# Patient Record
Sex: FEMALE | Race: BLACK OR AFRICAN AMERICAN | Employment: OTHER | URBAN - METROPOLITAN AREA
[De-identification: names, ages, dates, MRNs, and addresses within clinical notes are randomized per-mention and may not be internally consistent; named-entity substitution may affect disease eponyms.]

---

## 2017-07-08 ENCOUNTER — ED HISTORICAL/CONVERTED ENCOUNTER (OUTPATIENT)
Dept: OTHER | Age: 57
End: 2017-07-08

## 2018-05-09 ENCOUNTER — IP HISTORICAL/CONVERTED ENCOUNTER (OUTPATIENT)
Dept: OTHER | Age: 58
End: 2018-05-09

## 2018-09-18 ENCOUNTER — OP HISTORICAL/CONVERTED ENCOUNTER (OUTPATIENT)
Dept: OTHER | Age: 58
End: 2018-09-18

## 2019-01-11 ENCOUNTER — OP HISTORICAL/CONVERTED ENCOUNTER (OUTPATIENT)
Dept: OTHER | Age: 59
End: 2019-01-11

## 2019-02-25 ENCOUNTER — ED HISTORICAL/CONVERTED ENCOUNTER (OUTPATIENT)
Dept: OTHER | Age: 59
End: 2019-02-25

## 2019-04-08 ENCOUNTER — ED HISTORICAL/CONVERTED ENCOUNTER (OUTPATIENT)
Dept: OTHER | Age: 59
End: 2019-04-08

## 2019-10-31 ENCOUNTER — IP HISTORICAL/CONVERTED ENCOUNTER (OUTPATIENT)
Dept: OTHER | Age: 59
End: 2019-10-31

## 2020-01-24 ENCOUNTER — ED HISTORICAL/CONVERTED ENCOUNTER (OUTPATIENT)
Dept: OTHER | Age: 60
End: 2020-01-24

## 2020-02-20 ENCOUNTER — ED HISTORICAL/CONVERTED ENCOUNTER (OUTPATIENT)
Dept: OTHER | Age: 60
End: 2020-02-20

## 2020-12-13 ENCOUNTER — APPOINTMENT (OUTPATIENT)
Dept: GENERAL RADIOLOGY | Age: 60
DRG: 140 | End: 2020-12-13
Attending: EMERGENCY MEDICINE
Payer: MEDICAID

## 2020-12-13 ENCOUNTER — HOSPITAL ENCOUNTER (INPATIENT)
Age: 60
LOS: 5 days | Discharge: HOME | DRG: 140 | End: 2020-12-18
Attending: EMERGENCY MEDICINE | Admitting: INTERNAL MEDICINE
Payer: MEDICAID

## 2020-12-13 DIAGNOSIS — J18.9 COMMUNITY ACQUIRED PNEUMONIA, UNSPECIFIED LATERALITY: Primary | ICD-10-CM

## 2020-12-13 DIAGNOSIS — J44.9 CHRONIC OBSTRUCTIVE PULMONARY DISEASE, UNSPECIFIED COPD TYPE (HCC): ICD-10-CM

## 2020-12-13 PROBLEM — R09.02 HYPOXIA: Status: ACTIVE | Noted: 2020-12-13

## 2020-12-13 LAB
ANION GAP SERPL CALC-SCNC: 4 MMOL/L (ref 5–15)
ATRIAL RATE: 98 BPM
BASOPHILS # BLD: 0 K/UL (ref 0–0.1)
BASOPHILS NFR BLD: 0 % (ref 0–1)
BNP SERPL-MCNC: 67 PG/ML
BUN SERPL-MCNC: 21 MG/DL (ref 6–20)
BUN/CREAT SERPL: 18 (ref 12–20)
CA-I BLD-MCNC: 9.3 MG/DL (ref 8.5–10.1)
CALCULATED P AXIS, ECG09: 59 DEGREES
CALCULATED R AXIS, ECG10: -11 DEGREES
CALCULATED T AXIS, ECG11: 66 DEGREES
CHLORIDE SERPL-SCNC: 106 MMOL/L (ref 97–108)
CO2 SERPL-SCNC: 30 MMOL/L (ref 21–32)
COVID-19 RAPID TEST, COVR: NOT DETECTED
CREAT SERPL-MCNC: 1.2 MG/DL (ref 0.55–1.02)
DIAGNOSIS, 93000: NORMAL
DIFFERENTIAL METHOD BLD: ABNORMAL
EOSINOPHIL # BLD: 0 K/UL (ref 0–0.4)
EOSINOPHIL NFR BLD: 0 % (ref 0–7)
ERYTHROCYTE [DISTWIDTH] IN BLOOD BY AUTOMATED COUNT: 14.8 % (ref 11.5–14.5)
FLUAV AG NPH QL IA: NEGATIVE
FLUBV AG NOSE QL IA: NEGATIVE
GLUCOSE SERPL-MCNC: 99 MG/DL (ref 65–100)
HCT VFR BLD AUTO: 41.3 % (ref 35–47)
HGB BLD-MCNC: 13 G/DL (ref 11.5–16)
IMM GRANULOCYTES # BLD AUTO: 0 K/UL (ref 0–0.04)
IMM GRANULOCYTES NFR BLD AUTO: 0 % (ref 0–0.5)
LYMPHOCYTES # BLD: 1.4 K/UL (ref 0.8–3.5)
LYMPHOCYTES NFR BLD: 20 % (ref 12–49)
MCH RBC QN AUTO: 30.2 PG (ref 26–34)
MCHC RBC AUTO-ENTMCNC: 31.5 G/DL (ref 30–36.5)
MCV RBC AUTO: 95.8 FL (ref 80–99)
MONOCYTES # BLD: 0.3 K/UL (ref 0–1)
MONOCYTES NFR BLD: 5 % (ref 5–13)
NEUTS SEG # BLD: 5.3 K/UL (ref 1.8–8)
NEUTS SEG NFR BLD: 75 % (ref 32–75)
P-R INTERVAL, ECG05: 156 MS
PLATELET # BLD AUTO: 329 K/UL (ref 150–400)
PMV BLD AUTO: 10.6 FL (ref 8.9–12.9)
POTASSIUM SERPL-SCNC: 4.1 MMOL/L (ref 3.5–5.1)
Q-T INTERVAL, ECG07: 348 MS
QRS DURATION, ECG06: 70 MS
QTC CALCULATION (BEZET), ECG08: 444 MS
RBC # BLD AUTO: 4.31 M/UL (ref 3.8–5.2)
SARS-COV-2, COV2: NORMAL
SODIUM SERPL-SCNC: 140 MMOL/L (ref 136–145)
SPECIMEN SOURCE: NORMAL
TROPONIN I SERPL-MCNC: <0.05 NG/ML
VENTRICULAR RATE, ECG03: 98 BPM
WBC # BLD AUTO: 7.1 K/UL (ref 3.6–11)

## 2020-12-13 PROCEDURE — 74011000250 HC RX REV CODE- 250

## 2020-12-13 PROCEDURE — 36415 COLL VENOUS BLD VENIPUNCTURE: CPT

## 2020-12-13 PROCEDURE — 80048 BASIC METABOLIC PNL TOTAL CA: CPT

## 2020-12-13 PROCEDURE — 84484 ASSAY OF TROPONIN QUANT: CPT

## 2020-12-13 PROCEDURE — 71045 X-RAY EXAM CHEST 1 VIEW: CPT

## 2020-12-13 PROCEDURE — 94640 AIRWAY INHALATION TREATMENT: CPT

## 2020-12-13 PROCEDURE — 96365 THER/PROPH/DIAG IV INF INIT: CPT

## 2020-12-13 PROCEDURE — 74011250637 HC RX REV CODE- 250/637: Performed by: INTERNAL MEDICINE

## 2020-12-13 PROCEDURE — 99218 HC RM OBSERVATION: CPT

## 2020-12-13 PROCEDURE — 74011000250 HC RX REV CODE- 250: Performed by: INTERNAL MEDICINE

## 2020-12-13 PROCEDURE — 74011250636 HC RX REV CODE- 250/636: Performed by: INTERNAL MEDICINE

## 2020-12-13 PROCEDURE — 87635 SARS-COV-2 COVID-19 AMP PRB: CPT

## 2020-12-13 PROCEDURE — 93005 ELECTROCARDIOGRAM TRACING: CPT

## 2020-12-13 PROCEDURE — 65270000029 HC RM PRIVATE

## 2020-12-13 PROCEDURE — 74011250637 HC RX REV CODE- 250/637: Performed by: EMERGENCY MEDICINE

## 2020-12-13 PROCEDURE — 87804 INFLUENZA ASSAY W/OPTIC: CPT

## 2020-12-13 PROCEDURE — 85025 COMPLETE CBC W/AUTO DIFF WBC: CPT

## 2020-12-13 PROCEDURE — 74011250636 HC RX REV CODE- 250/636: Performed by: EMERGENCY MEDICINE

## 2020-12-13 PROCEDURE — 99285 EMERGENCY DEPT VISIT HI MDM: CPT

## 2020-12-13 PROCEDURE — 96375 TX/PRO/DX INJ NEW DRUG ADDON: CPT

## 2020-12-13 PROCEDURE — 83880 ASSAY OF NATRIURETIC PEPTIDE: CPT

## 2020-12-13 PROCEDURE — 77010033678 HC OXYGEN DAILY

## 2020-12-13 PROCEDURE — 74011000250 HC RX REV CODE- 250: Performed by: EMERGENCY MEDICINE

## 2020-12-13 PROCEDURE — 94760 N-INVAS EAR/PLS OXIMETRY 1: CPT

## 2020-12-13 RX ORDER — IPRATROPIUM BROMIDE AND ALBUTEROL SULFATE 2.5; .5 MG/3ML; MG/3ML
3 SOLUTION RESPIRATORY (INHALATION)
Status: COMPLETED | OUTPATIENT
Start: 2020-12-13 | End: 2020-12-13

## 2020-12-13 RX ORDER — ONDANSETRON 2 MG/ML
4 INJECTION INTRAMUSCULAR; INTRAVENOUS
Status: DISCONTINUED | OUTPATIENT
Start: 2020-12-13 | End: 2020-12-18 | Stop reason: HOSPADM

## 2020-12-13 RX ORDER — DIPHENHYDRAMINE HYDROCHLORIDE 50 MG/ML
25 INJECTION, SOLUTION INTRAMUSCULAR; INTRAVENOUS
Status: COMPLETED | OUTPATIENT
Start: 2020-12-13 | End: 2020-12-13

## 2020-12-13 RX ORDER — MORPHINE SULFATE 2 MG/ML
4 INJECTION, SOLUTION INTRAMUSCULAR; INTRAVENOUS
Status: DISCONTINUED | OUTPATIENT
Start: 2020-12-13 | End: 2020-12-13

## 2020-12-13 RX ORDER — ERGOCALCIFEROL 1.25 MG/1
50000 CAPSULE ORAL
COMMUNITY

## 2020-12-13 RX ORDER — BENZONATATE 100 MG/1
100 CAPSULE ORAL
Status: DISCONTINUED | OUTPATIENT
Start: 2020-12-13 | End: 2020-12-18 | Stop reason: HOSPADM

## 2020-12-13 RX ORDER — MAGNESIUM SULFATE 1 G/100ML
1 INJECTION INTRAVENOUS
Status: COMPLETED | OUTPATIENT
Start: 2020-12-13 | End: 2020-12-13

## 2020-12-13 RX ORDER — ALBUTEROL SULFATE 0.63 MG/3ML
0.63 SOLUTION RESPIRATORY (INHALATION)
COMMUNITY
End: 2020-12-18

## 2020-12-13 RX ORDER — AZITHROMYCIN 500 MG/1
500 TABLET, FILM COATED ORAL
Status: COMPLETED | OUTPATIENT
Start: 2020-12-13 | End: 2020-12-13

## 2020-12-13 RX ORDER — ALBUTEROL SULFATE 2.5 MG/.5ML
SOLUTION RESPIRATORY (INHALATION)
Status: COMPLETED
Start: 2020-12-13 | End: 2020-12-13

## 2020-12-13 RX ORDER — ENOXAPARIN SODIUM 100 MG/ML
40 INJECTION SUBCUTANEOUS EVERY 24 HOURS
Status: DISCONTINUED | OUTPATIENT
Start: 2020-12-13 | End: 2020-12-18 | Stop reason: HOSPADM

## 2020-12-13 RX ORDER — BENZONATATE 100 MG/1
100 CAPSULE ORAL ONCE
COMMUNITY

## 2020-12-13 RX ORDER — FUROSEMIDE 10 MG/ML
40 INJECTION INTRAMUSCULAR; INTRAVENOUS ONCE
Status: COMPLETED | OUTPATIENT
Start: 2020-12-13 | End: 2020-12-13

## 2020-12-13 RX ORDER — MONTELUKAST SODIUM 10 MG/1
10 TABLET ORAL DAILY
COMMUNITY

## 2020-12-13 RX ORDER — AMLODIPINE BESYLATE 10 MG/1
10 TABLET ORAL DAILY
COMMUNITY

## 2020-12-13 RX ORDER — PREDNISONE 20 MG/1
40 TABLET ORAL
Status: DISCONTINUED | OUTPATIENT
Start: 2020-12-14 | End: 2020-12-13

## 2020-12-13 RX ORDER — SODIUM CHLORIDE 0.9 % (FLUSH) 0.9 %
5-40 SYRINGE (ML) INJECTION EVERY 8 HOURS
Status: DISCONTINUED | OUTPATIENT
Start: 2020-12-13 | End: 2020-12-18

## 2020-12-13 RX ORDER — SODIUM CHLORIDE 0.9 % (FLUSH) 0.9 %
5-40 SYRINGE (ML) INJECTION AS NEEDED
Status: DISCONTINUED | OUTPATIENT
Start: 2020-12-13 | End: 2020-12-18 | Stop reason: HOSPADM

## 2020-12-13 RX ORDER — BUDESONIDE AND FORMOTEROL FUMARATE DIHYDRATE 80; 4.5 UG/1; UG/1
2 AEROSOL RESPIRATORY (INHALATION)
Status: DISCONTINUED | OUTPATIENT
Start: 2020-12-13 | End: 2020-12-18 | Stop reason: HOSPADM

## 2020-12-13 RX ORDER — ALBUTEROL SULFATE 90 UG/1
2 AEROSOL, METERED RESPIRATORY (INHALATION)
COMMUNITY
End: 2021-08-08 | Stop reason: ALTCHOICE

## 2020-12-13 RX ORDER — ADHESIVE BANDAGE
30 BANDAGE TOPICAL DAILY PRN
Status: DISCONTINUED | OUTPATIENT
Start: 2020-12-13 | End: 2020-12-18 | Stop reason: HOSPADM

## 2020-12-13 RX ORDER — ALBUTEROL SULFATE 2.5 MG/.5ML
2.5 SOLUTION RESPIRATORY (INHALATION)
Status: DISCONTINUED | OUTPATIENT
Start: 2020-12-13 | End: 2020-12-14

## 2020-12-13 RX ORDER — FLUTICASONE PROPIONATE 50 MCG
2 SPRAY, SUSPENSION (ML) NASAL DAILY
COMMUNITY

## 2020-12-13 RX ORDER — CALCIUM CARBONATE/VITAMIN D3 500 MG-10
TABLET ORAL DAILY
COMMUNITY

## 2020-12-13 RX ORDER — THERA TABS 400 MCG
1 TAB ORAL DAILY
COMMUNITY
End: 2021-08-08 | Stop reason: ALTCHOICE

## 2020-12-13 RX ORDER — IPRATROPIUM BROMIDE 0.5 MG/2.5ML
0.5 SOLUTION RESPIRATORY (INHALATION)
Status: DISCONTINUED | OUTPATIENT
Start: 2020-12-13 | End: 2020-12-14

## 2020-12-13 RX ORDER — LORAZEPAM 0.5 MG/1
0.5 TABLET ORAL
Status: DISCONTINUED | OUTPATIENT
Start: 2020-12-13 | End: 2020-12-18 | Stop reason: HOSPADM

## 2020-12-13 RX ORDER — FUROSEMIDE 40 MG/1
40 TABLET ORAL DAILY
COMMUNITY
End: 2020-12-18

## 2020-12-13 RX ORDER — ACETAMINOPHEN 325 MG/1
650 TABLET ORAL
Status: DISCONTINUED | OUTPATIENT
Start: 2020-12-13 | End: 2020-12-18 | Stop reason: HOSPADM

## 2020-12-13 RX ADMIN — CEFTRIAXONE SODIUM 1 G: 1 INJECTION, POWDER, FOR SOLUTION INTRAMUSCULAR; INTRAVENOUS at 11:28

## 2020-12-13 RX ADMIN — IPRATROPIUM BROMIDE 0.5 MG: 0.5 SOLUTION RESPIRATORY (INHALATION) at 13:33

## 2020-12-13 RX ADMIN — AZITHROMYCIN 500 MG: 500 INJECTION, POWDER, LYOPHILIZED, FOR SOLUTION INTRAVENOUS at 11:32

## 2020-12-13 RX ADMIN — BUDESONIDE AND FORMOTEROL FUMARATE DIHYDRATE 2 PUFF: 80; 4.5 AEROSOL RESPIRATORY (INHALATION) at 19:20

## 2020-12-13 RX ADMIN — ALBUTEROL SULFATE 2.5 MG: 2.5 SOLUTION RESPIRATORY (INHALATION) at 19:21

## 2020-12-13 RX ADMIN — IPRATROPIUM BROMIDE AND ALBUTEROL SULFATE 3 ML: .5; 3 SOLUTION RESPIRATORY (INHALATION) at 04:43

## 2020-12-13 RX ADMIN — METHYLPREDNISOLONE SODIUM SUCCINATE 125 MG: 125 INJECTION, POWDER, FOR SOLUTION INTRAMUSCULAR; INTRAVENOUS at 04:44

## 2020-12-13 RX ADMIN — ENOXAPARIN SODIUM 40 MG: 40 INJECTION SUBCUTANEOUS at 11:29

## 2020-12-13 RX ADMIN — ALBUTEROL SULFATE 2.5 MG: 2.5 SOLUTION RESPIRATORY (INHALATION) at 13:33

## 2020-12-13 RX ADMIN — MAGNESIUM SULFATE HEPTAHYDRATE 1 G: 1 INJECTION, SOLUTION INTRAVENOUS at 04:44

## 2020-12-13 RX ADMIN — METHYLPREDNISOLONE SODIUM SUCCINATE 40 MG: 40 INJECTION, POWDER, FOR SOLUTION INTRAMUSCULAR; INTRAVENOUS at 23:14

## 2020-12-13 RX ADMIN — DIPHENHYDRAMINE HYDROCHLORIDE 25 MG: 50 INJECTION, SOLUTION INTRAMUSCULAR; INTRAVENOUS at 06:05

## 2020-12-13 RX ADMIN — Medication 10 ML: at 11:31

## 2020-12-13 RX ADMIN — BENZONATATE 100 MG: 100 CAPSULE ORAL at 23:24

## 2020-12-13 RX ADMIN — METHYLPREDNISOLONE SODIUM SUCCINATE 40 MG: 40 INJECTION, POWDER, FOR SOLUTION INTRAMUSCULAR; INTRAVENOUS at 18:14

## 2020-12-13 RX ADMIN — Medication 10 ML: at 16:19

## 2020-12-13 RX ADMIN — AZITHROMYCIN MONOHYDRATE 500 MG: 500 TABLET ORAL at 06:05

## 2020-12-13 RX ADMIN — IPRATROPIUM BROMIDE AND ALBUTEROL SULFATE 3 ML: .5; 3 SOLUTION RESPIRATORY (INHALATION) at 04:39

## 2020-12-13 RX ADMIN — Medication 10 ML: at 23:14

## 2020-12-13 RX ADMIN — IPRATROPIUM BROMIDE 0.5 MG: 0.5 SOLUTION RESPIRATORY (INHALATION) at 19:21

## 2020-12-13 RX ADMIN — FUROSEMIDE 40 MG: 10 INJECTION, SOLUTION INTRAMUSCULAR; INTRAVENOUS at 18:14

## 2020-12-13 NOTE — H&P
History and Physical    Patient: Walter Sanders MRN: [id number]  SSN: [id number]    YOB: 1960  Age: 64 y.o. Sex: female      Subjective:      Walter Sanders is a 64 y.o. female who presents with a complaint of cough. Shortness of breath 2 weeks duration. Patient came to the emergency room with hypoxia 85% on room air. She was not getting any relief with home inhalers. Past Medical History:   Diagnosis Date    Chronic obstructive pulmonary disease ([address]     Hypertension      No past surgical history on file. No family history on file. Social History     Tobacco Use    Smoking status: Current Some Day Smoker    Smokeless tobacco: Never Used   Substance Use Topics    Alcohol use: Yes      Prior to Admission medications    Not on File        Allergies   Allergen Reactions    Lisinopril Angioedema       Review of Systems:  A comprehensive review of systems was negative except for that written in the History of Present Illness. Objective:     Vitals:    12/13/20 0436 12/13/20 0500 12/13/20 0530 12/13/20 0630   BP:  124/79 132/89 131/78   Pulse:  93 94 92   Resp:  18 20 18   Temp:       SpO2: (!) 85% 98% 92% 93%   Weight:       Height:            Physical Exam:  General:  Alert, , crying   Eyes:  Conjunctivae/corneas clear. PERRL, EOMs intact. Fundi benign   Ears:  Normal TMs and external ear canals both ears. Nose: Nares normal. Septum midline. Mucosa normal. No drainage or sinus tenderness. Mouth/Throat: Lips, mucosa, and tongue normal. Teeth and gums normal.   Neck: Supple, symmetrical, trachea midline, no adenopathy, thyroid: no enlargment/tenderness/nodules, no carotid bruit and no JVD. Back:   Symmetric, no curvature. ROM normal. No CVA tenderness. Lungs:   ronchi  bilaterally. Heart:  Regular rate and rhythm, S1, S2 normal, no murmur, click, rub or gallop. Abdomen:   Soft, non-tender. Bowel sounds normal. No masses,  No organomegaly.    Extremities: Extremities normal, atraumatic, no cyanosis or edema. Pulses: 2+ and symmetric all extremities. Skin: Skin color, texture, turgor normal. No rashes or lesions   Lymph nodes: Cervical, supraclavicular, and axillary nodes normal.   Neurologic: CNII-XII intact. Normal strength, sensation and reflexes throughout. Assessment:     Hospital Problems  Never Reviewed          Codes Class Noted POA    COPD (chronic obstructive pulmonary disease) ([facility] ICD-10-CM: J44.9  ICD-9-CM: 696  12/13/2020 Unknown        Hypoxia ICD-10-CM: R09.02  ICD-9-CM: 799.02  12/13/2020 Unknown          Obesity  Acute hypoxic respiratory failure  Possible pneumonia  COVID-19 test negative by rapid acting  Anxiety with depression and possible reflux esophagitis    Plan:     The antibiotics nebulizers and steroids consult pulmonary.   DVT prophylaxis    Signed By: Ciera Alford MD     December 13, 2020

## 2020-12-13 NOTE — CONSULTS
Pulmonary and Critical Care Consult    Subjective:   Consult Note: 12/13/2020 @no control      Chief Complaint:   Chief Complaint   Patient presents with    Respiratory Distress        This patient has been seen and evaluated at the request of Dr. Colleen Brooks. 57-year-old obese -American lady  I am asked to see for acute respiratory failure with hypoxia and shortness of breath    Active smoker of a few cigarettes a day  Apparently has an extensive history of smoking for several decades in the past  Has a history of COPD/asthma diagnosed for the last several years  Has been on inhalers through my partner Dr. Thelma Vogel on recent PFTs 10/20 was 2.96 L, 47%    Also has a history of moderate to severe sleep apnea  Was prescribed CPAP at 9 cm of water with 3 L oxygen bleed in  But patient has been relatively noncompliant with this    Recent episode of exacerbation/bronchitis approximately 2 weeks back for which she was treated with antibiotics  Patient states symptoms improved somewhat but then have worsened  Worsening shortness of breath  Also admits to pedal edema increasing  Cough with yellowish phlegm production for the last few days    Chest x-ray shows cardiomegaly with bilateral pulmonary vascular congestion with no clear infiltrate    Review of Systems:  A comprehensive review of systems was negative except for that written in the HPI. Past Medical history:    1. COPD  2. Asthma  3. Obstructive sleep apnea  4. Hypertension  5. Morbid obesity    No past surgical history on file. No family history on file.   Social History     Tobacco Use    Smoking status: Current Some Day Smoker    Smokeless tobacco: Never Used   Substance Use Topics    Alcohol use: Yes      Current Facility-Administered Medications   Medication Dose Route Frequency Provider Last Rate Last Dose    benzonatate (TESSALON) capsule 100 mg  100 mg Oral TID PRN Jolly Solorzano MD        acetaminophen (TYLENOL) tablet 650 mg 650 mg Oral Q6H PRN Sudhir Strange MD        [facility]dansetron [facility]) injection 4 mg  4 mg IntraVENous Q4H PRN Kailash NICHOLS MD        sodium chloride (NS) flush 5-40 mL  5-40 mL IntraVENous Q8H Kailash NICHOLS MD   10 mL at 20 1619    sodium chloride (NS) flush 5-40 mL  5-40 mL IntraVENous PRN Kailash NICHOLS MD   10 mL at 20 1131    albuterol (PROVENTIL VENTOLIN) nebulizer solution 2.5 mg  2.5 mg Nebulization Q6H RT Sudhir Strange MD   Stopped at 20 1400    ipratropium (ATROVENT) 0.02 % nebulizer solution 0.5 mg  0.5 mg Nebulization Q6H RT Kailash NICHOLS MD   0.5 mg at 20 1333    budesonide-formoterol (SYMBICORT) 80-4.5 mcg inhaler  2 Puff Inhalation BID RT Sudhir Strange MD       [address] [place] [address] ON 2020] predniSONE (DELTASONE) tablet 40 mg  40 mg Oral DAILY WITH BREAKFAST Sudhir Strange MD        cefTRIAXone (ROCEPHIN) 1 g in sterile water (preservative free) 10 mL IV syringe  1 g IntraVENous Q24H Kailash NICHOLS MD   1 g at 20 1128    azithromycin (ZITHROMAX) 500 mg in 0.9% sodium chloride 250 mL (VIAL-MATE)  500 mg IntraVENous Q24H Kailash NICHOLS MD   500 mg at 20 1132    magnesium hydroxide (MILK OF MAGNESIA) 400 mg/5 mL oral suspension 30 mL  30 mL Oral DAILY PRN Sudhir Strange MD        LORazepam (ATIVAN) tablet 0.5 mg  0.5 mg Oral BID PRN Kailash NICHOLS MD        enoxaparin (LOVENOX) injection 40 mg  40 mg SubCUTAneous Q24H Kailash NICHOLS MD   40 mg at 20 1129          Allergies   Allergen Reactions    Lisinopril Angioedema           Objective:     Blood pressure 112/67, pulse 100, temperature 98.7 °F (37.1 °C), resp. rate 22, height 5' 4\" (1.626 m), weight 93 kg (205 lb), SpO2 91 %. Temp (24hrs), Av.8 °F (37.1 °C), Min:98.5 °F (36.9 °C), Max:99.1 °F (37.3 °C)      Intake and Output:  Current Shift: No intake/output data recorded.   Last 3 Shifts:  1901 -  0700  In: 100 [I.V.:100]  Out: -     Physical Exam: General:  Obese lady, lying in bed comfortably, no acute distress, on nasal cannula oxygen at 2 L  Eye: Reactive, symmetric  Throat and Neck: Supple  Lung:  Use air entry bilaterally with prolonged exhalation, wheezing, occasional crackles  Heart: S1+S2. No murmurs  Abdomen: soft, non-tender. Bowel sounds normal. No masses; obese  Extremities:  2+ edema left lower extremity  : Not done  Skin: No cyanosis  Neurologic: A & O x3. Grossly nonfocal  Psychiatric: Appropriate affect; coherent      Lab/Data Review:    Recent Results (from the past 24 hour(s))   CBC WITH AUTOMATED DIFF    Collection Time: 12/13/20  4:30 AM   Result Value Ref Range    WBC 7.1 3.6 - 11.0 K/uL    RBC 4.31 3.80 - 5.20 M/uL    HGB 13.0 11.5 - 16.0 g/dL    HCT 41.3 35.0 - 47.0 %    MCV 95.8 80.0 - 99.0 FL    MCH 30.2 26.0 - 34.0 PG    MCHC 31.5 30.0 - 36.5 g/dL    RDW 14.8 (H) 11.5 - 14.5 %    PLATELET 990 173 - 502 K/uL    MPV 10.6 8.9 - 12.9 FL    NEUTROPHILS 75 32 - 75 %    LYMPHOCYTES 20 12 - 49 %    MONOCYTES 5 5 - 13 %    EOSINOPHILS 0 0 - 7 %    BASOPHILS 0 0 - 1 %    IMMATURE GRANULOCYTES 0 0.0 - 0.5 %    ABS. NEUTROPHILS 5.3 1.8 - 8.0 K/UL    ABS. LYMPHOCYTES 1.4 0.8 - 3.5 K/UL    ABS. MONOCYTES 0.3 0.0 - 1.0 K/UL    ABS. EOSINOPHILS 0.0 0.0 - 0.4 K/UL    ABS. BASOPHILS 0.0 0.0 - 0.1 K/UL    ABS. IMM.  GRANS. 0.0 0.00 - 0.04 K/UL    DF AUTOMATED     METABOLIC PANEL, BASIC    Collection Time: 12/13/20  4:30 AM   Result Value Ref Range    Sodium 140 136 - 145 mmol/L    Potassium 4.1 3.5 - 5.1 mmol/L    Chloride 106 97 - 108 mmol/L    CO2 30 21 - 32 mmol/L    Anion gap 4 (L) 5 - 15 mmol/L    Glucose 99 65 - 100 mg/dL    BUN 21 (H) 6 - 20 mg/dL    Creatinine 1.20 (H) 0.55 - 1.02 mg/dL    BUN/Creatinine ratio 18 12 - 20      GFR est AA 56 (L) >60 ml/min/1.73m2    GFR est non-AA 46 (L) >60 ml/min/1.73m2    Calcium 9.3 8.5 - 10.1 mg/dL   TROPONIN I    Collection Time: 12/13/20  4:30 AM   Result Value Ref Range    Troponin-I, Qt. <0.05 <0.05 ng/mL   BNP    Collection Time: 12/13/20  4:30 AM   Result Value Ref Range    NT pro-BNP 67 <125 pg/mL   SARS-COV-2    Collection Time: 12/13/20  5:30 AM   Result Value Ref Range    SARS-CoV-2 Please find results under separate order     INFLUENZA A & B AG (RAPID TEST)    Collection Time: 12/13/20  5:30 AM   Result Value Ref Range    Influenza A Antigen Negative Negative      Influenza B Antigen Negative Negative     COVID-19 RAPID TEST    Collection Time: 12/13/20  5:30 AM   Result Value Ref Range    Specimen source Nasopharyngeal      COVID-19 rapid test Not Detected Not Detected         XR CHEST SNGL V   Final Result   IMPRESSION: Hazy density bilaterally which may be artifact related to overlying   soft tissue. Early peripheral infiltrates could give this appearance as well. Are there signs or symptoms of pneumonia? .                    CT Results  (Last 48 hours)    None            Assessment:     1. Acute respiratory failure with hypoxia  2. Acute exacerbation of COPD  3. Asthma  4. Wheezing  5. Cardiomegaly  6. Pedal edema  7. Obstructive sleep apnea on CPAP  8. Obesity  9.   Acute kidney injury    Plan:     Patient admitted to the hospital  Will be watched here closely    Nasal cannula oxygen as needed to keep saturation above 92%  Currently on 2 L nasal cannula oxygen    Continue azithromycin and Rocephin  Further changes in antibiotics based on clinical response and culture results  We will start Solu-Medrol 40 mg every 6 hours  Continue duo nebs every 6 hours  Symbicort twice daily    Patient has cardiomegaly and pedal edema but BNP is normal  Will give Lasix 40 mg IV x1  Intake and output charting  Check electrolytes and replace as needed  Follow renal function with fluid changes  Check echo in a.m. to evaluate left ventricular and right ventricular function  Will do Doppler ultrasound lower extremities to rule out DVT    Start CPAP 9 cm of water pressure with 3 L oxygen bleed in    DVT and GI prophylaxis    Questions of patient were answered at bedside in detail  Case discussed in detail with RN, RT, and care team  Thank you for involving me in the care of this patient  I will follow with you closely during hospitalization    Time spent more than 45 minutes in direct patient care with no overlap reviewing results and records, decision making, and answering questions.       Keiko Monreal MD  [facility]  12/13/2020  4:34 PM

## 2020-12-13 NOTE — PROGRESS NOTES
Primary Nurse Kayley Banks RN and Karen Ventura RN performed a dual skin assessment on this patient No impairment noted  Driss score is 22

## 2020-12-13 NOTE — ED TRIAGE NOTES
Presents with sob x 2 weeks. Reports URI sx for that long. Hx of copd, inhalers not working well. Pt tearful. P ox 85-86%.   +wheezing

## 2020-12-13 NOTE — ROUTINE PROCESS
TRANSFER - OUT REPORT:    Verbal report given to Naty RN(name) on Brennan Lala  being transferred to [facility](unit) for routine progression of care       Report consisted of patients Situation, Background, Assessment and   Recommendations(SBAR). Information from the following report(s) SBAR, ED Summary, Intake/Output, MAR, Recent Results and Cardiac Rhythm Sinus Tach was reviewed with the receiving nurse. Lines:   Peripheral IV 12/13/20 Anterior;Right Forearm (Active)        Opportunity for questions and clarification was provided.       Patient transported with:   [facility]

## 2020-12-13 NOTE — ED PROVIDER NOTES
EMERGENCY DEPARTMENT HISTORY AND PHYSICAL EXAM      Date: 12/13/2020  Patient Name: Bolivar Seals      History of Presenting Illness     Chief Complaint   Patient presents with    Respiratory Distress       History Provided By: Patient    HPI: Bolivar Seals, 64 y.o. female with a past medical history significant hypertension and COPD presents to the ED with cc of shortness of breath x2 weeks worsening. She reports URI symptoms of cough congestion the same time on arrival to the ED 85% on room air. In her home inhalers without relief. No fevers no chills. There are no other complaints, changes, or physical findings at this time. PCP: Fidencio De La Torre MD    Current Facility-Administered Medications   Medication Dose Route Frequency Provider Last Rate Last Dose    benzonatate (TESSALON) capsule 100 mg  100 mg Oral TID PRN Socorro Graf MD        acetaminophen (TYLENOL) tablet 650 mg  650 mg Oral Q6H PRN Socorro Graf MD        morphine injection 4 mg  4 mg IntraVENous Q4H PRN Socorro Graf MD        [facility]) injection 4 mg  4 mg IntraVENous Q4H PRN Socorro Graf MD           Past History     Past Medical History:  Past Medical History:   Diagnosis Date    Chronic obstructive pulmonary disease ([facility]     Hypertension        Past Surgical History:  No past surgical history on file. Family History:  No family history on file. Social History:  Social History     Tobacco Use    Smoking status: Current Some Day Smoker    Smokeless tobacco: Never Used   Substance Use Topics    Alcohol use: Yes    Drug use: Never       Allergies: Allergies   Allergen Reactions    Lisinopril Angioedema         Review of Systems     Review of Systems   Constitutional: Negative for appetite change, fatigue and fever. HENT: Negative for congestion, ear pain, sinus pressure, sinus pain and sore throat. Eyes: Negative for redness and visual disturbance. Respiratory: Positive for shortness of breath and wheezing. Negative for cough and chest tightness. Cardiovascular: Positive for chest pain. Negative for leg swelling. Gastrointestinal: Negative for abdominal pain, diarrhea, nausea and vomiting. Genitourinary: Negative for dysuria and flank pain. Musculoskeletal: Negative for arthralgias, back pain, gait problem and myalgias. Skin: Negative for rash. Neurological: Negative for dizziness, speech difficulty, light-headedness, numbness and headaches. Psychiatric/Behavioral: Negative for suicidal ideas. The patient is not nervous/anxious. Physical Exam     Physical Exam  Vitals signs and nursing note reviewed. Constitutional:       General: She is in acute distress. Appearance: Normal appearance. She is diaphoretic. She is not ill-appearing. Comments: Anxious   HENT:      Head: Normocephalic and atraumatic. Right Ear: External ear normal.      Left Ear: External ear normal.      Nose: Nose normal.      Mouth/Throat:      Mouth: Mucous membranes are moist.      Pharynx: Oropharynx is clear. Eyes:      Conjunctiva/sclera: Conjunctivae normal.      Pupils: Pupils are equal, round, and reactive to light. Neck:      Musculoskeletal: Normal range of motion and neck supple. Cardiovascular:      Rate and Rhythm: Regular rhythm. Tachycardia present. Pulses: Normal pulses. Heart sounds: Normal heart sounds. Pulmonary:      Effort: Respiratory distress present. Breath sounds: No stridor. Wheezing present. No rales. Abdominal:      Palpations: Abdomen is soft. Musculoskeletal: Normal range of motion. Skin:     General: Skin is warm. Capillary Refill: Capillary refill takes less than 2 seconds. Neurological:      General: No focal deficit present. Mental Status: She is alert and oriented to person, place, and time. Mental status is at baseline.    Psychiatric:         Mood and Affect: Mood normal. Thought Content: Thought content normal.         Judgment: Judgment normal.         Lab and Diagnostic Study Results     Labs -     Recent Results (from the past 12 hour(s))   CBC WITH AUTOMATED DIFF    Collection Time: 12/13/20  4:30 AM   Result Value Ref Range    WBC 7.1 3.6 - 11.0 K/uL    RBC 4.31 3.80 - 5.20 M/uL    HGB 13.0 11.5 - 16.0 g/dL    HCT 41.3 35.0 - 47.0 %    MCV 95.8 80.0 - 99.0 FL    MCH 30.2 26.0 - 34.0 PG    MCHC 31.5 30.0 - 36.5 g/dL    RDW 14.8 (H) 11.5 - 14.5 %    PLATELET 540 394 - 212 K/uL    MPV 10.6 8.9 - 12.9 FL    NEUTROPHILS 75 32 - 75 %    LYMPHOCYTES 20 12 - 49 %    MONOCYTES 5 5 - 13 %    EOSINOPHILS 0 0 - 7 %    BASOPHILS 0 0 - 1 %    IMMATURE GRANULOCYTES 0 0.0 - 0.5 %    ABS. NEUTROPHILS 5.3 1.8 - 8.0 K/UL    ABS. LYMPHOCYTES 1.4 0.8 - 3.5 K/UL    ABS. MONOCYTES 0.3 0.0 - 1.0 K/UL    ABS. EOSINOPHILS 0.0 0.0 - 0.4 K/UL    ABS. BASOPHILS 0.0 0.0 - 0.1 K/UL    ABS. IMM.  GRANS. 0.0 0.00 - 0.04 K/UL    DF AUTOMATED     METABOLIC PANEL, BASIC    Collection Time: 12/13/20  4:30 AM   Result Value Ref Range    Sodium 140 136 - 145 mmol/L    Potassium 4.1 3.5 - 5.1 mmol/L    Chloride 106 97 - 108 mmol/L    CO2 30 21 - 32 mmol/L    Anion gap 4 (L) 5 - 15 mmol/L    Glucose 99 65 - 100 mg/dL    BUN 21 (H) 6 - 20 mg/dL    Creatinine 1.20 (H) 0.55 - 1.02 mg/dL    BUN/Creatinine ratio 18 12 - 20      GFR est AA 56 (L) >60 ml/min/1.73m2    GFR est non-AA 46 (L) >60 ml/min/1.73m2    Calcium 9.3 8.5 - 10.1 mg/dL   TROPONIN I    Collection Time: 12/13/20  4:30 AM   Result Value Ref Range    Troponin-I, Qt. <0.05 <0.05 ng/mL   BNP    Collection Time: 12/13/20  4:30 AM   Result Value Ref Range    NT pro-BNP 67 <125 pg/mL   SARS-COV-2    Collection Time: 12/13/20  5:30 AM   Result Value Ref Range    SARS-CoV-2 Please find results under separate order     INFLUENZA A & B AG (RAPID TEST)    Collection Time: 12/13/20  5:30 AM   Result Value Ref Range    Influenza A Antigen Negative Negative Influenza B Antigen Negative Negative     COVID-19 RAPID TEST    Collection Time: 12/13/20  5:30 AM   Result Value Ref Range    Specimen source Nasopharyngeal      COVID-19 rapid test Not Detected Not Detected         Radiologic Studies -   [unfilled]  CT Results  (Last 48 hours)    None        CXR Results  (Last 48 hours)               12/13/20 0437  XR CHEST SNGL V Final result    Impression:  IMPRESSION: Hazy density bilaterally which may be artifact related to overlying   soft tissue. Early peripheral infiltrates could give this appearance as well. Are there signs or symptoms of pneumonia? .                    Narrative:  PROCEDURE: XR CHEST SNGL V       HISTORY:Short of breath       COMPARISON:Chest x-ray October 31, 2019           TECHNIQUE: AP portable chest       LIMITATIONS: None       TUBES/LINES: None       LUNG PARENCHYMA/PLEURA: There is poorly defined density in the right infrahilar   region and along the right lateral chest wall. Minimal density in the periapical   region on the left. A discrete pulmonary mass or infiltrate is not seen. TRACHEA/BRONCHI: Normal   PULMONARY VESSELS: Normal   HEART: Heart appears mildly enlarged   MEDIASTINUM: Normal   BONE/SOFT TISSUES: No acute process       OTHER: None                 Medical Decision Making and ED Course   - I am the first and primary provider for this patient AND AM THE PRIMARY PROVIDER OF RECORD. - I reviewed the vital signs, available nursing notes, past medical history, past surgical history, family history and social history. - Initial assessment performed. The patients presenting problems have been discussed, and the staff are in agreement with the care plan formulated and outlined with them. I have encouraged them to ask questions as they arise throughout their visit. Vital Signs-Reviewed the patient's vital signs.     Patient Vitals for the past 12 hrs:   Temp Pulse Resp BP SpO2   12/13/20 0630 -- 92 18 131/78 93 %   12/13/20 0530 -- 94 20 132/89 92 %   12/13/20 0500 -- 93 18 124/79 98 %   12/13/20 0436 -- -- -- -- (!) 85 %   12/13/20 0435 -- -- -- -- 99 %   12/13/20 0431 99.1 °F (37.3 °C) (!) 111 (!) 32 (!) 151/79 (!) 85 %       EKG interpretation: (Preliminary):   13 December 2020 0 446 read at 0 452 normal sinus rhythm normal EKG no STEMI no ectopy. Rate and 90 bpm QRS of 70 ms. Records Reviewed: Nursing Notes      ED Course:              Provider Notes (Medical Decision Making):   61-year-old female presenting to the emergency department in respiratory distress. Diffuse wheezing hypoxia on arrival.  Received duo nebs and steroids and magnesium she is improving however she still only satting about 90% on nasal cannula. She still tachypneic. There is some evidence of pneumonia on chest x-ray she states she is had recurrent pneumonia and presented similarly over the last couple of years. Will admit. MDM           Consultations:       Consultations: Hospitalist Consultant: Dr. Iman Rowan: We have asked for emergent assistance with regard to this patient. We have discussed the patients HPI, ROS, PE and results this far. They will come and evaluate the patient for admission. Procedures and Critical Care       Performed by: Becki Chang MD  PROCEDURES:   Procedures        CRITICAL CARE NOTE :  5:56 AM  Amount of Critical Care Time: 35(minutes)    IMPENDING DETERIORATION -Respiratory and Metabolic  ASSOCIATED RISK FACTORS - Hypoxia, Dysrhythmia and Dehydration  MANAGEMENT- Bedside Assessment  INTERPRETATION -  Xrays, ECG and Blood Pressure  INTERVENTIONS - hemodynamic mngmt and Metobolic interventions  CASE REVIEW - Hospitalist  TREATMENT RESPONSE -Improved  PERFORMED BY - Self    NOTES   :  I have spent critical care time involved in lab review, consultations with specialist, family decision- making, bedside attention and documentation. This time excludes time spent in any separate billed procedures.   During this entire length of time I was immediately available to the patient . Nicolle Saravia MD        Disposition     Disposition: Admitted to Floor Stepdown Unit the case was discussed with the admitting physician Dr. Colleen Brooks    Admitted       Diagnosis     Clinical Impression:   1. Community acquired pneumonia, unspecified laterality    2. Chronic obstructive pulmonary disease, unspecified COPD type ([facility]        Attestations:    Nicolle Saravia MD    Please note that this dictation was completed with [employer], the computer voice recognition software. Quite often unanticipated grammatical, syntax, homophones, and other interpretive errors are inadvertently transcribed by the computer software. Please disregard these errors. Please excuse any errors that have escaped final proofreading. Thank you.

## 2020-12-13 NOTE — CONSULTS
Call for pulmonary consult for acute hypoxic respiratory failure.   The patient is normally followed by Dr. Liliya Mcginnis.  Will defer to his group's care

## 2020-12-14 ENCOUNTER — APPOINTMENT (OUTPATIENT)
Dept: NON INVASIVE DIAGNOSTICS | Age: 60
DRG: 140 | End: 2020-12-14
Attending: INTERNAL MEDICINE
Payer: MEDICAID

## 2020-12-14 LAB
ECHO AV AREA PEAK VELOCITY: 2.3 CM2
ECHO AV AREA/BSA PEAK VELOCITY: 1.2 CM2/M2
ECHO AV PEAK GRADIENT: 15 MMHG
ECHO EST RA PRESSURE: 3 MMHG
ECHO LV EDV A2C: 138 CM3
ECHO LV EJECTION FRACTION A2C: 34 %
ECHO LV EJECTION FRACTION BIPLANE: 62.4 % (ref 55–100)
ECHO LV ESV A2C: 40 CM3
ECHO LV INTERNAL DIMENSION DIASTOLIC: 5.17 CM (ref 3.9–5.3)
ECHO LV INTERNAL DIMENSION SYSTOLIC: 3.42 CM
ECHO LV IVSD: 1.21 CM (ref 0.6–0.9)
ECHO LV MASS 2D: 249.4 G (ref 67–162)
ECHO LV MASS INDEX 2D: 126.2 G/M2 (ref 43–95)
ECHO LV POSTERIOR WALL DIASTOLIC: 1.21 CM (ref 0.6–0.9)
ECHO LVOT DIAM: 1.9 CM
ECHO LVOT PEAK GRADIENT: 10 MMHG
ECHO MV A VELOCITY: 139 CM/S
ECHO MV AREA PHT: 3.79 CM2
ECHO MV E DECELERATION TIME (DT): 282 MS
ECHO MV E VELOCITY: 105 CM/S
ECHO MV E/A RATIO: 0.76
ECHO MV PRESSURE HALF TIME (PHT): 58 MS
ECHO PV PEAK INSTANTANEOUS GRADIENT SYSTOLIC: 3 MMHG
ECHO PV REGURGITANT MAX VELOCITY: 156 CM/S
ECHO PV REGURGITANT MAX VELOCITY: 193 CM/S
ECHO PV REGURGITANT MAX VELOCITY: 90.5 CM/S
ECHO PVEIN A DURATION: 95 MS
ECHO PVEIN A VELOCITY: 32 CM/S
ECHO RIGHT VENTRICULAR SYSTOLIC PRESSURE (RVSP): 40 MMHG
ECHO RV INTERNAL DIMENSION: 3.63 CM
ECHO TV MAX VELOCITY: 306 CM/S
ECHO TV REGURGITANT PEAK GRADIENT: 37 MMHG
MV DEC SLOPE: 5800 MM/S2
MV DEC SLOPE: 5800 MM/S2

## 2020-12-14 PROCEDURE — 74011636637 HC RX REV CODE- 636/637: Performed by: INTERNAL MEDICINE

## 2020-12-14 PROCEDURE — 74011250636 HC RX REV CODE- 250/636: Performed by: INTERNAL MEDICINE

## 2020-12-14 PROCEDURE — 74011250637 HC RX REV CODE- 250/637: Performed by: INTERNAL MEDICINE

## 2020-12-14 PROCEDURE — 93306 TTE W/DOPPLER COMPLETE: CPT

## 2020-12-14 PROCEDURE — 93970 EXTREMITY STUDY: CPT

## 2020-12-14 PROCEDURE — 74011000250 HC RX REV CODE- 250: Performed by: INTERNAL MEDICINE

## 2020-12-14 PROCEDURE — 77010033678 HC OXYGEN DAILY

## 2020-12-14 PROCEDURE — 65270000029 HC RM PRIVATE

## 2020-12-14 PROCEDURE — 94640 AIRWAY INHALATION TREATMENT: CPT

## 2020-12-14 PROCEDURE — 94760 N-INVAS EAR/PLS OXIMETRY 1: CPT

## 2020-12-14 RX ORDER — BISMUTH SUBSALICYLATE 262 MG
1 TABLET,CHEWABLE ORAL DAILY
Status: DISCONTINUED | OUTPATIENT
Start: 2020-12-14 | End: 2020-12-18 | Stop reason: HOSPADM

## 2020-12-14 RX ORDER — MAGNESIUM SULFATE 100 %
4 CRYSTALS MISCELLANEOUS AS NEEDED
Status: DISCONTINUED | OUTPATIENT
Start: 2020-12-14 | End: 2020-12-18 | Stop reason: HOSPADM

## 2020-12-14 RX ORDER — INSULIN LISPRO 100 [IU]/ML
INJECTION, SOLUTION INTRAVENOUS; SUBCUTANEOUS
Status: DISCONTINUED | OUTPATIENT
Start: 2020-12-14 | End: 2020-12-18 | Stop reason: HOSPADM

## 2020-12-14 RX ORDER — POLYETHYLENE GLYCOL 3350 17 G/17G
17 POWDER, FOR SOLUTION ORAL DAILY
Status: DISCONTINUED | OUTPATIENT
Start: 2020-12-14 | End: 2020-12-18 | Stop reason: HOSPADM

## 2020-12-14 RX ORDER — CALCIUM CARBONATE/VITAMIN D3 600MG-5MCG
1 TABLET ORAL
Status: DISCONTINUED | OUTPATIENT
Start: 2020-12-14 | End: 2020-12-18 | Stop reason: HOSPADM

## 2020-12-14 RX ORDER — DEXTROSE 50 % IN WATER (D50W) INTRAVENOUS SYRINGE
25-50 AS NEEDED
Status: DISCONTINUED | OUTPATIENT
Start: 2020-12-14 | End: 2020-12-18 | Stop reason: HOSPADM

## 2020-12-14 RX ORDER — ERGOCALCIFEROL 1.25 MG/1
50000 CAPSULE ORAL
Status: DISCONTINUED | OUTPATIENT
Start: 2020-12-14 | End: 2020-12-18 | Stop reason: HOSPADM

## 2020-12-14 RX ORDER — AMLODIPINE BESYLATE 5 MG/1
10 TABLET ORAL DAILY
Status: DISCONTINUED | OUTPATIENT
Start: 2020-12-14 | End: 2020-12-18 | Stop reason: HOSPADM

## 2020-12-14 RX ORDER — MONTELUKAST SODIUM 10 MG/1
10 TABLET ORAL
Status: DISCONTINUED | OUTPATIENT
Start: 2020-12-14 | End: 2020-12-18 | Stop reason: HOSPADM

## 2020-12-14 RX ORDER — IPRATROPIUM BROMIDE AND ALBUTEROL SULFATE 2.5; .5 MG/3ML; MG/3ML
3 SOLUTION RESPIRATORY (INHALATION)
Status: DISCONTINUED | OUTPATIENT
Start: 2020-12-14 | End: 2020-12-18 | Stop reason: HOSPADM

## 2020-12-14 RX ORDER — FUROSEMIDE 40 MG/1
40 TABLET ORAL DAILY
Status: DISCONTINUED | OUTPATIENT
Start: 2020-12-14 | End: 2020-12-18 | Stop reason: HOSPADM

## 2020-12-14 RX ADMIN — ENOXAPARIN SODIUM 40 MG: 40 INJECTION SUBCUTANEOUS at 11:01

## 2020-12-14 RX ADMIN — ALBUTEROL SULFATE 2.5 MG: 2.5 SOLUTION RESPIRATORY (INHALATION) at 07:09

## 2020-12-14 RX ADMIN — Medication 1 TABLET: at 11:01

## 2020-12-14 RX ADMIN — METHYLPREDNISOLONE SODIUM SUCCINATE 40 MG: 40 INJECTION, POWDER, FOR SOLUTION INTRAMUSCULAR; INTRAVENOUS at 16:53

## 2020-12-14 RX ADMIN — MONTELUKAST 10 MG: 10 TABLET, FILM COATED ORAL at 20:53

## 2020-12-14 RX ADMIN — CEFTRIAXONE SODIUM 1 G: 1 INJECTION, POWDER, FOR SOLUTION INTRAMUSCULAR; INTRAVENOUS at 13:23

## 2020-12-14 RX ADMIN — METHYLPREDNISOLONE SODIUM SUCCINATE 40 MG: 40 INJECTION, POWDER, FOR SOLUTION INTRAMUSCULAR; INTRAVENOUS at 11:01

## 2020-12-14 RX ADMIN — METHYLPREDNISOLONE SODIUM SUCCINATE 40 MG: 40 INJECTION, POWDER, FOR SOLUTION INTRAMUSCULAR; INTRAVENOUS at 18:00

## 2020-12-14 RX ADMIN — METHYLPREDNISOLONE SODIUM SUCCINATE 40 MG: 40 INJECTION, POWDER, FOR SOLUTION INTRAMUSCULAR; INTRAVENOUS at 05:26

## 2020-12-14 RX ADMIN — IPRATROPIUM BROMIDE AND ALBUTEROL SULFATE 3 ML: .5; 3 SOLUTION RESPIRATORY (INHALATION) at 13:26

## 2020-12-14 RX ADMIN — IPRATROPIUM BROMIDE AND ALBUTEROL SULFATE 3 ML: .5; 3 SOLUTION RESPIRATORY (INHALATION) at 20:10

## 2020-12-14 RX ADMIN — METHYLPREDNISOLONE SODIUM SUCCINATE 40 MG: 40 INJECTION, POWDER, FOR SOLUTION INTRAMUSCULAR; INTRAVENOUS at 23:50

## 2020-12-14 RX ADMIN — BUDESONIDE AND FORMOTEROL FUMARATE DIHYDRATE 2 PUFF: 80; 4.5 AEROSOL RESPIRATORY (INHALATION) at 07:09

## 2020-12-14 RX ADMIN — AZITHROMYCIN 500 MG: 500 INJECTION, POWDER, LYOPHILIZED, FOR SOLUTION INTRAVENOUS at 11:01

## 2020-12-14 RX ADMIN — BENZONATATE 100 MG: 100 CAPSULE ORAL at 20:53

## 2020-12-14 RX ADMIN — MULTIVITAMIN TABLET 1 TABLET: TABLET at 11:01

## 2020-12-14 RX ADMIN — POLYETHYLENE GLYCOL 3350 17 G: 17 POWDER, FOR SOLUTION ORAL at 16:51

## 2020-12-14 RX ADMIN — IPRATROPIUM BROMIDE 0.5 MG: 0.5 SOLUTION RESPIRATORY (INHALATION) at 01:35

## 2020-12-14 RX ADMIN — AMLODIPINE BESYLATE 10 MG: 5 TABLET ORAL at 11:01

## 2020-12-14 RX ADMIN — ERGOCALCIFEROL 50000 UNITS: 1.25 CAPSULE ORAL at 13:23

## 2020-12-14 RX ADMIN — IPRATROPIUM BROMIDE 0.5 MG: 0.5 SOLUTION RESPIRATORY (INHALATION) at 07:09

## 2020-12-14 RX ADMIN — BUDESONIDE AND FORMOTEROL FUMARATE DIHYDRATE 2 PUFF: 80; 4.5 AEROSOL RESPIRATORY (INHALATION) at 20:10

## 2020-12-14 RX ADMIN — Medication 10 ML: at 05:26

## 2020-12-14 RX ADMIN — Medication 10 ML: at 16:53

## 2020-12-14 RX ADMIN — INSULIN LISPRO 8 UNITS: 100 INJECTION, SOLUTION INTRAVENOUS; SUBCUTANEOUS at 20:53

## 2020-12-14 RX ADMIN — ALBUTEROL SULFATE 2.5 MG: 2.5 SOLUTION RESPIRATORY (INHALATION) at 01:35

## 2020-12-14 RX ADMIN — Medication 10 ML: at 22:28

## 2020-12-14 RX ADMIN — FUROSEMIDE 40 MG: 40 TABLET ORAL at 11:01

## 2020-12-14 RX ADMIN — BENZONATATE 100 MG: 100 CAPSULE ORAL at 11:01

## 2020-12-14 NOTE — PROGRESS NOTES
Pulmonary and Critical Care Progress Note    Subjective:       Patient seen and examined in her room this afternoon, no acute events overnight. Patient states that she does not feel all that much different from yesterday. Still actively wheezing on exam, therefore will continue on current duo nebs and Solu-Medrol plans. States that she does have a CPAP at home but cannot tolerate her mask. She was not aware of the ability to wear a nasal mask possibly at home. Influenza and COVID-19 testing is negative, lower extremity duplex is negative for clots, echocardiogram has been completed but the read is currently pending. Review of Systems:  A comprehensive review of systems was negative except for that written in the HPI.           Current Facility-Administered Medications   Medication Dose Route Frequency Provider Last Rate Last Dose    amLODIPine (NORVASC) tablet 10 mg  10 mg Oral DAILY Fina NICHOLS MD   10 mg at 12/14/20 1101    calcium-vitamin D 600 mg(1,500mg) -200 unit per tablet 1 Tab  1 Tab Oral DAILY WITH BREAKFAST Maricarmen Myrick MD   1 Tab at 12/14/20 1101    furosemide (LASIX) tablet 40 mg  40 mg Oral DAILY Maricarmen Myrick MD   40 mg at 12/14/20 1101    montelukast (SINGULAIR) tablet 10 mg  10 mg Oral QHS Maricarmen Myrick MD        multivitamin (ONE A DAY) tablet 1 Tab  1 Tab Oral DAILY Maricarmen Myrick MD   1 Tab at 12/14/20 1101    ergocalciferol capsule 50,000 Units  50,000 Units Oral Q7D Maricarmen Myrick MD   50,000 Units at 12/14/20 1323    albuterol-ipratropium (DUO-NEB) 2.5 MG-0.5 MG/3 ML  3 mL Nebulization Q6H RT Parish Gomes MD   3 mL at 12/14/20 1326    polyethylene glycol (MIRALAX) packet 17 g  17 g Oral DAILY Destin Elmore DO        benzonatate (TESSALON) capsule 100 mg  100 mg Oral TID PRN Fina NICHOLS MD   100 mg at 12/14/20 1101    acetaminophen (TYLENOL) tablet 650 mg  650 mg Oral Q6H PRN Fina NICHOLS MD        ondansetron [facility]) injection 4 mg  4 mg IntraVENous Q4H PRN Cecilia NICHOLS MD        sodium chloride (NS) flush 5-40 mL  5-40 mL IntraVENous Q8H Cecilia NICHOLS MD   10 mL at 20 0526    sodium chloride (NS) flush 5-40 mL  5-40 mL IntraVENous PRN Cecilia NICHOLS MD   10 mL at 20 1131    budesonide-formoterol (SYMBICORT) 80-4.5 mcg inhaler  2 Puff Inhalation BID RT Adam Palacios MD   2 Puff at 20 0709    cefTRIAXone (ROCEPHIN) 1 g in sterile water (preservative free) 10 mL IV syringe  1 g IntraVENous Q24H Cecilia NICHOLS MD   1 g at 20 1323    azithromycin (ZITHROMAX) 500 mg in 0.9% sodium chloride 250 mL (VIAL-MATE)  500 mg IntraVENous Q24H Cecilia NICHOLS MD   500 mg at 20 1101    magnesium hydroxide (MILK OF MAGNESIA) 400 mg/5 mL oral suspension 30 mL  30 mL Oral DAILY PRN Adam Palacios MD        LORazepam (ATIVAN) tablet 0.5 mg  0.5 mg Oral BID PRN Cecilia NICHOLS MD        enoxaparin (LOVENOX) injection 40 mg  40 mg SubCUTAneous Q24H Cecilia NICHOLS MD   40 mg at 20 1101    methylPREDNISolone (PF) (SOLU-MEDROL) injection 40 mg  40 mg IntraVENous Q6H Benitez Willson MD   40 mg at 20 1101          Allergies   Allergen Reactions    Lisinopril Angioedema           Objective:     Blood pressure 126/76, pulse 97, temperature 98.5 °F (36.9 °C), resp. rate 19, height 5' 4.02\" (1.626 m), weight 93 kg (205 lb 0.4 oz), SpO2 96 %. Temp (24hrs), Av.5 °F (36.9 °C), Min:98 °F (36.7 °C), Max:98.9 °F (37.2 °C)      Intake and Output:  Current Shift: No intake/output data recorded. Last 3 Shifts:  1901 -  0700  In: 100 [I.V.:100]  Out: -     Physical Exam:     General:  Obese lady, lying in bed comfortably, no acute distress, on nasal cannula oxygen at 2 L  Eye: Reactive, symmetric  Throat and Neck: Supple  Lung:  Decreased air entry bilaterally with prolonged exhalation, wheezing, occasional crackles. Currently on 2 L nasal cannula. Heart: S1+S2.   No murmurs  Abdomen: soft, non-tender. Bowel sounds normal. No masses; obese  Extremities:  2+ edema left lower extremity  : Not done  Skin: No cyanosis  Neurologic: A & O x3. Grossly nonfocal  Psychiatric: Appropriate affect; coherent      Lab/Data Review:    Recent Results (from the past 24 hour(s))   ECHO ADULT COMPLETE    Collection Time: 12/14/20 10:15 AM   Result Value Ref Range    Pulmonic Regurgitant End Max Velocity 193.00 cm/s    AoV PG 15.00 mmHg    Aortic Valve Area by Continuity of Peak Velocity 2.30 cm2    IVSd 1.21 (A) 0.6 - 0.9 cm    LVIDd 5.17 3.9 - 5.3 cm    LVIDs 3.42 cm    LVOT d 1.90 cm    Pulmonic Regurgitant End Max Velocity 156.00 cm/s    LVOT Peak Gradient 10.00 mmHg    LVPWd 1.21 (A) 0.6 - 0.9 cm    LV ED Vol A2C 138.00 cm3    BP EF 62.4 55 - 100 %    LV ES Vol A2C 40.00 cm3    LV Ejection Fraction MOD 2C 34 %    Mitral Valve Deceleration [place] 5,800.00 mm/s2    Mitral Valve Deceleration [place] 5,800.00 mm/s2    Mitral Valve E Wave Deceleration Time 282.00 ms    Mitral Valve Pressure Half-time 58.00 ms    MV A Adi 139.00 cm/s    MV E Adi 105.00 cm/s    MVA (PHT) 3.79 cm2    MV E/A 0.76     Pulmonic Regurgitant End Max Velocity 90.50 cm/s    Pulmonic Valve Systolic Peak Instantaneous Gradient 3.00 mmHg    P Vein A Dur 95.00 ms    Pulmonary Vein \"A\" Wave Velocity 32.00 cm/s    Est. RA Pressure 3.00 mmHg    RVIDd 3.63 cm    RVSP 40.00 mmHg    Tricuspid Valve Max Velocity 306.00 cm/s    Triscuspid Valve Regurgitation Peak Gradient 37.00 mmHg    LV Mass .4 67 - 162 g    LV Mass AL Index 126.2 43 - 95 g/m2    ZAID/BSA Pk Adi 1.2 cm2/m2       XR CHEST SNGL V   Final Result   IMPRESSION: Hazy density bilaterally which may be artifact related to overlying   soft tissue. Early peripheral infiltrates could give this appearance as well. Are there signs or symptoms of pneumonia? .                    CT Results  (Last 48 hours)    None            Assessment:     1. Acute respiratory failure with hypoxia  2.   Acute exacerbation of COPD  3. Asthma  4. Wheezing  5. Cardiomegaly  6. Pedal edema  7. Obstructive sleep apnea on CPAP  8. Obesity  9. Acute kidney injury    Plan:     Patient is doing fairly well on the floor on 2 L nasal cannula. Nasal cannula oxygen as needed to keep saturation above 92%    Continue azithromycin and Rocephin for treatment of possible pneumonia on top of a COPD exacerbation. Further changes in antibiotics based on clinical response and culture results  We will start Solu-Medrol 40 mg every 6 hours  Continue duo nebs every 6 hours  Symbicort twice daily    COVID-19 and influenza testing is negative. Patient has cardiomegaly and pedal edema but BNP is normal  Currently on oral Lasix daily. Intake and output charting  Check electrolytes and replace as needed  Follow renal function with fluid changes  Echocardiogram has been completed but the read is currently pending  Venous duplex of the lower extremities is negative for DVT. Patient refusing to wear full facemask, will need discussion as an outpatient about a nasal mask. Clearly her untreated obstructive sleep apnea is definitely contributing to her chronic shortness of breath. DVT and GI prophylaxis    Questions of patient were answered at bedside in detail  Case discussed in detail with RN, RT, and care team  Thank you for involving me in the care of this patient  I will follow with you closely during hospitalization    Time spent more than 30 minutes in direct patient care with no overlap reviewing results and records, decision making, and answering questions.       Leti Allan DO  [facility]  12/14/2020  4:34 PM

## 2020-12-14 NOTE — PROGRESS NOTES
Reason for Admission:   COPD                   RUR Score:          12%           Plan for utilizing home health:      Patient states she would like home health for skilled nursing at discharge. No preference for agency. PCP: First and Last name:  Dr. Shalini Grossman   Name of Practice:    Are you a current patient: Yes/No:    Approximate date of last visit:    Can you participate in a virtual visit with your PCP:                     Current Advanced Directive/Advance Care Plan: Patient does not have a written POA. Patient states her brother is her surrogate decision make, Zuri Dumont [phone]. Patient also her a daughter, Paula Elias, however patient does not know her contact number. Transition of Care Plan:                    CM met with patient at the bedside for DCP assessment. Patient lives in an apartment building on the 2nd floor. Patient states she is having difficulty getting up and down steps to her apartment. Patient does not have home oxygen, but has a CPAP and nebulizer. Patient has been having difficulty performing ADLs and she states she gets short of breath easily. Patient does not drive and uses medical transport to get to appointments. Patient would like home health skilled nursing at discharge, no preference for agency. Patient will need RT eval for home oxygen. Anticipate home/home health.

## 2020-12-14 NOTE — PROGRESS NOTES
Progress Note    Patient: Collin Troncoso MRN: [id number]  SSN: [id number]    YOB: 1960  Age: 64 y.o. Sex: female      Admit Date: 12/13/2020    LOS: 1 day     Subjective:   51-year-old admitted for acute respiratory failure, possible COVID-19 pneumonitis      Objective:     Vitals:    12/14/20 0711 12/14/20 0800 12/14/20 1204 12/14/20 1445   BP:   127/84 126/76   Pulse:  84  97   Resp:    19   Temp:    98.5 °F (36.9 °C)   SpO2: 96%   96%   Weight:   93 kg (205 lb 0.4 oz)    Height:   5' 4.02\" (1.626 m)         Intake and Output:  Current Shift: No intake/output data recorded. Last three shifts: 12/12 1901 - 12/14 0700  In: 100 [I.V.:100]  Out: -     Physical Exam:   General:  Alert, cooperative, no distress, appears stated age. Eyes:  Conjunctivae/corneas clear. PERRL, EOMs intact. Fundi benign   Ears:  Normal TMs and external ear canals both ears. Nose: Nares normal. Septum midline. Mucosa normal. No drainage or sinus tenderness. Mouth/Throat: Lips, mucosa, and tongue normal. Teeth and gums normal.   Neck: Supple, symmetrical, trachea midline, no adenopathy, thyroid: no enlargment/tenderness/nodules, no carotid bruit and no JVD. Back:   Symmetric, no curvature. ROM normal. No CVA tenderness. Lungs:    Diminished air entry bilaterally   Heart:  Regular rate and rhythm, S1, S2 normal, no murmur, click, rub or gallop. Abdomen:   Soft, non-tender. Bowel sounds normal. No masses,  No organomegaly. Extremities: Extremities normal, atraumatic, no cyanosis or edema. Pulses: 2+ and symmetric all extremities. Skin: Skin color, texture, turgor normal. No rashes or lesions   Lymph nodes: Cervical, supraclavicular, and axillary nodes normal.   Neurologic: CNII-XII intact. Normal strength, sensation and reflexes throughout. Lab/Data Review: All lab results for the last 24 hours reviewed.      Recent Results (from the past 24 hour(s))   ECHO ADULT COMPLETE    Collection Time: 12/14/20 10:15 AM   Result Value Ref Range    Pulmonic Regurgitant End Max Velocity 193.00 cm/s    AoV PG 15.00 mmHg    Aortic Valve Area by Continuity of Peak Velocity 2.30 cm2    IVSd 1.21 (A) 0.6 - 0.9 cm    LVIDd 5.17 3.9 - 5.3 cm    LVIDs 3.42 cm    LVOT d 1.90 cm    Pulmonic Regurgitant End Max Velocity 156.00 cm/s    LVOT Peak Gradient 10.00 mmHg    LVPWd 1.21 (A) 0.6 - 0.9 cm    LV ED Vol A2C 138.00 cm3    BP EF 62.4 55 - 100 %    LV ES Vol A2C 40.00 cm3    LV Ejection Fraction MOD 2C 34 %    Mitral Valve Deceleration [place] 5,800.00 mm/s2    Mitral Valve Deceleration [place] 5,800.00 mm/s2    Mitral Valve E Wave Deceleration Time 282.00 ms    Mitral Valve Pressure Half-time 58.00 ms    MV A Adi 139.00 cm/s    MV E Adi 105.00 cm/s    MVA (PHT) 3.79 cm2    MV E/A 0.76     Pulmonic Regurgitant End Max Velocity 90.50 cm/s    Pulmonic Valve Systolic Peak Instantaneous Gradient 3.00 mmHg    P Vein A Dur 95.00 ms    Pulmonary Vein \"A\" Wave Velocity 32.00 cm/s    Est. RA Pressure 3.00 mmHg    RVIDd 3.63 cm    RVSP 40.00 mmHg    Tricuspid Valve Max Velocity 306.00 cm/s    Triscuspid Valve Regurgitation Peak Gradient 37.00 mmHg    LV Mass .4 67 - 162 g    LV Mass AL Index 126.2 43 - 95 g/m2    ZAID/BSA Pk Adi 1.2 cm2/m2         Assessment:     COPD with acute extubation continue with DuoNeb's and steroids  Obesity  Anxiety neurosis  Possible reflux esophagitis.   No evidence of COVID-19 pneumonia clinically  Plan:     Continue IV antibiotics    Signed By: Jorge Jaimes MD     December 14, 2020

## 2020-12-14 NOTE — PROGRESS NOTES
Patient is accepted with [facility]. Patient will need RT eval for home oxygen.

## 2020-12-15 ENCOUNTER — APPOINTMENT (OUTPATIENT)
Dept: ULTRASOUND IMAGING | Age: 60
DRG: 140 | End: 2020-12-15
Attending: FAMILY MEDICINE
Payer: MEDICAID

## 2020-12-15 PROCEDURE — 74011250636 HC RX REV CODE- 250/636: Performed by: INTERNAL MEDICINE

## 2020-12-15 PROCEDURE — 74011250637 HC RX REV CODE- 250/637: Performed by: INTERNAL MEDICINE

## 2020-12-15 PROCEDURE — 94760 N-INVAS EAR/PLS OXIMETRY 1: CPT

## 2020-12-15 PROCEDURE — 65270000029 HC RM PRIVATE

## 2020-12-15 PROCEDURE — 74011000250 HC RX REV CODE- 250: Performed by: INTERNAL MEDICINE

## 2020-12-15 PROCEDURE — 94640 AIRWAY INHALATION TREATMENT: CPT

## 2020-12-15 PROCEDURE — 74011636637 HC RX REV CODE- 636/637: Performed by: INTERNAL MEDICINE

## 2020-12-15 PROCEDURE — 74011250637 HC RX REV CODE- 250/637: Performed by: FAMILY MEDICINE

## 2020-12-15 PROCEDURE — 76536 US EXAM OF HEAD AND NECK: CPT

## 2020-12-15 RX ORDER — GUAIFENESIN 600 MG/1
600 TABLET, EXTENDED RELEASE ORAL 2 TIMES DAILY
Status: DISCONTINUED | OUTPATIENT
Start: 2020-12-15 | End: 2020-12-18 | Stop reason: HOSPADM

## 2020-12-15 RX ADMIN — IPRATROPIUM BROMIDE AND ALBUTEROL SULFATE 3 ML: .5; 3 SOLUTION RESPIRATORY (INHALATION) at 19:50

## 2020-12-15 RX ADMIN — MONTELUKAST 10 MG: 10 TABLET, FILM COATED ORAL at 19:33

## 2020-12-15 RX ADMIN — IPRATROPIUM BROMIDE AND ALBUTEROL SULFATE 3 ML: .5; 3 SOLUTION RESPIRATORY (INHALATION) at 07:26

## 2020-12-15 RX ADMIN — GUAIFENESIN 600 MG: 600 TABLET, EXTENDED RELEASE ORAL at 14:16

## 2020-12-15 RX ADMIN — METHYLPREDNISOLONE SODIUM SUCCINATE 40 MG: 40 INJECTION, POWDER, FOR SOLUTION INTRAMUSCULAR; INTRAVENOUS at 12:00

## 2020-12-15 RX ADMIN — Medication 10 ML: at 23:36

## 2020-12-15 RX ADMIN — MULTIVITAMIN TABLET 1 TABLET: TABLET at 08:04

## 2020-12-15 RX ADMIN — METHYLPREDNISOLONE SODIUM SUCCINATE 40 MG: 40 INJECTION, POWDER, FOR SOLUTION INTRAMUSCULAR; INTRAVENOUS at 17:25

## 2020-12-15 RX ADMIN — BUDESONIDE AND FORMOTEROL FUMARATE DIHYDRATE 2 PUFF: 80; 4.5 AEROSOL RESPIRATORY (INHALATION) at 19:50

## 2020-12-15 RX ADMIN — INSULIN LISPRO 6 UNITS: 100 INJECTION, SOLUTION INTRAVENOUS; SUBCUTANEOUS at 11:15

## 2020-12-15 RX ADMIN — AZITHROMYCIN 500 MG: 500 INJECTION, POWDER, LYOPHILIZED, FOR SOLUTION INTRAVENOUS at 11:15

## 2020-12-15 RX ADMIN — METHYLPREDNISOLONE SODIUM SUCCINATE 40 MG: 40 INJECTION, POWDER, FOR SOLUTION INTRAMUSCULAR; INTRAVENOUS at 05:53

## 2020-12-15 RX ADMIN — ENOXAPARIN SODIUM 40 MG: 40 INJECTION SUBCUTANEOUS at 11:15

## 2020-12-15 RX ADMIN — Medication 1 TABLET: at 08:04

## 2020-12-15 RX ADMIN — METHYLPREDNISOLONE SODIUM SUCCINATE 40 MG: 40 INJECTION, POWDER, FOR SOLUTION INTRAMUSCULAR; INTRAVENOUS at 23:28

## 2020-12-15 RX ADMIN — INSULIN LISPRO 6 UNITS: 100 INJECTION, SOLUTION INTRAVENOUS; SUBCUTANEOUS at 17:25

## 2020-12-15 RX ADMIN — FUROSEMIDE 40 MG: 40 TABLET ORAL at 08:04

## 2020-12-15 RX ADMIN — INSULIN LISPRO 4 UNITS: 100 INJECTION, SOLUTION INTRAVENOUS; SUBCUTANEOUS at 19:40

## 2020-12-15 RX ADMIN — BUDESONIDE AND FORMOTEROL FUMARATE DIHYDRATE 2 PUFF: 80; 4.5 AEROSOL RESPIRATORY (INHALATION) at 07:24

## 2020-12-15 RX ADMIN — BENZONATATE 100 MG: 100 CAPSULE ORAL at 19:33

## 2020-12-15 RX ADMIN — POLYETHYLENE GLYCOL 3350 17 G: 17 POWDER, FOR SOLUTION ORAL at 08:04

## 2020-12-15 RX ADMIN — Medication 10 ML: at 14:16

## 2020-12-15 RX ADMIN — CEFTRIAXONE SODIUM 1 G: 1 INJECTION, POWDER, FOR SOLUTION INTRAMUSCULAR; INTRAVENOUS at 11:24

## 2020-12-15 RX ADMIN — IPRATROPIUM BROMIDE AND ALBUTEROL SULFATE 3 ML: .5; 3 SOLUTION RESPIRATORY (INHALATION) at 13:48

## 2020-12-15 RX ADMIN — Medication 10 ML: at 05:53

## 2020-12-15 RX ADMIN — INSULIN LISPRO 4 UNITS: 100 INJECTION, SOLUTION INTRAVENOUS; SUBCUTANEOUS at 08:04

## 2020-12-15 RX ADMIN — GUAIFENESIN 600 MG: 600 TABLET, EXTENDED RELEASE ORAL at 19:33

## 2020-12-15 RX ADMIN — IPRATROPIUM BROMIDE AND ALBUTEROL SULFATE 3 ML: .5; 3 SOLUTION RESPIRATORY (INHALATION) at 01:39

## 2020-12-15 RX ADMIN — AMLODIPINE BESYLATE 10 MG: 5 TABLET ORAL at 08:04

## 2020-12-15 NOTE — PROGRESS NOTES
Discharge plan continue to be home with home health through [facility]. Patient remains on 2L NC. Patient may need RT eval for home oxygen prior to discharge. CM will continue to follow.

## 2020-12-15 NOTE — PROGRESS NOTES
Pulmonary and Critical Care Progress Note    Subjective:       Patient seen and examined in her room this afternoon, no acute events overnight. Patient states that she is feeling better from admission and overall may be a little bit better than yesterday. Her echocardiogram was completed showing EF of 65%, mild LVH, grade 1 diastolic dysfunction, mild tricuspid valve regurgitation, and a normal RV. All questions/concerns were addressed at the bedside this afternoon. Review of Systems:  A comprehensive review of systems was negative except for that written in the HPI.           Current Facility-Administered Medications   Medication Dose Route Frequency Provider Last Rate Last Dose    guaiFENesin ER (MUCINEX) tablet 600 mg  600 mg Oral BID Kami Funes MD   600 mg at 12/15/20 1416    amLODIPine (NORVASC) tablet 10 mg  10 mg Oral DAILY Kailash NICHOLS MD   10 mg at 12/15/20 0804    calcium-vitamin D 600 mg(1,500mg) -200 unit per tablet 1 Tab  1 Tab Oral DAILY WITH BREAKFAST Sudhir Strange MD   1 Tab at 12/15/20 0804    furosemide (LASIX) tablet 40 mg  40 mg Oral DAILY Kailash NICHOLS MD   40 mg at 12/15/20 0804    montelukast (SINGULAIR) tablet 10 mg  10 mg Oral QHS Kailash NICHOLS MD   10 mg at 12/14/20 2053    multivitamin (ONE A DAY) tablet 1 Tab  1 Tab Oral DAILY Sudhir Strange MD   1 Tab at 12/15/20 0804    ergocalciferol capsule 50,000 Units  50,000 Units Oral Q7D Sudhir Strange MD   50,000 Units at 12/14/20 1323    albuterol-ipratropium (DUO-NEB) 2.5 MG-0.5 MG/3 ML  3 mL Nebulization Q6H RT Rome Dunn MD   3 mL at 12/15/20 1348    polyethylene glycol (MIRALAX) packet 17 g  17 g Oral DAILY Destin Elmore DO   17 g at 12/15/20 0804    insulin lispro (HUMALOG) injection   SubCUTAneous AC&HS Sudhir Strange MD   6 Units at 12/15/20 1115    glucose chewable tablet 16 g  4 Tab Oral PRN Sudhir Strange MD        glucagon (GLUCAGEN) injection 1 mg  1 mg IntraMUSCular PRN Claudio Gooden MD        dextrose (D50W) injection syrg 12.5-25 g  25-50 mL IntraVENous PRN Claudio Gooden MD        benzonatate (TESSALON) capsule 100 mg  100 mg Oral TID PRN Donita NICHOLS MD   100 mg at 20    acetaminophen (TYLENOL) tablet 650 mg  650 mg Oral Q6H PRN Claudio Gooden MD        ondansetron [facility]) injection 4 mg  4 mg IntraVENous Q4H PRN Donita NICHOLS MD        sodium chloride (NS) flush 5-40 mL  5-40 mL IntraVENous Q8H Donita NICHOLS MD   10 mL at 12/15/20 1416    sodium chloride (NS) flush 5-40 mL  5-40 mL IntraVENous PRN Claudio Gooden MD   10 mL at 20 1131    budesonide-formoterol (SYMBICORT) 80-4.5 mcg inhaler  2 Puff Inhalation BID RT Claudio Gooden MD   2 Puff at 12/15/20 0724    cefTRIAXone (ROCEPHIN) 1 g in sterile water (preservative free) 10 mL IV syringe  1 g IntraVENous Q24H Donita NICHOLS MD   1 g at 12/15/20 1124    azithromycin (ZITHROMAX) 500 mg in 0.9% sodium chloride 250 mL (VIAL-MATE)  500 mg IntraVENous Q24H Donita NICHOLS MD   500 mg at 12/15/20 1115    magnesium hydroxide (MILK OF MAGNESIA) 400 mg/5 mL oral suspension 30 mL  30 mL Oral DAILY PRN Claudio Gooden MD        LORazepam (ATIVAN) tablet 0.5 mg  0.5 mg Oral BID PRN Donita NICHOLS MD        enoxaparin (LOVENOX) injection 40 mg  40 mg SubCUTAneous Q24H Donita NICHOLS MD   40 mg at 12/15/20 1115    methylPREDNISolone (PF) (SOLU-MEDROL) injection 40 mg  40 mg IntraVENous Q6H Benitez Willson MD   40 mg at 12/15/20 1200          Allergies   Allergen Reactions    Lisinopril Angioedema           Objective:     Blood pressure 139/80, pulse 99, temperature 98.9 °F (37.2 °C), resp. rate 21, height 5' 4.02\" (1.626 m), weight 93 kg (205 lb 0.4 oz), SpO2 96 %. Temp (24hrs), Av.5 °F (36.9 °C), Min:98 °F (36.7 °C), Max:98.9 °F (37.2 °C)      Intake and Output:  Current Shift: No intake/output data recorded.   Last 3 Shifts: No intake/output data recorded. Physical Exam:     General:  Obese lady, lying in bed comfortably, no acute distress, on nasal cannula oxygen at 2 L  Eye: Reactive, symmetric  Throat and Neck: Supple  Lung:  Decreased air entry bilaterally with prolonged exhalation, wheezing, occasional crackles. Currently on 2 L nasal cannula. Heart: S1+S2. No murmurs  Abdomen: soft, non-tender. Bowel sounds normal. No masses; obese  Extremities:  2+ edema left lower extremity  : Not done  Skin: No cyanosis  Neurologic: A & O x3. Grossly nonfocal  Psychiatric: Appropriate affect; coherent      Lab/Data Review:    No results found for this or any previous visit (from the past 24 hour(s)). US HEAD NECK SOFT TISSUE   Final Result   IMPRESSION: Right thyroid nodule is of intermediate suspicion. Suggest either   biopsy or follow-up      XR CHEST SNGL V   Final Result   IMPRESSION: Hazy density bilaterally which may be artifact related to overlying   soft tissue. Early peripheral infiltrates could give this appearance as well. Are there signs or symptoms of pneumonia? .                    CT Results  (Last 48 hours)    None            Assessment:     1. Acute respiratory failure with hypoxia  2. Acute exacerbation of COPD  3. Asthma  4. Wheezing  5. Cardiomegaly  6. Pedal edema  7. Obstructive sleep apnea on CPAP  8. Obesity  9. Acute kidney injury    Plan:     Patient is doing fairly well on the floor on 2 L nasal cannula. Nasal cannula oxygen as needed to keep saturation above 92%    Continue azithromycin and Rocephin for treatment of possible pneumonia on top of a COPD exacerbation. Further changes in antibiotics based on clinical response and culture results  We will continue Solu-Medrol 40 mg every 6 hours, suspect she will need IV steroids for the next 24 to 48 hours. Anticipate discharge home Thursday/Friday. Continue duo nebs every 6 hours  Symbicort twice daily    COVID-19 and influenza testing is negative.     Patient has cardiomegaly and pedal edema but BNP is normal  Currently on oral Lasix daily. Intake and output charting  Check electrolytes and replace as needed  Follow renal function with fluid changes  Echocardiogram has been completed but the read is currently pending  Venous duplex of the lower extremities is negative for DVT. Patient refusing to wear full facemask, will need discussion as an outpatient about a nasal mask. Clearly her untreated obstructive sleep apnea is definitely contributing to her chronic shortness of breath. DVT and GI prophylaxis    Questions of patient were answered at bedside in detail  Case discussed in detail with RN, RT, and care team  Thank you for involving me in the care of this patient  I will follow with you closely during hospitalization    Time spent more than 30 minutes in direct patient care with no overlap reviewing results and records, decision making, and answering questions.       Lissette Montano DO  [facility]  12/15/2020  4:34 PM

## 2020-12-15 NOTE — CONSULTS
Consult    Patient: Inga Alvarez MRN: [id number]  SSN: [id number]    YOB: 1960  Age: 64 y.o. Sex: female      Subjective:      Inga Alvarez is a 64 y.o. female who is being seen for Chest pain. Patient with history of obesity, nicotine dependence, bronchial asthma, COPD and obstructive sleep apnea who presented to the emergency room complaining of coughing spells and shortness of breath for about 2 weeks. She tested negative for COVID-19 and influenza. She was found to be hypoxemic in the emergency room in the 80s. She is currently continue to wheeze. She is receiving some nebulizer treatment. She complains of some nonspecific retrosternal short lasting chest pain does not related to exercise or activity. It is sharp in nature, nonradiating. She denied nausea, vomiting or excessive sweating. She denied recent change in her weight but complains of some lower extremity swelling. She denies previous cardiac work-up. She have some coughing spells and bringing up some yellow phlegm. Past Medical History:   Diagnosis Date    Chronic obstructive pulmonary disease ([address]     Hypertension      No past surgical history on file. No family history on file.   Social History     Tobacco Use    Smoking status: Current Some Day Smoker    Smokeless tobacco: Never Used   Substance Use Topics    Alcohol use: Yes      Current Facility-Administered Medications   Medication Dose Route Frequency Provider Last Rate Last Dose    guaiFENesin ER (MUCINEX) tablet 600 mg  600 mg Oral BID Kami Funes MD   600 mg at 12/15/20 1416    amLODIPine (NORVASC) tablet 10 mg  10 mg Oral DAILY Mack NICHOLS MD   10 mg at 12/15/20 0804    calcium-vitamin D 600 mg(1,500mg) -200 unit per tablet 1 Tab  1 Tab Oral DAILY WITH BREAKFAST Guadlupe Hodgkin, MD   1 Tab at 12/15/20 0804    furosemide (LASIX) tablet 40 mg  40 mg Oral DAILY Mack NICHOLS MD   40 mg at 12/15/20 0804    montelukast (SINGULAIR) tablet 10 mg  10 mg Oral QHS Robyn NICHOLS MD   10 mg at 12/14/20 2053    multivitamin (ONE A DAY) tablet 1 Tab  1 Tab Oral DAILY Parul Dorman MD   1 Tab at 12/15/20 0804    ergocalciferol capsule 50,000 Units  50,000 Units Oral Q7D Parul Dorman MD   50,000 Units at 12/14/20 1323    albuterol-ipratropium (DUO-NEB) 2.5 MG-0.5 MG/3 ML  3 mL Nebulization Q6H RT Maricarmen Maurer MD   3 mL at 12/15/20 1348    polyethylene glycol (MIRALAX) packet 17 g  17 g Oral DAILY Destin Elmore DO   17 g at 12/15/20 0804    insulin lispro (HUMALOG) injection   SubCUTAneous AC&HS Parul Dorman MD   6 Units at 12/15/20 1725    glucose chewable tablet 16 g  4 Tab Oral PRN Parul Dorman MD        glucagon (GLUCAGEN) injection 1 mg  1 mg IntraMUSCular PRN Robyn NICHOLS MD        dextrose (D50W) injection syrg 12.5-25 g  25-50 mL IntraVENous PRN Parul Dorman MD        benzonatate (TESSALON) capsule 100 mg  100 mg Oral TID PRN Robyn NICHOLS MD   100 mg at 12/14/20 2053    acetaminophen (TYLENOL) tablet 650 mg  650 mg Oral Q6H PRN Parul Dorman MD        [facility] injection 4 mg  4 mg IntraVENous Q4H PRN Robyn NICHOLS MD        sodium chloride (NS) flush 5-40 mL  5-40 mL IntraVENous Q8H North Hamm MD   10 mL at 12/15/20 1416    sodium chloride (NS) flush 5-40 mL  5-40 mL IntraVENous PRN Robyn NICHOLS MD   10 mL at 12/13/20 1131    budesonide-formoterol (SYMBICORT) 80-4.5 mcg inhaler  2 Puff Inhalation BID RT Parul Dorman MD   2 Puff at 12/15/20 0724    cefTRIAXone (ROCEPHIN) 1 g in sterile water (preservative free) 10 mL IV syringe  1 g IntraVENous Q24H Robyn NICHOLS MD   1 g at 12/15/20 1124    azithromycin (ZITHROMAX) 500 mg in 0.9% sodium chloride 250 mL (VIAL-MATE)  500 mg IntraVENous Q24H Robyn NICHOLS MD   500 mg at 12/15/20 1115    magnesium hydroxide (MILK OF MAGNESIA) 400 mg/5 mL oral suspension 30 mL  30 mL Oral DAILY PRN Robyn Ybarra I, MD        LORazepam (ATIVAN) tablet 0.5 mg  0.5 mg Oral BID PRN Belkys NICHOLS MD        enoxaparin (LOVENOX) injection 40 mg  40 mg SubCUTAneous Q24H Belkys NICHOLS MD   40 mg at 12/15/20 1115    methylPREDNISolone (PF) (SOLU-MEDROL) injection 40 mg  40 mg IntraVENous Q6H Grady Willson MD   40 mg at 12/15/20 1725        Allergies   Allergen Reactions    Lisinopril Angioedema       Review of Systems:  A comprehensive review of systems was negative except for that written in the History of Present Illness. Objective:     Vitals:    12/15/20 0727 12/15/20 0740 12/15/20 1350 12/15/20 1508   BP:  (!) 148/88  139/80   Pulse:  94  99   Resp:  20  21   Temp:  98 °F (36.7 °C)  98.9 °F (37.2 °C)   SpO2: 93% 94% 95% 96%   Weight:       Height:            Physical Exam:  General:  Alert, cooperative, no distress, appears stated age. Eyes:  Conjunctivae/corneas clear. PERRL, EOMs intact. Fundi benign   Ears:  Normal TMs and external ear canals both ears. Nose: Nares normal. Septum midline. Mucosa normal. No drainage or sinus tenderness. Mouth/Throat: Lips, mucosa, and tongue normal. Teeth and gums normal.   Neck: Supple, symmetrical, trachea midline, no adenopathy, thyroid: no enlargment/tenderness/nodules, no carotid bruit and no JVD. Back:   Symmetric, no curvature. ROM normal. No CVA tenderness. Lungs:   Clear to auscultation bilaterally. Heart:  Regular rate and rhythm, S1, S2 normal, no murmur, click, rub or gallop. Abdomen:   Soft, non-tender. Bowel sounds normal. No masses,  No organomegaly. Extremities: Extremities normal, atraumatic, no cyanosis or edema. Pulses: 2+ and symmetric all extremities. Skin: Skin color, texture, turgor normal. No rashes or lesions   Lymph nodes: Cervical, supraclavicular, and axillary nodes normal.   Neurologic: CNII-XII intact. Normal strength, sensation and reflexes throughout.          Assessment:     Hospital Problems  Never Reviewed          Codes Class Noted POA    COPD (chronic obstructive pulmonary disease) ([facility] [address] ICD-10-CM: J44.9  ICD-9-CM: 901  12/13/2020 Unknown        Hypoxia ICD-10-CM: R09.02  ICD-9-CM: 799.02  12/13/2020 Unknown          Patient is 24-year-old -American female with:  1. Chest pain  2. COPD exacerbation  3. Hypertensive heart disease  4. Nicotine dependence  5. Morbid obesity  6. ACE inhibitor allergy  7. Bronchial asthma  8. Acute kidney injury  9. Obstructive sleep apnea    Plan:     I personally reviewed echo that showed EF of 60 to 65% with grade 1 diastolic dysfunction, mild pulmonary hypertension, RVSP 40 mmHg. She tested negative for COVID-19. She is allergic to lisinopril. She tested negative for influenza. She is still have some wheezing. Pulmonary management as per consultant team and primary team.  Duplex ultrasound was negative. BMP showed a creatinine of 1.2 and BUN of 21. Troponin was negative. BNP was normal.  She was counseled regarding the importance of smoking cessation. She is currently on amlodipine that probably is responsible for some of the lower extremity swelling. She is currently on oral Lasix and will continue. Blood pressure is acceptable. Consider up titrating medications as an outpatient if necessary. Consider outpatient stress test    Thank you  For involving me in Mrs. Monaco's care. I will follow.   Signed By: Oriana Garrido MD     December 15, 2020

## 2020-12-15 NOTE — PROGRESS NOTES
General Daily Progress Note          Patient Name:   Lawyer Welsh       YOB: 1960       Age:  64 y.o. Admit Date: 12/13/2020      Subjective:       Patient alert awake complaining of cough congestion  Complaining of swelling in the neck area           Objective:     Visit Vitals  BP (!) 148/88 (BP 1 Location: Right arm)   Pulse 94   Temp 98 °F (36.7 °C)   Resp 20   Ht 5' 4.02\" (1.626 m)   Wt 93 kg (205 lb 0.4 oz)   SpO2 94%   BMI 35.18 kg/m²        No results found for this or any previous visit (from the past 24 hour(s)). [unfilled]      Review of Systems    Constitutional: Negative for chills and fever. HENT: Negative. Eyes: Negative. Respiratory: Cough congestion   cardiovascular: Negative. Gastrointestinal: Negative for abdominal pain and nausea. Skin: Negative. Neurological: Negative. On examination  Head atraumatic  Neck swelling likely thyroidomegaly  Heart normal sinus rhythm  Lung decreased air entry bilateral wheezing  Abdomen soft obese nontender  Extremities no cyanosis no clubbing no edema  Neuro alert awake oriented x3 no focal deficit      Physical Exam:  On examination  Head atraumatic  Neck swelling likely thyroidomegaly  Heart normal sinus rhythm  Lung decreased air entry bilateral wheezing  Abdomen soft obese nontender  Extremities no cyanosis no clubbing no edema  Neuro alert awake oriented x3 no focal deficit      Constitutional: pt is oriented to person, place, and time. Head: Normocephalic and atraumatic. Eyes: Pupils are equal, round, and reactive to light. EOM are normal.   Cardiovascular: Normal rate, regular rhythm and normal heart sounds. Exhibits no tenderness. Abdominal: Soft. Bowel sounds are normal. There is no abdominal tenderness. There is no rebound and no guarding. Musculoskeletal: Normal range of motion. Neurological: pt is alert and oriented to person, place, and time.      XR CHEST SNGL V   Final Result   IMPRESSION: Hazy density bilaterally which may be artifact related to overlying   soft tissue. Early peripheral infiltrates could give this appearance as well. Are there signs or symptoms of pneumonia? Mirella Smart No results found for this or any previous visit (from the past 24 hour(s)). Results     Procedure Component Value Units Date/Time    INFLUENZA A & B AG (RAPID TEST) [[id number]] Collected:  12/13/20 0530    Order Status:  Completed Specimen:  Nasopharyngeal from Nasal washing Updated:  12/13/20 0656     Influenza A Antigen Negative        Influenza B Antigen Negative       COVID-19 RAPID TEST [[id number]] Collected:  12/13/20 0530    Order Status:  Completed Specimen:  Nasopharyngeal Updated:  12/13/20 0657     Specimen source Nasopharyngeal        COVID-19 rapid test Not Detected        Comment: Rapid Abbott ID Now   Rapid NAAT:  The specimen is NEGATIVE for SARS-CoV-2, the novel coronavirus associated with COVID-19. Negative results should be treated as presumptive and, if inconsistent with clinical signs and symptoms or necessary for patient management, should be tested with an alternative molecular assay. Negative results do not preclude SARS-CoV-2 infection and should not be used as the sole basis for patient management decisions. This test has been authorized by the FDA under   an Emergency Use Authorization (EUA) for use by authorized laboratories. Fact sheet for Healthcare Providers: ConventionUpdate.co.nz Fact sheet for Patients: ConventionUpdate.co.nz   Methodology: Isothermal Nucleic Acid Amplification                Labs:     Recent Labs     12/13/20  0430   WBC 7.1   HGB 13.0   HCT 41.3        Recent Labs     12/13/20  0430      K 4.1      CO2 30   BUN 21*   CREA 1.20*   GLU 99   CA 9.3     No results for input(s): ALT, AP, TBIL, TBILI, TP, ALB, GLOB, GGT, AML, LPSE in the last 72 hours.     No lab exists for component: SGOT, GPT, AMYP, HLPSE  No results for input(s): INR, PTP, APTT, INREXT in the last 72 hours. No results for input(s): FE, TIBC, PSAT, FERR in the last 72 hours. No results found for: FOL, RBCF   No results for input(s): PH, PCO2, PO2 in the last 72 hours. Recent Labs     12/13/20  0430   TROIQ <0.05     No results found for: CHOL, CHOLX, CHLST, CHOLV, HDL, HDLP, LDL, LDLC, DLDLP, TGLX, TRIGL, TRIGP, CHHD, CHHDX  No results found for: GLUCPOC  No results found for: COLOR, APPRN, SPGRU, REFSG, MIKE, PROTU, GLUCU, KETU, BILU, UROU, SHARIF, LEUKU, GLUKE, EPSU, BACTU, WBCU, RBCU, CASTS, UCRY      Assessment:   1. Acute respiratory failure with hypoxia  2. Acute exacerbation of COPD  3. Asthma  4. Wheezing  5. Cardiomegaly  6. Pedal edema  7. Obstructive sleep apnea on CPAP  8. Obesity  9. Acute kidney injury  10 thyromegaly     ·   LV: Calculated LVEF is 65%. Normal cavity size and systolic function (ejection fraction normal). Mild concentric hypertrophy. Abnormal left ventricular septal motion consistent with right ventricular pacing. Mild (grade 1) left ventricular diastolic dysfunction.              Plan:     On Norvasc 10 mg daily  On Zithromax 500 mg daily IV Rocephin 1 g daily  On Symbicort 2 puffs twice daily  Lasix 40 mg daily  IV Solu-Medrol 40 mg IV every 6      We will order ultrasound of the thyroid  Mucinex 600 twice daily for cough  PT OT consult      Current Facility-Administered Medications:     amLODIPine (NORVASC) tablet 10 mg, 10 mg, Oral, DAILY, North Hamm MD, 10 mg at 12/15/20 0804    calcium-vitamin D 600 mg(1,500mg) -200 unit per tablet 1 Tab, 1 Tab, Oral, DAILY WITH BREAKFAST, Adam Palacios MD, 1 Tab at 12/15/20 0804    furosemide (LASIX) tablet 40 mg, 40 mg, Oral, DAILY, North Hamm MD, 40 mg at 12/15/20 0804    montelukast (SINGULAIR) tablet 10 mg, 10 mg, Oral, QHS, North Hamm MD, 10 mg at 12/14/20 2053    multivitamin (ONE A DAY) tablet 1 Tab, 1 Tab, Oral, DAILY, Thai Buck MD, 1 Tab at 12/15/20 2661    ergocalciferol capsule 50,000 Units, 50,000 Units, Oral, Q7D, North Hamm MD, 50,000 Units at 12/14/20 1323    albuterol-ipratropium (DUO-NEB) 2.5 MG-0.5 MG/3 ML, 3 mL, Nebulization, Q6H RT, Benitez Willson MD, 3 mL at 12/15/20 0726    polyethylene glycol (MIRALAX) packet 17 g, 17 g, Oral, DAILY, Destin Elmore DO, 17 g at 12/15/20 0804    insulin lispro (HUMALOG) injection, , SubCUTAneous, AC&HS, Thai Buck MD, 6 Units at 12/15/20 1115    glucose chewable tablet 16 g, 4 Tab, Oral, PRN, Cherie NICHOLS MD    glucagon (GLUCAGEN) injection 1 mg, 1 mg, IntraMUSCular, PRN, Cherie NICHOLS MD    dextrose (D50W) injection syrg 12.5-25 g, 25-50 mL, IntraVENous, PRN, North Simon MD    benzonatate (TESSALON) capsule 100 mg, 100 mg, Oral, TID PRN, Cherie NICHOLS MD, 100 mg at 12/14/20 2053    acetaminophen (TYLENOL) tablet 650 mg, 650 mg, Oral, Q6H PRN, Cherie NICHOLS MD    ondansetron (ZOFRAN) injection 4 mg, 4 mg, IntraVENous, Q4H PRN, North Simon MD    sodium chloride (NS) flush 5-40 mL, 5-40 mL, IntraVENous, Q8H, North Hamm MD, 10 mL at 12/15/20 0553    sodium chloride (NS) flush 5-40 mL, 5-40 mL, IntraVENous, PRN, North Simon MD, 10 mL at 12/13/20 1131    budesonide-formoterol (SYMBICORT) 80-4.5 mcg inhaler, 2 Puff, Inhalation, BID RT, Thai Buck MD, 2 Puff at 12/15/20 0724    cefTRIAXone (ROCEPHIN) 1 g in sterile water (preservative free) 10 mL IV syringe, 1 g, IntraVENous, Q24H, North Hamm MD, 1 g at 12/15/20 1124    azithromycin (ZITHROMAX) 500 mg in 0.9% sodium chloride 250 mL (VIAL-MATE), 500 mg, IntraVENous, Q24H, North Hamm MD, 500 mg at 12/15/20 1115    magnesium hydroxide (MILK OF MAGNESIA) 400 mg/5 mL oral suspension 30 mL, 30 mL, Oral, DAILY PRN, North Hamm MD    LORazepam (ATIVAN) tablet 0.5 mg, 0.5 mg, Oral, BID PRN, Cherie NICHOLS MD    enoxaparin (LOVENOX) injection 40 mg, 40 mg, SubCUTAneous, Q24H, North Hamm MD, 40 mg at 12/15/20 1115    methylPREDNISolone (PF) (SOLU-MEDROL) injection 40 mg, 40 mg, IntraVENous, Q6H, Benitez Willson MD, 40 mg at 12/15/20 1200

## 2020-12-16 LAB
ALBUMIN SERPL-MCNC: 3.3 G/DL (ref 3.5–5)
ALBUMIN/GLOB SERPL: 0.8 {RATIO} (ref 1.1–2.2)
ALP SERPL-CCNC: 83 U/L (ref 45–117)
ALT SERPL-CCNC: 24 U/L (ref 12–78)
ANION GAP SERPL CALC-SCNC: 5 MMOL/L (ref 5–15)
AST SERPL W P-5'-P-CCNC: 7 U/L (ref 15–37)
BASOPHILS # BLD: 0 K/UL (ref 0–0.1)
BASOPHILS NFR BLD: 0 % (ref 0–1)
BILIRUB SERPL-MCNC: 0.3 MG/DL (ref 0.2–1)
BUN SERPL-MCNC: 29 MG/DL (ref 6–20)
BUN/CREAT SERPL: 27 (ref 12–20)
CA-I BLD-MCNC: 10 MG/DL (ref 8.5–10.1)
CHLORIDE SERPL-SCNC: 102 MMOL/L (ref 97–108)
CO2 SERPL-SCNC: 31 MMOL/L (ref 21–32)
CREAT SERPL-MCNC: 1.08 MG/DL (ref 0.55–1.02)
DIFFERENTIAL METHOD BLD: ABNORMAL
EOSINOPHIL # BLD: 0 K/UL (ref 0–0.4)
EOSINOPHIL NFR BLD: 0 % (ref 0–7)
ERYTHROCYTE [DISTWIDTH] IN BLOOD BY AUTOMATED COUNT: 14.7 % (ref 11.5–14.5)
GLOBULIN SER CALC-MCNC: 3.9 G/DL (ref 2–4)
GLUCOSE SERPL-MCNC: 180 MG/DL (ref 65–100)
HCT VFR BLD AUTO: 41.2 % (ref 35–47)
HGB BLD-MCNC: 13 G/DL (ref 11.5–16)
IMM GRANULOCYTES # BLD AUTO: 0 K/UL (ref 0–0.04)
IMM GRANULOCYTES NFR BLD AUTO: 0 % (ref 0–0.5)
LYMPHOCYTES # BLD: 0.6 K/UL (ref 0.8–3.5)
LYMPHOCYTES NFR BLD: 5 % (ref 12–49)
MCH RBC QN AUTO: 30.2 PG (ref 26–34)
MCHC RBC AUTO-ENTMCNC: 31.6 G/DL (ref 30–36.5)
MCV RBC AUTO: 95.6 FL (ref 80–99)
MONOCYTES # BLD: 0.3 K/UL (ref 0–1)
MONOCYTES NFR BLD: 3 % (ref 5–13)
NEUTS SEG # BLD: 11.1 K/UL (ref 1.8–8)
NEUTS SEG NFR BLD: 92 % (ref 32–75)
PLATELET # BLD AUTO: 353 K/UL (ref 150–400)
PMV BLD AUTO: 11.6 FL (ref 8.9–12.9)
POTASSIUM SERPL-SCNC: 4.5 MMOL/L (ref 3.5–5.1)
PROT SERPL-MCNC: 7.2 G/DL (ref 6.4–8.2)
RBC # BLD AUTO: 4.31 M/UL (ref 3.8–5.2)
SODIUM SERPL-SCNC: 138 MMOL/L (ref 136–145)
WBC # BLD AUTO: 12 K/UL (ref 3.6–11)

## 2020-12-16 PROCEDURE — 65270000029 HC RM PRIVATE

## 2020-12-16 PROCEDURE — 74011250637 HC RX REV CODE- 250/637: Performed by: INTERNAL MEDICINE

## 2020-12-16 PROCEDURE — 74011636637 HC RX REV CODE- 636/637: Performed by: INTERNAL MEDICINE

## 2020-12-16 PROCEDURE — 80053 COMPREHEN METABOLIC PANEL: CPT

## 2020-12-16 PROCEDURE — 74011250636 HC RX REV CODE- 250/636: Performed by: INTERNAL MEDICINE

## 2020-12-16 PROCEDURE — 97530 THERAPEUTIC ACTIVITIES: CPT

## 2020-12-16 PROCEDURE — 97161 PT EVAL LOW COMPLEX 20 MIN: CPT

## 2020-12-16 PROCEDURE — 74011000250 HC RX REV CODE- 250: Performed by: INTERNAL MEDICINE

## 2020-12-16 PROCEDURE — 94640 AIRWAY INHALATION TREATMENT: CPT

## 2020-12-16 PROCEDURE — 94760 N-INVAS EAR/PLS OXIMETRY 1: CPT

## 2020-12-16 PROCEDURE — 77010033678 HC OXYGEN DAILY

## 2020-12-16 PROCEDURE — 74011250637 HC RX REV CODE- 250/637: Performed by: FAMILY MEDICINE

## 2020-12-16 PROCEDURE — 36415 COLL VENOUS BLD VENIPUNCTURE: CPT

## 2020-12-16 PROCEDURE — 85025 COMPLETE CBC W/AUTO DIFF WBC: CPT

## 2020-12-16 RX ORDER — PREDNISONE 20 MG/1
40 TABLET ORAL
Status: DISCONTINUED | OUTPATIENT
Start: 2020-12-17 | End: 2020-12-18 | Stop reason: HOSPADM

## 2020-12-16 RX ORDER — HYDROXYZINE 25 MG/1
25 TABLET, FILM COATED ORAL
Status: DISCONTINUED | OUTPATIENT
Start: 2020-12-16 | End: 2020-12-18 | Stop reason: HOSPADM

## 2020-12-16 RX ORDER — GUAIFENESIN 100 MG/5ML
81 LIQUID (ML) ORAL DAILY
Status: DISCONTINUED | OUTPATIENT
Start: 2020-12-17 | End: 2020-12-18 | Stop reason: HOSPADM

## 2020-12-16 RX ADMIN — IPRATROPIUM BROMIDE AND ALBUTEROL SULFATE 3 ML: .5; 3 SOLUTION RESPIRATORY (INHALATION) at 13:44

## 2020-12-16 RX ADMIN — HYDROXYZINE HYDROCHLORIDE 25 MG: 25 TABLET, FILM COATED ORAL at 20:32

## 2020-12-16 RX ADMIN — BUDESONIDE AND FORMOTEROL FUMARATE DIHYDRATE 2 PUFF: 80; 4.5 AEROSOL RESPIRATORY (INHALATION) at 07:37

## 2020-12-16 RX ADMIN — INSULIN LISPRO 2 UNITS: 100 INJECTION, SOLUTION INTRAVENOUS; SUBCUTANEOUS at 09:14

## 2020-12-16 RX ADMIN — METHYLPREDNISOLONE SODIUM SUCCINATE 40 MG: 40 INJECTION, POWDER, FOR SOLUTION INTRAMUSCULAR; INTRAVENOUS at 09:15

## 2020-12-16 RX ADMIN — BENZONATATE 100 MG: 100 CAPSULE ORAL at 09:22

## 2020-12-16 RX ADMIN — GUAIFENESIN 600 MG: 600 TABLET, EXTENDED RELEASE ORAL at 20:32

## 2020-12-16 RX ADMIN — Medication 10 ML: at 21:40

## 2020-12-16 RX ADMIN — INSULIN LISPRO 6 UNITS: 100 INJECTION, SOLUTION INTRAVENOUS; SUBCUTANEOUS at 16:30

## 2020-12-16 RX ADMIN — ENOXAPARIN SODIUM 40 MG: 40 INJECTION SUBCUTANEOUS at 11:04

## 2020-12-16 RX ADMIN — GUAIFENESIN 600 MG: 600 TABLET, EXTENDED RELEASE ORAL at 09:15

## 2020-12-16 RX ADMIN — CEFTRIAXONE SODIUM 1 G: 1 INJECTION, POWDER, FOR SOLUTION INTRAMUSCULAR; INTRAVENOUS at 11:14

## 2020-12-16 RX ADMIN — METHYLPREDNISOLONE SODIUM SUCCINATE 40 MG: 40 INJECTION, POWDER, FOR SOLUTION INTRAMUSCULAR; INTRAVENOUS at 11:05

## 2020-12-16 RX ADMIN — FUROSEMIDE 40 MG: 40 TABLET ORAL at 09:14

## 2020-12-16 RX ADMIN — INSULIN LISPRO 6 UNITS: 100 INJECTION, SOLUTION INTRAVENOUS; SUBCUTANEOUS at 21:39

## 2020-12-16 RX ADMIN — MONTELUKAST 10 MG: 10 TABLET, FILM COATED ORAL at 21:40

## 2020-12-16 RX ADMIN — AMLODIPINE BESYLATE 10 MG: 5 TABLET ORAL at 09:14

## 2020-12-16 RX ADMIN — MULTIVITAMIN TABLET 1 TABLET: TABLET at 09:14

## 2020-12-16 RX ADMIN — IPRATROPIUM BROMIDE AND ALBUTEROL SULFATE 3 ML: .5; 3 SOLUTION RESPIRATORY (INHALATION) at 07:37

## 2020-12-16 RX ADMIN — INSULIN LISPRO 4 UNITS: 100 INJECTION, SOLUTION INTRAVENOUS; SUBCUTANEOUS at 11:05

## 2020-12-16 RX ADMIN — AZITHROMYCIN 500 MG: 500 INJECTION, POWDER, LYOPHILIZED, FOR SOLUTION INTRAVENOUS at 11:04

## 2020-12-16 RX ADMIN — Medication 1 TABLET: at 09:14

## 2020-12-16 RX ADMIN — BUDESONIDE AND FORMOTEROL FUMARATE DIHYDRATE 2 PUFF: 80; 4.5 AEROSOL RESPIRATORY (INHALATION) at 20:22

## 2020-12-16 RX ADMIN — Medication 10 ML: at 06:00

## 2020-12-16 RX ADMIN — IPRATROPIUM BROMIDE AND ALBUTEROL SULFATE 3 ML: .5; 3 SOLUTION RESPIRATORY (INHALATION) at 20:22

## 2020-12-16 RX ADMIN — IPRATROPIUM BROMIDE AND ALBUTEROL SULFATE 3 ML: .5; 3 SOLUTION RESPIRATORY (INHALATION) at 02:00

## 2020-12-16 NOTE — PROGRESS NOTES
PHYSICAL THERAPY EVALUATION  Patient: Walter Sanders (85 y.o. female)  Date: 12/16/2020  Primary Diagnosis: Hypoxia [R09.02]  COPD (chronic obstructive pulmonary disease) ([facility]) [J44.9]  COPD (chronic obstructive pulmonary disease) ([facility] [J44.9]        Precautions: Low SPO2 levels     ASSESSMENT  This is a 63 y/o female came to  [facility]  with c/o cough , shortness of breath 2 weeks duration ,  found to be hypoxemic in the emergency room in the 80s ,admitted  on 12/13/20 for hypoxia and COPD  . Pt tested negative for COVID-19 and influenza . Pt received sitting up on the chair  , A& O x 4 , as per pt. Report , she lives  alone in a  apartment on second floor with 10 steps to enter , HR on bilateral sides , IND with ADL's and IND for functional transfers/mobility without AD prior to admission . Based on the objective data described below, currently pt on 1 L O2  , SPO2 levels at rest 94% , is IND with all functional transfers and is able to ambulate 350' independently without AD  , took 1 standing rest break after ambulating 200' sec c/o SOB  , O2 sats - 90% . Pt educated about importance of taking rest breaks as needed and energy conservation techniques. Pt is at her functional baseline with no acute PT needs at this time . Recommend d/c to home independently  when medically appropriate . Current Level of Function Impacting Discharge (mobility/balance): Pt is IND with  functional mobility . Functional Outcome Measure: The patient scored 24 on the AM Pac basic mobility  outcome measure which is indicative of low complexity .       Other factors to consider for discharge: O2 sats         PLAN :  Recommendation for discharge: (in order for the patient to meet his/her long term goals)  Home independently     This discharge recommendation:  Has been made in collaboration with the attending provider and/or case management    IF patient discharges home will need the following DME: none         SUBJECTIVE:   Patient stated I am feeling good .     OBJECTIVE DATA SUMMARY:   HISTORY:    Past Medical History:   Diagnosis Date    Chronic obstructive pulmonary disease ([address]     Hypertension    No past surgical history on file. Personal factors and/or comorbidities impacting plan of care:     Home Situation  Home Environment: Apartment  # Steps to Enter: 10  Rails to Enter: Yes  Hand Rails : Bilateral  One/Two Story Residence: One story  Living Alone: Yes  Support Systems: Family member(s), Friends \ neighbors  Patient Expects to be Discharged to[de-identified] Independent living facility  Current DME Used/Available at Home: None    PLOF: Pt IND for ADLS/IADLS, IND with mobility prior to admission. EXAMINATION/PRESENTATION/DECISION MAKING:     Hearing: Auditory  Auditory Impairment: None  Skin:  intact where exposed    Range Of Motion:  AROM: Within functional limits     Strength:    Strength: Within functional limits    Tone & Sensation:   Tone: Normal     Sensation: Intact     Coordination:  Coordination: Within functional limits    Functional Mobility:    Transfers:  Sit to Stand: Independent  Stand to Sit: Independent  Stand Pivot Transfers: Independent        Balance:   Sitting: Intact  Standing: Intact  Ambulation/Gait Training:  Distance (ft): 350 Feet (ft)     Ambulation - Level of Assistance: Independent    Speed/Enriqueta: Accelerated      Functional Measure:    [address] [facility] [address] [id number] AM-PAC 6 Clicks         Basic Mobility Inpatient Short Form  How much difficulty does the patient currently have. .. Unable A Lot A Little None   1. Turning over in bed (including adjusting bedclothes, sheets and blankets)? [] 1   [] 2   [] 3   [x] 4   2. Sitting down on and standing up from a chair with arms ( e.g., wheelchair, bedside commode, etc.)   [] 1   [] 2   [] 3   [x] 4   3. Moving from lying on back to sitting on the side of the bed?    [] 1   [] 2   [] 3   [x] 4          How much help from another person does the patient currently need... Total A Lot A Little None   4. Moving to and from a bed to a chair (including a wheelchair)? [] 1   [] 2   [] 3   [x] 4   5. Need to walk in hospital room? [] 1   [] 2   [] 3   [x] 4   6. Climbing 3-5 steps with a railing? [] 1   [] 2   [] 3   [x] 4   © , Trustees of [address] [place] [address] [address] [id number], under license to [employer] All rights reserved     Score:  Initial: 24 Most Recent: X (Date: 20-- )   Interpretation of Tool:  Represents activities that are increasingly more difficult (i.e. Bed mobility, Transfers, Gait). Score 24 23 22-20 19-15 14-10 9-7 6   Modifier CH CI CJ CK CL CM CN          Physical Therapy Evaluation Charge Determination   History Examination Presentation Decision-Making   MEDIUM  Complexity : 1-2 comorbidities / personal factors will impact the outcome/ POC  LOW Complexity : 1-2 Standardized tests and measures addressing body structure, function, activity limitation and / or participation in recreation  LOW Complexity : Stable, uncomplicated  Other Functional Measure [facility] 6 low complexity      Based on the above components, the patient evaluation is determined to be of the following complexity level: LOW     Pain Ratin/10    Activity Tolerance:   Good  Please refer to the flowsheet for vital signs taken during this treatment. After treatment patient left in no apparent distress:   Sitting in chair and Call bell within reach    COMMUNICATION/EDUCATION:   The patients plan of care was discussed with: Registered nurse    Fall prevention education was provided and the patient/caregiver indicated understanding. and Patient/family agree to work toward stated goals and plan of care.     Thank you for this referral.  Ted Menezes   Time Calculation: 28 mins

## 2020-12-16 NOTE — PROGRESS NOTES
On room air at rest spo2=91%. During ambulation on room air spo2 dropped to 86%. Placed patient on 2lpm nc, during ambulation on 2lpm nc spo2 dropped to 89%. Increased o2 to 3lpm nc. During ambulation on 3lpm nc  Spo2=92%.

## 2020-12-16 NOTE — PROGRESS NOTES
General Daily Progress Note          Patient Name:   Nicolas Villarreal       YOB: 1960       Age:  64 y.o. Admit Date: 12/13/2020      Subjective:       Patient alert awake complaining of cough congestion    Ultrasound of the neck done shows thyroid nodule           Objective:     Visit Vitals  /81   Pulse 72   Temp 98 °F (36.7 °C)   Resp 20   Ht 5' 4.02\" (1.626 m)   Wt 93 kg (205 lb 0.4 oz)   SpO2 95%   BMI 35.18 kg/m²        Recent Results (from the past 24 hour(s))   CBC WITH AUTOMATED DIFF    Collection Time: 12/16/20  8:21 AM   Result Value Ref Range    WBC 12.0 (H) 3.6 - 11.0 K/uL    RBC 4.31 3.80 - 5.20 M/uL    HGB 13.0 11.5 - 16.0 g/dL    HCT 41.2 35.0 - 47.0 %    MCV 95.6 80.0 - 99.0 FL    MCH 30.2 26.0 - 34.0 PG    MCHC 31.6 30.0 - 36.5 g/dL    RDW 14.7 (H) 11.5 - 14.5 %    PLATELET 497 935 - 977 K/uL    MPV 11.6 8.9 - 12.9 FL    NEUTROPHILS 92 (H) 32 - 75 %    LYMPHOCYTES 5 (L) 12 - 49 %    MONOCYTES 3 (L) 5 - 13 %    EOSINOPHILS 0 0 - 7 %    BASOPHILS 0 0 - 1 %    IMMATURE GRANULOCYTES 0 0.0 - 0.5 %    ABS. NEUTROPHILS 11.1 (H) 1.8 - 8.0 K/UL    ABS. LYMPHOCYTES 0.6 (L) 0.8 - 3.5 K/UL    ABS. MONOCYTES 0.3 0.0 - 1.0 K/UL    ABS. EOSINOPHILS 0.0 0.0 - 0.4 K/UL    ABS. BASOPHILS 0.0 0.0 - 0.1 K/UL    ABS. IMM. GRANS. 0.0 0.00 - 0.04 K/UL    DF AUTOMATED     METABOLIC PANEL, COMPREHENSIVE    Collection Time: 12/16/20  8:21 AM   Result Value Ref Range    Sodium 138 136 - 145 mmol/L    Potassium 4.5 3.5 - 5.1 mmol/L    Chloride 102 97 - 108 mmol/L    CO2 31 21 - 32 mmol/L    Anion gap 5 5 - 15 mmol/L    Glucose 180 (H) 65 - 100 mg/dL    BUN 29 (H) 6 - 20 mg/dL    Creatinine 1.08 (H) 0.55 - 1.02 mg/dL    BUN/Creatinine ratio 27 (H) 12 - 20      GFR est AA >60 >60 ml/min/1.73m2    GFR est non-AA 52 (L) >60 ml/min/1.73m2    Calcium 10.0 8.5 - 10.1 mg/dL    Bilirubin, total 0.3 0.2 - 1.0 mg/dL    AST (SGOT) 7 (L) 15 - 37 U/L    ALT (SGPT) 24 12 - 78 U/L    Alk.  phosphatase 83 45 - 117 U/L Protein, total 7.2 6.4 - 8.2 g/dL    Albumin 3.3 (L) 3.5 - 5.0 g/dL    Globulin 3.9 2.0 - 4.0 g/dL    A-G Ratio 0.8 (L) 1.1 - 2.2       [unfilled]      Review of Systems    Constitutional: Negative for chills and fever. HENT: Negative. Eyes: Negative. Respiratory: Cough congestion   cardiovascular: Negative. Gastrointestinal: Negative for abdominal pain and nausea. Skin: Negative. Neurological: Negative. On examination  Head atraumatic  Neck swelling likely thyroidomegaly  Heart normal sinus rhythm  Lung decreased air entry bilateral wheezing  Abdomen soft obese nontender  Extremities no cyanosis no clubbing no edema  Neuro alert awake oriented x3 no focal deficit      Physical Exam:  On examination  Head atraumatic  Neck swelling likely thyroidomegaly  Heart normal sinus rhythm  Lung decreased air entry bilateral wheezing  Abdomen soft obese nontender  Extremities no cyanosis no clubbing no edema  Neuro alert awake oriented x3 no focal deficit      Constitutional: pt is oriented to person, place, and time. Head: Normocephalic and atraumatic. Eyes: Pupils are equal, round, and reactive to light. EOM are normal.   Cardiovascular: Normal rate, regular rhythm and normal heart sounds. Exhibits no tenderness. Abdominal: Soft. Bowel sounds are normal. There is no abdominal tenderness. There is no rebound and no guarding. Musculoskeletal: Normal range of motion. Neurological: pt is alert and oriented to person, place, and time. US HEAD NECK SOFT TISSUE   Final Result   IMPRESSION: Right thyroid nodule is of intermediate suspicion. Suggest either   biopsy or follow-up      XR CHEST SNGL V   Final Result   IMPRESSION: Hazy density bilaterally which may be artifact related to overlying   soft tissue. Early peripheral infiltrates could give this appearance as well. Are there signs or symptoms of pneumonia? .                     Recent Results (from the past 24 hour(s))   CBC WITH AUTOMATED DIFF    Collection Time: 12/16/20  8:21 AM   Result Value Ref Range    WBC 12.0 (H) 3.6 - 11.0 K/uL    RBC 4.31 3.80 - 5.20 M/uL    HGB 13.0 11.5 - 16.0 g/dL    HCT 41.2 35.0 - 47.0 %    MCV 95.6 80.0 - 99.0 FL    MCH 30.2 26.0 - 34.0 PG    MCHC 31.6 30.0 - 36.5 g/dL    RDW 14.7 (H) 11.5 - 14.5 %    PLATELET 075 017 - 698 K/uL    MPV 11.6 8.9 - 12.9 FL    NEUTROPHILS 92 (H) 32 - 75 %    LYMPHOCYTES 5 (L) 12 - 49 %    MONOCYTES 3 (L) 5 - 13 %    EOSINOPHILS 0 0 - 7 %    BASOPHILS 0 0 - 1 %    IMMATURE GRANULOCYTES 0 0.0 - 0.5 %    ABS. NEUTROPHILS 11.1 (H) 1.8 - 8.0 K/UL    ABS. LYMPHOCYTES 0.6 (L) 0.8 - 3.5 K/UL    ABS. MONOCYTES 0.3 0.0 - 1.0 K/UL    ABS. EOSINOPHILS 0.0 0.0 - 0.4 K/UL    ABS. BASOPHILS 0.0 0.0 - 0.1 K/UL    ABS. IMM. GRANS. 0.0 0.00 - 0.04 K/UL    DF AUTOMATED     METABOLIC PANEL, COMPREHENSIVE    Collection Time: 12/16/20  8:21 AM   Result Value Ref Range    Sodium 138 136 - 145 mmol/L    Potassium 4.5 3.5 - 5.1 mmol/L    Chloride 102 97 - 108 mmol/L    CO2 31 21 - 32 mmol/L    Anion gap 5 5 - 15 mmol/L    Glucose 180 (H) 65 - 100 mg/dL    BUN 29 (H) 6 - 20 mg/dL    Creatinine 1.08 (H) 0.55 - 1.02 mg/dL    BUN/Creatinine ratio 27 (H) 12 - 20      GFR est AA >60 >60 ml/min/1.73m2    GFR est non-AA 52 (L) >60 ml/min/1.73m2    Calcium 10.0 8.5 - 10.1 mg/dL    Bilirubin, total 0.3 0.2 - 1.0 mg/dL    AST (SGOT) 7 (L) 15 - 37 U/L    ALT (SGPT) 24 12 - 78 U/L    Alk.  phosphatase 83 45 - 117 U/L    Protein, total 7.2 6.4 - 8.2 g/dL    Albumin 3.3 (L) 3.5 - 5.0 g/dL    Globulin 3.9 2.0 - 4.0 g/dL    A-G Ratio 0.8 (L) 1.1 - 2.2         Results     Procedure Component Value Units Date/Time    INFLUENZA A & B AG (RAPID TEST) [[id number]] Collected: 12/13/20 0530    Order Status: Completed Specimen: Nasopharyngeal from Nasal washing Updated: 12/13/20 0656     Influenza A Antigen Negative        Influenza B Antigen Negative       COVID-19 RAPID TEST [[id number]] Collected: 12/13/20 0530    Order Status: Completed Specimen: Nasopharyngeal Updated: 12/13/20 0657     Specimen source Nasopharyngeal        COVID-19 rapid test Not Detected        Comment: Rapid Abbott ID Now   Rapid NAAT:  The specimen is NEGATIVE for SARS-CoV-2, the novel coronavirus associated with COVID-19. Negative results should be treated as presumptive and, if inconsistent with clinical signs and symptoms or necessary for patient management, should be tested with an alternative molecular assay. Negative results do not preclude SARS-CoV-2 infection and should not be used as the sole basis for patient management decisions. This test has been authorized by the FDA under   an Emergency Use Authorization (EUA) for use by authorized laboratories. Fact sheet for Healthcare Providers: ConventionBetyahdate.co.nz Fact sheet for Patients: TUBEdate.co.nz   Methodology: Isothermal Nucleic Acid Amplification                Labs:     Recent Labs     12/16/20  0821   WBC 12.0*   HGB 13.0   HCT 41.2        Recent Labs     12/16/20  0821      K 4.5      CO2 31   BUN 29*   CREA 1.08*   *   CA 10.0     Recent Labs     12/16/20  0821   ALT 24   AP 83   TBILI 0.3   TP 7.2   ALB 3.3*   GLOB 3.9     No results for input(s): INR, PTP, APTT, INREXT, INREXT in the last 72 hours. No results for input(s): FE, TIBC, PSAT, FERR in the last 72 hours. No results found for: FOL, RBCF   No results for input(s): PH, PCO2, PO2 in the last 72 hours. No results for input(s): CPK, CKNDX, TROIQ in the last 72 hours. No lab exists for component: CPKMB  No results found for: CHOL, CHOLX, CHLST, CHOLV, HDL, HDLP, LDL, LDLC, DLDLP, TGLX, TRIGL, TRIGP, CHHD, CHHDX  No results found for: GLUCPOC  No results found for: COLOR, APPRN, SPGRU, REFSG, MIKE, PROTU, GLUCU, KETU, BILU, UROU, SHARIF, LEUKU, GLUKE, EPSU, BACTU, WBCU, RBCU, CASTS, UCRY      Assessment:   1. Acute respiratory failure with hypoxia  2. Acute exacerbation of COPD  3. Asthma  4. Wheezing  5. Cardiomegaly  6. Pedal edema  7. Obstructive sleep apnea on CPAP  8. Obesity  9. Acute kidney injury  10 thyromegaly/thyroid nodule     ·   LV: Calculated LVEF is 65%. Normal cavity size and systolic function (ejection fraction normal). Mild concentric hypertrophy. Abnormal left ventricular septal motion consistent with right ventricular pacing. Mild (grade 1) left ventricular diastolic dysfunction.              Plan:     On Norvasc 10 mg daily  On Zithromax 500 mg daily IV Rocephin 1 g daily  On Symbicort 2 puffs twice daily  Lasix 40 mg daily  IV Solu-Medrol 40 mg IV every 6  ENT consult for possible biopsy        Mucinex 600 twice daily for cough  PT OT consult  Try to wean off oxygen  We will consult respiratory therapist to monitor ambulatory and overnight pulse ox      Current Facility-Administered Medications:     guaiFENesin ER (MUCINEX) tablet 600 mg, 600 mg, Oral, BID, Kami Funes MD, 600 mg at 12/16/20 0915    amLODIPine (NORVASC) tablet 10 mg, 10 mg, Oral, DAILY, Teresa Yang MD, 10 mg at 12/16/20 0914    calcium-vitamin D 600 mg(1,500mg) -200 unit per tablet 1 Tab, 1 Tab, Oral, DAILY WITH BREAKFAST, Teresa Yang MD, 1 Tab at 12/16/20 0914    furosemide (LASIX) tablet 40 mg, 40 mg, Oral, DAILY, Teresa Yang MD, 40 mg at 12/16/20 0914    montelukast (SINGULAIR) tablet 10 mg, 10 mg, Oral, QHS, Teresa Yang MD, 10 mg at 12/15/20 1933    multivitamin (ONE A DAY) tablet 1 Tab, 1 Tab, Oral, DAILY, Teresa Yang MD, 1 Tab at 12/16/20 0914    ergocalciferol capsule 50,000 Units, 50,000 Units, Oral, Q7D, Teresa Yang MD, 50,000 Units at 12/14/20 1323    albuterol-ipratropium (DUO-NEB) 2.5 MG-0.5 MG/3 ML, 3 mL, Nebulization, Q6H RT, Peter Ruiz MD, 3 mL at 12/16/20 0737    polyethylene glycol (MIRALAX) packet 17 g, 17 g, Oral, DAILY, Dsetin Elmore, , 17 g at 12/15/20 0804    insulin lispro (HUMALOG) injection, , SubCUTAneous, AC&HS, Sudhir Strange MD, 4 Units at 12/16/20 1105    glucose chewable tablet 16 g, 4 Tab, Oral, PRN, Kailash NICHOLS MD    glucagon (GLUCAGEN) injection 1 mg, 1 mg, IntraMUSCular, PRN, Kailash NICHOLS MD    dextrose (D50W) injection syrg 12.5-25 g, 25-50 mL, IntraVENous, PRN, North Mnotesinos MD    benzonatate (TESSALON) capsule 100 mg, 100 mg, Oral, TID PRN, Kailash NICHOLS MD, 100 mg at 12/16/20 9919    acetaminophen (TYLENOL) tablet 650 mg, 650 mg, Oral, Q6H PRN, Kailash NICHOLS MD    ondansetron (ZOFRAN) injection 4 mg, 4 mg, IntraVENous, Q4H PRN, Kailash NICHOLS MD    sodium chloride (NS) flush 5-40 mL, 5-40 mL, IntraVENous, Q8H, North Hamm MD, 10 mL at 12/16/20 0600    sodium chloride (NS) flush 5-40 mL, 5-40 mL, IntraVENous, PRN, North Montesinos MD, 10 mL at 12/13/20 1131    budesonide-formoterol (SYMBICORT) 80-4.5 mcg inhaler, 2 Puff, Inhalation, BID RT, Sudhir Strange MD, 2 Puff at 12/16/20 0737    cefTRIAXone (ROCEPHIN) 1 g in sterile water (preservative free) 10 mL IV syringe, 1 g, IntraVENous, Q24H, North Hamm MD, 1 g at 12/16/20 1114    azithromycin (ZITHROMAX) 500 mg in 0.9% sodium chloride 250 mL (VIAL-MATE), 500 mg, IntraVENous, Q24H, North Hamm MD, 500 mg at 12/16/20 1104    magnesium hydroxide (MILK OF MAGNESIA) 400 mg/5 mL oral suspension 30 mL, 30 mL, Oral, DAILY PRN, North Montesinos MD    LORazepam (ATIVAN) tablet 0.5 mg, 0.5 mg, Oral, BID PRN, Kailash NICHOLS MD    enoxaparin (LOVENOX) injection 40 mg, 40 mg, SubCUTAneous, Q24H, North Hamm MD, 40 mg at 12/16/20 1104    methylPREDNISolone (PF) (SOLU-MEDROL) injection 40 mg, 40 mg, IntraVENous, Q6H, Benitez Willson MD, 40 mg at 12/16/20 1105

## 2020-12-16 NOTE — PROGRESS NOTES
Pulmonary and Critical Care Progress Note    Subjective:     Patient seen and examined in her room this afternoon, no acute events overnight. Patient states that she is feeling better from admission and overall may be a little bit better than yesterday. Patient feels that a lot of her wheezing is coming from her neck and she definitely has a lot of anxiety. Certainly could be a form of vocal cord dysfunction and she is certainly anxious on exam.  I told her that I would place something in the chart as needed for anxiety. All questions/concerns were addressed at the bedside this afternoon. Patient was walked with and without supplemental oxygen and found that she desaturates to 86% with ambulation on room air. She will go home with supplemental oxygen. We will plan to retest her as an outpatient. Review of Systems:  A comprehensive review of systems was negative except for that written in the HPI.           Current Facility-Administered Medications   Medication Dose Route Frequency Provider Last Rate Last Admin    [START ON 12/17/2020] aspirin chewable tablet 81 mg  81 mg Oral DAILY Haider Rudolph MD        [START ON 12/17/2020] predniSONE (DELTASONE) tablet 40 mg  40 mg Oral DAILY WITH BREAKFAST Destin Welsh DO        hydrOXYzine HCL (ATARAX) tablet 25 mg  25 mg Oral QID PRN Destin Elmore DO        guaiFENesin ER (MUCINEX) tablet 600 mg  600 mg Oral BID Kami Funes MD   600 mg at 12/16/20 0915    amLODIPine (NORVASC) tablet 10 mg  10 mg Oral DAILY Suzy Srinivasan MD   10 mg at 12/16/20 0914    calcium-vitamin D 600 mg(1,500mg) -200 unit per tablet 1 Tab  1 Tab Oral DAILY WITH BREAKFAST Suzy Srinivasan MD   1 Tab at 12/16/20 0914    furosemide (LASIX) tablet 40 mg  40 mg Oral DAILY Brandon NICHOLS MD   40 mg at 12/16/20 0914    montelukast (SINGULAIR) tablet 10 mg  10 mg Oral QHS Brandon NICHOLS MD   10 mg at 12/15/20 1933    multivitamin (ONE A DAY) tablet 1 Tab  1 Tab Oral DAILY Thai Buck MD   1 Tab at 12/16/20 8683    ergocalciferol capsule 50,000 Units  50,000 Units Oral Q7D Thai Buck MD   50,000 Units at 12/14/20 1323    albuterol-ipratropium (DUO-NEB) 2.5 MG-0.5 MG/3 ML  3 mL Nebulization Q6H RT Heladio Leung MD   3 mL at 12/16/20 1344    polyethylene glycol (MIRALAX) packet 17 g  17 g Oral DAILY Destin Elmore DO   17 g at 12/15/20 0804    insulin lispro (HUMALOG) injection   SubCUTAneous AC&HS Thai Buck MD   4 Units at 12/16/20 1105    glucose chewable tablet 16 g  4 Tab Oral PRN Thai Buck MD        glucagon (GLUCAGEN) injection 1 mg  1 mg IntraMUSCular PRN Cherie NICHOLS MD        dextrose (D50W) injection syrg 12.5-25 g  25-50 mL IntraVENous PRN Thai Buck MD        benzonatate (TESSALON) capsule 100 mg  100 mg Oral TID PRN Thai Buck MD   100 mg at 12/16/20 5957    acetaminophen (TYLENOL) tablet 650 mg  650 mg Oral Q6H PRN Thai Buck MD        [facility]etron [facility]) injection 4 mg  4 mg IntraVENous Q4H PRN Cherie NICHOLS MD        sodium chloride (NS) flush 5-40 mL  5-40 mL IntraVENous Q8H Cherie NICHOLS MD   10 mL at 12/16/20 0600    sodium chloride (NS) flush 5-40 mL  5-40 mL IntraVENous PRN Thai Buck MD   10 mL at 12/13/20 1131    budesonide-formoterol (SYMBICORT) 80-4.5 mcg inhaler  2 Puff Inhalation BID RT Thai Buck MD   2 Puff at 12/16/20 0737    cefTRIAXone (ROCEPHIN) 1 g in sterile water (preservative free) 10 mL IV syringe  1 g IntraVENous Q24H Cherie NICHOLS MD   1 g at 12/16/20 1114    azithromycin (ZITHROMAX) 500 mg in 0.9% sodium chloride 250 mL (VIAL-MATE)  500 mg IntraVENous Q24H Cherie NICHOLS MD   500 mg at 12/16/20 1104    magnesium hydroxide (MILK OF MAGNESIA) 400 mg/5 mL oral suspension 30 mL  30 mL Oral DAILY PRN Cherie NICHOLS MD        LORazepam (ATIVAN) tablet 0.5 mg  0.5 mg Oral BID PRN Cherie NICHOLS MD        enoxaparin (LOVENOX) injection 40 mg 40 mg SubCUTAneous Q24H Belkys NICHOLS MD   40 mg at 20 1104          Allergies   Allergen Reactions    Lisinopril Angioedema           Objective:     Blood pressure (!) 145/80, pulse 95, temperature 98.6 °F (37 °C), resp. rate 22, height 5' 4.02\" (1.626 m), weight 93 kg (205 lb 0.4 oz), SpO2 95 %. Temp (24hrs), Av.2 °F (36.8 °C), Min:98 °F (36.7 °C), Max:98.6 °F (37 °C)      Intake and Output:  Current Shift: No intake/output data recorded. Last 3 Shifts: No intake/output data recorded. Physical Exam:     General:  Obese lady, lying in bed comfortably, no acute distress, on nasal cannula oxygen at 1 L  Eye: Reactive, symmetric  Throat and Neck: Supple  Lung:  Decreased air entry bilaterally with prolonged exhalation, wheezing, occasional crackles. Currently on 2 L nasal cannula. Heart: S1+S2. No murmurs  Abdomen: soft, non-tender. Bowel sounds normal. No masses; obese  Extremities:  2+ edema left lower extremity  : Not done  Skin: No cyanosis  Neurologic: A & O x3. Grossly nonfocal  Psychiatric: Appropriate affect; coherent      Lab/Data Review:    Recent Results (from the past 24 hour(s))   CBC WITH AUTOMATED DIFF    Collection Time: 20  8:21 AM   Result Value Ref Range    WBC 12.0 (H) 3.6 - 11.0 K/uL    RBC 4.31 3.80 - 5.20 M/uL    HGB 13.0 11.5 - 16.0 g/dL    HCT 41.2 35.0 - 47.0 %    MCV 95.6 80.0 - 99.0 FL    MCH 30.2 26.0 - 34.0 PG    MCHC 31.6 30.0 - 36.5 g/dL    RDW 14.7 (H) 11.5 - 14.5 %    PLATELET 966 870 - 214 K/uL    MPV 11.6 8.9 - 12.9 FL    NEUTROPHILS 92 (H) 32 - 75 %    LYMPHOCYTES 5 (L) 12 - 49 %    MONOCYTES 3 (L) 5 - 13 %    EOSINOPHILS 0 0 - 7 %    BASOPHILS 0 0 - 1 %    IMMATURE GRANULOCYTES 0 0.0 - 0.5 %    ABS. NEUTROPHILS 11.1 (H) 1.8 - 8.0 K/UL    ABS. LYMPHOCYTES 0.6 (L) 0.8 - 3.5 K/UL    ABS. MONOCYTES 0.3 0.0 - 1.0 K/UL    ABS. EOSINOPHILS 0.0 0.0 - 0.4 K/UL    ABS. BASOPHILS 0.0 0.0 - 0.1 K/UL    ABS. IMM.  GRANS. 0.0 0.00 - 0.04 K/UL    DF AUTOMATED METABOLIC PANEL, COMPREHENSIVE    Collection Time: 12/16/20  8:21 AM   Result Value Ref Range    Sodium 138 136 - 145 mmol/L    Potassium 4.5 3.5 - 5.1 mmol/L    Chloride 102 97 - 108 mmol/L    CO2 31 21 - 32 mmol/L    Anion gap 5 5 - 15 mmol/L    Glucose 180 (H) 65 - 100 mg/dL    BUN 29 (H) 6 - 20 mg/dL    Creatinine 1.08 (H) 0.55 - 1.02 mg/dL    BUN/Creatinine ratio 27 (H) 12 - 20      GFR est AA >60 >60 ml/min/1.73m2    GFR est non-AA 52 (L) >60 ml/min/1.73m2    Calcium 10.0 8.5 - 10.1 mg/dL    Bilirubin, total 0.3 0.2 - 1.0 mg/dL    AST (SGOT) 7 (L) 15 - 37 U/L    ALT (SGPT) 24 12 - 78 U/L    Alk. phosphatase 83 45 - 117 U/L    Protein, total 7.2 6.4 - 8.2 g/dL    Albumin 3.3 (L) 3.5 - 5.0 g/dL    Globulin 3.9 2.0 - 4.0 g/dL    A-G Ratio 0.8 (L) 1.1 - 2.2         US HEAD NECK SOFT TISSUE   Final Result   IMPRESSION: Right thyroid nodule is of intermediate suspicion. Suggest either   biopsy or follow-up      XR CHEST SNGL V   Final Result   IMPRESSION: Hazy density bilaterally which may be artifact related to overlying   soft tissue. Early peripheral infiltrates could give this appearance as well. Are there signs or symptoms of pneumonia? .                    CT Results  (Last 48 hours)    None            Assessment:     1. Acute respiratory failure with hypoxia  2. Acute exacerbation of COPD  3. Asthma  4. Wheezing  5. Cardiomegaly  6. Pedal edema  7. Obstructive sleep apnea on CPAP  8. Obesity  9. Acute kidney injury    Plan:     Patient is doing fairly well on the floor on 2 L nasal cannula. Nasal cannula oxygen as needed to keep saturation above 92%    Continue azithromycin and Rocephin for treatment of possible pneumonia on top of a COPD exacerbation. Further changes in antibiotics based on clinical response and culture results  We will transition her Solu-Medrol to prednisone 40 mg daily starting tomorrow. Anticipate discharge home Thursday/Friday.   Continue duo nebs every 6 hours  Symbicort twice daily    Definitely has a lot of wheezing in the neck area, suspect that she may have a degree of vocal cord dysfunction. I agree with a ENT consult. COVID-19 and influenza testing is negative. Oxygen saturation dropped to 86% with ambulation on room air. Will need to be set up for O2 at discharge. Patient has cardiomegaly and pedal edema but BNP is normal  Currently on oral Lasix daily. Intake and output charting  Check electrolytes and replace as needed  Follow renal function with fluid changes  Echocardiogram has been completed but the read is currently pending  Venous duplex of the lower extremities is negative for DVT. Patient refusing to wear full facemask, will need discussion as an outpatient about a nasal mask. Clearly her untreated obstructive sleep apnea is definitely contributing to her chronic shortness of breath. DVT and GI prophylaxis    Questions of patient were answered at bedside in detail  Case discussed in detail with RN, RT, and care team  Thank you for involving me in the care of this patient  I will follow with you closely during hospitalization    Time spent more than 30 minutes in direct patient care with no overlap reviewing results and records, decision making, and answering questions.       Tessy Garcia, DO  [facility]  12/16/2020  4:34 PM

## 2020-12-16 NOTE — CONSULTS
Progress note    Patient: Bruce Diane MRN: [id number]  SSN: [id number]    YOB: 1960  Age: 64 y.o. Sex: female      Subjective:      No acute events overnight. She complains of wheezing and shortness of breath but no chest pain    Past Medical History:   Diagnosis Date    Chronic obstructive pulmonary disease ([address]     Hypertension      No past surgical history on file. No family history on file.   Social History     Tobacco Use    Smoking status: Current Some Day Smoker    Smokeless tobacco: Never Used   Substance Use Topics    Alcohol use: Yes      Current Facility-Administered Medications   Medication Dose Route Frequency Provider Last Rate Last Admin    guaiFENesin ER (MUCINEX) tablet 600 mg  600 mg Oral BID Kami Funes MD   600 mg at 12/16/20 0915    amLODIPine (NORVASC) tablet 10 mg  10 mg Oral DAILY Maci NICHOLS MD   10 mg at 12/16/20 0914    calcium-vitamin D 600 mg(1,500mg) -200 unit per tablet 1 Tab  1 Tab Oral DAILY WITH BREAKFAST Caren Cabezas MD   1 Tab at 12/16/20 0914    furosemide (LASIX) tablet 40 mg  40 mg Oral DAILY Caren Cabezas MD   40 mg at 12/16/20 0914    montelukast (SINGULAIR) tablet 10 mg  10 mg Oral QHS Caren Cabezas MD   10 mg at 12/15/20 1933    multivitamin (ONE A DAY) tablet 1 Tab  1 Tab Oral DAILY Caren Cabezas MD   1 Tab at 12/16/20 0914    ergocalciferol capsule 50,000 Units  50,000 Units Oral Q7D Caren Cabezas MD   50,000 Units at 12/14/20 1323    albuterol-ipratropium (DUO-NEB) 2.5 MG-0.5 MG/3 ML  3 mL Nebulization Q6H RT Elliot Murphy MD   3 mL at 12/16/20 1344    polyethylene glycol (MIRALAX) packet 17 g  17 g Oral DAILY Destin Elmore DO   17 g at 12/15/20 0804    insulin lispro (HUMALOG) injection   SubCUTAneous AC&HS Caren Cabezas MD   4 Units at 12/16/20 1105    glucose chewable tablet 16 g  4 Tab Oral PRN Caren Cabezas MD        glucagon (GLUCAGEN) injection 1 mg  1 mg IntraMUSCular PRN Pauline Woodward MD        dextrose (D50W) injection syrg 12.5-25 g  25-50 mL IntraVENous PRN Yari NICHOLS MD        benzonatate (TESSALON) capsule 100 mg  100 mg Oral TID PRN Yari NICHOLS MD   100 mg at 12/16/20 3230    acetaminophen (TYLENOL) tablet 650 mg  650 mg Oral Q6H PRN Pauline Woodward MD        ondansetron [facility]) injection 4 mg  4 mg IntraVENous Q4H PRN Yari NICHOLS MD        sodium chloride (NS) flush 5-40 mL  5-40 mL IntraVENous Q8H Yari NICHOLS MD   10 mL at 12/16/20 0600    sodium chloride (NS) flush 5-40 mL  5-40 mL IntraVENous PRN Yari NICHOLS MD   10 mL at 12/13/20 1131    budesonide-formoterol (SYMBICORT) 80-4.5 mcg inhaler  2 Puff Inhalation BID RT Pauline Woodward MD   2 Puff at 12/16/20 0737    cefTRIAXone (ROCEPHIN) 1 g in sterile water (preservative free) 10 mL IV syringe  1 g IntraVENous Q24H Yari NICHOLS MD   1 g at 12/16/20 1114    azithromycin (ZITHROMAX) 500 mg in 0.9% sodium chloride 250 mL (VIAL-MATE)  500 mg IntraVENous Q24H Yari NICHOLS MD   500 mg at 12/16/20 1104    magnesium hydroxide (MILK OF MAGNESIA) 400 mg/5 mL oral suspension 30 mL  30 mL Oral DAILY PRN Yari NICHOLS MD        LORazepam (ATIVAN) tablet 0.5 mg  0.5 mg Oral BID PRN Yari NICHOLS MD        enoxaparin (LOVENOX) injection 40 mg  40 mg SubCUTAneous Q24H Yari NICHOLS MD   40 mg at 12/16/20 1104    methylPREDNISolone (PF) (SOLU-MEDROL) injection 40 mg  40 mg IntraVENous Q6H Jose Carlos Willson MD   40 mg at 12/16/20 1105        Allergies   Allergen Reactions    Lisinopril Angioedema       Review of Systems:  A comprehensive review of systems was negative except for that written in the History of Present Illness.     Objective:     Vitals:    12/16/20 0145 12/16/20 0737 12/16/20 0802 12/16/20 1344   BP:   137/81    Pulse:   72    Resp:   20    Temp:   98 °F (36.7 °C)    SpO2: 95% 95% 95% 93%   Weight:       Height:            Physical Exam:  General:  Alert, cooperative, no distress, appears stated age. Eyes:  Conjunctivae/corneas clear. PERRL, EOMs intact. Fundi benign   Ears:  Normal TMs and external ear canals both ears. Nose: Nares normal. Septum midline. Mucosa normal. No drainage or sinus tenderness. Mouth/Throat: Lips, mucosa, and tongue normal. Teeth and gums normal.   Neck: Supple, symmetrical, trachea midline, no adenopathy, thyroid: no enlargment/tenderness/nodules, no carotid bruit and no JVD. Back:   Symmetric, no curvature. ROM normal. No CVA tenderness. Lungs:   Clear to auscultation bilaterally. Heart:  Regular rate and rhythm, S1, S2 normal, no murmur, click, rub or gallop. Abdomen:   Soft, non-tender. Bowel sounds normal. No masses,  No organomegaly. Extremities: Extremities normal, atraumatic, no cyanosis or edema. Pulses: 2+ and symmetric all extremities. Skin: Skin color, texture, turgor normal. No rashes or lesions   Lymph nodes: Cervical, supraclavicular, and axillary nodes normal.   Neurologic: CNII-XII intact. Normal strength, sensation and reflexes throughout. Assessment:     Hospital Problems  Never Reviewed          Codes Class Noted POA    COPD (chronic obstructive pulmonary disease) ([facility] ICD-10-CM: J44.9  ICD-9-CM: 497  12/13/2020 Unknown        Hypoxia ICD-10-CM: R09.02  ICD-9-CM: 799.02  12/13/2020 Unknown          Patient is 53-year-old -American female with:  1. Chest pain  2. COPD exacerbation  3. Hypertensive heart disease  4. Nicotine dependence  5. Morbid obesity  6. ACE inhibitor allergy  7. Bronchial asthma  8. Acute kidney injury  9. Obstructive sleep apnea    Plan:     Results of echo were discussed with the patient. Currently in sinus rhythm, sinus tachycardia. COPD management per primary team and pulmonary team.  No further cardiac testing is planned at this time.   We will plan once she is more stable clinically for outpatient stress test.  I will see patient in 2 to 4 weeks after discharge or sooner if needed. I will sign off and remain available if needed. Add aspirin 81 mg daily.   Signed By: Pantera Brooks MD     December 16, 2020

## 2020-12-16 NOTE — PROGRESS NOTES
Chart reviewed. Patient is not on 1L nasal cannula. Patient may need RT eval for home oxygen if unable to wean. Patient is accepted with [facility] home health when stable for discharge.

## 2020-12-17 ENCOUNTER — APPOINTMENT (OUTPATIENT)
Dept: GENERAL RADIOLOGY | Age: 60
DRG: 140 | End: 2020-12-17
Attending: ORTHOPAEDIC SURGERY
Payer: MEDICAID

## 2020-12-17 ENCOUNTER — APPOINTMENT (OUTPATIENT)
Dept: ULTRASOUND IMAGING | Age: 60
DRG: 140 | End: 2020-12-17
Attending: FAMILY MEDICINE
Payer: MEDICAID

## 2020-12-17 PROCEDURE — 65270000029 HC RM PRIVATE

## 2020-12-17 PROCEDURE — 74011000250 HC RX REV CODE- 250: Performed by: INTERNAL MEDICINE

## 2020-12-17 PROCEDURE — 77010033678 HC OXYGEN DAILY

## 2020-12-17 PROCEDURE — 74011636637 HC RX REV CODE- 636/637: Performed by: INTERNAL MEDICINE

## 2020-12-17 PROCEDURE — 74011250636 HC RX REV CODE- 250/636: Performed by: INTERNAL MEDICINE

## 2020-12-17 PROCEDURE — 74011250637 HC RX REV CODE- 250/637: Performed by: INTERNAL MEDICINE

## 2020-12-17 PROCEDURE — 94760 N-INVAS EAR/PLS OXIMETRY 1: CPT

## 2020-12-17 PROCEDURE — 74011250637 HC RX REV CODE- 250/637: Performed by: FAMILY MEDICINE

## 2020-12-17 PROCEDURE — 99222 1ST HOSP IP/OBS MODERATE 55: CPT | Performed by: OTOLARYNGOLOGY

## 2020-12-17 PROCEDURE — 94640 AIRWAY INHALATION TREATMENT: CPT

## 2020-12-17 PROCEDURE — 76882 US LMTD JT/FCL EVL NVASC XTR: CPT

## 2020-12-17 PROCEDURE — 73560 X-RAY EXAM OF KNEE 1 OR 2: CPT

## 2020-12-17 RX ADMIN — AMLODIPINE BESYLATE 10 MG: 5 TABLET ORAL at 08:34

## 2020-12-17 RX ADMIN — INSULIN LISPRO 2 UNITS: 100 INJECTION, SOLUTION INTRAVENOUS; SUBCUTANEOUS at 19:44

## 2020-12-17 RX ADMIN — PREDNISONE 40 MG: 20 TABLET ORAL at 08:34

## 2020-12-17 RX ADMIN — ASPIRIN 81 MG: 81 TABLET, CHEWABLE ORAL at 08:34

## 2020-12-17 RX ADMIN — BENZONATATE 100 MG: 100 CAPSULE ORAL at 08:34

## 2020-12-17 RX ADMIN — Medication 10 ML: at 05:47

## 2020-12-17 RX ADMIN — IPRATROPIUM BROMIDE AND ALBUTEROL SULFATE 3 ML: .5; 3 SOLUTION RESPIRATORY (INHALATION) at 02:00

## 2020-12-17 RX ADMIN — Medication 1 TABLET: at 08:34

## 2020-12-17 RX ADMIN — INSULIN LISPRO 2 UNITS: 100 INJECTION, SOLUTION INTRAVENOUS; SUBCUTANEOUS at 11:22

## 2020-12-17 RX ADMIN — INSULIN LISPRO 2 UNITS: 100 INJECTION, SOLUTION INTRAVENOUS; SUBCUTANEOUS at 16:30

## 2020-12-17 RX ADMIN — HYDROXYZINE HYDROCHLORIDE 25 MG: 25 TABLET, FILM COATED ORAL at 23:28

## 2020-12-17 RX ADMIN — ENOXAPARIN SODIUM 40 MG: 40 INJECTION SUBCUTANEOUS at 11:22

## 2020-12-17 RX ADMIN — BENZONATATE 100 MG: 100 CAPSULE ORAL at 19:50

## 2020-12-17 RX ADMIN — FUROSEMIDE 40 MG: 40 TABLET ORAL at 08:34

## 2020-12-17 RX ADMIN — BUDESONIDE AND FORMOTEROL FUMARATE DIHYDRATE 2 PUFF: 80; 4.5 AEROSOL RESPIRATORY (INHALATION) at 21:10

## 2020-12-17 RX ADMIN — Medication 10 ML: at 14:00

## 2020-12-17 RX ADMIN — GUAIFENESIN 600 MG: 600 TABLET, EXTENDED RELEASE ORAL at 19:44

## 2020-12-17 RX ADMIN — CEFTRIAXONE SODIUM 1 G: 1 INJECTION, POWDER, FOR SOLUTION INTRAMUSCULAR; INTRAVENOUS at 11:23

## 2020-12-17 RX ADMIN — GUAIFENESIN 600 MG: 600 TABLET, EXTENDED RELEASE ORAL at 08:34

## 2020-12-17 RX ADMIN — MULTIVITAMIN TABLET 1 TABLET: TABLET at 08:34

## 2020-12-17 RX ADMIN — MONTELUKAST 10 MG: 10 TABLET, FILM COATED ORAL at 19:44

## 2020-12-17 RX ADMIN — Medication 10 ML: at 19:44

## 2020-12-17 RX ADMIN — IPRATROPIUM BROMIDE AND ALBUTEROL SULFATE 3 ML: .5; 3 SOLUTION RESPIRATORY (INHALATION) at 20:47

## 2020-12-17 RX ADMIN — AZITHROMYCIN 500 MG: 500 INJECTION, POWDER, LYOPHILIZED, FOR SOLUTION INTRAVENOUS at 11:23

## 2020-12-17 NOTE — PROGRESS NOTES
General Daily Progress Note          Patient Name:   Sammi Vergara       YOB: 1960       Age:  64 y.o. Admit Date: 12/13/2020      Subjective:       Patient alert awake complaining of cough congestion    Ultrasound of the neck done shows thyroid nodule  Complaining of right knee pain           Objective:     Visit Vitals  BP (!) 140/74   Pulse 86   Temp 98.2 °F (36.8 °C)   Resp 20   Ht 5' 4.02\" (1.626 m)   Wt 93 kg (205 lb 0.4 oz)   SpO2 96%   BMI 35.18 kg/m²        No results found for this or any previous visit (from the past 24 hour(s)). [unfilled]      Review of Systems    Constitutional: Negative for chills and fever. HENT: Negative. Eyes: Negative. Respiratory: Cough congestion   cardiovascular: Negative. Gastrointestinal: Negative for abdominal pain and nausea. Skin: Negative. Neurological: Negative. On examination  Head atraumatic  Neck swelling likely thyroidomegaly  Heart normal sinus rhythm  Lung decreased air entry bilateral wheezing  Abdomen soft obese nontender  Extremities no cyanosis no clubbing no edema  Neuro alert awake oriented x3 no focal deficit      Physical Exam:  On examination  Head atraumatic  Neck swelling likely thyroidomegaly  Heart normal sinus rhythm  Lung decreased air entry bilateral wheezing  Abdomen soft obese nontender  Extremities no cyanosis no clubbing no edema  Neuro alert awake oriented x3 no focal deficit      Constitutional: pt is oriented to person, place, and time. Head: Normocephalic and atraumatic. Eyes: Pupils are equal, round, and reactive to light. EOM are normal.   Cardiovascular: Normal rate, regular rhythm and normal heart sounds. Exhibits no tenderness. Abdominal: Soft. Bowel sounds are normal. There is no abdominal tenderness. There is no rebound and no guarding. Musculoskeletal: Normal range of motion. Neurological: pt is alert and oriented to person, place, and time.      US HEAD NECK SOFT TISSUE Final Result   IMPRESSION: Right thyroid nodule is of intermediate suspicion. Suggest either   biopsy or follow-up      XR CHEST SNGL V   Final Result   IMPRESSION: Hazy density bilaterally which may be artifact related to overlying   soft tissue. Early peripheral infiltrates could give this appearance as well. Are there signs or symptoms of pneumonia? .                US EXT NONVAS RT LTD    (Results Pending)        No results found for this or any previous visit (from the past 24 hour(s)). Results     Procedure Component Value Units Date/Time    INFLUENZA A & B AG (RAPID TEST) [[id number]] Collected: 12/13/20 0530    Order Status: Completed Specimen: Nasopharyngeal from Nasal washing Updated: 12/13/20 0656     Influenza A Antigen Negative        Influenza B Antigen Negative       COVID-19 RAPID TEST [[id number]] Collected: 12/13/20 0530    Order Status: Completed Specimen: Nasopharyngeal Updated: 12/13/20 0657     Specimen source Nasopharyngeal        COVID-19 rapid test Not Detected        Comment: Rapid Abbott ID Now   Rapid NAAT:  The specimen is NEGATIVE for SARS-CoV-2, the novel coronavirus associated with COVID-19. Negative results should be treated as presumptive and, if inconsistent with clinical signs and symptoms or necessary for patient management, should be tested with an alternative molecular assay. Negative results do not preclude SARS-CoV-2 infection and should not be used as the sole basis for patient management decisions. This test has been authorized by the FDA under   an Emergency Use Authorization (EUA) for use by authorized laboratories.  Fact sheet for Healthcare Providers: ConventionUpdate.co.nz Fact sheet for Patients: ConventionUpdate.co.nz   Methodology: Isothermal Nucleic Acid Amplification                Labs:     Recent Labs     12/16/20 0821   WBC 12.0*   HGB 13.0   HCT 41.2        Recent Labs     12/16/20 0821      K 4.5    CO2 31   BUN 29*   CREA 1.08*   *   CA 10.0     Recent Labs     12/16/20  0821   ALT 24   AP 83   TBILI 0.3   TP 7.2   ALB 3.3*   GLOB 3.9     No results for input(s): INR, PTP, APTT, INREXT, INREXT in the last 72 hours. No results for input(s): FE, TIBC, PSAT, FERR in the last 72 hours. No results found for: FOL, RBCF   No results for input(s): PH, PCO2, PO2 in the last 72 hours. No results for input(s): CPK, CKNDX, TROIQ in the last 72 hours. No lab exists for component: CPKMB  No results found for: CHOL, CHOLX, CHLST, CHOLV, HDL, HDLP, LDL, LDLC, DLDLP, TGLX, TRIGL, TRIGP, CHHD, CHHDX  No results found for: GLUCPOC  No results found for: COLOR, APPRN, SPGRU, REFSG, MIKE, PROTU, GLUCU, KETU, BILU, UROU, SHARIF, LEUKU, GLUKE, EPSU, BACTU, WBCU, RBCU, CASTS, UCRY      Assessment:   1. Acute respiratory failure with hypoxia  2. Acute exacerbation of COPD  3. Asthma  4. Wheezing  5. Cardiomegaly  6. Pedal edema  7. Obstructive sleep apnea on CPAP  8. Obesity  9. Acute kidney injury  10 thyromegaly/thyroid nodule     ·   LV: Calculated LVEF is 65%. Normal cavity size and systolic function (ejection fraction normal). Mild concentric hypertrophy. Abnormal left ventricular septal motion consistent with right ventricular pacing. Mild (grade 1) left ventricular diastolic dysfunction.        We will discharge home tomorrow        Plan:     On Norvasc 10 mg daily  On Zithromax 500 mg daily IV Rocephin 1 g daily  On Symbicort 2 puffs twice daily  Lasix 40 mg daily  IV Solu-Medrol 40 mg IV every 6  ENT consult for possible biopsy  Right knee pain      Likely patient go home with oxygen  Ultrasound of the right knee and orthopedic consult  Mucinex 600 twice daily for cough  PT OT consult  Try to wean off oxygen  We will consult respiratory therapist to monitor ambulatory and overnight pulse ox  Will discharge home tomorrow    Current Facility-Administered Medications:     aspirin chewable tablet 81 mg, 81 mg, Oral, DAILY, Haider Rudolph MD, 81 mg at 12/17/20 0834    predniSONE (DELTASONE) tablet 40 mg, 40 mg, Oral, DAILY WITH BREAKFAST, Destin Elmore DO, 40 mg at 12/17/20 0834    hydrOXYzine HCL (ATARAX) tablet 25 mg, 25 mg, Oral, QID PRN, Destin Elmore DO, 25 mg at 12/16/20 2032    guaiFENesin ER (MUCINEX) tablet 600 mg, 600 mg, Oral, BID, Kami Funes MD, 600 mg at 12/17/20 0834    amLODIPine (NORVASC) tablet 10 mg, 10 mg, Oral, DAILY, North Hamm MD, 10 mg at 12/17/20 0834    calcium-vitamin D 600 mg(1,500mg) -200 unit per tablet 1 Tab, 1 Tab, Oral, DAILY WITH BREAKFAST, Cecilia NICHOLS MD, 1 Tab at 12/17/20 6922    furosemide (LASIX) tablet 40 mg, 40 mg, Oral, DAILY, Cecilia NICHOLS MD, 40 mg at 12/17/20 0834    montelukast (SINGULAIR) tablet 10 mg, 10 mg, Oral, QHS, North Hamm MD, 10 mg at 12/16/20 2140    multivitamin (ONE A DAY) tablet 1 Tab, 1 Tab, Oral, DAILY, Adam Palacios MD, 1 Tab at 12/17/20 0834    ergocalciferol capsule 50,000 Units, 50,000 Units, Oral, Q7D, Adam Palacios MD, 50,000 Units at 12/14/20 1323    albuterol-ipratropium (DUO-NEB) 2.5 MG-0.5 MG/3 ML, 3 mL, Nebulization, Q6H RT, Benitez Willson MD, 3 mL at 12/17/20 0200    polyethylene glycol (MIRALAX) packet 17 g, 17 g, Oral, DAILY, Destin Elmore DO, 17 g at 12/15/20 0804    insulin lispro (HUMALOG) injection, , SubCUTAneous, AC&HS, Adam Palacios MD, Stopped at 12/17/20 0730    glucose chewable tablet 16 g, 4 Tab, Oral, PRN, Cecilia NICHOLS MD    glucagon (GLUCAGEN) injection 1 mg, 1 mg, IntraMUSCular, PRN, North Germain MD    dextrose (D50W) injection syrg 12.5-25 g, 25-50 mL, IntraVENous, PRN, North Hamm MD    benzonatate (TESSALON) capsule 100 mg, 100 mg, Oral, TID PRN, Cecilia NICHOLS MD, 100 mg at 12/17/20 0834    acetaminophen (TYLENOL) tablet 650 mg, 650 mg, Oral, Q6H PRN, North Hamm MD    ondansetron (ZOFRAN) injection 4 mg, 4 mg, IntraVENous, Q4H PRN, Beka Fuentes MD    sodium chloride (NS) flush 5-40 mL, 5-40 mL, IntraVENous, Q8H, North Hamm MD, 10 mL at 12/17/20 0547    sodium chloride (NS) flush 5-40 mL, 5-40 mL, IntraVENous, PRN, North Page MD, 10 mL at 12/13/20 1131    budesonide-formoterol (SYMBICORT) 80-4.5 mcg inhaler, 2 Puff, Inhalation, BID RT, Beka Fuentes MD, 2 Puff at 12/16/20 2022    cefTRIAXone (ROCEPHIN) 1 g in sterile water (preservative free) 10 mL IV syringe, 1 g, IntraVENous, Q24H, North Hamm MD, 1 g at 12/16/20 1114    azithromycin (ZITHROMAX) 500 mg in 0.9% sodium chloride 250 mL (VIAL-MATE), 500 mg, IntraVENous, Q24H, North Hamm MD, 500 mg at 12/16/20 1104    magnesium hydroxide (MILK OF MAGNESIA) 400 mg/5 mL oral suspension 30 mL, 30 mL, Oral, DAILY PRN, North Hamm MD    LORazepam (ATIVAN) tablet 0.5 mg, 0.5 mg, Oral, BID PRN, Dee NICHOLS MD    enoxaparin (LOVENOX) injection 40 mg, 40 mg, SubCUTAneous, Q24H, North Hamm MD, 40 mg at 12/16/20 1104

## 2020-12-17 NOTE — PROGRESS NOTES
Chart reviewed. Patient will need home oxygen at discharge, referral being sent [facility]. [facility] CM will continue to follow.

## 2020-12-17 NOTE — PROGRESS NOTES
OT eval order received and acknowledged. Pt screened and demonstrating baseline independence for self care tasks and functional mobility/transfers. OT evaluation order will be discontinued at this time as pt has no acute OT needs. Please reorder OT if pt functional status changes. Thank you.

## 2020-12-17 NOTE — CONSULTS
Subjective:    Charisma Bejarano   64 y.o.   1960     Chief complaint/reason for consult -throat discomfort, thyroid nodule    HPI     Location -neck, throat    Quality -dysphagia, throat mucus, thyroid nodule    Severity -moderate    Duration -months to years    Timing -chronic and ongoing    Context -45-year-old female admitted for COPD exacerbation, she is complaining about mild dysphagia to solids, feeling of fullness in her throat, increased mucus. She reports approximately 4 years ago at the time of being treated for breast cancer she was found to have a thyroid nodule and had a biopsy done at [place] [id number] which was benign. She had another follow-up ultrasound done while hospitalized on this admission showing a thyroid nodule on the right side    Modifying Features -none    Associated symptoms/signs -shortness of breath, chronic cough, COPD      Review of Systems  Review of Systems   Constitutional: Negative for chills and fever. HENT: Positive for congestion and sore throat. Negative for ear pain, hearing loss, nosebleeds and tinnitus. Eyes: Negative for blurred vision and double vision. Respiratory: Positive for cough, sputum production, shortness of breath and wheezing. Cardiovascular: Negative for chest pain and palpitations. Gastrointestinal: Negative for heartburn, nausea and vomiting. Musculoskeletal: Negative for joint pain and neck pain. Skin: Negative. Neurological: Negative for dizziness, speech change, weakness and headaches. Endo/Heme/Allergies: Negative for environmental allergies. Does not bruise/bleed easily. Psychiatric/Behavioral: Negative for memory loss. The patient does not have insomnia. Past Medical History:   Diagnosis Date    Chronic obstructive pulmonary disease ([facility]     Hypertension      No past surgical history on file. No family history on file.   Social History     Tobacco Use    Smoking status: Current Some Day Smoker    Smokeless tobacco: Never Used   Substance Use Topics    Alcohol use: Yes      Prior to Admission medications    Medication Sig Start Date End Date Taking? Authorizing Provider   amLODIPine (NORVASC) 10 mg tablet Take 10 mg by mouth daily. Yes Provider, Historical   albuterol (ACCUNEB) 0.63 mg/3 mL nebulizer solution 0.63 mg by Nebulization route four (4) times daily as needed for Wheezing. Yes Provider, Historical   mometasone-formoterol (DULERA) 200-5 mcg/actuation HFA inhaler Take 2 Puffs by inhalation two (2) times a day. Yes Provider, Historical   albuterol (ProAir HFA) 90 mcg/actuation inhaler Take 2 Puffs by inhalation every six (6) hours as needed for Wheezing. Yes Provider, Historical   therapeutic multivitamin (THERAGRAN) tablet Take 1 Tab by mouth daily. Yes Provider, Historical   furosemide (LASIX) 40 mg tablet Take 40 mg by mouth daily. Yes Provider, Historical   montelukast (SINGULAIR) 10 mg tablet Take 10 mg by mouth daily. Yes Provider, Historical   benzonatate (TESSALON) 100 mg capsule Take 100 mg by mouth once. Yes Provider, Historical   fluticasone propionate (FLONASE) 50 mcg/actuation nasal spray 2 Sprays by Both Nostrils route daily. Yes Provider, Historical   ergocalciferol (Vitamin D2) 1,250 mcg (50,000 unit) capsule Take 50,000 Units by mouth every seven (7) days. Q7days on Monday   Yes Provider, Historical   Calcium-Cholecalciferol, D3, 500 mg(1,250mg) -400 unit tab Take  by mouth daily. Provider, Historical        Allergies   Allergen Reactions    Lisinopril Angioedema         Objective:     Visit Vitals  BP (!) 140/74   Pulse 86   Temp 98.2 °F (36.8 °C)   Resp 20   Ht 5' 4.02\" (1.626 m)   Wt 205 lb 0.4 oz (93 kg)   SpO2 96%   BMI 35.18 kg/m²        Physical Exam  Vitals signs reviewed. Constitutional:       General: She is awake. She is not in acute distress. Appearance: Normal appearance. She is well-groomed. She is obese. Interventions: Nasal cannula in place. HENT:      Head: Normocephalic and atraumatic. Jaw: There is normal jaw occlusion. No trismus, tenderness or malocclusion. Salivary Glands: Right salivary gland is not diffusely enlarged or tender. Left salivary gland is not diffusely enlarged or tender. Right Ear: Hearing, tympanic membrane, ear canal and external ear normal.      Left Ear: Hearing, tympanic membrane, ear canal and external ear normal.      Ears:      Newton exam findings: does not lateralize. Right Rinne: AC > BC. Left Rinne: AC > BC. Nose: No nasal deformity, septal deviation or mucosal edema. Right Nostril: No epistaxis. Left Nostril: No epistaxis. Right Turbinates: Not enlarged, swollen or pale. Left Turbinates: Not enlarged, swollen or pale. Right Sinus: No maxillary sinus tenderness or frontal sinus tenderness. Left Sinus: No maxillary sinus tenderness or frontal sinus tenderness. Mouth/Throat:      Lips: Pink. No lesions. Mouth: Mucous membranes are moist. No oral lesions. Dentition: Abnormal dentition. No dental caries. Tongue: No lesions. Palate: No mass and lesions. Pharynx: Oropharynx is clear. Uvula midline. No oropharyngeal exudate or posterior oropharyngeal erythema. Tonsils: No tonsillar exudate. 0 on the right. 0 on the left. Eyes:      General: Vision grossly intact. Extraocular Movements: Extraocular movements intact. Right eye: No nystagmus. Left eye: No nystagmus. Conjunctiva/sclera: Conjunctivae normal.      Pupils: Pupils are equal, round, and reactive to light. Neck:      Musculoskeletal: No edema or erythema. Thyroid: Thyromegaly present. No thyroid mass or thyroid tenderness. Trachea: Phonation normal. Tracheal deviation present. No tracheal tenderness. Comments: Fullness of the right thyroid  Cardiovascular:      Rate and Rhythm: Normal rate and regular rhythm.    Pulmonary:      Effort: Pulmonary effort is normal. No respiratory distress. Breath sounds: No stridor. Musculoskeletal: Normal range of motion. General: No swelling or tenderness. Lymphadenopathy:      Cervical: No cervical adenopathy. Skin:     General: Skin is warm and dry. Findings: No lesion or rash. Neurological:      General: No focal deficit present. Mental Status: She is alert and oriented to person, place, and time. Mental status is at baseline. Cranial Nerves: Cranial nerves are intact. Coordination: Romberg sign negative. Gait: Gait is intact. Psychiatric:         Mood and Affect: Mood normal.         Behavior: Behavior normal. Behavior is cooperative. No results found for this or any previous visit (from the past 24 hour(s)). I have personally reviewed all pertinent notes, labs, results, and imaging studies for this patient. Assessment/Plan:       Encounter Diagnoses   Name Primary?  Community acquired pneumonia, unspecified laterality Yes    Chronic obstructive pulmonary disease, unspecified COPD type ([facility]      Thyroid nodule -I reviewed her ultrasound which shows a 16mm nodule right side, right thyroid is enlarged, left thyroid normal-sized. Due to her subjective compressive symptoms I recommend CT of the neck to look for compression of the trachea and esophagus. She will also contact her previous doctor to obtain records of prior ultrasound. Hoarseness, dysphagia -this is likely mostly secondary to her COPD, chronic mucus production. She should have a office laryngoscopy done she can follow-up as an outpatient. I gave her my card. Thank you for this consult. Myke Srinivasan MD, [address] [place] [address] [address]  [phone]            Orders Placed This Encounter    INFLUENZA A & B AG (RAPID TEST)    COVID-19 RAPID TEST    XR CHEST SNGL V    US HEAD NECK SOFT TISSUE    US EXT NONVAS RT LTD    XR KNEE RT MAX 2 VWS    CT NECK SOFT TISSUE W CONT    CBC WITH AUTOMATED DIFF    BASIC METABOLIC PANEL    TROPONIN I    BNP    SARS-COV-2    CBC WITH AUTOMATED DIFF    METABOLIC PANEL, COMPREHENSIVE    CBC WITH AUTOMATED DIFF    METABOLIC PANEL, COMPREHENSIVE    EKG, 12 LEAD, INITIAL    albuterol-ipratropium (DUO-NEB) 2.5 MG-0.5 MG/3 ML    magnesium sulfate 1 g/100 ml IVPB (premix or compounded)    methylPREDNISolone (PF) (Solu-MEDROL) injection 125 mg    albuterol-ipratropium (DUO-NEB) 2.5 MG-0.5 MG/3 ML    azithromycin (ZITHROMAX) tablet 500 mg    diphenhydrAMINE (BENADRYL) injection 25 mg    benzonatate (TESSALON) capsule 100 mg    acetaminophen (TYLENOL) tablet 650 mg    DISCONTD: morphine injection 4 mg    ondansetron (ZOFRAN) injection 4 mg    sodium chloride (NS) flush 5-40 mL    sodium chloride (NS) flush 5-40 mL    DISCONTD: albuterol CONCENTRATE 2.5mg/0.5 mL neb soln    DISCONTD: ipratropium (ATROVENT) 0.02 % nebulizer solution 0.5 mg    budesonide-formoterol (SYMBICORT) 80-4.5 mcg inhaler    DISCONTD: predniSONE (DELTASONE) tablet 40 mg    cefTRIAXone (ROCEPHIN) 1 g in sterile water (preservative free) 10 mL IV syringe    azithromycin (ZITHROMAX) 500 mg in 0.9% sodium chloride 250 mL (VIAL-MATE)    magnesium hydroxide (MILK OF MAGNESIA) 400 mg/5 mL oral suspension 30 mL    LORazepam (ATIVAN) tablet 0.5 mg    enoxaparin (LOVENOX) injection 40 mg    albuterol sulfate (PROVENTIL;VENTOLIN) 2.5 mg/0.5 mL nebulizing solution    DISCONTD: methylPREDNISolone (PF) (SOLU-MEDROL) injection 40 mg    furosemide (LASIX) injection 40 mg    amLODIPine (NORVASC) 10 mg tablet    albuterol (ACCUNEB) 0.63 mg/3 mL nebulizer solution    mometasone-formoterol (DULERA) 200-5 mcg/actuation HFA inhaler    albuterol (ProAir HFA) 90 mcg/actuation inhaler    therapeutic multivitamin (THERAGRAN) tablet    furosemide (LASIX) 40 mg tablet    montelukast (SINGULAIR) 10 mg tablet    Calcium-Cholecalciferol, D3, 500 mg(1,250mg) -400 unit tab    benzonatate (TESSALON) 100 mg capsule    fluticasone propionate (FLONASE) 50 mcg/actuation nasal spray    ergocalciferol (Vitamin D2) 1,250 mcg (50,000 unit) capsule    amLODIPine (NORVASC) tablet 10 mg    calcium-vitamin D 600 mg(1,500mg) -200 unit per tablet 1 Tab    furosemide (LASIX) tablet 40 mg    montelukast (SINGULAIR) tablet 10 mg    multivitamin (ONE A DAY) tablet 1 Tab    ergocalciferol capsule 50,000 Units    albuterol-ipratropium (DUO-NEB) 2.5 MG-0.5 MG/3 ML    polyethylene glycol (MIRALAX) packet 17 g    insulin lispro (HUMALOG) injection    glucose chewable tablet 16 g    glucagon (GLUCAGEN) injection 1 mg    dextrose (D50W) injection syrg 12.5-25 g    guaiFENesin ER (MUCINEX) tablet 600 mg    aspirin chewable tablet 81 mg    predniSONE (DELTASONE) tablet 40 mg    hydrOXYzine HCL (ATARAX) tablet 25 mg

## 2020-12-17 NOTE — PROGRESS NOTES
CM met with patient at the bedside. Although therapy has cleared the patient to go home independently, patient still wants home health to come see her when she is discharged. Patient is accepted with [facility]. Patient has home oxygen set up through [facility] will continue to follow.

## 2020-12-17 NOTE — CONSULTS
Consult    Patient: Inga Alvarez MRN: [id number]  SSN: [id number]    YOB: 1960  Age: 64 y.o. Sex: female      Subjective:      Inga Alvarez is a 64 y.o. female who is being seen for b/ l knee pain ( right > left) he rates the pain as 6 out of 10 in intensity worse with activity relieved with rest.  She denies any antecedent trauma that triggered this pain. She is admitted for her respiratory failure at this time. Cortisone injection in the past seem to have helped her knee pain. Past Medical History:   Diagnosis Date    Chronic obstructive pulmonary disease ([address]     Hypertension      No past surgical history on file. No family history on file.   Social History     Tobacco Use    Smoking status: Current Some Day Smoker    Smokeless tobacco: Never Used   Substance Use Topics    Alcohol use: Yes      Current Facility-Administered Medications   Medication Dose Route Frequency Provider Last Rate Last Admin    aspirin chewable tablet 81 mg  81 mg Oral DAILY Haider Rudolph MD   81 mg at 12/17/20 0834    predniSONE (DELTASONE) tablet 40 mg  40 mg Oral DAILY WITH BREAKFAST Destin Elmore, DO   40 mg at 12/17/20 0636    hydrOXYzine HCL (ATARAX) tablet 25 mg  25 mg Oral QID PRN Ziyad Grijalva DO   25 mg at 12/16/20 2032    guaiFENesin ER (MUCINEX) tablet 600 mg  600 mg Oral BID Kami Funes MD   600 mg at 12/17/20 0834    amLODIPine (NORVASC) tablet 10 mg  10 mg Oral DAILY Guadlupe Hodgkin, MD   10 mg at 12/17/20 0834    calcium-vitamin D 600 mg(1,500mg) -200 unit per tablet 1 Tab  1 Tab Oral DAILY WITH BREAKFAST Guadlupe Hodgkin, MD   1 Tab at 12/17/20 5655    furosemide (LASIX) tablet 40 mg  40 mg Oral DAILY Guadlupe Hodgkin, MD   40 mg at 12/17/20 0834    montelukast (SINGULAIR) tablet 10 mg  10 mg Oral QHS Mack NICHOLS MD   10 mg at 12/16/20 2140    multivitamin (ONE A DAY) tablet 1 Tab  1 Tab Oral DAILY Guadlupe Hodgkin, MD   1 Tab at 12/17/20 4406    ergocalciferol capsule 50,000 Units  50,000 Units Oral Q7D Adam Palacios MD   50,000 Units at 12/14/20 1323    albuterol-ipratropium (DUO-NEB) 2.5 MG-0.5 MG/3 ML  3 mL Nebulization Q6H RT Benitez Willson MD   3 mL at 12/17/20 0200    polyethylene glycol (MIRALAX) packet 17 g  17 g Oral DAILY Destin Elmore DO   17 g at 12/15/20 0804    insulin lispro (HUMALOG) injection   SubCUTAneous AC&HS Adam Palacios MD   2 Units at 12/17/20 1122    glucose chewable tablet 16 g  4 Tab Oral PRN Adam Palacios MD        glucagon (GLUCAGEN) injection 1 mg  1 mg IntraMUSCular PRN Cecilia NICHOLS MD        dextrose (D50W) injection syrg 12.5-25 g  25-50 mL IntraVENous PRN Adam Palacios MD        benzonatate (TESSALON) capsule 100 mg  100 mg Oral TID PRN Adam Palacios MD   100 mg at 12/17/20 0834    acetaminophen (TYLENOL) tablet 650 mg  650 mg Oral Q6H PRN Adam Palacios MD        ondansetron [facility]) injection 4 mg  4 mg IntraVENous Q4H PRN Cecilia NICHOLS MD        sodium chloride (NS) flush 5-40 mL  5-40 mL IntraVENous Q8H Cecilia NICHOLS MD   10 mL at 12/17/20 0547    sodium chloride (NS) flush 5-40 mL  5-40 mL IntraVENous PRN Cecilia NICHOLS MD   10 mL at 12/13/20 1131    budesonide-formoterol (SYMBICORT) 80-4.5 mcg inhaler  2 Puff Inhalation BID RT Adam Palacios MD   2 Puff at 12/16/20 2022    cefTRIAXone (ROCEPHIN) 1 g in sterile water (preservative free) 10 mL IV syringe  1 g IntraVENous Q24H Cecilia NICHOLS MD   1 g at 12/17/20 1123    azithromycin (ZITHROMAX) 500 mg in 0.9% sodium chloride 250 mL (VIAL-MATE)  500 mg IntraVENous Q24H Cecilia NICHOLS MD   500 mg at 12/17/20 1123    magnesium hydroxide (MILK OF MAGNESIA) 400 mg/5 mL oral suspension 30 mL  30 mL Oral DAILY PRN Cecilia NICHOLS MD        LORazepam (ATIVAN) tablet 0.5 mg  0.5 mg Oral BID PRN Cecilia NICHOLS MD        enoxaparin (LOVENOX) injection 40 mg  40 mg SubCUTAneous Q24H Cecilia NICHOLS MD   40 mg at 12/17/20 1122        Allergies   Allergen Reactions    Lisinopril Angioedema       Review of Systems:  A comprehensive review of systems was negative except for that written in the History of Present Illness. Objective:     Vitals:    12/16/20 1522 12/16/20 2018 12/16/20 2029 12/17/20 0745   BP: (!) 145/80  136/80 (!) 140/74   Pulse: 95  87 86   Resp: 22 20 20   Temp: 98.6 °F (37 °C)  98.8 °F (37.1 °C) 98.2 °F (36.8 °C)   SpO2: 95% 93% 94% 96%   Weight:       Height:            Physical Exam:  General:  Alert, cooperative, no distress, appears stated age. Eyes:  Conjunctivae/corneas clear. PERRL, EOMs intact. Fundi benign   Ears:  Normal TMs and external ear canals both ears. Nose: Nares normal. Septum midline. Mucosa normal. No drainage or sinus tenderness. Mouth/Throat: Lips, mucosa, and tongue normal. Teeth and gums normal.   Neck: Supple, symmetrical, trachea midline, no adenopathy, thyroid: no enlargment/tenderness/nodules, no carotid bruit and no JVD. Back:   Symmetric, no curvature. ROM normal. No CVA tenderness. Lungs:   Crackles (+)    Heart:  Regular rate and rhythm, S1, S2 normal, no murmur, click, rub or gallop. Abdomen:   Soft, non-tender. Bowel sounds normal. No masses,  No organomegaly. Extremities: Right knee : min effusion noted  TTP medial > lateral joint line   ROM 0-90 Degree dp(+)    Pulses: 2+ and symmetric all extremities. Skin: Skin color, texture, turgor normal. No rashes or lesions   Lymph nodes: Cervical, supraclavicular, and axillary nodes normal.   Neurologic: CNII-XII intact. Normal strength, sensation and reflexes throughout.          Assessment:     Hospital Problems  Never Reviewed          Codes Class Noted POA    COPD (chronic obstructive [facility] disease) ([facility] ICD-10-CM: J44.9  ICD-9-CM: 867  12/13/2020 Unknown        Hypoxia ICD-10-CM: R09.02  ICD-9-CM: 799.02  12/13/2020 Unknown              Plan:   SSymptomatic OA right knee in a 63 y/o female admitted with acute respiratory failure . X ray right knee reveals Right knee, 2 views     Narrowing for the medial compartment. Lateral and patellofemoral compartments  are preserved. Small peripheral osteophytes. No fracture or dislocation.     IMPRESSION  IMPRESSION: Mild DJD right knee, medial compartment predominance. Reviewed treatment options and consider cortisone injection rgith knee in outpatient set up . Patient acknowledges understanding and will follow up with me in the office in 2 weeks upon discharge. No active   Intervention planned at this time.         Signed By: Karrie Grubbs MD     December 17, 2020

## 2020-12-17 NOTE — PROGRESS NOTES
Pulmonary and Critical Care Progress Note    Subjective:     Patient seen and examined in her room this afternoon, no acute events overnight. Patient states that she is feeling better from admission and overall may be a little bit better than yesterday. Still dealing with a lot of wheezing coming from her neck and clearly she is anxious at baseline. She was seen by ENT today who recommended an outpatient laryngoscope as well as an inpatient CT of the neck which is currently pending. Patient was walked with and without supplemental oxygen and found that she desaturates to 86% with ambulation on room air. She will go home with supplemental oxygen. We will plan to retest her as an outpatient. Likely okay for discharge home Friday/Saturday      Review of Systems:  A comprehensive review of systems was negative except for that written in the HPI.           Current Facility-Administered Medications   Medication Dose Route Frequency Provider Last Rate Last Admin    aspirin chewable tablet 81 mg  81 mg Oral DAILY Haider Rudolph MD   81 mg at 12/17/20 0834    predniSONE (DELTASONE) tablet 40 mg  40 mg Oral DAILY WITH BREAKFAST Destin Elmore DO   40 mg at 12/17/20 0748    hydrOXYzine HCL (ATARAX) tablet 25 mg  25 mg Oral QID PRN Kendra Patricio DO   25 mg at 12/16/20 2032    guaiFENesin ER (MUCINEX) tablet 600 mg  600 mg Oral BID Kami Funes MD   600 mg at 12/17/20 0834    amLODIPine (NORVASC) tablet 10 mg  10 mg Oral DAILY Pauline Woodward MD   10 mg at 12/17/20 0834    calcium-vitamin D 600 mg(1,500mg) -200 unit per tablet 1 Tab  1 Tab Oral DAILY WITH BREAKFAST Pauline Woodward MD   1 Tab at 12/17/20 4846    furosemide (LASIX) tablet 40 mg  40 mg Oral DAILY Pauline Woodward MD   40 mg at 12/17/20 0834    montelukast (SINGULAIR) tablet 10 mg  10 mg Oral QHS Yari NICHOLS MD   10 mg at 12/16/20 2140    multivitamin (ONE A DAY) tablet 1 Tab  1 Tab Oral DAILY Pauline Woodward MD   1 Tab at 12/17/20 0834    ergocalciferol capsule 50,000 Units  50,000 Units Oral Q7D Pauline Woodward MD   50,000 Units at 12/14/20 1323    albuterol-ipratropium (DUO-NEB) 2.5 MG-0.5 MG/3 ML  3 mL Nebulization Q6H RT Benitez Willson MD   3 mL at 12/17/20 0200    polyethylene glycol (MIRALAX) packet 17 g  17 g Oral DAILY Destin Elmore DO   17 g at 12/15/20 0804    insulin lispro (HUMALOG) injection   SubCUTAneous AC&HS Pauline Woodward MD   2 Units at 12/17/20 1122    glucose chewable tablet 16 g  4 Tab Oral PRN Pauline Woodward MD        glucagon (GLUCAGEN) injection 1 mg  1 mg IntraMUSCular PRN Yari NICHOLS MD        dextrose (D50W) injection syrg 12.5-25 g  25-50 mL IntraVENous PRN Pauline Woodward MD        benzonatate (TESSALON) capsule 100 mg  100 mg Oral TID PRN Pauline Woodward MD   100 mg at 12/17/20 0834    acetaminophen (TYLENOL) tablet 650 mg  650 mg Oral Q6H PRN Pauline Woodward MD        ondansetron [facility]) injection 4 mg  4 mg IntraVENous Q4H PRN Yari NICHOLS MD        sodium chloride (NS) flush 5-40 mL  5-40 mL IntraVENous Q8H Yari NICHOLS MD   10 mL at 12/17/20 0547    sodium chloride (NS) flush 5-40 mL  5-40 mL IntraVENous PRN Yari NICHOLS MD   10 mL at 12/13/20 1131    budesonide-formoterol (SYMBICORT) 80-4.5 mcg inhaler  2 Puff Inhalation BID RT Yari NICHOLS MD   2 Puff at 12/16/20 2022    cefTRIAXone (ROCEPHIN) 1 g in sterile water (preservative free) 10 mL IV syringe  1 g IntraVENous Q24H Yari NICHOLS MD   1 g at 12/17/20 1123    azithromycin (ZITHROMAX) 500 mg in 0.9% sodium chloride 250 mL (VIAL-MATE)  500 mg IntraVENous Q24H Yari NICHOLS MD   500 mg at 12/17/20 1123    magnesium hydroxide (MILK OF MAGNESIA) 400 mg/5 mL oral suspension 30 mL  30 mL Oral DAILY PRN Yari NIHCOLS MD        LORazepam (ATIVAN) tablet 0.5 mg  0.5 mg Oral BID PRN Yari NICHOLS MD        enoxaparin (LOVENOX) injection 40 mg  40 mg SubCUTAneous Q24H Hansel, Hilda Adams MD   40 mg at 20 1122          Allergies   Allergen Reactions    Lisinopril Angioedema           Objective:     Blood pressure 133/79, pulse 87, temperature 98.2 °F (36.8 °C), resp. rate 20, height 5' 4.02\" (1.626 m), weight 93 kg (205 lb 0.4 oz), SpO2 97 %. Temp (24hrs), Av.4 °F (36.9 °C), Min:98.2 °F (36.8 °C), Max:98.8 °F (37.1 °C)      Intake and Output:  Current Shift: No intake/output data recorded. Last 3 Shifts: No intake/output data recorded. Physical Exam:     General:  Obese lady, lying in bed comfortably, no acute distress, on nasal cannula oxygen at 1 L  Eye: Reactive, symmetric  Throat and Neck: Supple  Lung:  Decreased air entry bilaterally with prolonged exhalation, wheezing, occasional crackles. Currently on 1 L nasal cannula. Heart: S1+S2. No murmurs  Abdomen: soft, non-tender. Bowel sounds normal. No masses; obese  Extremities:  2+ edema left lower extremity  : Not done  Skin: No cyanosis  Neurologic: A & O x3. Grossly nonfocal  Psychiatric: Appropriate affect; coherent      Lab/Data Review:    No results found for this or any previous visit (from the past 24 hour(s)). US EXT NONVAS RT LTD   Final Result   IMPRESSION: This examination is negative for a Baker's cyst.      XR KNEE RT MAX 2 VWS   Final Result   IMPRESSION: Mild DJD right knee, medial compartment predominance. US HEAD NECK SOFT TISSUE   Final Result   IMPRESSION: Right thyroid nodule is of intermediate suspicion. Suggest either   biopsy or follow-up      XR CHEST SNGL V   Final Result   IMPRESSION: Hazy density bilaterally which may be artifact related to overlying   soft tissue. Early peripheral infiltrates could give this appearance as well. Are there signs or symptoms of pneumonia? .                CT NECK SOFT TISSUE W CONT    (Results Pending)       CT Results  (Last 48 hours)    None            Assessment:     1. Acute respiratory failure with hypoxia  2. Acute exacerbation of COPD  3. Asthma  4. Wheezing  5. Cardiomegaly  6. Pedal edema  7. Obstructive sleep apnea on CPAP  8. Obesity  9. Acute kidney injury    Plan:     Patient is doing fairly well on the floor on 2 L nasal cannula. Nasal cannula oxygen as needed to keep saturation above 92%    Continue azithromycin and Rocephin for treatment of possible pneumonia on top of a COPD exacerbation. Recommend a total of 7 days of antibiotics. Has been transitioned to prednisone 40 mg daily today without any change in her symptoms. Recommend a wean of 10 mg every 3 days. Continue duo nebs every 6 hours  Symbicort twice daily  Okay for discharge home from a pulmonary perspective Friday/Saturday. Definitely has a lot of wheezing in the neck area, suspect that she may have a degree of vocal cord dysfunction. I appreciate assistance from ENT. Outpatient laryngoscope recommended, CT of the neck has been ordered and is currently pending. COVID-19 and influenza testing is negative. Oxygen saturation dropped to 86% with ambulation on room air. Will need to be set up for O2 at discharge. Patient has cardiomegaly and pedal edema but BNP is normal  Currently on oral Lasix daily. Intake and output charting  Check electrolytes and replace as needed  Follow renal function with fluid changes  Echocardiogram has been completed but the read is currently pending  Venous duplex of the lower extremities is negative for DVT. Patient refusing to wear full facemask, will need discussion as an outpatient about a nasal mask. Clearly her untreated obstructive sleep apnea is definitely contributing to her chronic shortness of breath.     DVT and GI prophylaxis    Questions of patient were answered at bedside in detail  Case discussed in detail with RN, RT, and care team  Thank you for involving me in the care of this patient  I will follow with you closely during hospitalization    Time spent more than 30 minutes in direct patient care with no overlap reviewing results and records, decision making, and answering questions.       Marlys Treviño DO  [facility]  12/17/2020  4:34 PM

## 2020-12-18 ENCOUNTER — APPOINTMENT (OUTPATIENT)
Dept: CT IMAGING | Age: 60
DRG: 140 | End: 2020-12-18
Attending: OTOLARYNGOLOGY
Payer: MEDICAID

## 2020-12-18 VITALS
OXYGEN SATURATION: 98 % | WEIGHT: 205.03 LBS | RESPIRATION RATE: 18 BRPM | HEIGHT: 64 IN | SYSTOLIC BLOOD PRESSURE: 135 MMHG | BODY MASS INDEX: 35 KG/M2 | HEART RATE: 90 BPM | TEMPERATURE: 98.5 F | DIASTOLIC BLOOD PRESSURE: 74 MMHG

## 2020-12-18 LAB
ALBUMIN SERPL-MCNC: 3.1 G/DL (ref 3.5–5)
ALBUMIN/GLOB SERPL: 0.9 {RATIO} (ref 1.1–2.2)
ALP SERPL-CCNC: 96 U/L (ref 45–117)
ALT SERPL-CCNC: 34 U/L (ref 12–78)
ANION GAP SERPL CALC-SCNC: 3 MMOL/L (ref 5–15)
AST SERPL W P-5'-P-CCNC: 12 U/L (ref 15–37)
BASOPHILS # BLD: 0 K/UL (ref 0–0.1)
BASOPHILS NFR BLD: 0 % (ref 0–1)
BILIRUB SERPL-MCNC: 0.3 MG/DL (ref 0.2–1)
BUN SERPL-MCNC: 32 MG/DL (ref 6–20)
BUN/CREAT SERPL: 25 (ref 12–20)
CA-I BLD-MCNC: 9.3 MG/DL (ref 8.5–10.1)
CHLORIDE SERPL-SCNC: 105 MMOL/L (ref 97–108)
CO2 SERPL-SCNC: 34 MMOL/L (ref 21–32)
CREAT SERPL-MCNC: 1.27 MG/DL (ref 0.55–1.02)
DIFFERENTIAL METHOD BLD: ABNORMAL
EOSINOPHIL # BLD: 0 K/UL (ref 0–0.4)
EOSINOPHIL NFR BLD: 0 % (ref 0–7)
ERYTHROCYTE [DISTWIDTH] IN BLOOD BY AUTOMATED COUNT: 14.7 % (ref 11.5–14.5)
GLOBULIN SER CALC-MCNC: 3.4 G/DL (ref 2–4)
GLUCOSE SERPL-MCNC: 82 MG/DL (ref 65–100)
HCT VFR BLD AUTO: 43.6 % (ref 35–47)
HGB BLD-MCNC: 13.3 G/DL (ref 11.5–16)
IMM GRANULOCYTES # BLD AUTO: 0 K/UL (ref 0–0.04)
IMM GRANULOCYTES NFR BLD AUTO: 0 % (ref 0–0.5)
LYMPHOCYTES # BLD: 1.9 K/UL (ref 0.8–3.5)
LYMPHOCYTES NFR BLD: 22 % (ref 12–49)
MCH RBC QN AUTO: 29.3 PG (ref 26–34)
MCHC RBC AUTO-ENTMCNC: 30.5 G/DL (ref 30–36.5)
MCV RBC AUTO: 96 FL (ref 80–99)
MONOCYTES # BLD: 0.7 K/UL (ref 0–1)
MONOCYTES NFR BLD: 7 % (ref 5–13)
NEUTS SEG # BLD: 6.3 K/UL (ref 1.8–8)
NEUTS SEG NFR BLD: 71 % (ref 32–75)
PLATELET # BLD AUTO: 321 K/UL (ref 150–400)
PMV BLD AUTO: 11.6 FL (ref 8.9–12.9)
POTASSIUM SERPL-SCNC: 4.1 MMOL/L (ref 3.5–5.1)
PROT SERPL-MCNC: 6.5 G/DL (ref 6.4–8.2)
RBC # BLD AUTO: 4.54 M/UL (ref 3.8–5.2)
SODIUM SERPL-SCNC: 142 MMOL/L (ref 136–145)
WBC # BLD AUTO: 8.9 K/UL (ref 3.6–11)

## 2020-12-18 PROCEDURE — 85025 COMPLETE CBC W/AUTO DIFF WBC: CPT

## 2020-12-18 PROCEDURE — 94640 AIRWAY INHALATION TREATMENT: CPT

## 2020-12-18 PROCEDURE — 74011636637 HC RX REV CODE- 636/637: Performed by: INTERNAL MEDICINE

## 2020-12-18 PROCEDURE — 74011250637 HC RX REV CODE- 250/637: Performed by: INTERNAL MEDICINE

## 2020-12-18 PROCEDURE — 77010033678 HC OXYGEN DAILY

## 2020-12-18 PROCEDURE — 94760 N-INVAS EAR/PLS OXIMETRY 1: CPT

## 2020-12-18 PROCEDURE — 36415 COLL VENOUS BLD VENIPUNCTURE: CPT

## 2020-12-18 PROCEDURE — 80053 COMPREHEN METABOLIC PANEL: CPT

## 2020-12-18 PROCEDURE — 74011000250 HC RX REV CODE- 250: Performed by: INTERNAL MEDICINE

## 2020-12-18 PROCEDURE — 74011250637 HC RX REV CODE- 250/637: Performed by: FAMILY MEDICINE

## 2020-12-18 PROCEDURE — 74011000636 HC RX REV CODE- 636: Performed by: FAMILY MEDICINE

## 2020-12-18 PROCEDURE — 74011250636 HC RX REV CODE- 250/636: Performed by: INTERNAL MEDICINE

## 2020-12-18 PROCEDURE — 70491 CT SOFT TISSUE NECK W/DYE: CPT

## 2020-12-18 RX ORDER — GUAIFENESIN 600 MG/1
600 TABLET, EXTENDED RELEASE ORAL 2 TIMES DAILY
Qty: 30 TAB | Refills: 0 | Status: SHIPPED | OUTPATIENT
Start: 2020-12-18

## 2020-12-18 RX ORDER — AMOXICILLIN AND CLAVULANATE POTASSIUM 875; 125 MG/1; MG/1
1 TABLET, FILM COATED ORAL 2 TIMES DAILY
Qty: 20 TAB | Refills: 0 | Status: ON HOLD | OUTPATIENT
Start: 2020-12-18 | End: 2021-07-16

## 2020-12-18 RX ORDER — IPRATROPIUM BROMIDE AND ALBUTEROL SULFATE 2.5; .5 MG/3ML; MG/3ML
3 SOLUTION RESPIRATORY (INHALATION)
Qty: 30 NEBULE | Refills: 1 | Status: SHIPPED | OUTPATIENT
Start: 2020-12-18

## 2020-12-18 RX ORDER — HYDROCODONE BITARTRATE AND ACETAMINOPHEN 5; 325 MG/1; MG/1
1 TABLET ORAL
Qty: 10 TAB | Refills: 0 | Status: SHIPPED | OUTPATIENT
Start: 2020-12-18 | End: 2020-12-21

## 2020-12-18 RX ORDER — HYDROXYZINE 25 MG/1
25 TABLET, FILM COATED ORAL
Qty: 30 TAB | Refills: 0 | Status: SHIPPED | OUTPATIENT
Start: 2020-12-18 | End: 2020-12-28

## 2020-12-18 RX ORDER — GUAIFENESIN 100 MG/5ML
81 LIQUID (ML) ORAL DAILY
Qty: 30 TAB | Refills: 0 | Status: SHIPPED | OUTPATIENT
Start: 2020-12-19

## 2020-12-18 RX ORDER — PREDNISONE 20 MG/1
20 TABLET ORAL
Qty: 10 TAB | Refills: 0 | Status: SHIPPED | OUTPATIENT
Start: 2020-12-19 | End: 2021-08-08 | Stop reason: ALTCHOICE

## 2020-12-18 RX ADMIN — GUAIFENESIN 600 MG: 600 TABLET, EXTENDED RELEASE ORAL at 09:21

## 2020-12-18 RX ADMIN — PREDNISONE 40 MG: 20 TABLET ORAL at 09:27

## 2020-12-18 RX ADMIN — BUDESONIDE AND FORMOTEROL FUMARATE DIHYDRATE 2 PUFF: 80; 4.5 AEROSOL RESPIRATORY (INHALATION) at 08:08

## 2020-12-18 RX ADMIN — FUROSEMIDE 40 MG: 40 TABLET ORAL at 09:21

## 2020-12-18 RX ADMIN — IPRATROPIUM BROMIDE AND ALBUTEROL SULFATE 3 ML: .5; 3 SOLUTION RESPIRATORY (INHALATION) at 08:08

## 2020-12-18 RX ADMIN — INSULIN LISPRO 6 UNITS: 100 INJECTION, SOLUTION INTRAVENOUS; SUBCUTANEOUS at 11:30

## 2020-12-18 RX ADMIN — AMLODIPINE BESYLATE 10 MG: 5 TABLET ORAL at 09:21

## 2020-12-18 RX ADMIN — IPRATROPIUM BROMIDE AND ALBUTEROL SULFATE 3 ML: .5; 3 SOLUTION RESPIRATORY (INHALATION) at 00:51

## 2020-12-18 RX ADMIN — Medication 1 TABLET: at 09:21

## 2020-12-18 RX ADMIN — ASPIRIN 81 MG: 81 TABLET, CHEWABLE ORAL at 09:21

## 2020-12-18 RX ADMIN — CEFTRIAXONE SODIUM 1 G: 1 INJECTION, POWDER, FOR SOLUTION INTRAMUSCULAR; INTRAVENOUS at 11:03

## 2020-12-18 RX ADMIN — AZITHROMYCIN 500 MG: 500 INJECTION, POWDER, LYOPHILIZED, FOR SOLUTION INTRAVENOUS at 11:14

## 2020-12-18 RX ADMIN — IOPAMIDOL 100 ML: 755 INJECTION, SOLUTION INTRAVENOUS at 08:00

## 2020-12-18 RX ADMIN — IPRATROPIUM BROMIDE AND ALBUTEROL SULFATE 3 ML: .5; 3 SOLUTION RESPIRATORY (INHALATION) at 13:31

## 2020-12-18 RX ADMIN — MULTIVITAMIN TABLET 1 TABLET: TABLET at 09:21

## 2020-12-18 NOTE — PROGRESS NOTES
CM spoke with patient who will not be discharging home/self care. Patient will not be going home with home health at this time.  Patient has oxygen set up through [facility].

## 2020-12-18 NOTE — PROGRESS NOTES
Problem: Risk for Spread of Infection  Goal: Prevent transmission of infectious organism to others  Description: Prevent the transmission of infectious organisms to other patients, staff members, and visitors. Outcome: Progressing Towards Goal     Problem: Falls - Risk of  Goal: *Absence of Falls  Description: Document Laurita Martínez Fall Risk and appropriate interventions in the flowsheet.   Outcome: Progressing Towards Goal  Note: Fall Risk Interventions:            Medication Interventions: Teach patient to arise slowly

## 2020-12-18 NOTE — DISCHARGE SUMMARY
Discharge Summary       PATIENT ID: Lawyer Welsh  MRN: [id number]   YOB: 1960    DATE OF ADMISSION: 12/13/2020  4:24 AM    DATE OF DISCHARGE:   PRIMARY CARE PROVIDER: Marcelino Fish MD     ATTENDING PHYSICIAN: Grisel Funes  DISCHARGING PROVIDER: Grisel Funes      CONSULTATIONS: IP CONSULT TO PULMONOLOGY  IP CONSULT TO CARDIOLOGY  IP CONSULT TO HOSPITALIST  IP CONSULT TO OTOLARYNGOLOGY  IP CONSULT TO ORTHOPEDIC SURGERY    PROCEDURES/SURGERIES: * No surgery found *    ADMITTING DIAGNOSES:    Patient Active Problem List    Diagnosis Date Noted    COPD (chronic obstructive pulmonary disease) ([facility] [address] 12/13/2020    Hypoxia 12/13/2020       DISCHARGE DIAGNOSES / PLAN:    .  1.  Acute respiratory failure with hypoxia  2.  Acute exacerbation of COPD  3.  Asthma  4.  Wheezing  5.  Cardiomegaly  6.  Pedal edema  7.  Obstructive sleep apnea on CPAP  8.  Obesity  9.  Acute kidney injury  10 thyromegaly/thyroid nodule         · LV: Calculated LVEF is 65%. Normal cavity size and systolic function (ejection fraction normal). Mild concentric hypertrophy. Abnormal left ventricular septal motion consistent with right ventricular pacing. Mild (grade 1) left ventricular diastolic dysfunction.                        Plan:      On Norvasc 10 mg daily  On Zithromax 500 mg daily IV Rocephin 1 g daily  On Symbicort 2 puffs twice daily  Lasix 40 mg daily  IV Solu-Medrol 40 mg IV every 6  ENT consult for possible biopsy  Right knee pain           DISCHARGE MEDICATIONS:  Current Discharge Medication List      START taking these medications    Details   albuterol-ipratropium (DUO-NEB) 2.5 mg-0.5 mg/3 ml nebu 3 mL by Nebulization route every six (6) hours as needed for Wheezing. Qty: 30 Nebule, Refills: 1      aspirin 81 mg chewable tablet Take 1 Tab by mouth daily. Qty: 30 Tab, Refills: 0      guaiFENesin ER (MUCINEX) 600 mg ER tablet Take 1 Tab by mouth two (2) times a day.   Qty: 30 Tab, Refills: 0 hydrOXYzine HCL (ATARAX) 25 mg tablet Take 1 Tab by mouth four (4) times daily as needed for Anxiety for up to 10 days. Qty: 30 Tab, Refills: 0      predniSONE (DELTASONE) 20 mg tablet Take 20 mg by mouth daily (with breakfast). Qty: 10 Tab, Refills: 0      amoxicillin-clavulanate (Augmentin) 875-125 mg per tablet Take 1 Tab by mouth two (2) times a day. Qty: 20 Tab, Refills: 0         CONTINUE these medications which have NOT CHANGED    Details   amLODIPine (NORVASC) 10 mg tablet Take 10 mg by mouth daily. mometasone-formoterol (DULERA) 200-5 mcg/actuation HFA inhaler Take 2 Puffs by inhalation two (2) times a day. albuterol (ProAir HFA) 90 mcg/actuation inhaler Take 2 Puffs by inhalation every six (6) hours as needed for Wheezing. therapeutic multivitamin (THERAGRAN) tablet Take 1 Tab by mouth daily. montelukast (SINGULAIR) 10 mg tablet Take 10 mg by mouth daily. benzonatate (TESSALON) 100 mg capsule Take 100 mg by mouth once. fluticasone propionate (FLONASE) 50 mcg/actuation nasal spray 2 Sprays by Both Nostrils route daily. ergocalciferol (Vitamin D2) 1,250 mcg (50,000 unit) capsule Take 50,000 Units by mouth every seven (7) days. Q7days on Monday      Calcium-Cholecalciferol, D3, 500 mg(1,250mg) -400 unit tab Take  by mouth daily. STOP taking these medications       albuterol (ACCUNEB) 0.63 mg/3 mL nebulizer solution Comments:   Reason for Stopping:         furosemide (LASIX) 40 mg tablet Comments:   Reason for Stopping:                 NOTIFY YOUR PHYSICIAN FOR ANY OF THE FOLLOWING:   Fever over 101 degrees for 24 hours. Chest pain, shortness of breath, fever, chills, nausea, vomiting, diarrhea, change in mentation, falling, weakness, bleeding. Severe pain or pain not relieved by medications. Or, any other signs or symptoms that you may have questions about.     DISPOSITION:  x  Home With:   OT  PT  HH  RN       Long term SNF/Inpatient Rehab Independent/assisted living    Hospice    Other:       PATIENT CONDITION AT DISCHARGE: Stable      PHYSICAL EXAMINATION AT DISCHARGE:  General:          Alert, cooperative, no distress, appears stated age. HEENT:           Atraumatic, anicteric sclerae, pink conjunctivae                          No oral ulcers, mucosa moist, throat clear, dentition fair  Neck:               Supple, symmetrical  Lungs:             Clear to auscultation bilaterally. No Wheezing or Rhonchi. No rales. Chest wall:      No tenderness  No Accessory muscle use. Heart:              Regular  rhythm,  No  murmur   No edema  Abdomen:        Soft, non-tender. Not distended. Bowel sounds normal  Extremities:     No cyanosis. No clubbing,                            Skin turgor normal, Capillary refill normal  Skin:                Not pale. Not Jaundiced  No rashes   Psych:             Not anxious or agitated. Neurologic:      Alert, moves all extremities, answers questions appropriately and responds to commands     US EXT NONVAS RT LTD   Final Result   IMPRESSION: This examination is negative for a Baker's cyst.      XR KNEE RT MAX 2 VWS   Final Result   IMPRESSION: Mild DJD right knee, medial compartment predominance. US HEAD NECK SOFT TISSUE   Final Result   IMPRESSION: Right thyroid nodule is of intermediate suspicion. Suggest either   biopsy or follow-up      XR CHEST SNGL V   Final Result   IMPRESSION: Hazy density bilaterally which may be artifact related to overlying   soft tissue. Early peripheral infiltrates could give this appearance as well. Are there signs or symptoms of pneumonia? .                CT NECK SOFT TISSUE W CONT    (Results Pending)        Recent Results (from the past 24 hour(s))   CBC WITH AUTOMATED DIFF    Collection Time: 12/18/20  6:13 AM   Result Value Ref Range    WBC 8.9 3.6 - 11.0 K/uL    RBC 4.54 3.80 - 5.20 M/uL    HGB 13.3 11.5 - 16.0 g/dL    HCT 43.6 35.0 - 47.0 %    MCV 96.0 80.0 - 99.0 FL MCH 29.3 26.0 - 34.0 PG    MCHC 30.5 30.0 - 36.5 g/dL    RDW 14.7 (H) 11.5 - 14.5 %    PLATELET 345 228 - 424 K/uL    MPV 11.6 8.9 - 12.9 FL    NEUTROPHILS 71 32 - 75 %    LYMPHOCYTES 22 12 - 49 %    MONOCYTES 7 5 - 13 %    EOSINOPHILS 0 0 - 7 %    BASOPHILS 0 0 - 1 %    IMMATURE GRANULOCYTES 0 0.0 - 0.5 %    ABS. NEUTROPHILS 6.3 1.8 - 8.0 K/UL    ABS. LYMPHOCYTES 1.9 0.8 - 3.5 K/UL    ABS. MONOCYTES 0.7 0.0 - 1.0 K/UL    ABS. EOSINOPHILS 0.0 0.0 - 0.4 K/UL    ABS. BASOPHILS 0.0 0.0 - 0.1 K/UL    ABS. IMM. GRANS. 0.0 0.00 - 0.04 K/UL    DF AUTOMATED     METABOLIC PANEL, COMPREHENSIVE    Collection Time: 12/18/20  6:13 AM   Result Value Ref Range    Sodium 142 136 - 145 mmol/L    Potassium 4.1 3.5 - 5.1 mmol/L    Chloride 105 97 - 108 mmol/L    CO2 34 (H) 21 - 32 mmol/L    Anion gap 3 (L) 5 - 15 mmol/L    Glucose 82 65 - 100 mg/dL    BUN 32 (H) 6 - 20 mg/dL    Creatinine 1.27 (H) 0.55 - 1.02 mg/dL    BUN/Creatinine ratio 25 (H) 12 - 20      GFR est AA 52 (L) >60 ml/min/1.73m2    GFR est non-AA 43 (L) >60 ml/min/1.73m2    Calcium 9.3 8.5 - 10.1 mg/dL    Bilirubin, total 0.3 0.2 - 1.0 mg/dL    AST (SGOT) 12 (L) 15 - 37 U/L    ALT (SGPT) 34 12 - 78 U/L    Alk.  phosphatase 96 45 - 117 U/L    Protein, total 6.5 6.4 - 8.2 g/dL    Albumin 3.1 (L) 3.5 - 5.0 g/dL    Globulin 3.4 2.0 - 4.0 g/dL    A-G Ratio 0.9 (L) 1.1 - 2.2            HOSPITAL COURSE:    History of presenting illness please refer to history and physical at the time of admission at the patient was admitted because acute respiratory failure COPD exacerbation seen by the pulmonologist start on nebulizer treatment IV Solu-Medrol patient was on 2 reduction by nasal cannula patient unable to be weaned off so patient going to be discharged home on 2 L nasal cannula patient has some difficulty in swallowing and neck pain ultrasound shows thyroid nodule seen by the ENT already ordered CT scan results with pending patient have echo done while in the hospital shows mild grade 1 left ventricular diastolic dysfunction ejection fraction about 65% normal ejection fraction  Medication reconciliation done follow-up with the ENT in a week or so follow-up the CAT scan results follow-up with me in 2 weeks    She is also complaining of pain behind the knee ultrasound shows no Baker's cyst  Signed:   Sherice Singh MD  12/18/2020  11:22 AM

## 2021-01-14 ENCOUNTER — TELEPHONE (OUTPATIENT)
Dept: ENT CLINIC | Age: 61
End: 2021-01-14

## 2021-01-14 NOTE — TELEPHONE ENCOUNTER
Spoke with patient, discussed her CT scan results. She does needs a follow-up thyroid ultrasound in 1 year.   She needs to follow-up in the office for laryngoscopy she has an appointment in March

## 2021-01-20 ENCOUNTER — TELEPHONE (OUTPATIENT)
Dept: ENT CLINIC | Age: 61
End: 2021-01-20

## 2021-03-30 ENCOUNTER — OFFICE VISIT (OUTPATIENT)
Dept: ENT CLINIC | Age: 61
End: 2021-03-30
Payer: MEDICAID

## 2021-03-30 VITALS
BODY MASS INDEX: 39.31 KG/M2 | RESPIRATION RATE: 18 BRPM | HEIGHT: 62 IN | OXYGEN SATURATION: 91 % | SYSTOLIC BLOOD PRESSURE: 160 MMHG | DIASTOLIC BLOOD PRESSURE: 100 MMHG | TEMPERATURE: 97.5 F | HEART RATE: 103 BPM | WEIGHT: 213.6 LBS

## 2021-03-30 DIAGNOSIS — R49.0 DYSPHONIA: Primary | ICD-10-CM

## 2021-03-30 DIAGNOSIS — E04.1 THYROID NODULE: ICD-10-CM

## 2021-03-30 PROCEDURE — 99203 OFFICE O/P NEW LOW 30 MIN: CPT | Performed by: OTOLARYNGOLOGY

## 2021-03-30 PROCEDURE — 31575 DIAGNOSTIC LARYNGOSCOPY: CPT | Performed by: OTOLARYNGOLOGY

## 2021-03-30 NOTE — LETTER
3/31/2021 Patient: Stephane Wakefield YOB: 1960 Date of Visit: 3/30/2021 Jovanni Lees MD 
[place] [address] [address] [employer] Dear Jovanni Lees MD, Thank you for referring Ms. Stephane Wakefield to [facility]ERGY [facility] for evaluation. My notes for this consultation are attached. If you have questions, please do not hesitate to call me. I look forward to following your patient along with you. Sincerely, Nupur Burks MD

## 2021-03-30 NOTE — PROGRESS NOTES
Otolaryngology-Head and Neck Surgery  New Patient Visit     Patient: Lisa Hartman  YOB: 1960  MRN: [id number]  Date of Service: 3/30/2021    Chief Complaint: Follow up thyroid nodule, CT scan results     History of Present Illness: Lisa Hartman is a 64y.o. year old female who was admitted to [facility] in December for COPD exacerbation and noted to have some dysphagia and compressive symptoms. She was seen by Dr Rebecca Goff and had thyroid US as well as CT ST neck    She presents in follow up to discuss next steps    Notes overall doing well  COPD improved  On Home O2 - mostly at night      Past Medical History:  Past Medical History:   Diagnosis Date    [facility] [address]     Chronic obstructive pulmonary disease [facility] [address]     Hypertension        Past Surgical History:   Past Surgical History:   Procedure Laterality Date    HX HYSTERECTOMY      HX MASTECTOMY Left        Medications:   Current Outpatient Medications   Medication Instructions    albuterol (ProAir HFA) 90 mcg/actuation inhaler 2 Puffs, Inhalation, EVERY 6 HOURS AS NEEDED    albuterol-ipratropium (DUO-NEB) 2.5 mg-0.5 mg/3 ml nebu 3 mL, Nebulization, EVERY 6 HOURS AS NEEDED    amLODIPine (NORVASC) 10 mg, Oral, DAILY    aspirin 81 mg, Oral, DAILY    benzonatate (TESSALON) 100 mg, Oral, ONCE    Calcium-Cholecalciferol, D3, 500 mg(1,250mg) -400 unit tab Oral, DAILY    ergocalciferol (VITAMIN D2) 50,000 Units, Oral, EVERY 7 DAYS, Q7days on Monday     fluticasone propionate (FLONASE) 50 mcg/actuation nasal spray 2 Sprays, Both Nostrils, DAILY    guaiFENesin ER (MUCINEX) 600 mg, Oral, 2 TIMES DAILY    mometasone-formoterol (DULERA) 200-5 mcg/actuation HFA inhaler 2 Puffs, Inhalation, 2 TIMES DAILY    montelukast (SINGULAIR) 10 mg, Oral, DAILY       Allergies:    Allergies   Allergen Reactions    Lisinopril Angioedema       Social History:   Social History     Socioeconomic History    Marital status: SINGLE   Tobacco Use    Smoking status: Former Smoker    Smokeless tobacco: Never Used   Substance and Sexual Activity    Alcohol use: Yes    Drug use: Never       Family History:  Family History   Problem Relation Age of Onset    Hypertension Mother     Cancer Father        Review of Systems:   Consitutional: denies fever, excessive weight gain or loss. Eyes: denies diplopia, eye pain. Integumentary: denies new concerning skin lesions. Ears, Nose, Mouth, Throat: denies except as per HPI. Endocrine: denies hot or cold intolerance, increased thirst.  Respiratory: denies cough, hemoptysis, wheezing  Gastrointestinal: denies trouble swallowing, nausea, emesis, regurgitation  Musculoskeletal: denies muscle weakness or wasting  Cardiovascular: denies chest pain, shortness of breath  Neurologic: denies seizures, numbness or tingling, syncope  Hematologic: denies easy bleeding or bruising    Physical Examination:   Vitals:    03/30/21 1418   BP: (!) 160/100  Comment: pt has not taken bp meds   BP 1 Location: Right upper arm   BP Patient Position: Sitting   BP Cuff Size: Adult   Pulse: (!) 103   Resp: 18   Temp: 97.5 °F (36.4 °C)   SpO2: 91%   Weight: 213 lb 9.6 oz (96.9 kg)   Height: 5' 2\" (1.575 m)        General: Comfortable, pleasant, appears stated age. BMI 39  Voice: Strong, speaking in full sentences, no stridor    Face: No masses or lesions, facial strength symmetric   Ears: External ears unremarkable. Bilateral ear canal clear. Tympanic membrane clear and intact, with visible landmarks. Clear middle ear space  Nose: External nose unremarkable. Dorsum midline. Anterior rhinoscopy demonstrates no lesions. Septum midline. Turbinates without hypertrophy. Oral Cavity / Oropharynx: No trismus. Mucosa pink and moist. No lesions. Tongue is midline and mobile. Palate elevates symmetrically. Uvula midline. Tonsils unremarkable. Base of tongue soft. Floor of mouth soft. Crowded oropharynx  Neck: Supple. No adenopathy. R thyroid possibly slightly full. Otherwise, palpable laryngeal landmarks. Full neck range of motion   Neurologic: CN II - XI intact. Normal gait    PROCEDURE: FLEXIBLE FIBEROPTIC LARYNGOSCOPY    Preoperative Diagnosis:  Dysphonia     Postoperative Diagnosis: Same as above     Procedure: Flexible Fiberoptic Laryngoscopy    Anesthesia: Topical 4% Lidocaine, Oxymetazoline    Description of Procedure: Verbal informed consent was obtained. After application of topical anesthetic and decongestant, the flexible fiberoptic endoscope was introduced to the patient's nare. It was passed through the nose and into the pharynx. The scope was then withdrawn and repeated on the opposing nare. The patient tolerated the procedure well. Findings: Normal nasal cavity, no polyposis or purulence. Middle meatus clear bilaterally. Nasopharynx without lesions. Base of tongue, vallecula & epiglottis unremarkable. Clear pyriform sinus. Vocal folds without lesions. Normal mobility. Visualized subglottis appears patent. Thyroid US 12/2020  Right lobe is 5 x 3 x 2 cm and left is 3 x 2 x 2. Right lobe has a fairly  well-defined complex nodule of 16 x 15 mm. Central cystic components with the  outer portion heterogeneous solid material, nonvascular, with small focus of  what may be colloid. Well-circumscribed oval-shaped homogeneous solid nodule of  9 mm in the isthmus. No nodule seen anywhere else. . There is no adjacent  findings     IMPRESSION  IMPRESSION: Right thyroid nodule is of intermediate suspicion. Suggest either  biopsy or follow-up      CT neck   IMPRESSION  IMPRESSION: Mild airway compression due to medial deviation of normal caliber  carotid arteries. The small thyroid nodule is not impinging on the airway    Assessment and Plan:   1. Thyroid nodule, right  2.  Dysphonia   - Reassurance provided normal scope exam without sign of tumor or lesion  - Reviewed CT neck findings with patient  - Will plan follow up thyroid US in December 2021 and RTC thereafter  - Signs to watch out for reviewed       The patient was instructed to return to clinic if no improvement or progression of symptoms. Signs to watch out for reviewed.       MD Akhil LeesVA [facility]  [address] [place] [address]  Office Phone: [phone]

## 2021-03-30 NOTE — Clinical Note
She needs a thyroid US in 9 months - whats the best way to keep track of this? Not sure if I can order it now, or how to do this?

## 2021-03-30 NOTE — PROGRESS NOTES
Chief Complaint   Patient presents with    New Patient     Ct results     Visit Vitals  BP (!) 160/100 (BP 1 Location: Right upper arm, BP Patient Position: Sitting, BP Cuff Size: Adult) Comment: pt has not taken bp meds   Pulse (!) 103   Temp 97.5 °F (36.4 °C)   Resp 18   Ht 5' 2\" (1.575 m)   Wt 213 lb 9.6 oz (96.9 kg)   SpO2 91%   BMI 39.07 kg/m²

## 2021-03-31 NOTE — PROGRESS NOTES
Will you need to see her after? We can put in the order now, she will get a reminder call a few days prior to appt.

## 2021-07-12 ENCOUNTER — HOSPITAL ENCOUNTER (OUTPATIENT)
Dept: PREADMISSION TESTING | Age: 61
Discharge: HOME | End: 2021-07-12
Payer: MEDICAID

## 2021-07-12 LAB — SARS-COV-2, COV2: NORMAL

## 2021-07-12 PROCEDURE — U0003 INFECTIOUS AGENT DETECTION BY NUCLEIC ACID (DNA OR RNA); SEVERE ACUTE RESPIRATORY SYNDROME CORONAVIRUS 2 (SARS-COV-2) (CORONAVIRUS DISEASE [COVID-19]), AMPLIFIED PROBE TECHNIQUE, MAKING USE OF HIGH THROUGHPUT TECHNOLOGIES AS DESCRIBED BY CMS-2020-01-R: HCPCS

## 2021-07-13 LAB — SARS-COV-2, COV2NT: NOT DETECTED

## 2021-07-16 ENCOUNTER — APPOINTMENT (OUTPATIENT)
Dept: ENDOSCOPY | Age: 61
End: 2021-07-16
Attending: INTERNAL MEDICINE
Payer: MEDICAID

## 2021-07-16 ENCOUNTER — ANESTHESIA EVENT (OUTPATIENT)
Dept: ENDOSCOPY | Age: 61
End: 2021-07-16
Payer: MEDICAID

## 2021-07-16 ENCOUNTER — ANESTHESIA (OUTPATIENT)
Dept: ENDOSCOPY | Age: 61
End: 2021-07-16
Payer: MEDICAID

## 2021-07-16 ENCOUNTER — HOSPITAL ENCOUNTER (OUTPATIENT)
Age: 61
Setting detail: OUTPATIENT SURGERY
Discharge: HOME | End: 2021-07-16
Attending: INTERNAL MEDICINE | Admitting: INTERNAL MEDICINE
Payer: MEDICAID

## 2021-07-16 VITALS
RESPIRATION RATE: 20 BRPM | HEIGHT: 64 IN | BODY MASS INDEX: 36.37 KG/M2 | WEIGHT: 213 LBS | DIASTOLIC BLOOD PRESSURE: 99 MMHG | TEMPERATURE: 97.1 F | OXYGEN SATURATION: 92 % | SYSTOLIC BLOOD PRESSURE: 134 MMHG | HEART RATE: 99 BPM

## 2021-07-16 PROCEDURE — 2709999900 HC NON-CHARGEABLE SUPPLY: Performed by: INTERNAL MEDICINE

## 2021-07-16 PROCEDURE — 88305 TISSUE EXAM BY PATHOLOGIST: CPT

## 2021-07-16 PROCEDURE — 88312 SPECIAL STAINS GROUP 1: CPT

## 2021-07-16 PROCEDURE — 77030019988 HC FCPS ENDOSC DISP BSC -B: Performed by: INTERNAL MEDICINE

## 2021-07-16 PROCEDURE — 76040000007: Performed by: INTERNAL MEDICINE

## 2021-07-16 PROCEDURE — 74011000250 HC RX REV CODE- 250: Performed by: INTERNAL MEDICINE

## 2021-07-16 PROCEDURE — 74011250636 HC RX REV CODE- 250/636: Performed by: INTERNAL MEDICINE

## 2021-07-16 PROCEDURE — 77030021593 HC FCPS BIOP ENDOSC BSC -A: Performed by: INTERNAL MEDICINE

## 2021-07-16 RX ORDER — SODIUM CHLORIDE, SODIUM LACTATE, POTASSIUM CHLORIDE, CALCIUM CHLORIDE 600; 310; 30; 20 MG/100ML; MG/100ML; MG/100ML; MG/100ML
75 INJECTION, SOLUTION INTRAVENOUS CONTINUOUS
Status: DISCONTINUED | OUTPATIENT
Start: 2021-07-16 | End: 2021-07-16 | Stop reason: HOSPADM

## 2021-07-16 RX ORDER — NALOXONE HYDROCHLORIDE 0.4 MG/ML
0.4 INJECTION, SOLUTION INTRAMUSCULAR; INTRAVENOUS; SUBCUTANEOUS ONCE
Status: COMPLETED | OUTPATIENT
Start: 2021-07-16 | End: 2021-07-16

## 2021-07-16 RX ORDER — FENTANYL CITRATE 50 UG/ML
INJECTION, SOLUTION INTRAMUSCULAR; INTRAVENOUS AS NEEDED
Status: DISCONTINUED | OUTPATIENT
Start: 2021-07-16 | End: 2021-07-16 | Stop reason: HOSPADM

## 2021-07-16 RX ORDER — MIDAZOLAM HYDROCHLORIDE 1 MG/ML
INJECTION INTRAMUSCULAR; INTRAVENOUS AS NEEDED
Status: DISCONTINUED | OUTPATIENT
Start: 2021-07-16 | End: 2021-07-16 | Stop reason: HOSPADM

## 2021-07-16 RX ORDER — FUROSEMIDE 20 MG/1
20 TABLET ORAL DAILY
COMMUNITY
End: 2021-08-08 | Stop reason: ALTCHOICE

## 2021-07-16 RX ORDER — SODIUM CHLORIDE 9 MG/ML
25 INJECTION, SOLUTION INTRAVENOUS CONTINUOUS
Status: DISCONTINUED | OUTPATIENT
Start: 2021-07-16 | End: 2021-07-16 | Stop reason: HOSPADM

## 2021-07-16 RX ORDER — FLUMAZENIL 0.1 MG/ML
0.2 INJECTION INTRAVENOUS ONCE
Status: COMPLETED | OUTPATIENT
Start: 2021-07-16 | End: 2021-07-16

## 2021-07-16 RX ADMIN — FLUMAZENIL 0.2 MG: 0.1 INJECTION, SOLUTION INTRAVENOUS at 14:00

## 2021-07-16 RX ADMIN — NALOXONE HYDROCHLORIDE 0.4 MG: 0.4 INJECTION, SOLUTION INTRAMUSCULAR; INTRAVENOUS; SUBCUTANEOUS at 14:52

## 2021-07-16 RX ADMIN — SODIUM CHLORIDE 25 ML/HR: 9 INJECTION, SOLUTION INTRAVENOUS at 11:27

## 2021-07-16 NOTE — PERIOP NOTES
Patient alert and oriented x 4 with respirations even and unlabored on 2 L/min O2 via patient's home portable oxygen tank. Patient discharged home per physician's order. Patient verbalizes understanding of discharge instructions. Patient transported via wheelchair to front hospital entrance with daughter present to transport patient via private vehicle.

## 2021-07-16 NOTE — PROGRESS NOTES
Pt sleeping but easily awakened, o2 sat 91-94% on o2 at 2l. Pt voices no complaints except hunger. Dr Deisy Patel notified of patient being in pacu, over to see patient, order rec'd to transfer to same day.

## 2021-07-16 NOTE — ANESTHESIA PREPROCEDURE EVALUATION
Relevant Problems   RESPIRATORY SYSTEM   (+) COPD (chronic obstructive pulmonary disease) (HCC)       Anesthetic History   No history of anesthetic complications            Review of Systems / Medical History  Patient summary reviewed, nursing notes reviewed and pertinent labs reviewed    Pulmonary    COPD (on continuous home oxygen @ 2L/min): severe    Sleep apnea: No treatment           Neuro/Psych   Within defined limits           Cardiovascular    Hypertension                   GI/Hepatic/Renal  Within defined limits              Endo/Other        Obesity and cancer (history of Breast CA)     Other Findings            Physical Exam    Airway  Mallampati: III  TM Distance: 4 - 6 cm  Neck ROM: normal range of motion   Mouth opening: Normal     Cardiovascular    Rhythm: regular  Rate: normal         Dental  No notable dental hx       Pulmonary  Breath sounds clear to auscultation               Abdominal  GI exam deferred       Other Findings            Anesthetic Plan    ASA: 3  Anesthesia type: total IV anesthesia and general          Induction: Intravenous  Anesthetic plan and risks discussed with: Patient      General anesthesia was prescribed for this patient because by definition it is \"a drug-induced loss of consciousness during which patients are not arousable, even by painful stimulation. \" Sometimes, the ability to independently maintain ventilatory function is often impaired and patients often require assistance in maintaining a patent airway. Occasionally, positive pressure ventilation may be required because of depressed spontaneous ventilation or drug-induced depression of neuromuscular function. This depth of anesthesia is preferred for endoscopic/esophageal procedures to facilitate the procedure and for patient safety/quality of care.

## 2021-08-08 ENCOUNTER — HOSPITAL ENCOUNTER (INPATIENT)
Age: 61
LOS: 5 days | Discharge: HOME HEALTH | DRG: 133 | End: 2021-08-13
Attending: EMERGENCY MEDICINE | Admitting: FAMILY MEDICINE
Payer: MEDICAID

## 2021-08-08 ENCOUNTER — APPOINTMENT (OUTPATIENT)
Dept: GENERAL RADIOLOGY | Age: 61
DRG: 133 | End: 2021-08-08
Attending: EMERGENCY MEDICINE
Payer: MEDICAID

## 2021-08-08 DIAGNOSIS — J96.90 RESPIRATORY FAILURE, UNSPECIFIED CHRONICITY, UNSPECIFIED WHETHER WITH HYPOXIA OR HYPERCAPNIA (HCC): ICD-10-CM

## 2021-08-08 DIAGNOSIS — R09.02 HYPOXEMIA: ICD-10-CM

## 2021-08-08 DIAGNOSIS — J44.1 COPD EXACERBATION (HCC): Primary | ICD-10-CM

## 2021-08-08 LAB
ANION GAP SERPL CALC-SCNC: 3 MMOL/L (ref 5–15)
BASOPHILS # BLD: 0 K/UL (ref 0–0.1)
BASOPHILS NFR BLD: 0 % (ref 0–1)
BNP SERPL-MCNC: 73 PG/ML
BUN SERPL-MCNC: 14 MG/DL (ref 6–20)
BUN/CREAT SERPL: 15 (ref 12–20)
CA-I BLD-MCNC: 9.6 MG/DL (ref 8.5–10.1)
CHLORIDE SERPL-SCNC: 104 MMOL/L (ref 97–108)
CO2 SERPL-SCNC: 33 MMOL/L (ref 21–32)
COVID-19 RAPID TEST, COVR: NOT DETECTED
CREAT SERPL-MCNC: 0.92 MG/DL (ref 0.55–1.02)
DIFFERENTIAL METHOD BLD: ABNORMAL
EOSINOPHIL # BLD: 0 K/UL (ref 0–0.4)
EOSINOPHIL NFR BLD: 1 % (ref 0–7)
ERYTHROCYTE [DISTWIDTH] IN BLOOD BY AUTOMATED COUNT: 14 % (ref 11.5–14.5)
GLUCOSE SERPL-MCNC: 107 MG/DL (ref 65–100)
HCT VFR BLD AUTO: 45 % (ref 35–47)
HGB BLD-MCNC: 13.6 G/DL (ref 11.5–16)
IMM GRANULOCYTES # BLD AUTO: 0 K/UL (ref 0–0.04)
IMM GRANULOCYTES NFR BLD AUTO: 1 % (ref 0–0.5)
LYMPHOCYTES # BLD: 0.9 K/UL (ref 0.8–3.5)
LYMPHOCYTES NFR BLD: 12 % (ref 12–49)
MCH RBC QN AUTO: 29.3 PG (ref 26–34)
MCHC RBC AUTO-ENTMCNC: 30.2 G/DL (ref 30–36.5)
MCV RBC AUTO: 97 FL (ref 80–99)
MONOCYTES # BLD: 0.4 K/UL (ref 0–1)
MONOCYTES NFR BLD: 5 % (ref 5–13)
NEUTS SEG # BLD: 6.2 K/UL (ref 1.8–8)
NEUTS SEG NFR BLD: 81 % (ref 32–75)
NRBC # BLD: 0 K/UL (ref 0–0.01)
NRBC BLD-RTO: 0 PER 100 WBC
PLATELET # BLD AUTO: 284 K/UL (ref 150–400)
PMV BLD AUTO: 10.7 FL (ref 8.9–12.9)
POTASSIUM SERPL-SCNC: 4 MMOL/L (ref 3.5–5.1)
RBC # BLD AUTO: 4.64 M/UL (ref 3.8–5.2)
SODIUM SERPL-SCNC: 140 MMOL/L (ref 136–145)
SPECIMEN SOURCE: NORMAL
TROPONIN I SERPL-MCNC: <0.05 NG/ML
WBC # BLD AUTO: 7.6 K/UL (ref 3.6–11)

## 2021-08-08 PROCEDURE — 85025 COMPLETE CBC W/AUTO DIFF WBC: CPT

## 2021-08-08 PROCEDURE — 87635 SARS-COV-2 COVID-19 AMP PRB: CPT

## 2021-08-08 PROCEDURE — 93005 ELECTROCARDIOGRAM TRACING: CPT

## 2021-08-08 PROCEDURE — 74011000250 HC RX REV CODE- 250: Performed by: EMERGENCY MEDICINE

## 2021-08-08 PROCEDURE — 74011250636 HC RX REV CODE- 250/636: Performed by: INTERNAL MEDICINE

## 2021-08-08 PROCEDURE — 99284 EMERGENCY DEPT VISIT MOD MDM: CPT

## 2021-08-08 PROCEDURE — 87186 SC STD MICRODIL/AGAR DIL: CPT

## 2021-08-08 PROCEDURE — 87077 CULTURE AEROBIC IDENTIFY: CPT

## 2021-08-08 PROCEDURE — 84484 ASSAY OF TROPONIN QUANT: CPT

## 2021-08-08 PROCEDURE — 74011250636 HC RX REV CODE- 250/636: Performed by: EMERGENCY MEDICINE

## 2021-08-08 PROCEDURE — 96374 THER/PROPH/DIAG INJ IV PUSH: CPT

## 2021-08-08 PROCEDURE — 87070 CULTURE OTHR SPECIMN AEROBIC: CPT

## 2021-08-08 PROCEDURE — 94761 N-INVAS EAR/PLS OXIMETRY MLT: CPT

## 2021-08-08 PROCEDURE — 80048 BASIC METABOLIC PNL TOTAL CA: CPT

## 2021-08-08 PROCEDURE — 87040 BLOOD CULTURE FOR BACTERIA: CPT

## 2021-08-08 PROCEDURE — 71045 X-RAY EXAM CHEST 1 VIEW: CPT

## 2021-08-08 PROCEDURE — 74011000250 HC RX REV CODE- 250: Performed by: INTERNAL MEDICINE

## 2021-08-08 PROCEDURE — 65270000029 HC RM PRIVATE

## 2021-08-08 PROCEDURE — 94640 AIRWAY INHALATION TREATMENT: CPT

## 2021-08-08 PROCEDURE — 83880 ASSAY OF NATRIURETIC PEPTIDE: CPT

## 2021-08-08 PROCEDURE — 74011250637 HC RX REV CODE- 250/637: Performed by: INTERNAL MEDICINE

## 2021-08-08 PROCEDURE — 36415 COLL VENOUS BLD VENIPUNCTURE: CPT

## 2021-08-08 RX ORDER — AMLODIPINE BESYLATE 5 MG/1
10 TABLET ORAL DAILY
Status: DISCONTINUED | OUTPATIENT
Start: 2021-08-09 | End: 2021-08-14 | Stop reason: HOSPADM

## 2021-08-08 RX ORDER — IPRATROPIUM BROMIDE AND ALBUTEROL SULFATE 2.5; .5 MG/3ML; MG/3ML
3 SOLUTION RESPIRATORY (INHALATION)
Status: COMPLETED | OUTPATIENT
Start: 2021-08-08 | End: 2021-08-08

## 2021-08-08 RX ORDER — IPRATROPIUM BROMIDE AND ALBUTEROL SULFATE 2.5; .5 MG/3ML; MG/3ML
3 SOLUTION RESPIRATORY (INHALATION)
Status: DISCONTINUED | OUTPATIENT
Start: 2021-08-08 | End: 2021-08-14 | Stop reason: HOSPADM

## 2021-08-08 RX ORDER — POTASSIUM CHLORIDE 750 MG/1
10 TABLET, FILM COATED, EXTENDED RELEASE ORAL DAILY
Status: DISCONTINUED | OUTPATIENT
Start: 2021-08-09 | End: 2021-08-14 | Stop reason: HOSPADM

## 2021-08-08 RX ORDER — MONTELUKAST SODIUM 10 MG/1
10 TABLET ORAL DAILY
Status: DISCONTINUED | OUTPATIENT
Start: 2021-08-09 | End: 2021-08-14 | Stop reason: HOSPADM

## 2021-08-08 RX ORDER — DOCUSATE SODIUM 100 MG/1
100 CAPSULE, LIQUID FILLED ORAL AS NEEDED
Status: DISCONTINUED | OUTPATIENT
Start: 2021-08-08 | End: 2021-08-14 | Stop reason: HOSPADM

## 2021-08-08 RX ORDER — FUROSEMIDE 10 MG/ML
40 INJECTION INTRAMUSCULAR; INTRAVENOUS 2 TIMES DAILY
Status: DISCONTINUED | OUTPATIENT
Start: 2021-08-08 | End: 2021-08-09

## 2021-08-08 RX ORDER — FUROSEMIDE 10 MG/ML
40 INJECTION INTRAMUSCULAR; INTRAVENOUS DAILY
Status: DISCONTINUED | OUTPATIENT
Start: 2021-08-08 | End: 2021-08-08

## 2021-08-08 RX ORDER — ENOXAPARIN SODIUM 100 MG/ML
40 INJECTION SUBCUTANEOUS EVERY 24 HOURS
Status: DISCONTINUED | OUTPATIENT
Start: 2021-08-08 | End: 2021-08-14 | Stop reason: HOSPADM

## 2021-08-08 RX ORDER — FLUTICASONE PROPIONATE 50 MCG
2 SPRAY, SUSPENSION (ML) NASAL DAILY
Status: DISCONTINUED | OUTPATIENT
Start: 2021-08-09 | End: 2021-08-14 | Stop reason: HOSPADM

## 2021-08-08 RX ORDER — GUAIFENESIN 600 MG/1
600 TABLET, EXTENDED RELEASE ORAL 2 TIMES DAILY
Status: DISCONTINUED | OUTPATIENT
Start: 2021-08-08 | End: 2021-08-12

## 2021-08-08 RX ORDER — BENZONATATE 100 MG/1
100 CAPSULE ORAL ONCE
Status: COMPLETED | OUTPATIENT
Start: 2021-08-08 | End: 2021-08-08

## 2021-08-08 RX ORDER — CALCIUM CARBONATE/VITAMIN D3 600MG-5MCG
1 TABLET ORAL DAILY
Status: DISCONTINUED | OUTPATIENT
Start: 2021-08-09 | End: 2021-08-14 | Stop reason: HOSPADM

## 2021-08-08 RX ORDER — CARVEDILOL 3.12 MG/1
3.12 TABLET ORAL 2 TIMES DAILY WITH MEALS
Status: DISCONTINUED | OUTPATIENT
Start: 2021-08-08 | End: 2021-08-11

## 2021-08-08 RX ORDER — BUDESONIDE AND FORMOTEROL FUMARATE DIHYDRATE 160; 4.5 UG/1; UG/1
2 AEROSOL RESPIRATORY (INHALATION) 2 TIMES DAILY
Status: DISCONTINUED | OUTPATIENT
Start: 2021-08-08 | End: 2021-08-14 | Stop reason: HOSPADM

## 2021-08-08 RX ORDER — GUAIFENESIN 100 MG/5ML
81 LIQUID (ML) ORAL DAILY
Status: DISCONTINUED | OUTPATIENT
Start: 2021-08-09 | End: 2021-08-14 | Stop reason: HOSPADM

## 2021-08-08 RX ORDER — ERGOCALCIFEROL 1.25 MG/1
50000 CAPSULE ORAL
Status: DISCONTINUED | OUTPATIENT
Start: 2021-08-10 | End: 2021-08-14 | Stop reason: HOSPADM

## 2021-08-08 RX ADMIN — BENZONATATE 100 MG: 100 CAPSULE ORAL at 17:29

## 2021-08-08 RX ADMIN — IPRATROPIUM BROMIDE AND ALBUTEROL SULFATE 3 ML: .5; 2.5 SOLUTION RESPIRATORY (INHALATION) at 10:01

## 2021-08-08 RX ADMIN — METHYLPREDNISOLONE SODIUM SUCCINATE 125 MG: 125 INJECTION, POWDER, FOR SOLUTION INTRAMUSCULAR; INTRAVENOUS at 09:36

## 2021-08-08 RX ADMIN — ENOXAPARIN SODIUM 40 MG: 40 INJECTION SUBCUTANEOUS at 17:37

## 2021-08-08 RX ADMIN — METHYLPREDNISOLONE SODIUM SUCCINATE 40 MG: 40 INJECTION, POWDER, FOR SOLUTION INTRAMUSCULAR; INTRAVENOUS at 21:27

## 2021-08-08 RX ADMIN — GUAIFENESIN 600 MG: 600 TABLET, EXTENDED RELEASE ORAL at 21:27

## 2021-08-08 RX ADMIN — BUDESONIDE AND FORMOTEROL FUMARATE DIHYDRATE 2 PUFF: 160; 4.5 AEROSOL RESPIRATORY (INHALATION) at 19:50

## 2021-08-08 RX ADMIN — FUROSEMIDE 40 MG: 10 INJECTION, SOLUTION INTRAMUSCULAR; INTRAVENOUS at 21:27

## 2021-08-08 RX ADMIN — IPRATROPIUM BROMIDE AND ALBUTEROL SULFATE 3 ML: .5; 2.5 SOLUTION RESPIRATORY (INHALATION) at 19:50

## 2021-08-08 RX ADMIN — IPRATROPIUM BROMIDE AND ALBUTEROL SULFATE 3 ML: .5; 2.5 SOLUTION RESPIRATORY (INHALATION) at 17:28

## 2021-08-08 RX ADMIN — METHYLPREDNISOLONE SODIUM SUCCINATE 40 MG: 40 INJECTION, POWDER, FOR SOLUTION INTRAMUSCULAR; INTRAVENOUS at 17:28

## 2021-08-08 RX ADMIN — CARVEDILOL 3.12 MG: 3.12 TABLET, FILM COATED ORAL at 17:28

## 2021-08-08 NOTE — H&P
History and Physical    Patient: Miri Vizcaino MRN: [id number]  SSN: [id number]    YOB: 1960  Age: 63 y.o. Sex: female      Subjective:      Miri Vizcaino is a 63 y.o. female who has a history of COPD, chronic hypoxic respiratory failure hypertension came in with a complaint of shortness of breath and cough    Past Medical History:   Diagnosis Date    [facility] ([facility]     Chronic obstructive pulmonary disease ([facility]     Hypertension      Past Surgical History:   Procedure Laterality Date    COLONOSCOPY N/A 7/16/2021    . performed by Gaviota Jeffery MD at [address] [facility] HX HYSTERECTOMY      HX MASTECTOMY Left       Family History   Problem Relation Age of Onset    Hypertension Mother     Cancer Father      Social History     Tobacco Use    Smoking status: Former Smoker    Smokeless tobacco: Never Used   Substance Use Topics    Alcohol use: Yes      Prior to Admission medications    Medication Sig Start Date End Date Taking? Authorizing Provider   albuterol-ipratropium (DUO-NEB) 2.5 mg-0.5 mg/3 ml nebu 3 mL by Nebulization route every six (6) hours as needed for Wheezing. 12/18/20   Aria Funes MD   aspirin 81 mg chewable tablet Take 1 Tab by mouth daily. 12/19/20   Aria Funes MD   guaiFENesin ER (MUCINEX) 600 mg ER tablet Take 1 Tab by mouth two (2) times a day. Patient not taking: Reported on 7/16/2021 12/18/20   Aria Funes MD   amLODIPine (NORVASC) 10 mg tablet Take 10 mg by mouth daily. Provider, Historical   mometasone-formoterol (DULERA) 200-5 mcg/actuation HFA inhaler Take 2 Puffs by inhalation two (2) times a day. Provider, Historical   montelukast (SINGULAIR) 10 mg tablet Take 10 mg by mouth daily. Provider, Historical   Calcium-Cholecalciferol, D3, 500 mg(1,250mg) -400 unit tab Take  by mouth daily. Patient not taking: Reported on 7/16/2021    Provider, Historical   benzonatate (TESSALON) 100 mg capsule Take 100 mg by mouth once.     Provider, Historical   fluticasone propionate (FLONASE) 50 mcg/actuation nasal spray 2 Sprays by Both Nostrils route daily. Patient not taking: Reported on 7/16/2021    Provider, Historical   ergocalciferol (Vitamin D2) 1,250 mcg (50,000 unit) capsule Take 50,000 Units by mouth every seven (7) days. Q7days on Monday    Provider, Historical        Allergies   Allergen Reactions    Lisinopril Angioedema       Review of Systems:  A comprehensive review of systems was negative except for that written in the History of Present Illness. Objective:     Vitals:    08/08/21 1145 08/08/21 1218 08/08/21 1456 08/08/21 1553   BP: (!) 161/97 (!) 161/91 (!) 168/86 (!) 174/96   Pulse: (!) 103 (!) 102 (!) 101 (!) 103   Resp: 20 20 20 20   Temp:  98.6 °F (37 °C)  97.8 °F (36.6 °C)   SpO2: 93% 93% 92% 98%   Weight:       Height:            Physical Exam:  General:  Alert, cooperative, no distress, appears stated age. Eyes:  Conjunctivae/corneas clear. PERRL, EOMs intact. Fundi benign   Ears:  Normal TMs and external ear canals both ears. Nose: Nares normal. Septum midline. Mucosa normal. No drainage or sinus tenderness. Mouth/Throat: Lips, mucosa, and tongue normal. Teeth and gums normal.   Neck: Supple, symmetrical, trachea midline, no adenopathy, thyroid: no enlargment/tenderness/nodules, no carotid bruit and no JVD. Back:   Symmetric, no curvature. ROM normal. No CVA tenderness. Lungs:    Diminished to auscultation bilaterally. Heart:  Regular rate and rhythm, S1, S2 normal, no murmur, click, rub or gallop. Abdomen:   Soft, non-tender. Bowel sounds normal. No masses,  No organomegaly. Extremities: Extremities normal, atraumatic, no cyanosis or edema. Pulses: 2+ and symmetric all extremities. Skin: Skin color, texture, turgor normal. No rashes or lesions   Lymph nodes: Cervical, supraclavicular, and axillary nodes normal.   Neurologic: CNII-XII intact. Normal strength, sensation and reflexes throughout.      Recent Results (from the past 24 hour(s))   BNP    Collection Time: 08/08/21  9:15 AM   Result Value Ref Range    NT pro-BNP 73 <866 pg/mL   METABOLIC PANEL, BASIC    Collection Time: 08/08/21  9:15 AM   Result Value Ref Range    Sodium 140 136 - 145 mmol/L    Potassium 4.0 3.5 - 5.1 mmol/L    Chloride 104 97 - 108 mmol/L    CO2 33 (H) 21 - 32 mmol/L    Anion gap 3 (L) 5 - 15 mmol/L    Glucose 107 (H) 65 - 100 mg/dL    BUN 14 6 - 20 mg/dL    Creatinine 0.92 0.55 - 1.02 mg/dL    BUN/Creatinine ratio 15 12 - 20      GFR est AA >60 >60 ml/min/1.73m2    GFR est non-AA >60 >60 ml/min/1.73m2    Calcium 9.6 8.5 - 10.1 mg/dL   CBC WITH AUTOMATED DIFF    Collection Time: 08/08/21  9:15 AM   Result Value Ref Range    WBC 7.6 3.6 - 11.0 K/uL    RBC 4.64 3.80 - 5.20 M/uL    HGB 13.6 11.5 - 16.0 g/dL    HCT 45.0 35.0 - 47.0 %    MCV 97.0 80.0 - 99.0 FL    MCH 29.3 26.0 - 34.0 PG    MCHC 30.2 30.0 - 36.5 g/dL    RDW 14.0 11.5 - 14.5 %    PLATELET 480 673 - 827 K/uL    MPV 10.7 8.9 - 12.9 FL    NRBC 0.0 0.0  WBC    ABSOLUTE NRBC 0.00 0.00 - 0.01 K/uL    NEUTROPHILS 81 (H) 32 - 75 %    LYMPHOCYTES 12 12 - 49 %    MONOCYTES 5 5 - 13 %    EOSINOPHILS 1 0 - 7 %    BASOPHILS 0 0 - 1 %    IMMATURE GRANULOCYTES 1 (H) 0 - 0.5 %    ABS. NEUTROPHILS 6.2 1.8 - 8.0 K/UL    ABS. LYMPHOCYTES 0.9 0.8 - 3.5 K/UL    ABS. MONOCYTES 0.4 0.0 - 1.0 K/UL    ABS. EOSINOPHILS 0.0 0.0 - 0.4 K/UL    ABS. BASOPHILS 0.0 0.0 - 0.1 K/UL    ABS. IMM.  GRANS. 0.0 0.00 - 0.04 K/UL    DF AUTOMATED     TROPONIN I    Collection Time: 08/08/21  9:15 AM   Result Value Ref Range    Troponin-I, Qt. <0.05 <0.05 ng/mL   CULTURE, BLOOD, PAIRED    Collection Time: 08/08/21  9:30 AM    Specimen: Blood   Result Value Ref Range    Special Requests: No Special Requests      Culture result: No growth after 4 hours     COVID-19 RAPID TEST    Collection Time: 08/08/21 11:27 AM   Result Value Ref Range    Specimen source Please find results under separate order      COVID-19 rapid test Not Detected Not Detected         Assessment:     Hospital Problems  Date Reviewed: 7/16/2021        Codes Class Noted POA    Res[facility] ([facility] ICD-10-CM: J96.90  ICD-9-CM: 518.81  8/8/2021 Unknown        COPD exacerbation ([facility] ICD-10-CM: J44.1  ICD-9-CM: 491.21  8/8/2021 Unknown        Hypoxia ICD-10-CM: R09.02  ICD-9-CM: 799.02  12/13/2020 Unknown          Acute hypoxic respiratory failure chronic hypoxic respiratory failure  COPD with acute exacerbation  Decompensated CHF acute    Plan:     Critical care time of 60 minutes  Continue with duo nebs diuretics telemetry monitor    Signed By: Yadira Dumont MD     August 8, 2021

## 2021-08-08 NOTE — ED PROVIDER NOTES
EMERGENCY DEPARTMENT HISTORY AND PHYSICAL EXAM      Date: 8/8/2021  Patient Name: Contreras Isaac    History of Presenting Illness     Chief Complaint   Patient presents with    Shortness of Breath       History Provided By: Patient    HPI: Contreras Isaac, 63 y.o. female presents to the ED with cc of   Chief Complaint   Patient presents with    Shortness of Breath         There are no other complaints, changes, or physical findings at this time. PCP: Claudia Caldera MD    No current facility-administered medications on file prior to encounter. Current Outpatient Medications on File Prior to Encounter   Medication Sig Dispense Refill    furosemide (LASIX) 20 mg tablet Take 20 mg by mouth daily.  albuterol-ipratropium (DUO-NEB) 2.5 mg-0.5 mg/3 ml nebu 3 mL by Nebulization route every six (6) hours as needed for Wheezing. 30 Nebule 1    aspirin 81 mg chewable tablet Take 1 Tab by mouth daily. 30 Tab 0    guaiFENesin ER (MUCINEX) 600 mg ER tablet Take 1 Tab by mouth two (2) times a day. (Patient not taking: Reported on 7/16/2021) 30 Tab 0    predniSONE (DELTASONE) 20 mg tablet Take 20 mg by mouth daily (with breakfast). (Patient not taking: Reported on 7/16/2021) 10 Tab 0    amLODIPine (NORVASC) 10 mg tablet Take 10 mg by mouth daily.  mometasone-formoterol (DULERA) 200-5 mcg/actuation HFA inhaler Take 2 Puffs by inhalation two (2) times a day.  albuterol (ProAir HFA) 90 mcg/actuation inhaler Take 2 Puffs by inhalation every six (6) hours as needed for Wheezing.  therapeutic multivitamin (THERAGRAN) tablet Take 1 Tab by mouth daily.  montelukast (SINGULAIR) 10 mg tablet Take 10 mg by mouth daily.  Calcium-Cholecalciferol, D3, 500 mg(1,250mg) -400 unit tab Take  by mouth daily. (Patient not taking: Reported on 7/16/2021)      benzonatate (TESSALON) 100 mg capsule Take 100 mg by mouth once.       fluticasone propionate (FLONASE) 50 mcg/actuation nasal spray 2 Sprays by Both Nostrils route daily. (Patient not taking: Reported on 7/16/2021)      ergocalciferol (Vitamin D2) 1,250 mcg (50,000 unit) capsule Take 50,000 Units by mouth every seven (7) days. Q7days on Monday         Past History     Past Medical History:  Past Medical History:   Diagnosis Date    [facility] ([facility]     Chronic obstructive pulmonary disease ([facility]     Hypertension        Past Surgical History:  Past Surgical History:   Procedure Laterality Date    COLONOSCOPY N/A 7/16/2021    . performed by Joe Rodas MD at [address] [place] [address] HX HYSTERECTOMY      HX MASTECTOMY Left        Family History:  Family History   Problem Relation Age of Onset    Hypertension Mother     Cancer Father        Social History:  Social History     Tobacco Use    Smoking status: Former Smoker    Smokeless tobacco: Never Used   Vaping Use    Vaping Use: Never used   Substance Use Topics    Alcohol use: Yes    Drug use: Never       Allergies: Allergies   Allergen Reactions    Lisinopril Angioedema         Review of Systems   Review of Systems   Constitutional: Negative. HENT: Negative for congestion, nosebleeds, sinus pressure and sinus pain. Eyes: Negative. Negative for photophobia. Respiratory: Positive for cough, chest tightness and shortness of breath. Negative for apnea, choking, wheezing and stridor. Cardiovascular: Positive for chest pain and palpitations. Gastrointestinal: Negative. Genitourinary: Negative. Musculoskeletal: Negative. Negative for back pain and gait problem. Skin: Negative. Allergic/Immunologic: Negative. Neurological: Negative. Negative for weakness, light-headedness and numbness. Hematological: Negative. Psychiatric/Behavioral: The patient is nervous/anxious. Physical Exam   Physical Exam  Vitals and nursing note reviewed. Constitutional:       General: She is in acute distress. Appearance: Normal appearance. HENT:      Head: Normocephalic and atraumatic. Eyes:      Extraocular Movements: Extraocular movements intact. Pupils: Pupils are equal, round, and reactive to light. Cardiovascular:      Rate and Rhythm: Regular rhythm. Pulses: Normal pulses. Heart sounds: Normal heart sounds. Pulmonary:      Effort: Tachypnea and respiratory distress present. Breath sounds: Normal breath sounds. Abdominal:      General: Abdomen is flat. Bowel sounds are normal.      Palpations: Abdomen is soft. Musculoskeletal:         General: Normal range of motion. Cervical back: Normal range of motion and neck supple. Right lower leg: No tenderness. Edema present. Left lower leg: No tenderness. Edema present. Skin:     General: Skin is warm and dry. Neurological:      General: No focal deficit present. Mental Status: She is alert and oriented to person, place, and time. Psychiatric:         Mood and Affect: Mood is anxious.          Behavior: Behavior normal.         Diagnostic Study Results     Labs -     Recent Results (from the past 12 hour(s))   BNP    Collection Time: 08/08/21  9:15 AM   Result Value Ref Range    NT pro-BNP 73 <216 pg/mL   METABOLIC PANEL, BASIC    Collection Time: 08/08/21  9:15 AM   Result Value Ref Range    Sodium 140 136 - 145 mmol/L    Potassium 4.0 3.5 - 5.1 mmol/L    Chloride 104 97 - 108 mmol/L    CO2 33 (H) 21 - 32 mmol/L    Anion gap 3 (L) 5 - 15 mmol/L    Glucose 107 (H) 65 - 100 mg/dL    BUN 14 6 - 20 mg/dL    Creatinine 0.92 0.55 - 1.02 mg/dL    BUN/Creatinine ratio 15 12 - 20      GFR est AA >60 >60 ml/min/1.73m2    GFR est non-AA >60 >60 ml/min/1.73m2    Calcium 9.6 8.5 - 10.1 mg/dL   CBC WITH AUTOMATED DIFF    Collection Time: 08/08/21  9:15 AM   Result Value Ref Range    WBC 7.6 3.6 - 11.0 K/uL    RBC 4.64 3.80 - 5.20 M/uL    HGB 13.6 11.5 - 16.0 g/dL    HCT 45.0 35.0 - 47.0 %    MCV 97.0 80.0 - 99.0 FL    MCH 29.3 26.0 - 34.0 PG    MCHC 30.2 30.0 - 36.5 g/dL    RDW 14.0 11.5 - 14.5 %    PLATELET 284 150 - 400 K/uL    MPV 10.7 8.9 - 12.9 FL    NRBC 0.0 0.0  WBC    ABSOLUTE NRBC 0.00 0.00 - 0.01 K/uL    NEUTROPHILS 81 (H) 32 - 75 %    LYMPHOCYTES 12 12 - 49 %    MONOCYTES 5 5 - 13 %    EOSINOPHILS 1 0 - 7 %    BASOPHILS 0 0 - 1 %    IMMATURE GRANULOCYTES 1 (H) 0 - 0.5 %    ABS. NEUTROPHILS 6.2 1.8 - 8.0 K/UL    ABS. LYMPHOCYTES 0.9 0.8 - 3.5 K/UL    ABS. MONOCYTES 0.4 0.0 - 1.0 K/UL    ABS. EOSINOPHILS 0.0 0.0 - 0.4 K/UL    ABS. BASOPHILS 0.0 0.0 - 0.1 K/UL    ABS. IMM. GRANS. 0.0 0.00 - 0.04 K/UL    DF AUTOMATED     TROPONIN I    Collection Time: 08/08/21  9:15 AM   Result Value Ref Range    Troponin-I, Qt. <0.05 <0.05 ng/mL       Labs reviewed by me    Radiologic Studies -   XR CHEST PORT    (Results Pending)     CT Results  (Last 48 hours)    None        CXR Results  (Last 48 hours)    None            Medical Decision Making     I am the first provider for this patient. I reviewed the vital signs, available nursing notes, past medical history, past surgical history, family history and social history. RADIOLOGY report and LABS reviewed by me    Vital Signs-Reviewed the patient's vital signs. Patient Vitals for the past 12 hrs:   Temp Pulse Resp BP SpO2   08/08/21 1002 -- -- -- -- 94 %   08/08/21 0913 98.8 °F (37.1 °C) (!) 105 25 137/74 (!) 62 %       EKG interpretation: (Preliminary)  EKG done at 921 shows normal sinus rhythm, left axis deviation, ST segments with ventricular rate of 84 and no ectopy    Records Reviewed: Nurse's note. Provider Notes (Medical Decision Making):    Patient presents with DIFF DX : EKG done at 921 shows normal sinus rhythm with left axis deviation nonspecific ST with no ectopy ventricular rate of 84        ED Course:   Initial assessment performed. The patients presenting problems have been discussed, and they are in agreement with the care plan formulated and outlined with them.   I have encouraged them to ask questions as they arise throughout their visit. CRITICAL CARE NOTE :  10:29 AM  Amount of Critical Care Time: ___60_(minutes)__    IMPENDING DETERIORATION -Airway and Respiratory  ASSOCIATED RISK FACTORS - Hypoxia and Dysrhythmia  MANAGEMENT- Bedside Assessment  INTERPRETATION -  Xrays and ECG  INTERVENTIONS - vent mngmt  CASE REVIEW - Nursing  TREATMENT RESPONSE -Improved  PERFORMED BY - Self    NOTES   :  I have spent critical care time involved in lab review, consultations with specialist, family decision- making, bedside attention and documentation. This time excludes time spent in any separate billed procedures. During this entire length of time I was immediately available to the patient . Debbi Miranda MD                     [address] [place] [address]      Disposition:  Admitted   Diagnostic tests were reviewed and questions answered. Diagnosis, care plan and treatment options were discussed. The patient understand instructions and will follow up as directed. Condition stable    Admitting Provider:  Stephanie Callahan MD     Consulting Provider:  No ref. provider found       DISCHARGE PLAN:  1. Current Discharge Medication List        2. Follow-up Information    None       3. Return to ED if worse     Diagnosis     Clinical Impression:     ICD-10-CM ICD-9-CM    1. COPD exacerbation ([facility]  J44.1 491.21    2. Hypoxemia  R09.02 799.02         Attestations:    Debbi Miranda MD    Please note that this dictation was completed with [employer], the computer voice recognition software. Quite often unanticipated grammatical, syntax, homophones, and other interpretive errors are inadvertently transcribed by the computer software. Please disregard these errors. Please excuse any errors that have escaped final proofreading. Thank you.

## 2021-08-09 ENCOUNTER — APPOINTMENT (OUTPATIENT)
Dept: NON INVASIVE DIAGNOSTICS | Age: 61
DRG: 133 | End: 2021-08-09
Attending: INTERNAL MEDICINE
Payer: MEDICAID

## 2021-08-09 LAB
ALBUMIN SERPL-MCNC: 3.6 G/DL (ref 3.5–5)
ALBUMIN/GLOB SERPL: 0.9 {RATIO} (ref 1.1–2.2)
ALP SERPL-CCNC: 111 U/L (ref 45–117)
ALT SERPL-CCNC: 29 U/L (ref 12–78)
ANION GAP SERPL CALC-SCNC: 1 MMOL/L (ref 5–15)
ARTERIAL PATENCY WRIST A: ABNORMAL
AST SERPL W P-5'-P-CCNC: 12 U/L (ref 15–37)
ATRIAL RATE: 84 BPM
BASE EXCESS BLDA CALC-SCNC: 4.9 MMOL/L (ref 0–2)
BDY SITE: ABNORMAL
BILIRUB SERPL-MCNC: 0.2 MG/DL (ref 0.2–1)
BNP SERPL-MCNC: 105 PG/ML
BUN SERPL-MCNC: 20 MG/DL (ref 6–20)
BUN/CREAT SERPL: 17 (ref 12–20)
CA-I BLD-MCNC: 9.7 MG/DL (ref 8.5–10.1)
CALCULATED P AXIS, ECG09: 44 DEGREES
CALCULATED R AXIS, ECG10: -33 DEGREES
CALCULATED T AXIS, ECG11: 48 DEGREES
CHLORIDE SERPL-SCNC: 100 MMOL/L (ref 97–108)
CO2 SERPL-SCNC: 37 MMOL/L (ref 21–32)
CREAT SERPL-MCNC: 1.16 MG/DL (ref 0.55–1.02)
DIAGNOSIS, 93000: NORMAL
ECHO AO ASC DIAM: 2.5 CM
ECHO AO ROOT DIAM: 3.4 CM
ECHO AV AREA PEAK VELOCITY: 2.16 CM2
ECHO AV AREA VTI: 2.4 CM2
ECHO AV AREA/BSA PEAK VELOCITY: 1.1 CM2/M2
ECHO AV AREA/BSA VTI: 1.2 CM2/M2
ECHO AV MEAN GRADIENT: 6 MMHG
ECHO AV MEAN VELOCITY: 114 CM/S
ECHO AV PEAK GRADIENT: 11 MMHG
ECHO AV PEAK VELOCITY: 164 CM/S
ECHO AV VTI: 28 CM
ECHO EST RA PRESSURE: 10 MMHG
ECHO LA AREA 2C: 8.13 CM2
ECHO LA AREA 4C: 13.4 CM2
ECHO LA MAJOR AXIS: 3.8 CM
ECHO LA MINOR AXIS: 1.92 CM
ECHO LV E' SEPTAL VELOCITY: 5.85 CM/S
ECHO LV EDV A2C: 44 CM3
ECHO LV EDV A4C: 53 CM3
ECHO LV ESV A2C: 62.1 CM3
ECHO LV ESV A4C: 23 CM3
ECHO LV INTERNAL DIMENSION DIASTOLIC: 4.84 CM (ref 3.9–5.3)
ECHO LV INTERNAL DIMENSION SYSTOLIC: 3.96 CM
ECHO LV IVSD: 1.04 CM (ref 0.6–0.9)
ECHO LV MASS 2D: 218.1 G (ref 67–162)
ECHO LV MASS INDEX 2D: 110.2 G/M2 (ref 43–95)
ECHO LV POSTERIOR WALL DIASTOLIC: 1.33 CM (ref 0.6–0.9)
ECHO LVOT DIAM: 1.9 CM
ECHO LVOT PEAK GRADIENT: 6 MMHG
ECHO LVOT PEAK VELOCITY: 125 CM/S
ECHO LVOT SV: 67 CM3
ECHO LVOT VTI: 23.7 CM
ECHO LVOT VTI: 23.7 CM
ECHO MV A VELOCITY: 119 CM/S
ECHO MV AREA PHT: 7.33 CM2
ECHO MV E VELOCITY: 111 CM/S
ECHO MV E/A RATIO: 0.93
ECHO MV E/E' SEPTAL: 18.97
ECHO MV PRESSURE HALF TIME (PHT): 30 MS
ECHO PV MAX VELOCITY: 159 CM/S
ECHO PV MEAN GRADIENT: 5 MMHG
ECHO PV PEAK INSTANTANEOUS GRADIENT SYSTOLIC: 10 MMHG
ECHO PV PEAK INSTANTANEOUS GRADIENT SYSTOLIC: 6 MMHG
ECHO PV REGURGITANT MAX VELOCITY: 118 CM/S
ECHO PV VTI: 25.9 CM
ECHO RA AREA 4C: 13.04 CM2
ECHO RIGHT VENTRICULAR SYSTOLIC PRESSURE (RVSP): 58 MMHG
ECHO RV TAPSE: 2.1 CM (ref 1.5–2)
ECHO RVOT DIAMETER: 2.5 CM
ECHO TV MAX VELOCITY: 345 CM/S
ECHO TV REGURGITANT PEAK GRADIENT: 48 MMHG
GAS FLOW.O2 O2 DELIVERY SYS: 5 L/MIN
GLOBULIN SER CALC-MCNC: 4.2 G/DL (ref 2–4)
GLUCOSE BLD STRIP.AUTO-MCNC: 142 MG/DL (ref 65–117)
GLUCOSE BLD STRIP.AUTO-MCNC: 185 MG/DL (ref 65–117)
GLUCOSE SERPL-MCNC: 168 MG/DL (ref 65–100)
HCO3 BLDA-SCNC: 32 MMOL/L (ref 22–26)
LA VOL DISK BP: 32 CM3 (ref 22–52)
LVOT MG: 4 MMHG
MV DEC SLOPE: ABNORMAL MM/S2
MV DEC SLOPE: ABNORMAL MM/S2
P-R INTERVAL, ECG05: 150 MS
PCO2 BLDA: 55 MMHG (ref 35–45)
PERFORMED BY, TECHID: ABNORMAL
PERFORMED BY, TECHID: ABNORMAL
PH BLDA: 7.37 [PH] (ref 7.35–7.45)
PO2 BLDA: 174 MMHG (ref 75–100)
POTASSIUM SERPL-SCNC: 4.8 MMOL/L (ref 3.5–5.1)
PROT SERPL-MCNC: 7.8 G/DL (ref 6.4–8.2)
Q-T INTERVAL, ECG07: 352 MS
QRS DURATION, ECG06: 80 MS
QTC CALCULATION (BEZET), ECG08: 415 MS
SAO2 % BLD: >100 %
SAO2% DEVICE SAO2% SENSOR NAME: ABNORMAL
SODIUM SERPL-SCNC: 138 MMOL/L (ref 136–145)
TROPONIN I SERPL-MCNC: <0.05 NG/ML
VENTRICULAR RATE, ECG03: 84 BPM

## 2021-08-09 PROCEDURE — 83880 ASSAY OF NATRIURETIC PEPTIDE: CPT

## 2021-08-09 PROCEDURE — 74011000250 HC RX REV CODE- 250: Performed by: INTERNAL MEDICINE

## 2021-08-09 PROCEDURE — 82803 BLOOD GASES ANY COMBINATION: CPT

## 2021-08-09 PROCEDURE — 94640 AIRWAY INHALATION TREATMENT: CPT

## 2021-08-09 PROCEDURE — 74011250636 HC RX REV CODE- 250/636: Performed by: INTERNAL MEDICINE

## 2021-08-09 PROCEDURE — 74011250637 HC RX REV CODE- 250/637: Performed by: INTERNAL MEDICINE

## 2021-08-09 PROCEDURE — 36600 WITHDRAWAL OF ARTERIAL BLOOD: CPT

## 2021-08-09 PROCEDURE — 84484 ASSAY OF TROPONIN QUANT: CPT

## 2021-08-09 PROCEDURE — 82962 GLUCOSE BLOOD TEST: CPT

## 2021-08-09 PROCEDURE — 65270000029 HC RM PRIVATE

## 2021-08-09 PROCEDURE — 93306 TTE W/DOPPLER COMPLETE: CPT

## 2021-08-09 PROCEDURE — 80053 COMPREHEN METABOLIC PANEL: CPT

## 2021-08-09 PROCEDURE — 36415 COLL VENOUS BLD VENIPUNCTURE: CPT

## 2021-08-09 PROCEDURE — 93005 ELECTROCARDIOGRAM TRACING: CPT

## 2021-08-09 RX ORDER — NICOTINE 7MG/24HR
1 PATCH, TRANSDERMAL 24 HOURS TRANSDERMAL DAILY
Status: DISCONTINUED | OUTPATIENT
Start: 2021-08-09 | End: 2021-08-14 | Stop reason: HOSPADM

## 2021-08-09 RX ORDER — FUROSEMIDE 10 MG/ML
40 INJECTION INTRAMUSCULAR; INTRAVENOUS DAILY
Status: DISCONTINUED | OUTPATIENT
Start: 2021-08-10 | End: 2021-08-14 | Stop reason: HOSPADM

## 2021-08-09 RX ORDER — PANTOPRAZOLE SODIUM 40 MG/1
40 TABLET, DELAYED RELEASE ORAL
Status: DISCONTINUED | OUTPATIENT
Start: 2021-08-09 | End: 2021-08-14 | Stop reason: HOSPADM

## 2021-08-09 RX ADMIN — IPRATROPIUM BROMIDE AND ALBUTEROL SULFATE 3 ML: .5; 2.5 SOLUTION RESPIRATORY (INHALATION) at 20:58

## 2021-08-09 RX ADMIN — POTASSIUM CHLORIDE 10 MEQ: 750 TABLET, FILM COATED, EXTENDED RELEASE ORAL at 08:34

## 2021-08-09 RX ADMIN — GUAIFENESIN 600 MG: 600 TABLET, EXTENDED RELEASE ORAL at 08:34

## 2021-08-09 RX ADMIN — METHYLPREDNISOLONE SODIUM SUCCINATE 40 MG: 40 INJECTION, POWDER, FOR SOLUTION INTRAMUSCULAR; INTRAVENOUS at 09:18

## 2021-08-09 RX ADMIN — METHYLPREDNISOLONE SODIUM SUCCINATE 40 MG: 40 INJECTION, POWDER, FOR SOLUTION INTRAMUSCULAR; INTRAVENOUS at 14:00

## 2021-08-09 RX ADMIN — CARVEDILOL 3.12 MG: 3.12 TABLET, FILM COATED ORAL at 08:33

## 2021-08-09 RX ADMIN — ASPIRIN 81 MG: 81 TABLET, CHEWABLE ORAL at 08:34

## 2021-08-09 RX ADMIN — IPRATROPIUM BROMIDE AND ALBUTEROL SULFATE 3 ML: .5; 2.5 SOLUTION RESPIRATORY (INHALATION) at 08:06

## 2021-08-09 RX ADMIN — IPRATROPIUM BROMIDE AND ALBUTEROL SULFATE 3 ML: .5; 2.5 SOLUTION RESPIRATORY (INHALATION) at 13:54

## 2021-08-09 RX ADMIN — CARVEDILOL 3.12 MG: 3.12 TABLET, FILM COATED ORAL at 17:01

## 2021-08-09 RX ADMIN — GUAIFENESIN 600 MG: 600 TABLET, EXTENDED RELEASE ORAL at 20:50

## 2021-08-09 RX ADMIN — PANTOPRAZOLE SODIUM 40 MG: 40 TABLET, DELAYED RELEASE ORAL at 09:23

## 2021-08-09 RX ADMIN — Medication 1 TABLET: at 08:34

## 2021-08-09 RX ADMIN — FLUTICASONE PROPIONATE 2 SPRAY: 50 SPRAY, METERED NASAL at 08:35

## 2021-08-09 RX ADMIN — IPRATROPIUM BROMIDE AND ALBUTEROL SULFATE 3 ML: .5; 2.5 SOLUTION RESPIRATORY (INHALATION) at 01:30

## 2021-08-09 RX ADMIN — MONTELUKAST 10 MG: 10 TABLET, FILM COATED ORAL at 08:35

## 2021-08-09 RX ADMIN — BUDESONIDE AND FORMOTEROL FUMARATE DIHYDRATE 2 PUFF: 160; 4.5 AEROSOL RESPIRATORY (INHALATION) at 08:06

## 2021-08-09 RX ADMIN — BUDESONIDE AND FORMOTEROL FUMARATE DIHYDRATE 2 PUFF: 160; 4.5 AEROSOL RESPIRATORY (INHALATION) at 20:58

## 2021-08-09 RX ADMIN — AMLODIPINE BESYLATE 10 MG: 5 TABLET ORAL at 08:32

## 2021-08-09 RX ADMIN — METHYLPREDNISOLONE SODIUM SUCCINATE 40 MG: 40 INJECTION, POWDER, FOR SOLUTION INTRAMUSCULAR; INTRAVENOUS at 20:50

## 2021-08-09 RX ADMIN — ENOXAPARIN SODIUM 40 MG: 40 INJECTION SUBCUTANEOUS at 17:01

## 2021-08-09 RX ADMIN — FUROSEMIDE 40 MG: 10 INJECTION, SOLUTION INTRAMUSCULAR; INTRAVENOUS at 08:36

## 2021-08-09 NOTE — PROGRESS NOTES
1000-Pt received from ED, assumed care of patient. See nursing assessment. 1013-Four eye skin assessment preformed with Caren Mercer RN. No skin abnormalities noted at this time.

## 2021-08-09 NOTE — CONSULTS
Consult    NAME: Alva Schwab   :  1960   MRN:  [id number]     Date/Time:  2021 9:34 AM    Patient PCP: Ajit Ku MD  ________________________________________________________________________     Problem List:   -Admitted to the hospital with shortness of breath  -No known established coronary artery disease  -History of COPD/current smoker  -Bilateral leg swelling and edema      Assessment/Plan:   -79-year-old -American female admitted to the hospital with shortness of breath and bilateral lower extremity edema for several days.  -No known established coronary artery disease in the past and no recent cardiac work-up being done.  -Last echocardiogram in our office was done which shows normal ejection fraction of 60% with no other abnormalities.  -Patient is a current smoker.  -She stated that her symptoms are likely to be secondary to underlying COPD and she had at least 3 admissions to the hospital last 1 was in 2020 since she saw me in May 2019.  -EKG shows sinus rhythm with no ST-T wave changes consistent with significant ischemia or injury pattern  -Cardiac enzyme has been unremarkable x1.      []        High complexity decision making was performed      Patient Active Problem List   Diagnosis Code    COPD (chronic obstructive pulmonary disease) ([facility] J44.9    Hypoxia R09.02    Respiratory failure ([facility] [address] J96.90    [facility] exacerbation [facility] J44.1        Subjective:   CHIEF COMPLAINT:     HISTORY OF PRESENT ILLNESS:     Placido Marks is a 63 y.o.  female who has no known established coronary artery disease and presented to the emergency department with shortness of breath and bilateral lower extremity edema responded well to Lasix in the emergency department. Patient was seen me last in my office in May 2019 after initial noninvasive cardiac work-up was unremarkable and secondary to her symptoms her great saphenous veins were occluded by mechanical procedure. I have not seen her since then and she stated that she was feeling fine and as she was told that she has no heart disease and because of no symptoms she decided not to return to my office since May off 2019,  In the emergency department she was lying comfortably in the bed improved in her symptoms since presented to the hospital and her EKG shows sinus rhythm with no acute ST-T wave changes consistent with ischemia or injury pattern and cardiac enzyme has been unremarkable. Initial diagnosis of acute exacerbation of COPD was made and she got admitted to the hospital.      We were asked to consult for work up and evaluation of the above problems. Past Medical History:   Diagnosis Date    Breast CA ([facility] [address] [facility]     Chronic obstructive pulmonary disease ([facility]     Hypertension       Past Surgical History:   Procedure Laterality Date    COLONOSCOPY N/A 7/16/2021    .  performed by [facility] HX HYSTERECTOMY      HX MASTECTOMY Left      Allergies   Allergen Reactions    Lisinopril Angioedema      Meds:  See below  Social History     Tobacco Use    Smoking status: Former Smoker    Smokeless tobacco: Never Used   Substance Use Topics    Alcohol use: Yes      Family History   Problem Relation Age of Onset    Hypertension Mother     Cancer Father        REVIEW OF SYSTEMS:     []         Unable to obtain  ROS due to ---   [x]         Total of 12 systems reviewed as follows:    Constitutional: negative fever, negative chills, negative weight loss  Eyes:   negative visual changes  ENT:   negative sore throat, tongue or lip swelling  Respiratory:  Positive for shortness of breath  Cards:  negative for chest pain, palpitations, positive for lower extremity edema  GI:   negative for nausea, vomiting, diarrhea, and abdominal pain  Genitourinary: negative for frequency, dysuria  Integument:  negative for rash   Hematologic:  negative for easy bruising and gum/nose bleeding  Musculoskel: negative for myalgias,  back pain  Neurological:  negative for headaches, dizziness, vertigo, weakness  Behavl/Psych: negative for feelings of anxiety, depression     Pertinent Positives include :    Objective:      Physical Exam:    Last 24hrs VS reviewed since prior progress note. Most recent are:    Visit Vitals  BP (!) 145/68 (BP 1 Location: Right upper arm, BP Patient Position: At rest)   Pulse 87   Temp 97.6 °F (36.4 °C)   Resp 18   Ht 5' 4\" (1.626 m)   Wt 93 kg (205 lb)   SpO2 91%   BMI 35.19 kg/m²     No intake or output data in the 24 hours ending 08/09/21 0934     General Appearance: Well developed, well nourished, alert & oriented x 3,    no acute distress. Ears/Nose/Mouth/Throat: Pupils equal and round, Hearing grossly normal.  Neck: Supple. JVP within normal limits. Carotids good upstrokes, with no bruit. Chest: Lungs clear to auscultation bilaterally. Cardiovascular: Regular rate and rhythm, S1S2 normal, no murmur, rubs, gallops. Abdomen: Soft, non-tender, bowel sounds are active. No organomegaly. Extremities: 1+ pedal edema bilaterally. Femoral pulses +2, Distal Pulses +1. Skin: Warm and dry. Neuro: CN II-XII grossly intact, Strength and sensation grossly intact. []         Post-cath site without hematoma, bruit, tenderness, or thrill. Distal pulses intact. XR CHEST PORT   Final Result   Mild CHF/volume overload suspected. Data:      Telemetry:    EKG:  []  No new EKG for review. Prior to Admission medications    Medication Sig Start Date End Date Taking? Authorizing Provider   albuterol-ipratropium (DUO-NEB) 2.5 mg-0.5 mg/3 ml nebu 3 mL by Nebulization route every six (6) hours as needed for Wheezing. 12/18/20   Rasheeda Funes MD   aspirin 81 mg chewable tablet Take 1 Tab by mouth daily. 12/19/20   Rasheeda Funes MD   guaiFENesin ER (MUCINEX) 600 mg ER tablet Take 1 Tab by mouth two (2) times a day.   Patient not taking: Reported on 7/16/2021 12/18/20   Jagjit Mendoza MD amLODIPine (NORVASC) 10 mg tablet Take 10 mg by mouth daily. Provider, Historical   mometasone-formoterol (DULERA) 200-5 mcg/actuation HFA inhaler Take 2 Puffs by inhalation two (2) times a day. Provider, Historical   montelukast (SINGULAIR) 10 mg tablet Take 10 mg by mouth daily. Provider, Historical   Calcium-Cholecalciferol, D3, 500 mg(1,250mg) -400 unit tab Take  by mouth daily. Patient not taking: Reported on 7/16/2021    Provider, Historical   benzonatate (TESSALON) 100 mg capsule Take 100 mg by mouth once. Provider, Historical   fluticasone propionate (FLONASE) 50 mcg/actuation nasal spray 2 Sprays by Both Nostrils route daily. Patient not taking: Reported on 7/16/2021    Provider, Historical   ergocalciferol (Vitamin D2) 1,250 mcg (50,000 unit) capsule Take 50,000 Units by mouth every seven (7) days.  Q7days on Monday    Provider, Historical       Recent Results (from the past 24 hour(s))   CULTURE, RESPIRATORY/SPUTUM/BRONCH W GRAM STAIN    Collection Time: 08/08/21  9:45 AM    Specimen: Sputum   Result Value Ref Range    Special Requests: No Special Requests      GRAM STAIN Rare WBCs seen      GRAM STAIN Occasional Epithelial cells seen      GRAM STAIN Few Gram Negative Rods      GRAM STAIN Few Gram Positive Rods      GRAM STAIN Rare Gram Negative Rods      Culture result: PENDING    COVID-19 RAPID TEST    Collection Time: 08/08/21 11:27 AM   Result Value Ref Range    Specimen source Please find results under separate order      COVID-19 rapid test Not Detected Not Detected     METABOLIC PANEL, COMPREHENSIVE    Collection Time: 08/09/21  4:40 AM   Result Value Ref Range    Sodium 138 136 - 145 mmol/L    Potassium 4.8 3.5 - 5.1 mmol/L    Chloride 100 97 - 108 mmol/L    CO2 37 (H) 21 - 32 mmol/L    Anion gap 1 (L) 5 - 15 mmol/L    Glucose 168 (H) 65 - 100 mg/dL    BUN 20 6 - 20 mg/dL    Creatinine 1.16 (H) 0.55 - 1.02 mg/dL    BUN/Creatinine ratio 17 12 - 20      GFR est AA 58 (L) >60 ml/min/1.73m2    GFR est non-AA 48 (L) >60 ml/min/1.73m2    Calcium 9.7 8.5 - 10.1 mg/dL    Bilirubin, total 0.2 0.2 - 1.0 mg/dL    AST (SGOT) 12 (L) 15 - 37 U/L    ALT (SGPT) 29 12 - 78 U/L    Alk.  phosphatase 111 45 - 117 U/L    Protein, total 7.8 6.4 - 8.2 g/dL    Albumin 3.6 3.5 - 5.0 g/dL    Globulin 4.2 (H) 2.0 - 4.0 g/dL    A-G Ratio 0.9 (L) 1.1 - 2.2     TROPONIN I    Collection Time: 08/09/21  4:40 AM   Result Value Ref Range    Troponin-I, Qt. <0.05 <0.05 ng/mL        Lizette Gil MD

## 2021-08-09 NOTE — PROGRESS NOTES
Progress Note    Patient: Geeta Begum MRN: [id number]  SSN: [id number]    YOB: 1960  Age: 63 y.o. Sex: female      Admit Date: 8/8/2021    LOS: 1 day     Subjective:     51-year-old patient with shortness of breath and chest pain. Denies any fever or chills. Initial cardiac enzymes are negative. Objective:     Vitals:    08/09/21 0442 08/09/21 0644 08/09/21 0806 08/09/21 0914   BP: 139/81 (!) 160/88  (!) 145/68   Pulse: 90 99  87   Resp: 19 18 18   Temp:       SpO2: 94% 97% 96% 91%   Weight:       Height:            Intake and Output:  Current Shift: No intake/output data recorded. Last three shifts: No intake/output data recorded. Physical Exam:   General:  Alert, cooperative, no distress, appears stated age. Eyes:  Conjunctivae/corneas clear. PERRL, EOMs intact. Fundi benign   Ears:  Normal TMs and external ear canals both ears. Nose: Nares normal. Septum midline. Mucosa normal. No drainage or sinus tenderness. Mouth/Throat: Lips, mucosa, and tongue normal. Teeth and gums normal.   Neck: Supple, symmetrical, trachea midline, no adenopathy, thyroid: no enlargment/tenderness/nodules, no carotid bruit and no JVD. Back:   Symmetric, no curvature. ROM normal. No CVA tenderness. Lungs:    Diminished breath sounds to auscultation bilaterally. Heart:  Regular rate and rhythm, S1, S2 normal, no murmur, click, rub or gallop. Abdomen:   Soft, non-tender. Bowel sounds normal. No masses,  No organomegaly. Extremities: Extremities normal, atraumatic, no cyanosis or edema. Pulses: 2+ and symmetric all extremities. Skin: Skin color, texture, turgor normal. No rashes or lesions   Lymph nodes: Cervical, supraclavicular, and axillary nodes normal.   Neurologic: CNII-XII intact. Normal strength, sensation and reflexes throughout. Lab/Data Review: All lab results for the last 24 hours reviewed.      Recent Results (from the past 24 hour(s))   CULTURE, BLOOD, PAIRED Collection Time: 08/08/21  9:30 AM    Specimen: Blood   Result Value Ref Range    Special Requests: No Special Requests      Culture result: No growth after 21 hours     CULTURE, RESPIRATORY/SPUTUM/BRONCH W GRAM STAIN    Collection Time: 08/08/21  9:45 AM    Specimen: Sputum   Result Value Ref Range    Special Requests: No Special Requests      GRAM STAIN Rare WBCs seen      GRAM STAIN Occasional Epithelial cells seen      GRAM STAIN Few Gram Negative Rods      GRAM STAIN Few Gram Positive Rods      GRAM STAIN Rare Gram Negative Rods      Culture result: PENDING    COVID-19 RAPID TEST    Collection Time: 08/08/21 11:27 AM   Result Value Ref Range    Specimen source Please find results under separate order      COVID-19 rapid test Not Detected Not Detected     METABOLIC PANEL, COMPREHENSIVE    Collection Time: 08/09/21  4:40 AM   Result Value Ref Range    Sodium 138 136 - 145 mmol/L    Potassium 4.8 3.5 - 5.1 mmol/L    Chloride 100 97 - 108 mmol/L    CO2 37 (H) 21 - 32 mmol/L    Anion gap 1 (L) 5 - 15 mmol/L    Glucose 168 (H) 65 - 100 mg/dL    BUN 20 6 - 20 mg/dL    Creatinine 1.16 (H) 0.55 - 1.02 mg/dL    BUN/Creatinine ratio 17 12 - 20      GFR est AA 58 (L) >60 ml/min/1.73m2    GFR est non-AA 48 (L) >60 ml/min/1.73m2    Calcium 9.7 8.5 - 10.1 mg/dL    Bilirubin, total 0.2 0.2 - 1.0 mg/dL    AST (SGOT) 12 (L) 15 - 37 U/L    ALT (SGPT) 29 12 - 78 U/L    Alk.  phosphatase 111 45 - 117 U/L    Protein, total 7.8 6.4 - 8.2 g/dL    Albumin 3.6 3.5 - 5.0 g/dL    Globulin 4.2 (H) 2.0 - 4.0 g/dL    A-G Ratio 0.9 (L) 1.1 - 2.2     TROPONIN I    Collection Time: 08/09/21  4:40 AM   Result Value Ref Range    Troponin-I, Qt. <0.05 <0.05 ng/mL         Assessment:     Active Problems:    Hypoxia (12/13/2020)      Respiratory failure (HCC) (8/8/2021)      COPD exacerbation (HCC) (8/8/2021)    Acute hypoxic respiratory failure chronic hypoxic likely failure  Atypical chest pain  COPD with acute exacerbation  Possible reflux esophagitis  Obesity      Plan:     Consult cardiology for chest pain  Consult pulmonary for hypoxia and respiratory failure  Nursing report   of request for change of service to Dr Miguel Angel Martínez By: Suzy Rizo MD     August 9, 2021

## 2021-08-09 NOTE — ED NOTES
Repeat EKG due to patient stating she has chest pain. Nurse aware, patient irritated saying we are not doing anything and we are ignoring her. Patient educated on EKG and morning labs. Patient states she is aware.

## 2021-08-09 NOTE — CONSULTS
PULMONARY CONSULT  [facility]    Name: Segun Lim MRN: [id number]   : 1960 Hospital: [address] [place] [address]   Date: 2021  Admission date: 2021 Hospital Day: 2       HPI:     Hospital Problems  Date Reviewed: 2021        Codes Class Noted POA    Respiratory failure ([facility] [address] ICD-10-CM: J96.90  ICD-9-CM: 518.81  2021 Unknown        COPD exacerbation ([facility] [address] ICD-10-CM: J44.1  ICD-9-CM: 491.21  2021 Unknown        Hypoxia ICD-10-CM: R09.02  ICD-9-CM: 799.02  2020 Unknown                   [x] High complexity decision making was performed  [x] See my orders for details      Subjective/Initial History:     I was asked by Jesusita Mckenzie MD to see Segun Lim  a 63 y.o.  female in consultation     Excerpts from admission 2021 or consult notes as follows:   73-year-old lady came in because of shortness of breath and dyspnea significant past medical history of COPD and also sleep apnea noncompliant to CPAP machine she is a current smoker she is on home oxygen 2 L nasal cannula complaining about generalized weakness shortness of breath dyspnea and swelling of the lower extremities came to the hospital she was put on 5 L oxygen she was severely short of breath dyspneic x-ray shows fluid overload CHF so admitted and pulmonary consult was called for further evaluation.       Allergies   Allergen Reactions    Lisinopril Angioedema        MAR reviewed and pertinent medications noted or modified as needed     Current Facility-Administered Medications   Medication    [START ON 8/10/2021] furosemide (LASIX) injection 40 mg    pantoprazole (PROTONIX) tablet 40 mg    albuterol-ipratropium (DUO-NEB) 2.5 MG-0.5 MG/3 ML    amLODIPine (NORVASC) tablet 10 mg    aspirin chewable tablet 81 mg    calcium-vitamin D 600 mg(1,500mg) -200 unit per tablet 1 Tablet    [START ON 8/10/2021] ergocalciferol capsule 50,000 Units    fluticasone propionate (FLONASE) 50 mcg/actuation nasal spray 2 [place]    guaiFENesin ER (MUCINEX) tablet 600 mg    budesonide-formoteroL (SYMBICORT) 160-4.5 mcg/actuation HFA inhaler 2 Puff    montelukast (SINGULAIR) tablet 10 mg    carvediloL (COREG) tablet 3.125 mg    docusate sodium (COLACE) capsule 100 mg    albuterol-ipratropium (DUO-NEB) 2.5 MG-0.5 MG/3 ML    methylPREDNISolone (PF) (SOLU-MEDROL) injection 40 mg    enoxaparin (LOVENOX) injection 40 mg    potassium chloride SR (KLOR-CON 10) tablet 10 mEq      Patient PCP: Blanca Teixeira MD  PMH:  has a past medical history of Breast CA ([facility] [address] Chronic obstructive pulmonary disease ([facility] [address] and Hypertension. PSH:   has a past surgical history that includes hx hysterectomy; hx mastectomy (Left); and colonoscopy (N/A, 2021). FHX: family history includes Cancer in her father; Hypertension in her mother. SHX:  reports that she has quit smoking. She has never used smokeless tobacco. She reports current alcohol use. She reports that she does not use drugs. ROS:    Review of Systems   Constitutional: Negative. HENT: Negative. Eyes: Negative. Respiratory: Positive for cough, sputum production, shortness of breath and wheezing. Cardiovascular: Positive for orthopnea and leg swelling. Gastrointestinal: Negative. Genitourinary: Negative. Musculoskeletal: Negative. Skin: Negative. Neurological: Negative. Psychiatric/Behavioral: Negative.          Objective:     Vital Signs: Telemetry:    normal sinus rhythm Intake/Output:   Visit Vitals  BP (!) 145/68 (BP 1 Location: Right upper arm, BP Patient Position: At rest)   Pulse 92   Temp 97.3 °F (36.3 °C)   Resp 21   Ht 5' 4\" (1.626 m)   Wt 93 kg (205 lb)   SpO2 93%   BMI 35.19 kg/m²       Temp (24hrs), Av °F (36.7 °C), Min:97.3 °F (36.3 °C), Max:98.6 °F (37 °C)        O2 Device: Nasal cannula O2 Flow Rate (L/min): 5 l/min       Wt Readings from Last 4 Encounters:   08/08/21 93 kg (205 lb)   21 96.6 kg (213 lb)   03/30/21 96.9 kg (213 lb 9.6 oz)   12/14/20 93 kg (205 lb 0.4 oz)        No intake or output data in the 24 hours ending 08/09/21 1036    Last shift:      No intake/output data recorded. Last 3 shifts: No intake/output data recorded. Physical Exam:     Physical Exam  Constitutional:       Appearance: Normal appearance. HENT:      Head: Normocephalic and atraumatic. Nose: Nose normal.      Mouth/Throat:      Mouth: Mucous membranes are moist.   Eyes:      Pupils: Pupils are equal, round, and reactive to light. Cardiovascular:      Rate and Rhythm: Normal rate and regular rhythm. Heart sounds: Normal heart sounds. Pulmonary:      Effort: Respiratory distress present. Breath sounds: Wheezing present. Abdominal:      General: Abdomen is flat. Palpations: Abdomen is soft. Musculoskeletal:         General: Swelling present. Cervical back: Normal range of motion and neck supple. Right lower leg: Edema present. Left lower leg: Edema present. Skin:     General: Skin is warm. Neurological:      General: No focal deficit present. Mental Status: She is alert. Psychiatric:         Mood and Affect: Mood normal.          Labs:    Recent Labs     08/08/21  0915   WBC 7.6   HGB 13.6        Recent Labs     08/09/21  0440 08/08/21  0915    140   K 4.8 4.0    104   CO2 37* 33*   * 107*   BUN 20 14   CREA 1.16* 0.92   CA 9.7 9.6   ALB 3.6  --    ALT 29  --      No results for input(s): PH, PCO2, PO2, HCO3, FIO2 in the last 72 hours.   Recent Labs     08/09/21  0440 08/08/21  0915   TROIQ <0.05 <0.05     No results found for: BNPP, BNP   Lab Results   Component Value Date/Time    Culture result: PENDING 08/08/2021 09:45 AM    Culture result: No growth after 21 hours 08/08/2021 09:30 AM   No results found for: TSH, TSHEXT    Imaging:    CXR Results  (Last 48 hours)               08/08/21 0943  XR CHEST PORT Final result    Impression:  Mild CHF/volume overload suspected. Narrative:  XR CHEST PORT       Comparison:  None available       Single view: The heart size is magnified by AP technique. There is central   vascular congestion. Negative for focal consolidation. No pneumothorax or   pleural effusion apparent. Surgical clips project over the left axilla. Results from [place] encounter on 08/08/21    XR CHEST PORT    Narrative  XR CHEST PORT    Comparison:  None available    Single view: The heart size is magnified by AP technique. There is central  vascular congestion. Negative for focal consolidation. No pneumothorax or  pleural effusion apparent. Surgical clips project over the left axilla. Impression  Mild CHF/volume overload suspected. Results from [place] encounter on 12/13/20    XR KNEE RT MAX 2 VWS    Narrative  Right knee, 2 views    Narrowing for the medial compartment. Lateral and patellofemoral compartments  are preserved. Small peripheral osteophytes. No fracture or dislocation. Impression  IMPRESSION: Mild DJD right knee, medial compartment predominance. XR CHEST SNGL V    Narrative  PROCEDURE: XR CHEST SNGL V    HISTORY:Short of breath    COMPARISON:Chest x-ray October 31, 2019      TECHNIQUE: AP portable chest    LIMITATIONS: None    TUBES/LINES: None    LUNG PARENCHYMA/PLEURA: There is poorly defined density in the right infrahilar  region and along the right lateral chest wall. Minimal density in the periapical  region on the left. A discrete pulmonary mass or infiltrate is not seen. TRACHEA/BRONCHI: Normal  PULMONARY VESSELS: Normal  HEART: Heart appears mildly enlarged  MEDIASTINUM: Normal  BONE/SOFT TISSUES: No acute process    OTHER: None    Impression  IMPRESSION: Hazy density bilaterally which may be artifact related to overlying  soft tissue. Early peripheral infiltrates could give this appearance as well. Are there signs or symptoms of pneumonia? .    Results from Hospital Encounter encounter on 12/13/20    CT NECK SOFT TISSUE W CONT    Narrative  CT dose reduction was achieved through use of a standardized protocol tailored  for this examination and automatic exposure control for dose modulation. Contrast study 100 cc [id number]    Sinuses are clear. Salivary glands are normal and symmetric. Generalized  prominence of the pharyngeal mucosal tissues without significant asymmetry, mass  or abscess. There is medial deviation of both internal carotid arteries as well  as the dominant right jugular, with mild mass effect upon the airway. Airway is  slightly asymmetric at this level, secondary to greater deviation on the right. .  There is mild calcification of the carotid bifurcations, not flow-limiting    Small right thyroid nodule, as recently described. Thyroid is not compressing on  the trachea. No infiltration of the fat planes. Upper mediastinum is clear    Impression  IMPRESSION: Mild airway compression due to medial deviation of normal caliber  carotid arteries. The small thyroid nodule is not impinging on the airway        IMPRESSION:   1. Acute on chronic hypoxic respiratory failure  2. Chronic Obstructive Pulmonary Disease with Severe Acute Exacerbation requiring inpatient hospitalization and management; has very poor airway clearance. Increased work of breathing  3. Body mass index is 35.19 kg/m². 4. Obstructive sleep apnea syndrome noncompliant to CPAP machine  5. Rule out cor pulmonale heart failure  6. Tobacco abuse  7. Pt is requiring Drug therapy requiring intensive monitoring for toxicity  8. Pt is unstable, unpredictable needing inpatient monitoring; is acutely ill and at high risk of sudden decline and decompensation with severe consequenses and continued end organ dysfunction and failure  9. Prognosis guarded       RECOMMENDATIONS/PLAN:     1.  On 5 liter nasal Cannula oxygen as salvage oxygen delivery device to provide high concentration of oxygen to overcome refractory hypoxia; will get arterial blood gases to see the PCO2 level if elevated will start patient on noninvasive ventilator BiPAP machine  2. She is chronically on home oxygen 2 L nasal cannula right now she is on 5 L we will continue to wean  3. She has not been on CPAP machine for a while he is she is noncompliant  4. IV Solu-Medrol nebulizer treatment  5. Will start patient on Lasix  6. 2D echo to see the right heart pressure  7. Intubate and place on vent if NIV fails  8. Agree with Empiric IV antibiotics pending culture results   9. We will start patient on nicotine patch  10. Supplemental O2 to keep sats > 93%  11. Aspiration precautions  12. Labs to follow electrolytes, renal function and and blood counts  13. Glucose monitoring and SSI  14. Bronchial hygiene with respiratory therapy techniques, bronchodilators  15. DVT, SUP prophylaxis  16. Smoking cessation counseling done  17. Pt needs IV fluids with additives and Drug therapy requiring intensive monitoring for toxicity  18. Prescription drug management with home med reconciliation reviewed       This care involved high complexity medical decision making: I personally:  · Reviewed the flowsheet and previous days notes  · Reviewed and summarized records or history from previous days note or discussions with staff, family  · High Risk Drug therapy requiring intensive monitoring for toxicity: eg steroids, pressors, antibiotics  · Reviewed and/or ordered Clinical lab tests  · Reviewed images and/or ordered Radiology tests  · Reviewed the patients ECG / Telemetry  · Reviewed and/or adjusted NiPPV settings  · Called and arranged for Radiologic procedures or interventions  · performed or ordered Diagnostic endoscopies with identified risk factors.   · discussed my assessment/management with : Nursing, Hospitalist and Family for coordination of care          Tina Angelo MD Attending Only

## 2021-08-09 NOTE — PROGRESS NOTES
Interdepartmental Hand-off Summary    Situation:     8/9/2021  6:55 PM    Transferred from: Patient Hold/PACU  Transferred to: [place] [address]  Reason for transfer: continuity of care      Patient Active Problem List   Diagnosis Code    COPD (chronic obstructive pulmonary disease) ([facility] [address] J44.9    Hypoxia R09.02    Respiratory failure ([facility] [address] J96.90    COPD exacerbation ([facility] [address] J44.1       Attending physician: Driss Brantley MD    Patient assessment:      Allergies   Allergen Reactions    Lisinopril Angioedema       Background:     Neuro:  A&Ox 4, self care, minimal assist to [facility], turns self    Respiratory:      O2 sats baseline at home on 2L 88-93% per ED and patient report. In PACU pt was on [facility] however, abnormal ABG on 5LNC and NC decreased to [facility] and sats remained 88-92%. No resp distress noted all day. LS diminished throughout.           Dressings: none    Drains: none    Nuñez: none    Diet: common adult, low fat and low sodium    GI:  BS +, hypoactive, last BM this AM, Pt reported blood, occult blood ordered in ED    Cardiac: NSR RRR, S1 S2 noted    Code Status: Full    Infection/Isolation: none    Repeating labs discontinued: no    Transfer orders written: yes    Use of restraints: no    High falls risk: yes         Assessment:          Pain medication: Type none, Amount n/a, Time n/a  Sedation: Medication none, Total Amount n/a, Last Dose n\a    Antibiotics: Type none, Amount n/a, Time n/a    IV: Date inserted 8/8/21, Size 20G Location right lower FA Fluid IVL Rate n\a    Belongings transfer: yes    Medications transfer: no    Family updated/aware of transfer: yes    Complete this section for patients transferring from any Critical Care Area:    Telemetry: yes,      Pacemaker: no,      Summary of Critical Care Stay: Telephone report given to [facility], RN at [phone]    Recommendation:     Primary patient goals: discharge home    Transfer to: [place] [address]    Transferring nurse: Skyler Boo RN, 6:55 PM    Receiving nurse: Telephone report given to Gianna Holden RN.  Bed not ready on [address].

## 2021-08-09 NOTE — MED STUDENT NOTES
General Daily Progress Note          Patient Name:   Som Kent       YOB: 1960       Age:  63 y.o. Admit Date: 8/8/2021      Subjective:     Erwin Neely is a 63 y.o.  female who has no known established coronary artery disease, COPD, sleep apnea non-compliant on CPAP presented to the emergency department with shortness of breath and bilateral lower extremity edema which responded well to Lasix in the emergency department. In the PACU she was lying comfortably in the bed improved in her symptoms since presented to the hospital and her EKG shows sinus rhythm with no acute ST-T wave changes consistent with ischemia or injury pattern and cardiac enzyme has been unremarkable. Initial diagnosis of acute exacerbation of COPD was made and she got admitted to the hospital.   Patient notes she had one bloody bowel movement this morning. She notes she had a recent colonoscopy with Dr. Kuldip Longoria, GI specialist, but is not sure of the results.               Objective:     Visit Vitals  BP (!) 145/68 (BP 1 Location: Right upper arm, BP Patient Position: At rest)   Pulse 92   Temp 97.3 °F (36.3 °C)   Resp 21   Ht 5' 4\" (1.626 m)   Wt 205 lb (93 kg)   SpO2 93%   BMI 35.19 kg/m²        Recent Results (from the past 24 hour(s))   COVID-19 RAPID TEST    Collection Time: 08/08/21 11:27 AM   Result Value Ref Range    Specimen source Please find results under separate order      COVID-19 rapid test Not Detected Not Detected     METABOLIC PANEL, COMPREHENSIVE    Collection Time: 08/09/21  4:40 AM   Result Value Ref Range    Sodium 138 136 - 145 mmol/L    Potassium 4.8 3.5 - 5.1 mmol/L    Chloride 100 97 - 108 mmol/L    CO2 37 (H) 21 - 32 mmol/L    Anion gap 1 (L) 5 - 15 mmol/L    Glucose 168 (H) 65 - 100 mg/dL    BUN 20 6 - 20 mg/dL    Creatinine 1.16 (H) 0.55 - 1.02 mg/dL    BUN/Creatinine ratio 17 12 - 20      GFR est AA 58 (L) >60 ml/min/1.73m2    GFR est non-AA 48 (L) >60 ml/min/1.73m2    Calcium 9.7 8.5 - 10.1 mg/dL    Bilirubin, total 0.2 0.2 - 1.0 mg/dL    AST (SGOT) 12 (L) 15 - 37 U/L    ALT (SGPT) 29 12 - 78 U/L    Alk. phosphatase 111 45 - 117 U/L    Protein, total 7.8 6.4 - 8.2 g/dL    Albumin 3.6 3.5 - 5.0 g/dL    Globulin 4.2 (H) 2.0 - 4.0 g/dL    A-G Ratio 0.9 (L) 1.1 - 2.2     TROPONIN I    Collection Time: 08/09/21  4:40 AM   Result Value Ref Range    Troponin-I, Qt. <0.05 <0.05 ng/mL     [unfilled]      Review of Systems    Constitutional: Negative for chills and fever. HENT: Negative. Eyes: Negative. Respiratory: Positive for cough, sputum production, shortness of breath and wheezing  Cardiovascular: Positive for orthopnea and leg swelling. Otherwise negative. Gastrointestinal: Negative for abdominal pain and nausea. Skin: Negative. Neurological: Negative. Physical Exam:      Constitutional: pt is oriented to person, place, and time. HENT:   Head: Normocephalic and atraumatic. Eyes: Pupils are equal, round, and reactive to light. EOM are normal.   Cardiovascular: Normal rate, regular rhythm and normal heart sounds. Pulmonary/Chest: Wheezing present. Exhibits no tenderness. Abdominal: Soft. Bowel sounds are normal. There is no abdominal tenderness. There is no rebound and no guarding. Musculoskeletal: Normal range of motion. Neurological: pt is alert and oriented to person, place, and time. XR CHEST PORT   Final Result   Mild CHF/volume overload suspected.            Recent Results (from the past 24 hour(s))   COVID-19 RAPID TEST    Collection Time: 08/08/21 11:27 AM   Result Value Ref Range    Specimen source Please find results under separate order      COVID-19 rapid test Not Detected Not Detected     METABOLIC PANEL, COMPREHENSIVE    Collection Time: 08/09/21  4:40 AM   Result Value Ref Range    Sodium 138 136 - 145 mmol/L    Potassium 4.8 3.5 - 5.1 mmol/L    Chloride 100 97 - 108 mmol/L    CO2 37 (H) 21 - 32 mmol/L    Anion gap 1 (L) 5 - 15 mmol/L Glucose 168 (H) 65 - 100 mg/dL    BUN 20 6 - 20 mg/dL    Creatinine 1.16 (H) 0.55 - 1.02 mg/dL    BUN/Creatinine ratio 17 12 - 20      GFR est AA 58 (L) >60 ml/min/1.73m2    GFR est non-AA 48 (L) >60 ml/min/1.73m2    Calcium 9.7 8.5 - 10.1 mg/dL    Bilirubin, total 0.2 0.2 - 1.0 mg/dL    AST (SGOT) 12 (L) 15 - 37 U/L    ALT (SGPT) 29 12 - 78 U/L    Alk. phosphatase 111 45 - 117 U/L    Protein, total 7.8 6.4 - 8.2 g/dL    Albumin 3.6 3.5 - 5.0 g/dL    Globulin 4.2 (H) 2.0 - 4.0 g/dL    A-G Ratio 0.9 (L) 1.1 - 2.2     TROPONIN I    Collection Time: 08/09/21  4:40 AM   Result Value Ref Range    Troponin-I, Qt. <0.05 <0.05 ng/mL       Results     Procedure Component Value Units Date/Time    COVID-19 RAPID TEST [[id number]] Collected: 08/08/21 1127    Order Status: Completed Specimen: Nasopharyngeal Updated: 08/08/21 1154     Specimen source       Please find results under separate order           COVID-19 rapid test Not Detected        Comment: Rapid Abbott ID Now   Rapid NAAT:  The specimen is NEGATIVE for SARS-CoV-2, the novel coronavirus associated with COVID-19. Negative results should be treated as presumptive and, if inconsistent with clinical signs and symptoms or necessary for patient management, should be tested with an alternative molecular assay. Negative results do not preclude SARS-CoV-2 infection and should not be used as the sole basis for patient management decisions. This test has been authorized by the FDA under   an Emergency Use Authorization (EUA) for use by authorized laboratories.  Fact sheet for Healthcare Providers: ConventionUpdate.co.nz Fact sheet for Patients: ConventionUpdate.co.nz   Methodology: Isothermal Nucleic Acid Amplification         CULTURE, RESPIRATORY/SPUTUM/BRONCH Faustino Profit [[id number]] Collected: 08/08/21 0945    Order Status: Completed Specimen: Sputum Updated: 08/09/21 0024     Special Requests: No Special Requests        GRAM STAIN Rare WBCs seen               Occasional Epithelial cells seen            Few Gram Negative Rods         Few Gram Positive Rods         Rare Gram Negative Rods        Culture result: PENDING    CULTURE, BLOOD, PAIRED [[id number]] Collected: 08/08/21 0930    Order Status: Completed Specimen: Blood Updated: 08/09/21 0733     Special Requests: No Special Requests        Culture result: No growth after 21 hours              Labs:     Recent Labs     08/08/21 0915   WBC 7.6   HGB 13.6   HCT 45.0        Recent Labs     08/09/21 0440 08/08/21 0915    140   K 4.8 4.0    104   CO2 37* 33*   BUN 20 14   CREA 1.16* 0.92   * 107*   CA 9.7 9.6     Recent Labs     08/09/21 0440   ALT 29      TBILI 0.2   TP 7.8   ALB 3.6   GLOB 4.2*     No results for input(s): INR, PTP, APTT, INREXT in the last 72 hours. No results for input(s): FE, TIBC, PSAT, FERR in the last 72 hours. No results found for: FOL, RBCF   No results for input(s): PH, PCO2, PO2 in the last 72 hours. Recent Labs     08/09/21 0440 08/08/21 0915   TROIQ <0.05 <0.05     No results found for: CHOL, CHOLX, CHLST, CHOLV, HDL, HDLP, LDL, LDLC, DLDLP, TGLX, TRIGL, TRIGP, CHHD, CHHDX  No results found for: GLUCPOC  No results found for: COLOR, APPRN, SPGRU, REFSG, MIKE, PROTU, GLUCU, KETU, BILU, UROU, SHARIF, LEUKU, GLUKE, EPSU, BACTU, WBCU, RBCU, CASTS, UCRY      Assessment:     Acute hypoxic respiratory failure, chronic hypoxic   Atypical chest pain  COPD with acute exacerbation  Possible reflux esophagitis  Obesity  CHF  Asthma  Obstructive sleep apnea      Plan: 1. On 5 liter nasal Cannula oxygen as salvage oxygen delivery device to provide high concentration of oxygen to overcome refractory hypoxia; Pulmonology will get arterial blood gases to see the PCO2 level if elevated will start patient on noninvasive ventilator BiPAP machine  2.  She is chronically on home oxygen 2 L nasal cannula right now she is on 5 L we will continue to try to wean  3. She has not been on CPAP machine for a while he is she is noncompliant  4. IV Solu-Medrol nebulizer treatment  5. Will start patient on Lasix  6. 2D echo to see the right heart pressure  7. Intubate and place on vent if NIV fails  8. Agree with Empiric IV antibiotics pending culture results   9. We will start patient on nicotine patch  10. Supplemental O2 to keep sats > 93%  11. Aspiration precautions  12. Labs to follow electrolytes, renal function and and blood counts  13. Glucose monitoring and SSI  14. Bronchial hygiene with respiratory therapy techniques, bronchodilators  15.  DVT, SUP prophylaxis          Current Facility-Administered Medications:     [START ON 8/10/2021] furosemide (LASIX) injection 40 mg, 40 mg, IntraVENous, DAILY, North Hamm MD    pantoprazole (PROTONIX) tablet 40 mg, 40 mg, Oral, ACB, North Hamm MD, 40 mg at 08/09/21 0923    nicotine (NICODERM CQ) 7 mg/24 hr patch 1 Patch, 1 Patch, TransDERmal, DAILY, Adiel Lebron MD    albuterol-ipratropium (DUO-NEB) 2.5 MG-0.5 MG/3 ML, 3 mL, Nebulization, Q6H PRN, North Muller MD, 3 mL at 08/08/21 1728    amLODIPine (NORVASC) tablet 10 mg, 10 mg, Oral, DAILY, North Hamm MD, 10 mg at 08/09/21 5809    aspirin chewable tablet 81 mg, 81 mg, Oral, DAILY, North Hamm MD, 81 mg at 08/09/21 0834    calcium-vitamin D 600 mg(1,500mg) -200 unit per tablet 1 Tablet, 1 Tablet, Oral, DAILY, North Hamm MD, 1 Tablet at 08/09/21 0834    [START ON 8/10/2021] ergocalciferol capsule 50,000 Units, 50,000 Units, Oral, Q7D, North Hamm MD    fluticasone propionate (FLONASE) 50 mcg/actuation nasal spray 2 Spray, 2 Spray, Both Nostrils, DAILY, North Hamm MD, 2 [place] at 08/09/21 0835    guaiFENesin ER (MUCINEX) tablet 600 mg, 600 mg, Oral, BID, North Hamm MD, 600 mg at 08/09/21 0834    budesonide-formoteroL (SYMBICORT) 160-4.5 mcg/actuation HFA inhaler 2 Puff, 2 Puff, Inhalation, BID, [place], Jina Rangel MD, 2 Puff at 08/09/21 0806    montelukast (SINGULAIR) tablet 10 mg, 10 mg, Oral, DAILY, Joel Small MD, 10 mg at 08/09/21 0835    carvediloL (COREG) tablet 3.125 mg, 3.125 mg, Oral, BID WITH MEALS, North Hamm MD, 3.125 mg at 08/09/21 1009    docusate sodium (COLACE) capsule 100 mg, 100 mg, Oral, PRN, Glo NICHOLS MD    albuterol-ipratropium (DUO-NEB) 2.5 MG-0.5 MG/3 ML, 3 mL, Nebulization, Q6H RT, Joel Small MD, 3 mL at 08/09/21 0806    methylPREDNISolone (PF) (SOLU-MEDROL) injection 40 mg, 40 mg, IntraVENous, Q8H, North Hamm MD, 40 mg at 08/09/21 0918    enoxaparin (LOVENOX) injection 40 mg, 40 mg, SubCUTAneous, Q24H, North Hamm MD, 40 mg at 08/08/21 1737    potassium chloride SR (KLOR-CON 10) tablet 10 mEq, 10 mEq, Oral, DAILY, Joel Small MD, 10 mEq at 08/09/21 0200                                                                                              *ATTENTION:  This note has been created by a medical student for educational purposes only. Please do not refer to the content of this note for clinical decision-making, billing, or other purposes. Please see attending physicians note to obtain clinical information on this patient. *

## 2021-08-09 NOTE — MED STUDENT NOTES
General Daily Progress Note          Patient Name:   Daryl Apgar       YOB: 1960       Age:  63 y.o. Admit Date: 2021      Subjective:     Gilbert Key is a 63 y.o.  female who has no known established coronary artery disease, COPD, sleep apnea non-compliant on CPAP presented to the emergency department with shortness of breath and bilateral lower extremity edema which responded well to Lasix in the emergency department. In the PACU she was lying comfortably in the bed improved in her symptoms since presented to the hospital and her EKG shows sinus rhythm with no acute ST-T wave changes consistent with ischemia or injury pattern and cardiac enzyme has been unremarkable. Initial diagnosis of acute exacerbation of COPD was made and she got admitted to the hospital.   Patient notes she had one bloody bowel movement this morning. She notes she had a recent colonoscopy with Dr. Daisy Galvez, GI specialist, but is not sure of the results. Objective:     Visit Vitals  BP (!) 145/68 (BP 1 Location: Right upper arm, BP Patient Position: At rest)   Pulse 92   Temp 97.8 °F (36.6 °C)   Resp 21   Ht 5' 4\" (1.626 m)   Wt 93 kg (205 lb)   SpO2 93%   BMI 35.19 kg/m²        Recent Results (from the past 24 hour(s))   METABOLIC PANEL, COMPREHENSIVE    Collection Time: 21  4:40 AM   Result Value Ref Range    Sodium 138 136 - 145 mmol/L    Potassium 4.8 3.5 - 5.1 mmol/L    Chloride 100 97 - 108 mmol/L    CO2 37 (H) 21 - 32 mmol/L    Anion gap 1 (L) 5 - 15 mmol/L    Glucose 168 (H) 65 - 100 mg/dL    BUN 20 6 - 20 mg/dL    Creatinine 1.16 (H) 0.55 - 1.02 mg/dL    BUN/Creatinine ratio 17 12 - 20      GFR est AA 58 (L) >60 ml/min/1.73m2    GFR est non-AA 48 (L) >60 ml/min/1.73m2    Calcium 9.7 8.5 - 10.1 mg/dL    Bilirubin, total 0.2 0.2 - 1.0 mg/dL    AST (SGOT) 12 (L) 15 - 37 U/L    ALT (SGPT) 29 12 - 78 U/L    Alk.  phosphatase 111 45 - 117 U/L    Protein, total 7.8 6.4 - 8.2 g/dL Albumin 3.6 3.5 - 5.0 g/dL    Globulin 4.2 (H) 2.0 - 4.0 g/dL    A-G Ratio 0.9 (L) 1.1 - 2.2     TROPONIN I    Collection Time: 08/09/21  4:40 AM   Result Value Ref Range    Troponin-I, Qt. <0.05 <0.05 ng/mL     [unfilled]      Review of Systems    Constitutional: Negative for chills and fever. HENT: Negative. Eyes: Negative. Respiratory: Positive for cough, sputum production, shortness of breath and wheezing  Cardiovascular: Positive for orthopnea and leg swelling. Otherwise negative. Gastrointestinal: Negative for abdominal pain and nausea. Skin: Negative. Neurological: Negative. Physical Exam:      Constitutional: pt is oriented to person, place, and time. HENT:   Head: Normocephalic and atraumatic. Eyes: Pupils are equal, round, and reactive to light. EOM are normal.   Cardiovascular: Normal rate, regular rhythm and normal heart sounds. Pulmonary/Chest: Wheezing present. Exhibits no tenderness. Abdominal: Soft. Bowel sounds are normal. There is no abdominal tenderness. There is no rebound and no guarding. Musculoskeletal: Normal range of motion. Neurological: pt is alert and oriented to person, place, and time. XR CHEST PORT   Final Result   Mild CHF/volume overload suspected. Recent Results (from the past 24 hour(s))   METABOLIC PANEL, COMPREHENSIVE    Collection Time: 08/09/21  4:40 AM   Result Value Ref Range    Sodium 138 136 - 145 mmol/L    Potassium 4.8 3.5 - 5.1 mmol/L    Chloride 100 97 - 108 mmol/L    CO2 37 (H) 21 - 32 mmol/L    Anion gap 1 (L) 5 - 15 mmol/L    Glucose 168 (H) 65 - 100 mg/dL    BUN 20 6 - 20 mg/dL    Creatinine 1.16 (H) 0.55 - 1.02 mg/dL    BUN/Creatinine ratio 17 12 - 20      GFR est AA 58 (L) >60 ml/min/1.73m2    GFR est non-AA 48 (L) >60 ml/min/1.73m2    Calcium 9.7 8.5 - 10.1 mg/dL    Bilirubin, total 0.2 0.2 - 1.0 mg/dL    AST (SGOT) 12 (L) 15 - 37 U/L    ALT (SGPT) 29 12 - 78 U/L    Alk.  phosphatase 111 45 - 117 U/L    Protein, total 7.8 6.4 - 8.2 g/dL    Albumin 3.6 3.5 - 5.0 g/dL    Globulin 4.2 (H) 2.0 - 4.0 g/dL    A-G Ratio 0.9 (L) 1.1 - 2.2     TROPONIN I    Collection Time: 08/09/21  4:40 AM   Result Value Ref Range    Troponin-I, Qt. <0.05 <0.05 ng/mL       Results     Procedure Component Value Units Date/Time    COVID-19 RAPID TEST [[id number]] Collected: 08/08/21 1127    Order Status: Completed Specimen: Nasopharyngeal Updated: 08/08/21 1154     Specimen source       Please find results under separate order           COVID-19 rapid test Not Detected        Comment: Rapid Abbott ID Now   Rapid NAAT:  The specimen is NEGATIVE for SARS-CoV-2, the novel coronavirus associated with COVID-19. Negative results should be treated as presumptive and, if inconsistent with clinical signs and symptoms or necessary for patient management, should be tested with an alternative molecular assay. Negative results do not preclude SARS-CoV-2 infection and should not be used as the sole basis for patient management decisions. This test has been authorized by the FDA under   an Emergency Use Authorization (EUA) for use by authorized laboratories.  Fact sheet for Healthcare Providers: ConventionUpdate.co.nz Fact sheet for Patients: ConventionUpdate.co.nz   Methodology: Isothermal Nucleic Acid Amplification         CULTURE, RESPIRATORY/SPUTUM/BRONCH Nico Ravi [[id number]] Collected: 08/08/21 0945    Order Status: Completed Specimen: Sputum Updated: 08/09/21 0024     Special Requests: No Special Requests        GRAM STAIN Rare WBCs seen               Occasional Epithelial cells seen            Few Gram Negative Rods         Few Gram Positive Rods         Rare Gram Negative Rods        Culture result: PENDING    CULTURE, BLOOD, PAIRED [[id number]] Collected: 08/08/21 0930    Order Status: Completed Specimen: Blood Updated: 08/09/21 0733     Special Requests: No Special Requests        Culture result: No growth after 21 hours              Labs:     Recent Labs     08/08/21 0915   WBC 7.6   HGB 13.6   HCT 45.0        Recent Labs     08/09/21 0440 08/08/21 0915    140   K 4.8 4.0    104   CO2 37* 33*   BUN 20 14   CREA 1.16* 0.92   * 107*   CA 9.7 9.6     Recent Labs     08/09/21 0440   ALT 29      TBILI 0.2   TP 7.8   ALB 3.6   GLOB 4.2*     No results for input(s): INR, PTP, APTT, INREXT, INREXT in the last 72 hours. No results for input(s): FE, TIBC, PSAT, FERR in the last 72 hours. No results found for: FOL, RBCF   No results for input(s): PH, PCO2, PO2 in the last 72 hours. Recent Labs     08/09/21 0440 08/08/21 0915   TROIQ <0.05 <0.05     No results found for: CHOL, CHOLX, CHLST, CHOLV, HDL, HDLP, LDL, LDLC, DLDLP, TGLX, TRIGL, TRIGP, CHHD, CHHDX  No results found for: GLUCPOC  No results found for: COLOR, APPRN, SPGRU, REFSG, MIKE, PROTU, GLUCU, KETU, BILU, UROU, SHARIF, LEUKU, GLUKE, EPSU, BACTU, WBCU, RBCU, CASTS, UCRY      Assessment:     Acute hypoxic respiratory failure, chronic hypoxic   Atypical chest pain  COPD with acute exacerbation  Possible reflux esophagitis  Obesity  CHF  Asthma  Obstructive sleep apnea  As per patient have some rectal bleed      Plan: 1. On 5 liter nasal Cannula oxygen as salvage oxygen delivery device to provide high concentration of oxygen to overcome refractory hypoxia; Pulmonology will get arterial blood gases to see the PCO2 level if elevated will start patient on noninvasive ventilator BiPAP machine  2. She is chronically on home oxygen 2 L nasal cannula right now she is on 5 L we will continue to try to wean  3. She has not been on CPAP machine for a while he is she is noncompliant  4. IV Solu-Medrol nebulizer treatment  5. Will start patient on Lasix  6. 2D echo to see the right heart pressure  7. Intubate and place on vent if NIV fails  8. Agree with Empiric IV antibiotics pending culture results   9.  We will start patient on nicotine patch  10. Supplemental O2 to keep sats > 93%  11. Aspiration precautions  12. Labs to follow electrolytes, renal function and and blood counts  13. Glucose monitoring and SSI  14. Bronchial hygiene with respiratory therapy techniques, bronchodilators  15.  DVT, SUP prophylaxis      Stool for occult blood          Current Facility-Administered Medications:     [START ON 8/10/2021] furosemide (LASIX) injection 40 mg, 40 mg, IntraVENous, DAILY, North Hamm MD    pantoprazole (PROTONIX) tablet 40 mg, 40 mg, Oral, ACB, North Hamm MD, 40 mg at 08/09/21 0923    nicotine (NICODERM CQ) 7 mg/24 hr patch 1 Patch, 1 Patch, TransDERmal, DAILY, Adiel Lebron MD    albuterol-ipratropium (DUO-NEB) 2.5 MG-0.5 MG/3 ML, 3 mL, Nebulization, Q6H PRN, North Allen MD, 3 mL at 08/08/21 1728    amLODIPine (NORVASC) tablet 10 mg, 10 mg, Oral, DAILY, North Hamm MD, 10 mg at 08/09/21 2592    aspirin chewable tablet 81 mg, 81 mg, Oral, DAILY, North Hamm MD, 81 mg at 08/09/21 0834    calcium-vitamin D 600 mg(1,500mg) -200 unit per tablet 1 Tablet, 1 Tablet, Oral, DAILY, North Hamm MD, 1 Tablet at 08/09/21 0834    [START ON 8/10/2021] ergocalciferol capsule 50,000 Units, 50,000 Units, Oral, Q7D, North Hamm MD    fluticasone propionate (FLONASE) 50 mcg/actuation nasal spray 2 Spray, 2 Spray, Both Nostrils, DAILY, North Hamm MD, 2 [place] at 08/09/21 0835    guaiFENesin ER (MUCINEX) tablet 600 mg, 600 mg, Oral, BID, North Hamm MD, 600 mg at 08/09/21 0834    budesonide-formoteroL (SYMBICORT) 160-4.5 mcg/actuation HFA inhaler 2 Puff, 2 Puff, Inhalation, BID, Tenisha Barges I, MD, 2 Puff at 08/09/21 0806    montelukast (SINGULAIR) tablet 10 mg, 10 mg, Oral, DAILY, North Hamm MD, 10 mg at 08/09/21 0835    carvediloL (COREG) tablet 3.125 mg, 3.125 mg, Oral, BID WITH MEALS, North Hamm MD, 3.125 mg at 08/09/21 0833    docusate sodium (COLACE) capsule 100 mg, 100 mg, Oral, PRN, Joel Small MD    albuterol-ipratropium (DUO-NEB) 2.5 MG-0.5 MG/3 ML, 3 mL, Nebulization, Q6H RT, North Hamm MD, 3 mL at 08/09/21 0806    methylPREDNISolone (PF) (SOLU-MEDROL) injection 40 mg, 40 mg, IntraVENous, Q8H, North Hamm MD, 40 mg at 08/09/21 0918    enoxaparin (LOVENOX) injection 40 mg, 40 mg, SubCUTAneous, Q24H, North Hamm MD, 40 mg at 08/08/21 1737    potassium chloride SR (KLOR-CON 10) tablet 10 mEq, 10 mEq, Oral, DAILY, North Hamm MD, 10 mEq at 08/09/21 0363                                                                                              *ATTENTION:  This note has been created by a medical student for educational purposes only. Please do not refer to the content of this note for clinical decision-making, billing, or other purposes. Please see attending physicians note to obtain clinical information on this patient. *

## 2021-08-10 LAB
ALBUMIN SERPL-MCNC: 3.2 G/DL (ref 3.5–5)
ALBUMIN/GLOB SERPL: 0.8 {RATIO} (ref 1.1–2.2)
ALP SERPL-CCNC: 113 U/L (ref 45–117)
ALT SERPL-CCNC: 27 U/L (ref 12–78)
ANION GAP SERPL CALC-SCNC: 0 MMOL/L (ref 5–15)
APPEARANCE UR: CLEAR
AST SERPL W P-5'-P-CCNC: 8 U/L (ref 15–37)
ATRIAL RATE: 92 BPM
BACTERIA SPEC CULT: NORMAL
BACTERIA SPEC CULT: NORMAL
BACTERIA URNS QL MICRO: NEGATIVE /HPF
BILIRUB SERPL-MCNC: 0.2 MG/DL (ref 0.2–1)
BILIRUB UR QL: NEGATIVE
BNP SERPL-MCNC: 54 PG/ML
BUN SERPL-MCNC: 32 MG/DL (ref 6–20)
BUN/CREAT SERPL: 26 (ref 12–20)
CA-I BLD-MCNC: 9.9 MG/DL (ref 8.5–10.1)
CALCULATED P AXIS, ECG09: 72 DEGREES
CALCULATED R AXIS, ECG10: 12 DEGREES
CALCULATED T AXIS, ECG11: 63 DEGREES
CHLORIDE SERPL-SCNC: 103 MMOL/L (ref 97–108)
CO2 SERPL-SCNC: 37 MMOL/L (ref 21–32)
COLOR UR: ABNORMAL
CREAT SERPL-MCNC: 1.21 MG/DL (ref 0.55–1.02)
CREAT UR-MCNC: 108 MG/DL
DIAGNOSIS, 93000: NORMAL
ERYTHROCYTE [DISTWIDTH] IN BLOOD BY AUTOMATED COUNT: 13.5 % (ref 11.5–14.5)
GLOBULIN SER CALC-MCNC: 3.8 G/DL (ref 2–4)
GLUCOSE BLD STRIP.AUTO-MCNC: 149 MG/DL (ref 65–117)
GLUCOSE BLD STRIP.AUTO-MCNC: 188 MG/DL (ref 65–117)
GLUCOSE BLD STRIP.AUTO-MCNC: 234 MG/DL (ref 65–117)
GLUCOSE BLD STRIP.AUTO-MCNC: 329 MG/DL (ref 65–117)
GLUCOSE SERPL-MCNC: 160 MG/DL (ref 65–100)
GLUCOSE UR STRIP.AUTO-MCNC: 50 MG/DL
GRAM STN SPEC: NORMAL
HCT VFR BLD AUTO: 45.9 % (ref 35–47)
HGB BLD-MCNC: 13.7 G/DL (ref 11.5–16)
HGB UR QL STRIP: NEGATIVE
KETONES UR QL STRIP.AUTO: NEGATIVE MG/DL
LEUKOCYTE ESTERASE UR QL STRIP.AUTO: NEGATIVE
MCH RBC QN AUTO: 28.9 PG (ref 26–34)
MCHC RBC AUTO-ENTMCNC: 29.8 G/DL (ref 30–36.5)
MCV RBC AUTO: 96.8 FL (ref 80–99)
MUCOUS THREADS URNS QL MICRO: ABNORMAL /LPF
NITRITE UR QL STRIP.AUTO: NEGATIVE
NRBC # BLD: 0 K/UL (ref 0–0.01)
NRBC BLD-RTO: 0 PER 100 WBC
P-R INTERVAL, ECG05: 152 MS
PERFORMED BY, TECHID: ABNORMAL
PH UR STRIP: 5 [PH] (ref 5–8)
PLATELET # BLD AUTO: 303 K/UL (ref 150–400)
PMV BLD AUTO: 11.6 FL (ref 8.9–12.9)
POTASSIUM SERPL-SCNC: 4.4 MMOL/L (ref 3.5–5.1)
PROT SERPL-MCNC: 7 G/DL (ref 6.4–8.2)
PROT UR STRIP-MCNC: 30 MG/DL
PROT UR-MCNC: 28 MG/DL (ref 0–11.9)
Q-T INTERVAL, ECG07: 340 MS
QRS DURATION, ECG06: 84 MS
QTC CALCULATION (BEZET), ECG08: 420 MS
RBC # BLD AUTO: 4.74 M/UL (ref 3.8–5.2)
RBC #/AREA URNS HPF: ABNORMAL /HPF (ref 0–5)
SODIUM SERPL-SCNC: 140 MMOL/L (ref 136–145)
SODIUM UR-SCNC: 32 MMOL/L
SP GR UR REFRACTOMETRY: 1.02 (ref 1–1.03)
SPECIAL REQUESTS,SREQ: NORMAL
UROBILINOGEN UR QL STRIP.AUTO: 0.1 EU/DL (ref 0.1–1)
VENTRICULAR RATE, ECG03: 92 BPM
WBC # BLD AUTO: 12.7 K/UL (ref 3.6–11)
WBC URNS QL MICRO: ABNORMAL /HPF (ref 0–4)

## 2021-08-10 PROCEDURE — 74011250637 HC RX REV CODE- 250/637: Performed by: FAMILY MEDICINE

## 2021-08-10 PROCEDURE — 65270000029 HC RM PRIVATE

## 2021-08-10 PROCEDURE — 97161 PT EVAL LOW COMPLEX 20 MIN: CPT

## 2021-08-10 PROCEDURE — 97165 OT EVAL LOW COMPLEX 30 MIN: CPT

## 2021-08-10 PROCEDURE — 97530 THERAPEUTIC ACTIVITIES: CPT

## 2021-08-10 PROCEDURE — 82570 ASSAY OF URINE CREATININE: CPT

## 2021-08-10 PROCEDURE — 74011250637 HC RX REV CODE- 250/637: Performed by: INTERNAL MEDICINE

## 2021-08-10 PROCEDURE — 74011000250 HC RX REV CODE- 250: Performed by: INTERNAL MEDICINE

## 2021-08-10 PROCEDURE — 74011250636 HC RX REV CODE- 250/636: Performed by: INTERNAL MEDICINE

## 2021-08-10 PROCEDURE — 81001 URINALYSIS AUTO W/SCOPE: CPT

## 2021-08-10 PROCEDURE — 94760 N-INVAS EAR/PLS OXIMETRY 1: CPT

## 2021-08-10 PROCEDURE — 77010033678 HC OXYGEN DAILY

## 2021-08-10 PROCEDURE — 82962 GLUCOSE BLOOD TEST: CPT

## 2021-08-10 PROCEDURE — 84300 ASSAY OF URINE SODIUM: CPT

## 2021-08-10 PROCEDURE — 74011636637 HC RX REV CODE- 636/637: Performed by: FAMILY MEDICINE

## 2021-08-10 PROCEDURE — 84156 ASSAY OF PROTEIN URINE: CPT

## 2021-08-10 PROCEDURE — 36415 COLL VENOUS BLD VENIPUNCTURE: CPT

## 2021-08-10 PROCEDURE — 85027 COMPLETE CBC AUTOMATED: CPT

## 2021-08-10 PROCEDURE — 94640 AIRWAY INHALATION TREATMENT: CPT

## 2021-08-10 PROCEDURE — 80053 COMPREHEN METABOLIC PANEL: CPT

## 2021-08-10 PROCEDURE — 83880 ASSAY OF NATRIURETIC PEPTIDE: CPT

## 2021-08-10 RX ORDER — DEXTROSE 50 % IN WATER (D50W) INTRAVENOUS SYRINGE
25-50 AS NEEDED
Status: DISCONTINUED | OUTPATIENT
Start: 2021-08-10 | End: 2021-08-14 | Stop reason: HOSPADM

## 2021-08-10 RX ORDER — ACETAMINOPHEN 325 MG/1
650 TABLET ORAL
Status: DISCONTINUED | OUTPATIENT
Start: 2021-08-10 | End: 2021-08-14 | Stop reason: HOSPADM

## 2021-08-10 RX ORDER — MAGNESIUM SULFATE 100 %
4 CRYSTALS MISCELLANEOUS AS NEEDED
Status: DISCONTINUED | OUTPATIENT
Start: 2021-08-10 | End: 2021-08-14 | Stop reason: HOSPADM

## 2021-08-10 RX ORDER — INSULIN LISPRO 100 [IU]/ML
INJECTION, SOLUTION INTRAVENOUS; SUBCUTANEOUS
Status: DISCONTINUED | OUTPATIENT
Start: 2021-08-10 | End: 2021-08-14 | Stop reason: HOSPADM

## 2021-08-10 RX ADMIN — FLUTICASONE PROPIONATE 2 SPRAY: 50 SPRAY, METERED NASAL at 09:00

## 2021-08-10 RX ADMIN — INSULIN LISPRO 8 UNITS: 100 INJECTION, SOLUTION INTRAVENOUS; SUBCUTANEOUS at 21:33

## 2021-08-10 RX ADMIN — BUDESONIDE AND FORMOTEROL FUMARATE DIHYDRATE 2 PUFF: 160; 4.5 AEROSOL RESPIRATORY (INHALATION) at 20:08

## 2021-08-10 RX ADMIN — ASPIRIN 81 MG: 81 TABLET, CHEWABLE ORAL at 08:23

## 2021-08-10 RX ADMIN — GUAIFENESIN 600 MG: 600 TABLET, EXTENDED RELEASE ORAL at 08:22

## 2021-08-10 RX ADMIN — CARVEDILOL 3.12 MG: 3.12 TABLET, FILM COATED ORAL at 08:23

## 2021-08-10 RX ADMIN — GUAIFENESIN 600 MG: 600 TABLET, EXTENDED RELEASE ORAL at 21:33

## 2021-08-10 RX ADMIN — ACETAMINOPHEN 650 MG: 325 TABLET ORAL at 17:39

## 2021-08-10 RX ADMIN — ENOXAPARIN SODIUM 40 MG: 40 INJECTION SUBCUTANEOUS at 16:10

## 2021-08-10 RX ADMIN — INSULIN LISPRO 4 UNITS: 100 INJECTION, SOLUTION INTRAVENOUS; SUBCUTANEOUS at 16:10

## 2021-08-10 RX ADMIN — METHYLPREDNISOLONE SODIUM SUCCINATE 40 MG: 40 INJECTION, POWDER, FOR SOLUTION INTRAMUSCULAR; INTRAVENOUS at 13:03

## 2021-08-10 RX ADMIN — METHYLPREDNISOLONE SODIUM SUCCINATE 40 MG: 40 INJECTION, POWDER, FOR SOLUTION INTRAMUSCULAR; INTRAVENOUS at 05:44

## 2021-08-10 RX ADMIN — CARVEDILOL 3.12 MG: 3.12 TABLET, FILM COATED ORAL at 16:10

## 2021-08-10 RX ADMIN — IPRATROPIUM BROMIDE AND ALBUTEROL SULFATE 3 ML: .5; 2.5 SOLUTION RESPIRATORY (INHALATION) at 01:27

## 2021-08-10 RX ADMIN — METHYLPREDNISOLONE SODIUM SUCCINATE 40 MG: 40 INJECTION, POWDER, FOR SOLUTION INTRAMUSCULAR; INTRAVENOUS at 21:33

## 2021-08-10 RX ADMIN — MONTELUKAST 10 MG: 10 TABLET, FILM COATED ORAL at 08:23

## 2021-08-10 RX ADMIN — FUROSEMIDE 40 MG: 10 INJECTION, SOLUTION INTRAMUSCULAR; INTRAVENOUS at 08:22

## 2021-08-10 RX ADMIN — IPRATROPIUM BROMIDE AND ALBUTEROL SULFATE 3 ML: .5; 2.5 SOLUTION RESPIRATORY (INHALATION) at 08:01

## 2021-08-10 RX ADMIN — Medication 1 TABLET: at 08:23

## 2021-08-10 RX ADMIN — AMLODIPINE BESYLATE 10 MG: 5 TABLET ORAL at 08:23

## 2021-08-10 RX ADMIN — ERGOCALCIFEROL 50000 UNITS: 1.25 CAPSULE ORAL at 13:02

## 2021-08-10 RX ADMIN — POTASSIUM CHLORIDE 10 MEQ: 750 TABLET, FILM COATED, EXTENDED RELEASE ORAL at 08:22

## 2021-08-10 RX ADMIN — PANTOPRAZOLE SODIUM 40 MG: 40 TABLET, DELAYED RELEASE ORAL at 05:44

## 2021-08-10 RX ADMIN — IPRATROPIUM BROMIDE AND ALBUTEROL SULFATE 3 ML: .5; 2.5 SOLUTION RESPIRATORY (INHALATION) at 13:29

## 2021-08-10 RX ADMIN — BUDESONIDE AND FORMOTEROL FUMARATE DIHYDRATE 2 PUFF: 160; 4.5 AEROSOL RESPIRATORY (INHALATION) at 08:01

## 2021-08-10 RX ADMIN — IPRATROPIUM BROMIDE AND ALBUTEROL SULFATE 3 ML: .5; 2.5 SOLUTION RESPIRATORY (INHALATION) at 19:51

## 2021-08-10 NOTE — PROGRESS NOTES
Called to pt room due to pt being concerned about blood sugar levels increasing due to being on IV steroids. Called MD Funes. Per telephone order place pt on SSI AC/HS. Orders placed. 1650- pt complaining of right knee pain, called MD Funes. Telephone orders to put pt on Tylenol 650mg q6hr PRN. Orders placed.

## 2021-08-10 NOTE — CONSULTS
NEPHROLOGY CONSULT NOTE     Patient: Aissatou Moreno MRN: [id number]  PCP: Disha Ventura MD   :     1960  Age:   63 y.o. Sex:  female      Referring physician: Disha Ventura MD     Reason for consultation:     INOCENTE  CKD      Ms Srikanth Pringle was seen and examined in Room 570 @ [facility] this afternoon. Admission Date: 2021  9:12 AM  LOS: 2 days     DISCUSSION / PLAN :   There is a pre-renal pattern of azotemia. This could be attributed to the following :    1. Diuresis --> on IV Diuretics    2. Steroids --> on Solu-Medrol    3. Heart Failure --> possible    4. ATN --> possible. 5. Obstruction --> Seems unlikely    5. AIN and AGN seem unlikely at this time. Ms Srikanth Pringle has risk factors for CKD. She is an older -American lady with an established history of HTN. She may have hypertensive nephrosclerosis. Atherosclerotic disease,affecting the renal parenchyma, is possible. Recommendations        U/A  Urine lytes  P/C Ratio  PTH to screen for secondary hyperparathyroidism  Continue cautious diuresis  Wean steroids as tolerated  Avoid NSAIDs + IV contrast  2 gram sodium diet  Dose meds for her GFR. Follow lytes       Active Problems / Assessment AAActive  : Active Problems:    Hypoxia (2020)      Respiratory failure ([place] [address] (2021)      COPD exacerbation ([facility] (2021)    INOCENTE  CKD     Subjective:     \" not too well \"    \" copd \"      HPI:     Ms Srikanth Pringle is a 63year old lady with a history of hypertension and copd. She wears oxygen at baseline. She has seen a Nephrologist in the recent past. However, she did not return for follow-up visits. Her baseline creatinine is unknown. Ms Srikanth Pringle presented with a history of dyspnea on exertion, decreased exercise tolerance , cough and orthopnea. There was no history of chest pain, nausea, vomiting, fever, gi bleeding or dysuria. She reported arthralgia / myalgia. She denied headache. There was no history of easy bruising.     She was evaluated in the ER @ [facility]. She was diagnosed with decompensated COPD and Respiratory Failure. She was started on steroids and diuresis. There has been an incremental decline in her GFR over the last couple of days. We were asked to assist with her risk stratification. Lab review :      8/8/21 ---> BUN 14 , Creat 0.92    8/10/21 --> BUN 32 , Creat 1.21        Past Medical Hx:   Past Medical History:   Diagnosis Date    Breast CA ([facility] [address] [address]     Chronic obstructive pulmonary disease ([facility] [address]     Hypertension         Past Surgical Hx:     Past Surgical History:   Procedure Laterality Date    COLONOSCOPY N/A 7/16/2021    . performed by Courtney Schwartz MD at [facility] HX HYSTERECTOMY      HX MASTECTOMY Left      Breast Cancer     Ovarian Cancer      Medications:  Prior to Admission medications    Medication Sig Start Date End Date Taking? Authorizing Provider   albuterol-ipratropium (DUO-NEB) 2.5 mg-0.5 mg/3 ml nebu 3 mL by Nebulization route every six (6) hours as needed for Wheezing. 12/18/20  Yes Erick Woodard MD   aspirin 81 mg chewable tablet Take 1 Tab by mouth daily. 12/19/20  Yes Erick Woodard MD   guaiFENesin ER (MUCINEX) 600 mg ER tablet Take 1 Tab by mouth two (2) times a day. 12/18/20  Yes Em Funes MD   amLODIPine (NORVASC) 10 mg tablet Take 10 mg by mouth daily. Yes Provider, Historical   mometasone-formoterol (DULERA) 200-5 mcg/actuation HFA inhaler Take 2 Puffs by inhalation two (2) times a day. Yes Provider, Historical   montelukast (SINGULAIR) 10 mg tablet Take 10 mg by mouth daily. Yes Provider, Historical   Calcium-Cholecalciferol, D3, 500 mg(1,250mg) -400 unit tab Take  by mouth daily. Yes Provider, Historical   benzonatate (TESSALON) 100 mg capsule Take 100 mg by mouth once. Yes Provider, Historical   fluticasone propionate (FLONASE) 50 mcg/actuation nasal spray 2 Sprays by Both Nostrils route daily.    Yes Provider, Historical   ergocalciferol (Vitamin D2) 1,250 mcg (50,000 unit) capsule Take 50,000 Units by mouth every seven (7) days. Q7days on Monday   Yes Provider, Historical       Allergies   Allergen Reactions    Lisinopril Angioedema       Social Hx:     Smoking --> Since age 17  Alcohol --> occasionally    Has a daughter. Family History   Problem Relation Age of Onset    Hypertension Mother     Cancer Father        Review of Systems:      See HPI          Objective:    Vitals:    Vitals:    08/10/21 0733 08/10/21 0800 08/10/21 0832 08/10/21 1200   BP: (!) 148/88      Pulse: 89 87  89   Resp: 20      Temp: 97.7 °F (36.5 °C)      SpO2: (!) 87% 92% 93%    Weight:       Height:         I&O's:  08/09 0701 - 08/10 0700  In: 920 [P.O.:920]  Out: 550 [Urine:550]  Visit Vitals  BP (!) 148/88   Pulse 89   Temp 97.7 °F (36.5 °C)   Resp 20   Ht 5' 4\" (1.626 m)   Wt 93 kg (205 lb)   SpO2 93%   BMI 35.19 kg/m²       Physical Exam:    Seen in Room 570 this afternoon. General: Sleeping in bed quite comfortably. HEENT: Sclerae without icterus. Neck: No bruit. CVS :  No S 3 gallop , no pericardial rub    RS : Decrease air entry           Wheezes +            Prolonged expiratory phase of respiration.     Abdomen:  Not distended    Extremities: Leg oedema +    Neuro : Alert and Oriented      Psych:  Appropriate affect      Laboratory Results:    Lab Results   Component Value Date    BUN 32 (H) 08/10/2021     08/10/2021    K 4.4 08/10/2021     08/10/2021    CO2 37 (H) 08/10/2021       Lab Results   Component Value Date    BUN 32 (H) 08/10/2021    BUN 20 08/09/2021    BUN 14 08/08/2021    BUN 32 (H) 12/18/2020    BUN 29 (H) 12/16/2020    K 4.4 08/10/2021    K 4.8 08/09/2021    K 4.0 08/08/2021    K 4.1 12/18/2020    K 4.5 12/16/2020       Lab Results   Component Value Date    WBC 12.7 (H) 08/10/2021    RBC 4.74 08/10/2021    HGB 13.7 08/10/2021    HCT 45.9 08/10/2021    MCV 96.8 08/10/2021    MCH 28.9 08/10/2021    RDW 13.5 08/10/2021     08/10/2021       No results found for: PTH, PHOS    Urine dipstick:         I have reviewed the followin [facility] discussed with: Ms Arcelia Sessions reviewed. Thank you for allowing us to participate in the care of this patient. We will follow patient. Please dont hesitate to call with any questions    Rigoberto Levin MD  8/10/2021    Diogo Suarez  [address] [place] [address]  Phone - [phone]   Fax - [phone]  [email or web address]

## 2021-08-10 NOTE — PROGRESS NOTES
PHYSICAL THERAPY EVALUATION  Patient: Aissatou Moreno (52 y.o. female)  Date: 8/10/2021  Primary Diagnosis: COPD exacerbation ([facility] [address] [J44.1]  Respiratory failure ([facility] [address] [J96.90]  Hypoxia [R09.02]        Precautions: standard       ASSESSMENT  Pt is a 64 yo female admitted on 8/8/2021 for SOB and cough; COVID 19 negative, pt currently being treated for respiratory failure, COPD exacerbation, and hypoxia. Pt on 2L O2 via NC at baseline, currently on 4L O2 via NC. PMH: breast CA, COPD, and HTN. Pt A&O x 4. Per pt report pt resides alone in a 2nd floor apartment with 10 MELANY, bilateral handrails, pt was I for ADLS/IADLS but stated she has been having difficulty completing most tasks due to SOB over the past several weeks, no AD with mobility prior to admission. DME: portable O2. Based on the objective data described below, the patient presents with generalized weakness, impaired functional mobility, impaired amb, impaired balance, and decreased activity tolerance (O2 sats decreased from 94% at rest to 89% with mobility on 4L O2). Pt semi-supine in bed upon PT/OT arrival, agreeable to evaluation. Pt required mod I for bed mobility, mod I supine <> sit, mod I sit <> stand transfers. Pt amb 70 feet with gt belt, no AD, and mod I; demonstrating slow, steady, step through gt pattern with no LOB or knee buckling noted. Pt did fair with session today with decrease in oxygen saturations with mobility as well as c/o SOB with mobility, pt also limited overall in mobility due to c/o 6/10 right knee pain. Pt will benefit from continued skilled PT to address above deficits and return to PLOF. Current PT DC recommendation HHPT with shower chair and SPC.      Current Level of Function Impacting Discharge (mobility/balance): mod I    Other factors to consider for discharge: close to baseline but would benefit from [place] and DME to improve safety at home      PLAN :  Recommendations and Planned Interventions: bed mobility training, transfer training, gait training, therapeutic exercises, neuromuscular re-education, patient and family training/education and therapeutic activities      Frequency/Duration: Patient will be followed by physical therapy:  3 times a week to address goals. Recommendation for discharge: (in order for the patient to meet his/her long term goals)  HHPT    This discharge recommendation:  Has been made in collaboration with the attending provider and/or case management    IF patient discharges home will need the following DME: straight cane and shower chair         SUBJECTIVE:   Patient stated I just want to get some sleep.     OBJECTIVE DATA SUMMARY:   HISTORY:    Past Medical History:   Diagnosis Date    Breast CA ([facility] [address]     Chronic obstructive pulmonary disease ([facility] [address]     Hypertension      Past Surgical History:   Procedure Laterality Date    COLONOSCOPY N/A 7/16/2021    . performed by Refugio Jolley MD at [address] [place] [address] HX HYSTERECTOMY      HX MASTECTOMY Left        Home Situation  Home Environment: Apartment  # Steps to Enter: 10  Rails to Enter: Yes  Hand Rails : Bilateral  One/Two Story Residence: One story  Living Alone: Yes  Support Systems: None  Patient Expects to be Discharged to[de-identified] Apartment  Current DME Used/Available at Home: Oxygen, portable    EXAMINATION/PRESENTATION/DECISION MAKING:   Critical Behavior:  Neurologic State: Alert  Orientation Level: Oriented X4  Cognition: Follows commands     Hearing: Auditory  Auditory Impairment: None  Skin:  Intact where visible  Edema: none noted   Range Of Motion:  AROM: Within functional limits                       Strength:    Strength:  Within functional limits                    Tone & Sensation:   Tone: Normal         Functional Mobility:  Bed Mobility:  Rolling: Modified independent  Supine to Sit: Modified independent  Sit to Supine: Modified independent  Scooting: Modified independent  Transfers:  Sit to Stand: Modified independent  Stand to Sit: Modified independent                       Balance:   Sitting: Intact; Without support  Standing: Intact; Without support  Ambulation/Gait Training:  Distance (ft): 75 Feet (ft)  Assistive Device: Gait belt  Ambulation - Level of Assistance: Modified independent     Gait Description (WDL): Exceptions to WDL           Therapeutic Exercises:   Not completed this session    Functional Measure:  [facility] Mobility Inpatient Short Form  How much difficulty does the patient currently have. .. Unable A Lot A Little None   1. Turning over in bed (including adjusting bedclothes, sheets and blankets)? [] 1   [] 2   [] 3   [x] 4   2. Sitting down on and standing up from a chair with arms ( e.g., wheelchair, bedside commode, etc.)   [] 1   [] 2   [] 3   [x] 4   3. Moving from lying on back to sitting on the side of the bed? [] 1   [] 2   [] 3   [x] 4          How much help from another person does the patient currently need. .. Total A Lot A Little None   4. Moving to and from a bed to a chair (including a wheelchair)? [] 1   [] 2   [] 3   [x] 4   5. Need to walk in hospital room? [] 1   [] 2   [x] 3   [] 4   6. Climbing 3-5 steps with a railing? [] 1   [] 2   [x] 3   [] 4   © 2007, Trustees of [address] [place] [address] [address] [address], under license to [employer]. All rights reserved     Score:  Initial: 22/24 Most Recent: X (Date: 8/10/21 )   Interpretation of Tool:  Represents activities that are increasingly more difficult (i.e. Bed mobility, Transfers, Gait).   Score 24 23 22-20 19-15 14-10 9-7 6   Modifier CH CI CJ CK CL CM CN         Physical Therapy Evaluation Charge Determination   History Examination Presentation Decision-Making   HIGH Complexity :3+ comorbidities / personal factors will impact the outcome/ POC  HIGH Complexity : 4+ Standardized tests and measures addressing body structure, function, activity limitation and / or participation in recreation  LOW Complexity : Stable, uncomplicated Other outcome measures [facility] 6  low      Based on the above components, the patient evaluation is determined to be of the following complexity level: LOW     Pain Ratin/10 right knee    Activity Tolerance:   Fair and desaturates with exertion and requires oxygen    After treatment patient left in no apparent distress:   Supine in bed, Call bell within reach and Side rails x 3 and nsg updated. GOALS:    Problem: Mobility Impaired (Adult and Pediatric)  Goal: *Acute Goals and Plan of Care (Insert Text)  Description: Pt will be I with LE HEP in 7 days. Pt will perform bed mobility with mod I in 7 days. Pt will perform transfers with mod I in 7 days. Pt will amb 100-200 feet with LRAD safely with mod I in 7 days. Outcome: Not Met       COMMUNICATION/EDUCATION:   The patients plan of care was discussed with: Occupational therapist and Registered nurse. Fall prevention education was provided and the patient/caregiver indicated understanding., Patient/family have participated as able in goal setting and plan of care. and Patient/family agree to work toward stated goals and plan of care. PT/OT sessions occurred together for increased safety of pt and clinician.        Thank you for this referral.  Florin Carrillo, PT, DPT   Time Calculation: 21 mins

## 2021-08-10 NOTE — PROGRESS NOTES
Reason for Admission:  Hypoxia                     RUR Score:          14%           Plan for utilizing home health:      No need    PCP: First and Last name:  Disha Ventura MD     Name of Practice:    Are you a current patient: Yes/No: yes   Approximate date of last visit: \"March\" per patient   Can you participate in a virtual visit with your PCP: unlikely                    Current Advanced Directive/Advance Care Plan: Prior      Healthcare Decision Maker:  Suzanna Yates, brother, [address], daughter, [phone]  Click here to [address] including selection of the Healthcare Decision Maker Relationship (ie \"Primary\")                             Transition of Care Plan:       Home self                 CM met with patient in room to complete DCP assessment. Patient reports that she lives in a apartment, alone, on the second floor. Patient utilizes O2 at home, 24/7, provided by [facility].  Patient reports that she hhas major difficulty completing many day to day activities due to her breathing and became tearful when discussing. Patient reports that she needs assistance at home and asked CM about PCA. CM explained PCA process. At this time patient reports she does not think she will need HH, patient may also not qualify due to her insurance. CM will continue to follow.

## 2021-08-10 NOTE — PROGRESS NOTES
Progress Note      8/10/2021 8:20 AM  NAME: Macario Maldonado   MRN:  [id number]   Admit Diagnosis: COPD exacerbation ([facility] [J44.1]  Res[facility] failure ([facility] [J96.90]  Hypoxia [R09.02]      Problem List:   - hypoxia and respiratory failure  - COPD exacerbation       Assessment/Plan:   8/10/21  - patient observed sitting the side of the bed with eyes opened. Complains of shortness of breath with activity. Denies chest pain, palpitations, dizziness or distress. No acute distress noted. - sinus rhythm on telemetry without acute cardiovascular events reported overnight.  - vital signs stable  - Hgb 13.7/Hct 45.9  - Bun 20/Creat 1.16 8//9/21  - cardiac enzymes unremarkable  - EF of 55%-60% 8/9/21  - strict I&O, daily weight   - spoke with patient regarding the importance of smoking cessation. - we will continue conservative cardiovascular management and follow patient during hospitalization along with the team         []       High complexity decision making was performed in this patient at high risk for decompensation with multiple organ involvement. Subjective:      HISTORY OF PRESENT ILLNESS:     Layo Tao is a 63 y.o.  female who has no known established coronary artery disease and presented to the emergency department with shortness of breath and bilateral lower extremity edema responded well to Lasix in the emergency department. Patient was seen me last in my office in May 2019 after initial noninvasive cardiac work-up was unremarkable and secondary to her symptoms her great saphenous veins were occluded by mechanical procedure.  I have not seen her since then and she stated that she was feeling fine and as she was told that she has no heart disease and because of no symptoms she decided not to return to my office since May off 2019,  In the emergency department she was lying comfortably in the bed improved in her symptoms since presented to the hospital and her EKG shows sinus rhythm with no acute ST-T wave changes consistent with ischemia or injury pattern and cardiac enzyme has been unremarkable. Initial diagnosis of acute exacerbation of COPD was made and she got admitted to the hospital.       Review of Systems:    Symptom Y/N Comments  Symptom Y/N Comments   Fever/Chills N   Chest Pain N    Poor Appetite N   Edema N    Cough N   Abdominal Pain N    Sputum N   Joint Pain N    SOB/ALVARADO N   Pruritis/Rash N    Nausea/vomit N   Tolerating PT/OT Y    Diarrhea N   Tolerating Diet Y    Constipation N   Other       Could NOT obtain due to:      Objective:      Physical Exam:    Last 24hrs VS reviewed since prior progress note. Most recent are:    Visit Vitals  BP (!) 148/88   Pulse 87   Temp 97.7 °F (36.5 °C)   Resp 20   Ht 5' 4\" (1.626 m)   Wt 93 kg (205 lb)   SpO2 93%   BMI 35.19 kg/m²       Intake/Output Summary (Last 24 hours) at 8/10/2021 6414  Last data filed at 8/9/2021 1802  Gross per 24 hour   Intake 920 ml   Output 550 ml   Net 370 ml        General Appearance: Well developed, well nourished, alert & oriented x 3,    no acute distress. Ears/Nose/Mouth/Throat: Hearing grossly normal.  Neck: Supple. Chest: Lungs clear yet diminished to auscultation bilaterally. Scattered wheezing noted to bases. Cardiovascular: Regular rate and rhythm, S1S2 normal, no murmur. Abdomen: Soft, non-tender, bowel sounds are active. Extremities: non-pitting edema noted bilaterally. Skin: Warm and dry. PMH/SH reviewed - no change compared to H&P    Data Review    Telemetry: sinus rhythm    EKG:   []  No new EKG for review  XR CHEST PORT   Final Result   Mild CHF/volume overload suspected. Echocardiogram 8/9/21 Result status: Final result   · LV: Estimated LVEF is 55 - 60%. Normal cavity size, wall thickness and systolic function (ejection fraction normal). Mild (grade 1) left ventricular diastolic dysfunction. · LA: Mildly dilated left atrium. · MV: Mild mitral valve prolapse.   · TV: Right Ventricular Arterial Pressure (RVSP) is 23 mmHg. Echo Findings    Left Ventricle Normal cavity size, wall thickness and systolic function (ejection fraction normal). The estimated EF is 55 - 60%. There is mild (grade 1) left ventricular diastolic dysfunction. Left Atrium Mildly dilated left atrium. Right Ventricle Normal cavity size, wall thickness and global systolic function. Right Atrium Normal cavity size. Interatrial Septum No patent foramen ovale visualized. Aortic Valve Normal valve structure, no stenosis and no regurgitation. Mitral Valve Normal valve structure, no stenosis and no regurgitation. Mild mitral valve prolapse. Tricuspid Valve Normal valve structure and no stenosis. Trace regurgitation. Right Ventricular Arterial Pressure (RVSP) is 23 mmHg. Pulmonic Valve Pulmonic valve not well visualized. No stenosis and no regurgitation. Aorta Normal aortic root, ascending aortic, and aortic arch. Normal aortic root. Pulmonary Artery Pulmonary arteries not well visualized. IVC/Hepatic Veins Normal structure. Normal central venous pressure (3 mmHg); IVC diameter is less than 21 mm and collapses more than 50% with respiration. Pericardium Normal pericardium and no evidence of pericardial effusion. Lab Data Personally Reviewed:    Recent Labs     08/10/21  0607 08/08/21  0915   WBC 12.7* 7.6   HGB 13.7 13.6   HCT 45.9 45.0    284     No results for input(s): INR, PTP, APTT, INREXT in the last 72 hours.    Recent Labs     08/09/21  0440 08/08/21  0915    140   K 4.8 4.0    104   CO2 37* 33*   BUN 20 14   CREA 1.16* 0.92   * 107*   CA 9.7 9.6     Recent Labs     08/09/21  0440 08/08/21  0915   TROIQ <0.05 <0.05     No results found for: CHOL, CHOLX, CHLST, CHOLV, HDL, HDLP, LDL, LDLC, DLDLP, TGLX, TRIGL, TRIGP, CHHD, CHHDX    Recent Labs     08/09/21  0440      TP 7.8   ALB 3.6   GLOB 4.2*     Recent Labs     08/09/21  1355   PH 7.37   PCO2 55*   PO2 174* Medications Personally Reviewed:    Current Facility-Administered Medications   Medication Dose Route Frequency    furosemide (LASIX) injection 40 mg  40 mg IntraVENous DAILY    pantoprazole (PROTONIX) tablet 40 mg  40 mg Oral ACB    nicotine (NICODERM CQ) 7 mg/24 hr patch 1 Patch  1 Patch TransDERmal DAILY    albuterol-ipratropium (DUO-NEB) 2.5 MG-0.5 MG/3 ML  3 mL Nebulization Q6H PRN    amLODIPine (NORVASC) tablet 10 mg  10 mg Oral DAILY    aspirin chewable tablet 81 mg  81 mg Oral DAILY    calcium-vitamin D 600 mg(1,500mg) -200 unit per tablet 1 Tablet  1 Tablet Oral DAILY    ergocalciferol capsule 50,000 Units  50,000 Units Oral Q7D    fluticasone propionate (FLONASE) 50 mcg/actuation nasal spray 2 Spray  2 Spray Both Nostrils DAILY    guaiFENesin ER (MUCINEX) tablet 600 mg  600 mg Oral BID    budesonide-formoteroL (SYMBICORT) 160-4.5 mcg/actuation HFA inhaler 2 Puff  2 Puff Inhalation BID    montelukast (SINGULAIR) tablet 10 mg  10 mg Oral DAILY    carvediloL (COREG) tablet 3.125 mg  3.125 mg Oral BID WITH MEALS    docusate sodium (COLACE) capsule 100 mg  100 mg Oral PRN    albuterol-ipratropium (DUO-NEB) 2.5 MG-0.5 MG/3 ML  3 mL Nebulization Q6H RT    methylPREDNISolone (PF) (SOLU-MEDROL) injection 40 mg  40 mg IntraVENous Q8H    enoxaparin (LOVENOX) injection 40 mg  40 mg SubCUTAneous Q24H    potassium chloride SR (KLOR-CON 10) tablet 10 mEq  10 mEq Oral DAILY         Danielle Wright NP

## 2021-08-10 NOTE — PROGRESS NOTES
OCCUPATIONAL THERAPY EVALUATION  Patient: Dede Pelayo (37 y.o. female)  Date: 8/10/2021  Primary Diagnosis: COPD exacerbation ([facility] [address] [J44.1]  Respiratory failure ([facility] [address] [J96.90]  Hypoxia [R09.02]        Precautions: fall risk       ASSESSMENT  Pt is a 62 yo female admitted on 8/8/2021 for SOB and cough; COVID 19 negative, pt currently being treated for respiratory failure, COPD exacerbation, and hypoxia. Pt on 2L O2 via NC at baseline, currently on 4L O2 via NC. PMH: breast CA, COPD, and HTN. Pt A&O x 4 and agreeable to OT/PT evaluations at this time. Per pt report pt resides alone in a 2nd floor apartment with 10 MELANY, bilateral handrails, pt was I for ADLS/IADLS but stated she has been having difficulty completing most tasks due to SOB over the past several weeks, no AD with mobility prior to admission. DME: portable O2. Based on current observations, pt presents with deficits in generalized strength, dynamic standing balance, functional activity tolerance, increased R knee pain impacting overall performance of ADLs and functional transfers/mobility. Pt currently mod I for bed mobility, setup/SBA donning socks seated EOB bringing each LE up onto bedside, mod I supine <> sit, mod I sit <> stand transfers to/from EOB and toilet and SBA toileting routine including management of undergarments in standing. Overall, pt tolerates session fair today, O2 sats decreased from 94% at rest to 89% with functional mobility on 4L O2 via NC). Pt would benefit from continued skilled OT services to address current impairments and improve overall IND and safety with self cares and functional transfers/mobility. Recommend discharge home with [address] [place] [address] and DME (shower chair, SPC) once medically appropriate. Other factors to consider for discharge: family support, DME, time since onset, severity of deficits      Patient will benefit from skilled therapy intervention to address the above noted impairments.        PLAN :  Recommendations and Planned Interventions: self care training, functional mobility training, therapeutic exercise, balance training, therapeutic activities, endurance activities, patient education, and home safety training    Frequency/Duration: Patient will be followed by occupational therapy 3 times a week to address goals. Recommendation for discharge: (in order for the patient to meet his/her long term goals)  HHOT with shower chair and SPC    This discharge recommendation:  Has been made in collaboration with the attending provider and/or case management    IF patient discharges home will need the following DME: cane: straight and shower chair       SUBJECTIVE:   Patient stated I can hardly do anything anymore I get so short of breath.     OBJECTIVE DATA SUMMARY:   HISTORY:   Past Medical History:   Diagnosis Date    Breast CA ([facility] [address]     Chronic obstructive pulmonary disease ([facility] [address]     Hypertension      Past Surgical History:   Procedure Laterality Date    COLONOSCOPY N/A 7/16/2021    . performed by Alba Sorto MD at [facility]      HX MASTECTOMY Left        Expanded or extensive additional review of patient history:     Home Situation  Home Environment: Apartment  # Steps to Enter: 10  Rails to Enter: Yes  Hand Rails : Bilateral  One/Two Story Residence: One story  Living Alone: Yes  Support Systems: None  Patient Expects to be Discharged to[de-identified] Apartment  Current DME Used/Available at Home: Oxygen, portable    EXAMINATION OF PERFORMANCE DEFICITS:  Cognitive/Behavioral Status:  Neurologic State: Alert  Orientation Level: Oriented X4  Cognition: Follows commands    Hearing: Auditory  Auditory Impairment: None    Vision/Perceptual:     WFL      Range of Motion:  AROM: Within functional limits    Strength:  Strength: Within functional limits    Coordination:     Fine Motor Skills-Upper: Left Intact; Right Intact    Gross Motor Skills-Upper: Left Intact; Right Intact    Tone & Sensation:  Tone: Normal    Balance:  Sitting: Intact; Without support  Standing: Intact; Without support    Functional Mobility and Transfers for ADLs:  Bed Mobility:  Rolling: Modified independent  Supine to Sit: Modified independent  Sit to Supine: Modified independent  Scooting: Modified independent    Transfers:  Sit to Stand: Modified independent  Stand to Sit: Modified independent  Bathroom Mobility: Modified independent  Toilet Transfer : Modified independent    ADL Intervention and task modifications:  Grooming  Grooming Assistance: Modified independent  Position Performed: Standing  Washing Hands: Modified independent    Lower Body Dressing Assistance  Socks: Set-up  Position Performed: Seated edge of bed    Toileting  Toileting Assistance: Stand-by assistance  Bladder Hygiene: Modified independent  Clothing Management: Stand-by assistance    Therapeutic Exercise:  Pt would benefit from UE HEP to improve overall UE AROM/strength and can be further educated in next treatment session. Functional Measure:    [facility] AM-PACTM \"6 Clicks\"                                                       Daily Activity Inpatient Short Form  How much help from another person does the patient currently need. .. Total; A Lot A Little None   1. Putting on and taking off regular lower body clothing? []  1 []  2 [x]  3 []  4   2. Bathing (including washing, rinsing, drying)? []  1 []  2 [x]  3 []  4   3. Toileting, which includes using toilet, bedpan or urinal? [] 1 []  2 [x]  3 []  4   4. Putting on and taking off regular upper body clothing? []  1 []  2 []  3 [x]  4   5. Taking care of personal grooming such as brushing teeth? []  1 []  2 []  3 [x]  4   6. Eating meals? []  1 []  2 []  3 [x]  4   © 2007, Trustees of [facility], under license to [employer].  All rights reserved     Score: 21/24     Interpretation of Tool:  Represents clinically-significant functional categories (i.e. Activities of daily living). Percentage of Impairment CH    0%   CI    1-19% CJ    20-39% CK    40-59% CL    60-79% CM    80-99% CN     100%   [facility]  Score 6-24 24 23 20-22 15-19 10-14 7-9 6        Occupational Therapy Evaluation Charge Determination   History Examination Decision-Making   LOW Complexity : Brief history review  LOW Complexity : 1-3 performance deficits relating to physical, cognitive , or psychosocial skils that result in activity limitations and / or participation restrictions  MEDIUM Complexity : Patient may present with comorbidities that affect occupational performnce. Miniml to moderate modification of tasks or assistance (eg, physical or verbal ) with assesment(s) is necessary to enable patient to complete evaluation       Based on the above components, the patient evaluation is determined to be of the following complexity level: LOW   Pain Ratin/10 R knee    Activity Tolerance:   Fair    After treatment patient left in no apparent distress:    Supine in bed and Call bell within reach    COMMUNICATION/EDUCATION:   The patients plan of care was discussed with: Physical therapist and Registered nurse. Patient/family have participated as able in goal setting and plan of care. and Patient/family agree to work toward stated goals and plan of care. This patients plan of care is appropriate for delegation to [facility].     OT/PT sessions occurred together for increased patient and clinician safety    Thank you for this referral.  Carissa Charles  Time Calculation: 21 mins   Problem: Self Care Deficits Care Plan (Adult)  Goal: *Acute Goals and Plan of Care (Insert Text)  Description: Pt will be IND sup <> sit in prep for EOB ADLs  Pt will be IND grooming standing sink side LRAD  Pt will be IND LE dressing sitting EOB/long sit  Pt will be IND sit <>  prep for toileting LRAD  Pt will be IND toileting/toilet transfer/cloth mgmt LRAD  Pt will be IND following UE HEP in prep for self care tasks   Outcome: Not Met

## 2021-08-10 NOTE — PROGRESS NOTES
Pulmonary Progress Note    Subjective:   Daily Progress Note: 8/10/2021 9:44 AM    CC: Shortness of breath    HPI: Excerpts from admission 2021 or consult notes as follows:   82-year-old lady came in because of shortness of breath and dyspnea significant past medical history of COPD and also sleep apnea noncompliant to CPAP machine she is a current smoker she is on home oxygen 2 L nasal cannula complaining about generalized weakness shortness of breath dyspnea and swelling of the lower extremities came to the hospital she was put on 5 L oxygen she was severely short of breath dyspneic x-ray shows fluid overload CHF so admitted and pulmonary consult was called for further evaluation. Review of Systems  A comprehensive review of systems was negative except for that written in the HPI. Objective:     Visit Vitals  BP (!) 148/88   Pulse 87   Temp 97.7 °F (36.5 °C)   Resp 20   Ht 5' 4\" (1.626 m)   Wt 93 kg (205 lb)   SpO2 93%   BMI 35.19 kg/m²    O2 Flow Rate (L/min): 4 l/min O2 Device: Nasal cannula    Temp (24hrs), Av.1 °F (36.7 °C), Min:97.7 °F (36.5 °C), Max:98.4 °F (36.9 °C)      No intake/output data recorded.    1901 - 08/10 0700  In: 920 [P.O.:920]  Out: 550 [Urine:550]    General appearance: alert, cooperative, no distress, appears stated age  Head: Normocephalic, without obvious abnormality, atraumatic  Neck: supple, symmetrical, trachea midline, no adenopathy, thyroid: not enlarged, symmetric, no tenderness/mass/nodules, no carotid bruit and no JVD  Lungs: clear to auscultation bilaterally  Heart: regular rate and rhythm, S1, S2 normal, no murmur, click, rub or gallop  Extremities: bilateral pedal edema  Neurologic: Grossly normal        Data Review    Recent Results (from the past 24 hour(s))   ECHO ADULT COMPLETE    Collection Time: 21 12:13 PM   Result Value Ref Range    LV ED Vol A2C 44.00 cm3    LV ED Vol A4C 53.00 cm3    LV ES Vol A4C 23.00 cm3    LV ES Vol A2C 62.10 cm3 IVSd 1.04 (A) 0.60 - 0.90 cm    LVIDd 4.84 3.90 - 5.30 cm    LVIDs 3.96 cm    LVOT d 1.90 cm    Left Ventricular Outflow Tract Mean Gradient 4.00 mmHg    LVOT VTI 23.70 cm    LVOT VTI 23.70 cm    LVOT Peak Velocity 125.00 cm/s    LVOT Peak Gradient 6.00 mmHg    LVPWd 1.33 (A) 0.60 - 0.90 cm    LV E' Septal Velocity 5.85 cm/s    E/E' septal 18.97     LVOT SV 67.0 cm3    Left Atrium Minor Axis 1.92 cm    Left Atrium Major Axis 3.80 cm    LA Area 2C 8.13 cm2    LA Area 4C 13.40 cm2    LA Volume DISK BP 32.00 22.0 - 52.0 cm3    Aortic Valve Systolic Mean Gradient 3.75 mmHg    AoV VTI 28.00 cm    Aortic valve mean velocity 114.00 cm/s    Aortic Valve Systolic Peak Velocity 891.30 cm/s    AoV PG 11.00 mmHg    Aortic Valve Area by Continuity of VTI 2.40 cm2    Aortic Valve Area by Continuity of Peak Velocity 2.16 cm2    Ao Root D 3.40 cm    AO ASC D 2.50 cm    Mitral Valve Deceleration Hays 10,890.00 mm/s2    Mitral Valve Deceleration [place] 10,890.00 mm/s2    Mitral Valve Pressure Half-time 30.00 ms    MV A Adi 119.00 cm/s    MV E Adi 111.00 cm/s    MVA (PHT) 7.33 cm2    MV E/A 0.93     Pulmonic Regurgitant End Max Velocity 118.00 cm/s    Pulmonic Valve Systolic Peak Instantaneous Gradient 6.00 mmHg    Pulmonic Valve Systolic Mean Gradient 7.00 mmHg    Pulmonic Valve Systolic Velocity Time Integral 25.90 cm    Pulmonic Valve Max Velocity 159.00 cm/s    Pulmonic Valve Systolic Peak Instantaneous Gradient 10.00 mmHg    Est. RA Pressure 10.00 mmHg    RVOT Diameter 2.50 cm    RVSP 58.00 mmHg    Tricuspid Valve Max Velocity 345.00 cm/s    Triscuspid Valve Regurgitation Peak Gradient 48.00 mmHg    Tapse 2.10 (A) 1.50 - 2.00 cm    Right Atrial Area 4C 13.04 cm2    LV Mass .1 67.0 - 162.0 g    LV Mass AL Index 110.2 43.0 - 95.0 g/m2    ZAID/BSA Pk Adi 1.1 cm2/m2    ZADI/BSA VTI 1.2 cm2/m2   BLOOD GAS, ARTERIAL    Collection Time: 08/09/21  1:55 PM   Result Value Ref Range    pH 7.37 7.35 - 7.45      PCO2 55 (H) 35 - 45 mmHg PO2 174 (H) 75 - 100 mmHg    O2 SAT >100 >95 %    BICARBONATE 32 (H) 22 - 26 mmol/L    BASE EXCESS 4.9 (H) 0 - 2 mmol/L    O2 METHOD Nasal Cannula      O2 FLOW RATE 5.0 L/min    SITE Left Radial      MADI'S TEST PASS     GLUCOSE, POC    Collection Time: 08/09/21  4:02 PM   Result Value Ref Range    Glucose (POC) 142 (H) 65 - 117 mg/dL    Performed by Cisco Bejarano, POC    Collection Time: 08/09/21  8:45 PM   Result Value Ref Range    Glucose (POC) 185 (H) 65 - 117 mg/dL    Performed by Tiffanie Arredondo    CBC W/O DIFF    Collection Time: 08/10/21  6:07 AM   Result Value Ref Range    WBC 12.7 (H) 3.6 - 11.0 K/uL    RBC 4.74 3.80 - 5.20 M/uL    HGB 13.7 11.5 - 16.0 g/dL    HCT 45.9 35.0 - 47.0 %    MCV 96.8 80.0 - 99.0 FL    MCH 28.9 26.0 - 34.0 PG    MCHC 29.8 (L) 30.0 - 36.5 g/dL    RDW 13.5 11.5 - 14.5 %    PLATELET 953 467 - 241 K/uL    MPV 11.6 8.9 - 12.9 FL    NRBC 0.0 0.0  WBC    ABSOLUTE NRBC 0.00 0.00 - 1.44 K/uL   METABOLIC PANEL, COMPREHENSIVE    Collection Time: 08/10/21  6:07 AM   Result Value Ref Range    Sodium 140 136 - 145 mmol/L    Potassium 4.4 3.5 - 5.1 mmol/L    Chloride 103 97 - 108 mmol/L    CO2 37 (H) 21 - 32 mmol/L    Anion gap 0 (L) 5 - 15 mmol/L    Glucose 160 (H) 65 - 100 mg/dL    BUN 32 (H) 6 - 20 mg/dL    Creatinine 1.21 (H) 0.55 - 1.02 mg/dL    BUN/Creatinine ratio 26 (H) 12 - 20      GFR est AA 55 (L) >60 ml/min/1.73m2    GFR est non-AA 46 (L) >60 ml/min/1.73m2    Calcium 9.9 8.5 - 10.1 mg/dL    Bilirubin, total 0.2 0.2 - 1.0 mg/dL    AST (SGOT) 8 (L) 15 - 37 U/L    ALT (SGPT) 27 12 - 78 U/L    Alk.  phosphatase 113 45 - 117 U/L    Protein, total 7.0 6.4 - 8.2 g/dL    Albumin 3.2 (L) 3.5 - 5.0 g/dL    Globulin 3.8 2.0 - 4.0 g/dL    A-G Ratio 0.8 (L) 1.1 - 2.2     BNP    Collection Time: 08/10/21  6:07 AM   Result Value Ref Range    NT pro-BNP 54 <125 pg/mL   GLUCOSE, POC    Collection Time: 08/10/21  7:43 AM   Result Value Ref Range    Glucose (POC) 188 (H) 65 - 117 mg/dL    Performed by [facility]        Current Facility-Administered Medications   Medication Dose Route Frequency    furosemide (LASIX) injection 40 mg  40 mg IntraVENous DAILY    pantoprazole (PROTONIX) tablet 40 mg  40 mg Oral ACB    nicotine (NICODERM CQ) 7 mg/24 hr patch 1 Patch  1 Patch TransDERmal DAILY    albuterol-ipratropium (DUO-NEB) 2.5 MG-0.5 MG/3 ML  3 mL Nebulization Q6H PRN    amLODIPine (NORVASC) tablet 10 mg  10 mg Oral DAILY    aspirin chewable tablet 81 mg  81 mg Oral DAILY    calcium-vitamin D 600 mg(1,500mg) -200 unit per tablet 1 Tablet  1 Tablet Oral DAILY    ergocalciferol capsule 50,000 Units  50,000 Units Oral Q7D    fluticasone propionate (FLONASE) 50 mcg/actuation nasal spray 2 Spray  2 Spray Both Nostrils DAILY    guaiFENesin ER (MUCINEX) tablet 600 mg  600 mg Oral BID    budesonide-formoteroL (SYMBICORT) 160-4.5 mcg/actuation HFA inhaler 2 Puff  2 Puff Inhalation BID    montelukast (SINGULAIR) tablet 10 mg  10 mg Oral DAILY    carvediloL (COREG) tablet 3.125 mg  3.125 mg Oral BID WITH MEALS    docusate sodium (COLACE) capsule 100 mg  100 mg Oral PRN    albuterol-ipratropium (DUO-NEB) 2.5 MG-0.5 MG/3 ML  3 mL Nebulization Q6H RT    methylPREDNISolone (PF) (SOLU-MEDROL) injection 40 mg  40 mg IntraVENous Q8H    enoxaparin (LOVENOX) injection 40 mg  40 mg SubCUTAneous Q24H    potassium chloride SR (KLOR-CON 10) tablet 10 mEq  10 mEq Oral DAILY         Assessment/Plan:     Active Problems:    Hypoxia (12/13/2020)      Respiratory failure (HCC) (8/8/2021)      COPD exacerbation (HCC) (8/8/2021)      IMPRESSION:   1. Acute on chronic hypoxic respiratory failure  2. Chronic Obstructive Pulmonary Disease  3. Body mass index is 35.19 kg/m². 4. Obstructive sleep apnea syndrome noncompliant to CPAP machine  5. Tobacco abuse  6. RVSP is 23 mmHg  7. Prognosis guarded        RECOMMENDATIONS/PLAN:      1.  On 4 liter nasal Cannula oxygen as salvage oxygen delivery device to provide high concentration of oxygen to overcome refractory hypoxia;  2. Decrease FiO2 PCO2 is elevated she is alert awake no need for BiPAP she is noncompliant  3. She is chronically on home oxygen 2 L nasal cannula right now she is on 4 L we will continue to wean  4. She has not been on CPAP machine for a while he is she is noncompliant  5. IV Solu-Medrol nebulizer treatment  6. Patient on Lasix  7. Right heart pressure is 23 mmHg  8. We will start patient on nicotine patch  9. Mild Left Ventricular diastolic dysfunction   10. Smoking cessation counseling done  11. Pt needs IV fluids with additives and Drug therapy requiring intensive monitoring for toxicity  12.  Prescription drug management with home med reconciliation reviewed

## 2021-08-10 NOTE — PROGRESS NOTES
General Daily Progress Note          Patient Name:   Lev Hsu       YOB: 1960       Age:  63 y.o. Admit Date: 8/8/2021      Subjective:     Patient seen in room. She is ambulating with demonstrated shortness of breath while on supplemental oxygen at 4 L. Reports that she does have chronic home O2 and usually is only at 2 L. She has had increased shortness of breath quiring higher oxygen needs.   She is currently 92 to 93%    Objective:     Visit Vitals  BP (!) 148/88   Pulse 87   Temp 97.7 °F (36.5 °C)   Resp 20   Ht 5' 4\" (1.626 m)   Wt 93 kg (205 lb)   SpO2 93%   BMI 35.19 kg/m²        Recent Results (from the past 24 hour(s))   ECHO ADULT COMPLETE    Collection Time: 08/09/21 12:13 PM   Result Value Ref Range    LV ED Vol A2C 44.00 cm3    LV ED Vol A4C 53.00 cm3    LV ES Vol A4C 23.00 cm3    LV ES Vol A2C 62.10 cm3    IVSd 1.04 (A) 0.60 - 0.90 cm    LVIDd 4.84 3.90 - 5.30 cm    LVIDs 3.96 cm    LVOT d 1.90 cm    Left Ventricular Outflow Tract Mean Gradient 4.00 mmHg    LVOT VTI 23.70 cm    LVOT VTI 23.70 cm    LVOT Peak Velocity 125.00 cm/s    LVOT Peak Gradient 6.00 mmHg    LVPWd 1.33 (A) 0.60 - 0.90 cm    LV E' Septal Velocity 5.85 cm/s    E/E' septal 18.97     LVOT SV 67.0 cm3    Left Atrium Minor Axis 1.92 cm    Left Atrium Major Axis 3.80 cm    LA Area 2C 8.13 cm2    LA Area 4C 13.40 cm2    LA Volume DISK BP 32.00 22.0 - 52.0 cm3    Aortic Valve Systolic Mean Gradient 9.42 mmHg    AoV VTI 28.00 cm    Aortic valve mean velocity 114.00 cm/s    Aortic Valve Systolic Peak Velocity 975.11 cm/s    AoV PG 11.00 mmHg    Aortic Valve Area by Continuity of VTI 2.40 cm2    Aortic Valve Area by Continuity of Peak Velocity 2.16 cm2    Ao Root D 3.40 cm    AO ASC D 2.50 cm    Mitral Valve Deceleration [place] 10,890.00 mm/s2    Mitral Valve Deceleration [place] 10,890.00 mm/s2    Mitral Valve Pressure Half-time 30.00 ms    MV A Adi 119.00 cm/s    MV E Adi 111.00 cm/s    MVA (PHT) 7.33 cm2    MV E/A 0.93     Pulmonic Regurgitant End Max Velocity 118.00 cm/s    Pulmonic Valve Systolic Peak Instantaneous Gradient 6.00 mmHg    Pulmonic Valve Systolic Mean Gradient 4.21 mmHg    Pulmonic Valve Systolic Velocity Time Integral 25.90 cm    Pulmonic Valve Max Velocity 159.00 cm/s    Pulmonic Valve Systolic Peak Instantaneous Gradient 10.00 mmHg    Est. RA Pressure 10.00 mmHg    RVOT Diameter 2.50 cm    RVSP 58.00 mmHg    Tricuspid Valve Max Velocity 345.00 cm/s    Triscuspid Valve Regurgitation Peak Gradient 48.00 mmHg    Tapse 2.10 (A) 1.50 - 2.00 cm    Right Atrial Area 4C 13.04 cm2    LV Mass .1 67.0 - 162.0 g    LV Mass AL Index 110.2 43.0 - 95.0 g/m2    ZAID/BSA Pk Adi 1.1 cm2/m2    ZAID/BSA VTI 1.2 cm2/m2   BLOOD GAS, ARTERIAL    Collection Time: 08/09/21  1:55 PM   Result Value Ref Range    pH 7.37 7.35 - 7.45      PCO2 55 (H) 35 - 45 mmHg    PO2 174 (H) 75 - 100 mmHg    O2 SAT >100 >95 %    BICARBONATE 32 (H) 22 - 26 mmol/L    BASE EXCESS 4.9 (H) 0 - 2 mmol/L    O2 METHOD Nasal Cannula      O2 FLOW RATE 5.0 L/min    SITE Left Radial      MADI'S TEST PASS     GLUCOSE, POC    Collection Time: 08/09/21  4:02 PM   Result Value Ref Range    Glucose (POC) 142 (H) 65 - 117 mg/dL    Performed by Cisco Bejarano, POC    Collection Time: 08/09/21  8:45 PM   Result Value Ref Range    Glucose (POC) 185 (H) 65 - 117 mg/dL    Performed by BORIS GUNN    CBC W/O DIFF    Collection Time: 08/10/21  6:07 AM   Result Value Ref Range    WBC 12.7 (H) 3.6 - 11.0 K/uL    RBC 4.74 3.80 - 5.20 M/uL    HGB 13.7 11.5 - 16.0 g/dL    HCT 45.9 35.0 - 47.0 %    MCV 96.8 80.0 - 99.0 FL    MCH 28.9 26.0 - 34.0 PG    MCHC 29.8 (L) 30.0 - 36.5 g/dL    RDW 13.5 11.5 - 14.5 %    PLATELET 465 738 - 698 K/uL    MPV 11.6 8.9 - 12.9 FL    NRBC 0.0 0.0  WBC    ABSOLUTE NRBC 0.00 0.00 - 6.73 K/uL   METABOLIC PANEL, COMPREHENSIVE    Collection Time: 08/10/21  6:07 AM   Result Value Ref Range    Sodium 140 136 - 145 mmol/L Potassium 4.4 3.5 - 5.1 mmol/L    Chloride 103 97 - 108 mmol/L    CO2 37 (H) 21 - 32 mmol/L    Anion gap 0 (L) 5 - 15 mmol/L    Glucose 160 (H) 65 - 100 mg/dL    BUN 32 (H) 6 - 20 mg/dL    Creatinine 1.21 (H) 0.55 - 1.02 mg/dL    BUN/Creatinine ratio 26 (H) 12 - 20      GFR est AA 55 (L) >60 ml/min/1.73m2    GFR est non-AA 46 (L) >60 ml/min/1.73m2    Calcium 9.9 8.5 - 10.1 mg/dL    Bilirubin, total 0.2 0.2 - 1.0 mg/dL    AST (SGOT) 8 (L) 15 - 37 U/L    ALT (SGPT) 27 12 - 78 U/L    Alk. phosphatase 113 45 - 117 U/L    Protein, total 7.0 6.4 - 8.2 g/dL    Albumin 3.2 (L) 3.5 - 5.0 g/dL    Globulin 3.8 2.0 - 4.0 g/dL    A-G Ratio 0.8 (L) 1.1 - 2.2     BNP    Collection Time: 08/10/21  6:07 AM   Result Value Ref Range    NT pro-BNP 54 <125 pg/mL   GLUCOSE, POC    Collection Time: 08/10/21  7:43 AM   Result Value Ref Range    Glucose (POC) 188 (H) 65 - 117 mg/dL    Performed by Jared 150, POC    Collection Time: 08/10/21 11:08 AM   Result Value Ref Range    Glucose (POC) 149 (H) 65 - 117 mg/dL    Performed by Koki Rene      [unfilled]      Review of Systems    Constitutional: Negative for chills and fever. HENT: Negative. Eyes: Negative. Respiratory: Positive for cough, sputum production, shortness of breath and wheezing  Cardiovascular: Positive for orthopnea and leg swelling. Otherwise negative. Gastrointestinal: Negative for abdominal pain and nausea. Skin: Negative. Neurological: Negative. Physical Exam:      Constitutional: pt is oriented to person, place, and time. HENT:   Head: Normocephalic and atraumatic. Eyes: Pupils are equal, round, and reactive to light. EOM are normal.   Cardiovascular: Normal rate, regular rhythm and normal heart sounds. Pulmonary/Chest: Inspiratory and expiratory wheezing in upper fields, bibasilar diminished breath sounds. On supplemental oxygen. Dyspnea on exertion  Abdominal: Soft.  Bowel sounds are normal. There is no abdominal tenderness. There is no rebound and no guarding. Musculoskeletal: Normal range of motion. Neurological: pt is alert and oriented to person, place, and time. XR CHEST PORT   Final Result   Mild CHF/volume overload suspected.            Recent Results (from the past 24 hour(s))   ECHO ADULT COMPLETE    Collection Time: 08/09/21 12:13 PM   Result Value Ref Range    LV ED Vol A2C 44.00 cm3    LV ED Vol A4C 53.00 cm3    LV ES Vol A4C 23.00 cm3    LV ES Vol A2C 62.10 cm3    IVSd 1.04 (A) 0.60 - 0.90 cm    LVIDd 4.84 3.90 - 5.30 cm    LVIDs 3.96 cm    LVOT d 1.90 cm    Left Ventricular Outflow Tract Mean Gradient 4.00 mmHg    LVOT VTI 23.70 cm    LVOT VTI 23.70 cm    LVOT Peak Velocity 125.00 cm/s    LVOT Peak Gradient 6.00 mmHg    LVPWd 1.33 (A) 0.60 - 0.90 cm    LV E' Septal Velocity 5.85 cm/s    E/E' septal 18.97     LVOT SV 67.0 cm3    Left Atrium Minor Axis 1.92 cm    Left Atrium Major Axis 3.80 cm    LA Area 2C 8.13 cm2    LA Area 4C 13.40 cm2    LA Volume DISK BP 32.00 22.0 - 52.0 cm3    Aortic Valve Systolic Mean Gradient 8.58 mmHg    AoV VTI 28.00 cm    Aortic valve mean velocity 114.00 cm/s    Aortic Valve Systolic Peak Velocity 599.93 cm/s    AoV PG 11.00 mmHg    Aortic Valve Area by Continuity of VTI 2.40 cm2    Aortic Valve Area by Continuity of Peak Velocity 2.16 cm2    Ao Root D 3.40 cm    AO ASC D 2.50 cm    Mitral Valve Deceleration [place] 10,890.00 mm/s2    Mitral Valve Deceleration [place] 10,890.00 mm/s2    Mitral Valve Pressure Half-time 30.00 ms    MV A Adi 119.00 cm/s    MV E Adi 111.00 cm/s    MVA (PHT) 7.33 cm2    MV E/A 0.93     Pulmonic Regurgitant End Max Velocity 118.00 cm/s    Pulmonic Valve Systolic Peak Instantaneous Gradient 6.00 mmHg    Pulmonic Valve Systolic Mean Gradient 1.73 mmHg    Pulmonic Valve Systolic Velocity Time Integral 25.90 cm    Pulmonic Valve Max Velocity 159.00 cm/s    Pulmonic Valve Systolic Peak Instantaneous Gradient 10.00 mmHg    Est. RA Pressure 10.00 mmHg RVOT Diameter 2.50 cm    RVSP 58.00 mmHg    Tricuspid Valve Max Velocity 345.00 cm/s    Triscuspid Valve Regurgitation Peak Gradient 48.00 mmHg    Tapse 2.10 (A) 1.50 - 2.00 cm    Right Atrial Area 4C 13.04 cm2    LV Mass .1 67.0 - 162.0 g    LV Mass AL Index 110.2 43.0 - 95.0 g/m2    ZAID/BSA Pk Adi 1.1 cm2/m2    ZAID/BSA VTI 1.2 cm2/m2   BLOOD GAS, ARTERIAL    Collection Time: 08/09/21  1:55 PM   Result Value Ref Range    pH 7.37 7.35 - 7.45      PCO2 55 (H) 35 - 45 mmHg    PO2 174 (H) 75 - 100 mmHg    O2 SAT >100 >95 %    BICARBONATE 32 (H) 22 - 26 mmol/L    BASE EXCESS 4.9 (H) 0 - 2 mmol/L    O2 METHOD Nasal Cannula      O2 FLOW RATE 5.0 L/min    SITE Left Radial      MADI'S TEST PASS     GLUCOSE, POC    Collection Time: 08/09/21  4:02 PM   Result Value Ref Range    Glucose (POC) 142 (H) 65 - 117 mg/dL    Performed by Cisco Bejarano, POC    Collection Time: 08/09/21  8:45 PM   Result Value Ref Range    Glucose (POC) 185 (H) 65 - 117 mg/dL    Performed by Raymundo Larios    CBC W/O DIFF    Collection Time: 08/10/21  6:07 AM   Result Value Ref Range    WBC 12.7 (H) 3.6 - 11.0 K/uL    RBC 4.74 3.80 - 5.20 M/uL    HGB 13.7 11.5 - 16.0 g/dL    HCT 45.9 35.0 - 47.0 %    MCV 96.8 80.0 - 99.0 FL    MCH 28.9 26.0 - 34.0 PG    MCHC 29.8 (L) 30.0 - 36.5 g/dL    RDW 13.5 11.5 - 14.5 %    PLATELET 826 348 - 476 K/uL    MPV 11.6 8.9 - 12.9 FL    NRBC 0.0 0.0  WBC    ABSOLUTE NRBC 0.00 0.00 - 1.42 K/uL   METABOLIC PANEL, COMPREHENSIVE    Collection Time: 08/10/21  6:07 AM   Result Value Ref Range    Sodium 140 136 - 145 mmol/L    Potassium 4.4 3.5 - 5.1 mmol/L    Chloride 103 97 - 108 mmol/L    CO2 37 (H) 21 - 32 mmol/L    Anion gap 0 (L) 5 - 15 mmol/L    Glucose 160 (H) 65 - 100 mg/dL    BUN 32 (H) 6 - 20 mg/dL    Creatinine 1.21 (H) 0.55 - 1.02 mg/dL    BUN/Creatinine ratio 26 (H) 12 - 20      GFR est AA 55 (L) >60 ml/min/1.73m2    GFR est non-AA 46 (L) >60 ml/min/1.73m2    Calcium 9.9 8.5 - 10.1 mg/dL Bilirubin, total 0.2 0.2 - 1.0 mg/dL    AST (SGOT) 8 (L) 15 - 37 U/L    ALT (SGPT) 27 12 - 78 U/L    Alk. phosphatase 113 45 - 117 U/L    Protein, total 7.0 6.4 - 8.2 g/dL    Albumin 3.2 (L) 3.5 - 5.0 g/dL    Globulin 3.8 2.0 - 4.0 g/dL    A-G Ratio 0.8 (L) 1.1 - 2.2     BNP    Collection Time: 08/10/21  6:07 AM   Result Value Ref Range    NT pro-BNP 54 <125 pg/mL   GLUCOSE, POC    Collection Time: 08/10/21  7:43 AM   Result Value Ref Range    Glucose (POC) 188 (H) 65 - 117 mg/dL    Performed by Jared Barrow, POC    Collection Time: 08/10/21 11:08 AM   Result Value Ref Range    Glucose (POC) 149 (H) 65 - 117 mg/dL    Performed by Wilda Heimlich        Results     Procedure Component Value Units Date/Time    COVID-19 RAPID TEST [[id number]] Collected: 08/08/21 1127    Order Status: Completed Specimen: Nasopharyngeal Updated: 08/08/21 1154     Specimen source       Please find results under separate order           COVID-19 rapid test Not Detected        Comment: Rapid Abbott ID Now   Rapid NAAT:  The specimen is NEGATIVE for SARS-CoV-2, the novel coronavirus associated with COVID-19. Negative results should be treated as presumptive and, if inconsistent with clinical signs and symptoms or necessary for patient management, should be tested with an alternative molecular assay. Negative results do not preclude SARS-CoV-2 infection and should not be used as the sole basis for patient management decisions. This test has been authorized by the FDA under   an Emergency Use Authorization (EUA) for use by authorized laboratories.  Fact sheet for Healthcare Providers: ConventionUpdate.co.nz Fact sheet for Patients: ConventionUpdate.co.nz   Methodology: Isothermal Nucleic Acid Amplification         CULTURE, RESPIRATORY/SPUTUM/BRONCH Peggy Quince STAIN [[id number]]  (Susceptibility) Collected: 08/08/21 0994    Order Status: Completed Specimen: Sputum Updated: 08/10/21 1122 Special Requests: No Special Requests        GRAM STAIN Rare WBCs seen               Occasional Epithelial cells seen            Few Gram Negative Rods         Few Gram Positive Rods         Rare Gram Negative Rods        Culture result: Few Serratia marcescens               Moderate Normal respiratory santos          Susceptibility      Serratia marcescens      BLANCA      Amikacin ($) Susceptible      Cefazolin ($) Resistant      Cefepime ($$) Susceptible      Cefoxitin Intermediate      Ceftazidime ($) Susceptible      Ceftriaxone ($) Susceptible      Ciprofloxacin ($) Susceptible      Gentamicin ($) Susceptible      Levofloxacin ($) Susceptible      Meropenem ($$) Susceptible      Tobramycin ($) Intermediate               Linear View                   CULTURE, BLOOD, PAIRED [[id number]] Collected: 08/08/21 0930    Order Status: Completed Specimen: Blood Updated: 08/10/21 0839     Special Requests: No Special Requests        Culture result: No growth 2 days              Labs:     Recent Labs     08/10/21  0607 08/08/21  0915   WBC 12.7* 7.6   HGB 13.7 13.6   HCT 45.9 45.0    284     Recent Labs     08/10/21  0607 08/09/21 0440 08/08/21  0915    138 140   K 4.4 4.8 4.0    100 104   CO2 37* 37* 33*   BUN 32* 20 14   CREA 1.21* 1.16* 0.92   * 168* 107*   CA 9.9 9.7 9.6     Recent Labs     08/10/21  0607 08/09/21  0440   ALT 27 29    111   TBILI 0.2 0.2   TP 7.0 7.8   ALB 3.2* 3.6   GLOB 3.8 4.2*     No results for input(s): INR, PTP, APTT, INREXT, INREXT in the last 72 hours. No results for input(s): FE, TIBC, PSAT, FERR in the last 72 hours.    No results found for: FOL, RBCF   Recent Labs     08/09/21  1355   PH 7.37   PCO2 55*   PO2 174*     Recent Labs     08/09/21  0440 08/08/21  0915   TROIQ <0.05 <0.05     No results found for: CHOL, CHOLX, CHLST, CHOLV, HDL, HDLP, LDL, LDLC, DLDLP, TGLX, TRIGL, TRIGP, CHHD, CHHDX  Lab Results   Component Value Date/Time    Glucose (POC) 149 (H) 08/10/2021 11:08 AM    Glucose (POC) 188 (H) 08/10/2021 07:43 AM    Glucose (POC) 185 (H) 08/09/2021 08:45 PM    Glucose (POC) 142 (H) 08/09/2021 04:02 PM     No results found for: COLOR, APPRN, SPGRU, REFSG, MIKE, PROTU, GLUCU, KETU, BILU, UROU, SHARIF, LEUKU, GLUKE, EPSU, BACTU, WBCU, RBCU, CASTS, UCRY      Assessment:   Acute on chronic hypoxic respiratory failure requiring supplemental oxygen  Atypical chest pain  COPD with acute exacerbation  Possible reflux esophagitis  Obesity  Heart failure with preserved ejection fraction  Asthma  Obstructive sleep apnea  GI bleed per her report  Nicotine dependence  INOCENTE versus CKD with worsening renal function-on diuretic therapy      Plan:   Pulmonology following  Cardiology following  On supplemental oxygen  Continue to monitor labs  Nephrology consultation ordered-patient has seen a nephrologist as outpatient but does not wish to see them at this time and is open to new consult  Continue all medications as ordered  Repeat chest x-ray through pulmonology    Patient is full code    Discussed with Dr. Annamarie Singh      Current Facility-Administered Medications:     furosemide (LASIX) injection 40 mg, 40 mg, IntraVENous, DAILY, North Hamm MD, 40 mg at 08/10/21 8478    pantoprazole (PROTONIX) tablet 40 mg, 40 mg, Oral, ACB, North Hamm MD, 40 mg at 08/10/21 0544    nicotine (NICODERM CQ) 7 mg/24 hr patch 1 Patch, 1 Patch, TransDERmal, DAILY, Adiel Lebron MD, 1 Patch at 08/10/21 0823    albuterol-ipratropium (DUO-NEB) 2.5 MG-0.5 MG/3 ML, 3 mL, Nebulization, Q6H PRN, North Hamm MD, 3 mL at 08/08/21 1728    amLODIPine (NORVASC) tablet 10 mg, 10 mg, Oral, DAILY, North Hamm MD, 10 mg at 08/10/21 3352    aspirin chewable tablet 81 mg, 81 mg, Oral, DAILY, North Hamm MD, 81 mg at 08/10/21 0823    calcium-vitamin D 600 mg(1,500mg) -200 unit per tablet 1 Tablet, 1 Tablet, Oral, DAILY, Aniket Vásquez MD, 1 Tablet at 08/10/21 7413   ergocalciferol capsule 50,000 Units, 50,000 Units, Oral, Q7D, North Hamm MD    fluticasone propionate (FLONASE) 50 mcg/actuation nasal spray 2 Spray, 2 Spray, Both Nostrils, DAILY, North Hamm MD, 2 [place] at 08/10/21 0900    guaiFENesin ER (MUCINEX) tablet 600 mg, 600 mg, Oral, BID, North Hamm MD, 600 mg at 08/10/21 5370    budesonide-formoteroL (SYMBICORT) 160-4.5 mcg/actuation HFA inhaler 2 Puff, 2 Puff, Inhalation, BID, [place] NICHOLS MD, 2 Puff at 08/10/21 0801    montelukast (SINGULAIR) tablet 10 mg, 10 mg, Oral, DAILY, North Hamm MD, 10 mg at 08/10/21 1681    carvediloL (COREG) tablet 3.125 mg, 3.125 mg, Oral, BID WITH MEALS, North Hamm MD, 3.125 mg at 08/10/21 0948    docusate sodium (COLACE) capsule 100 mg, 100 mg, Oral, PRN, [place] NICHOLS MD    albuterol-ipratropium (DUO-NEB) 2.5 MG-0.5 MG/3 ML, 3 mL, Nebulization, Q6H RT, North Hamm MD, 3 mL at 08/10/21 0801    methylPREDNISolone (PF) (SOLU-MEDROL) injection 40 mg, 40 mg, IntraVENous, Q8H, North Hamm MD, 40 mg at 08/10/21 0544    enoxaparin (LOVENOX) injection 40 mg, 40 mg, SubCUTAneous, Q24H, North Hamm MD, 40 mg at 08/09/21 1701    potassium chloride SR (KLOR-CON 10) tablet 10 mEq, 10 mEq, Oral, DAILY, North Hamm MD, 10 mEq at 08/10/21 3029                                                                                              *ATTENTION:  This note has been created by a medical student for educational purposes only. Please do not refer to the content of this note for clinical decision-making, billing, or other purposes. Please see attending physicians note to obtain clinical information on this patient. *

## 2021-08-10 NOTE — PROGRESS NOTES
Pulmonary Progress Note    Subjective:   Daily Progress Note: 8/10/2021 9:44 AM    CC: Shortness of breath    HPI: Excerpts from admission 2021 or consult notes as follows:   81-year-old lady came in because of shortness of breath and dyspnea significant past medical history of COPD and also sleep apnea noncompliant to CPAP machine she is a current smoker she is on home oxygen 2 L nasal cannula complaining about generalized weakness shortness of breath dyspnea and swelling of the lower extremities came to the hospital she was put on 5 L oxygen she was severely short of breath dyspneic x-ray shows fluid overload CHF so admitted and pulmonary consult was called for further evaluation. Review of Systems  A comprehensive review of systems was negative except for that written in the HPI. Objective:     Visit Vitals  BP (!) 148/88   Pulse 87   Temp 97.7 °F (36.5 °C)   Resp 20   Ht 5' 4\" (1.626 m)   Wt 93 kg (205 lb)   SpO2 93%   BMI 35.19 kg/m²    O2 Flow Rate (L/min): 4 l/min O2 Device: Nasal cannula    Temp (24hrs), Av.9 °F (36.6 °C), Min:97.3 °F (36.3 °C), Max:98.4 °F (36.9 °C)      No intake/output data recorded.    1901 - 08/10 0700  In: 920 [P.O.:920]  Out: 550 [Urine:550]    General appearance: alert, cooperative, no distress, appears stated age  Head: Normocephalic, without obvious abnormality, atraumatic  Neck: supple, symmetrical, trachea midline, no adenopathy, thyroid: not enlarged, symmetric, no tenderness/mass/nodules, no carotid bruit and no JVD  Lungs: clear to auscultation bilaterally  Heart: regular rate and rhythm, S1, S2 normal, no murmur, click, rub or gallop  Extremities: bilateral pedal edema  Neurologic: Grossly normal        Data Review    Recent Results (from the past 24 hour(s))   ECHO ADULT COMPLETE    Collection Time: 21 12:13 PM   Result Value Ref Range    LV ED Vol A2C 44.00 cm3    LV ED Vol A4C 53.00 cm3    LV ES Vol A4C 23.00 cm3    LV ES Vol A2C 62.10 cm3 IVSd 1.04 (A) 0.60 - 0.90 cm    LVIDd 4.84 3.90 - 5.30 cm    LVIDs 3.96 cm    LVOT d 1.90 cm    Left Ventricular Outflow Tract Mean Gradient 4.00 mmHg    LVOT VTI 23.70 cm    LVOT VTI 23.70 cm    LVOT Peak Velocity 125.00 cm/s    LVOT Peak Gradient 6.00 mmHg    LVPWd 1.33 (A) 0.60 - 0.90 cm    LV E' Septal Velocity 5.85 cm/s    E/E' septal 18.97     LVOT SV 67.0 cm3    Left Atrium Minor Axis 1.92 cm    Left Atrium Major Axis 3.80 cm    LA Area 2C 8.13 cm2    LA Area 4C 13.40 cm2    LA Volume DISK BP 32.00 22.0 - 52.0 cm3    Aortic Valve Systolic Mean Gradient 3.04 mmHg    AoV VTI 28.00 cm    Aortic valve mean velocity 114.00 cm/s    Aortic Valve Systolic Peak Velocity 764.28 cm/s    AoV PG 11.00 mmHg    Aortic Valve Area by Continuity of VTI 2.40 cm2    Aortic Valve Area by Continuity of Peak Velocity 2.16 cm2    Ao Root D 3.40 cm    AO ASC D 2.50 cm    Mitral Valve Deceleration [place] 10,890.00 mm/s2    Mitral Valve Deceleration [place] 10,890.00 mm/s2    Mitral Valve Pressure Half-time 30.00 ms    MV A Adi 119.00 cm/s    MV E Adi 111.00 cm/s    MVA (PHT) 7.33 cm2    MV E/A 0.93     Pulmonic Regurgitant End Max Velocity 118.00 cm/s    Pulmonic Valve Systolic Peak Instantaneous Gradient 6.00 mmHg    Pulmonic Valve Systolic Mean Gradient 4.90 mmHg    Pulmonic Valve Systolic Velocity Time Integral 25.90 cm    Pulmonic Valve Max Velocity 159.00 cm/s    Pulmonic Valve Systolic Peak Instantaneous Gradient 10.00 mmHg    Est. RA Pressure 10.00 mmHg    RVOT Diameter 2.50 cm    RVSP 58.00 mmHg    Tricuspid Valve Max Velocity 345.00 cm/s    Triscuspid Valve Regurgitation Peak Gradient 48.00 mmHg    Tapse 2.10 (A) 1.50 - 2.00 cm    Right Atrial Area 4C 13.04 cm2    LV Mass .1 67.0 - 162.0 g    LV Mass AL Index 110.2 43.0 - 95.0 g/m2    ZAID/BSA Pk Adi 1.1 cm2/m2    ZAID/BSA VTI 1.2 cm2/m2   BLOOD GAS, ARTERIAL    Collection Time: 08/09/21  1:55 PM   Result Value Ref Range    pH 7.37 7.35 - 7.45      PCO2 55 (H) 35 - 45 mmHg PO2 174 (H) 75 - 100 mmHg    O2 SAT >100 >95 %    BICARBONATE 32 (H) 22 - 26 mmol/L    BASE EXCESS 4.9 (H) 0 - 2 mmol/L    O2 METHOD Nasal Cannula      O2 FLOW RATE 5.0 L/min    SITE Left Radial      MADI'S TEST PASS     GLUCOSE, POC    Collection Time: 08/09/21  4:02 PM   Result Value Ref Range    Glucose (POC) 142 (H) 65 - 117 mg/dL    Performed by Cisco Bejarano, POC    Collection Time: 08/09/21  8:45 PM   Result Value Ref Range    Glucose (POC) 185 (H) 65 - 117 mg/dL    Performed by Jason Cota    CBC W/O DIFF    Collection Time: 08/10/21  6:07 AM   Result Value Ref Range    WBC 12.7 (H) 3.6 - 11.0 K/uL    RBC 4.74 3.80 - 5.20 M/uL    HGB 13.7 11.5 - 16.0 g/dL    HCT 45.9 35.0 - 47.0 %    MCV 96.8 80.0 - 99.0 FL    MCH 28.9 26.0 - 34.0 PG    MCHC 29.8 (L) 30.0 - 36.5 g/dL    RDW 13.5 11.5 - 14.5 %    PLATELET 877 747 - 224 K/uL    MPV 11.6 8.9 - 12.9 FL    NRBC 0.0 0.0  WBC    ABSOLUTE NRBC 0.00 0.00 - 1.03 K/uL   METABOLIC PANEL, COMPREHENSIVE    Collection Time: 08/10/21  6:07 AM   Result Value Ref Range    Sodium 140 136 - 145 mmol/L    Potassium 4.4 3.5 - 5.1 mmol/L    Chloride 103 97 - 108 mmol/L    CO2 37 (H) 21 - 32 mmol/L    Anion gap 0 (L) 5 - 15 mmol/L    Glucose 160 (H) 65 - 100 mg/dL    BUN 32 (H) 6 - 20 mg/dL    Creatinine 1.21 (H) 0.55 - 1.02 mg/dL    BUN/Creatinine ratio 26 (H) 12 - 20      GFR est AA 55 (L) >60 ml/min/1.73m2    GFR est non-AA 46 (L) >60 ml/min/1.73m2    Calcium 9.9 8.5 - 10.1 mg/dL    Bilirubin, total 0.2 0.2 - 1.0 mg/dL    AST (SGOT) 8 (L) 15 - 37 U/L    ALT (SGPT) 27 12 - 78 U/L    Alk.  phosphatase 113 45 - 117 U/L    Protein, total 7.0 6.4 - 8.2 g/dL    Albumin 3.2 (L) 3.5 - 5.0 g/dL    Globulin 3.8 2.0 - 4.0 g/dL    A-G Ratio 0.8 (L) 1.1 - 2.2     BNP    Collection Time: 08/10/21  6:07 AM   Result Value Ref Range    NT pro-BNP 54 <125 pg/mL   GLUCOSE, POC    Collection Time: 08/10/21  7:43 AM   Result Value Ref Range    Glucose (POC) 188 (H) 65 - 117 mg/dL    Performed by Alverto Pérez        Current Facility-Administered Medications   Medication Dose Route Frequency    furosemide (LASIX) injection 40 mg  40 mg IntraVENous DAILY    pantoprazole (PROTONIX) tablet 40 mg  40 mg Oral ACB    nicotine (NICODERM CQ) 7 mg/24 hr patch 1 Patch  1 Patch TransDERmal DAILY    albuterol-ipratropium (DUO-NEB) 2.5 MG-0.5 MG/3 ML  3 mL Nebulization Q6H PRN    amLODIPine (NORVASC) tablet 10 mg  10 mg Oral DAILY    aspirin chewable tablet 81 mg  81 mg Oral DAILY    calcium-vitamin D 600 mg(1,500mg) -200 unit per tablet 1 Tablet  1 Tablet Oral DAILY    ergocalciferol capsule 50,000 Units  50,000 Units Oral Q7D    fluticasone propionate (FLONASE) 50 mcg/actuation nasal spray 2 Spray  2 Spray Both Nostrils DAILY    guaiFENesin ER (MUCINEX) tablet 600 mg  600 mg Oral BID    budesonide-formoteroL (SYMBICORT) 160-4.5 mcg/actuation HFA inhaler 2 Puff  2 Puff Inhalation BID    montelukast (SINGULAIR) tablet 10 mg  10 mg Oral DAILY    carvediloL (COREG) tablet 3.125 mg  3.125 mg Oral BID WITH MEALS    docusate sodium (COLACE) capsule 100 mg  100 mg Oral PRN    albuterol-ipratropium (DUO-NEB) 2.5 MG-0.5 MG/3 ML  3 mL Nebulization Q6H RT    methylPREDNISolone (PF) (SOLU-MEDROL) injection 40 mg  40 mg IntraVENous Q8H    enoxaparin (LOVENOX) injection 40 mg  40 mg SubCUTAneous Q24H    potassium chloride SR (KLOR-CON 10) tablet 10 mEq  10 mEq Oral DAILY         Assessment/Plan:     Active Problems:    Hypoxia (12/13/2020)      Respiratory failure (HCC) (8/8/2021)      COPD exacerbation (HCC) (8/8/2021)      IMPRESSION:   1. Acute on chronic hypoxic respiratory failure  2. Chronic Obstructive Pulmonary Disease with Severe Acute Exacerbation requiring inpatient hospitalization and management; has very poor airway clearance. Increased work of breathing  3. Body mass index is 35.19 kg/m². 4. Obstructive sleep apnea syndrome noncompliant to CPAP machine  5.  Rule out cor pulmonale heart failure  6. Tobacco abuse  7. RVSP is 23 mmHg  8. Pt is requiring Drug therapy requiring intensive monitoring for toxicity  9. Pt is unstable, unpredictable needing inpatient monitoring; is acutely ill and at high risk of sudden decline and decompensation with severe consequenses and continued end organ dysfunction and failure  10. Prognosis guarded        RECOMMENDATIONS/PLAN:      1. On 4 liter nasal Cannula oxygen as salvage oxygen delivery device to provide high concentration of oxygen to overcome refractory hypoxia; will get arterial blood gases to see the PCO2 level if elevated will start patient on noninvasive ventilator BiPAP machine  2. She is chronically on home oxygen 2 L nasal cannula right now she is on 4 L we will continue to wean  3. She has not been on CPAP machine for a while he is she is noncompliant  4. IV Solu-Medrol nebulizer treatment  5. Will start patient on Lasix  6. Right heart pressure is 23 mmHg  7. Intubate and place on vent if NIV fails  8. Agree with Empiric IV antibiotics pending culture results   9. We will start patient on nicotine patch  10. Mild Left Ventricular diastolic dysfunction   11. Supplemental O2 to keep sats > 93%  12. Aspiration precautions  13. Labs to follow electrolytes, renal function and and blood counts  14. Glucose monitoring and SSI  15. Bronchial hygiene with respiratory therapy techniques, bronchodilators  16. DVT, SUP prophylaxis  17. Smoking cessation counseling done  18. Pt needs IV fluids with additives and Drug therapy requiring intensive monitoring for toxicity  19.  Prescription drug management with home med reconciliation reviewed

## 2021-08-11 LAB
ALBUMIN SERPL-MCNC: 3.1 G/DL (ref 3.5–5)
ALBUMIN/GLOB SERPL: 0.8 {RATIO} (ref 1.1–2.2)
ALP SERPL-CCNC: 126 U/L (ref 45–117)
ALT SERPL-CCNC: 29 U/L (ref 12–78)
ANION GAP SERPL CALC-SCNC: 1 MMOL/L (ref 5–15)
AST SERPL W P-5'-P-CCNC: 23 U/L (ref 15–37)
BASOPHILS # BLD: 0 K/UL (ref 0–0.1)
BASOPHILS NFR BLD: 0 % (ref 0–1)
BILIRUB SERPL-MCNC: 0.3 MG/DL (ref 0.2–1)
BUN SERPL-MCNC: 33 MG/DL (ref 6–20)
BUN/CREAT SERPL: 28 (ref 12–20)
CA-I BLD-MCNC: 9.7 MG/DL (ref 8.5–10.1)
CHLORIDE SERPL-SCNC: 103 MMOL/L (ref 97–108)
CO2 SERPL-SCNC: 35 MMOL/L (ref 21–32)
CREAT SERPL-MCNC: 1.18 MG/DL (ref 0.55–1.02)
DIFFERENTIAL METHOD BLD: ABNORMAL
EOSINOPHIL # BLD: 0 K/UL (ref 0–0.4)
EOSINOPHIL NFR BLD: 0 % (ref 0–7)
ERYTHROCYTE [DISTWIDTH] IN BLOOD BY AUTOMATED COUNT: 13.7 % (ref 11.5–14.5)
GLOBULIN SER CALC-MCNC: 3.9 G/DL (ref 2–4)
GLUCOSE BLD STRIP.AUTO-MCNC: 182 MG/DL (ref 65–117)
GLUCOSE BLD STRIP.AUTO-MCNC: 217 MG/DL (ref 65–117)
GLUCOSE BLD STRIP.AUTO-MCNC: 244 MG/DL (ref 65–117)
GLUCOSE BLD STRIP.AUTO-MCNC: 281 MG/DL (ref 65–117)
GLUCOSE SERPL-MCNC: 213 MG/DL (ref 65–100)
HCT VFR BLD AUTO: 45.9 % (ref 35–47)
HGB BLD-MCNC: 13.9 G/DL (ref 11.5–16)
IMM GRANULOCYTES # BLD AUTO: 0.1 K/UL (ref 0–0.04)
IMM GRANULOCYTES NFR BLD AUTO: 1 % (ref 0–0.5)
LYMPHOCYTES # BLD: 0.3 K/UL (ref 0.8–3.5)
LYMPHOCYTES NFR BLD: 3 % (ref 12–49)
MCH RBC QN AUTO: 29.1 PG (ref 26–34)
MCHC RBC AUTO-ENTMCNC: 30.3 G/DL (ref 30–36.5)
MCV RBC AUTO: 96 FL (ref 80–99)
MONOCYTES # BLD: 0.3 K/UL (ref 0–1)
MONOCYTES NFR BLD: 3 % (ref 5–13)
NEUTS SEG # BLD: 11.5 K/UL (ref 1.8–8)
NEUTS SEG NFR BLD: 93 % (ref 32–75)
NRBC # BLD: 0 K/UL (ref 0–0.01)
NRBC BLD-RTO: 0 PER 100 WBC
PERFORMED BY, TECHID: ABNORMAL
PLATELET # BLD AUTO: 299 K/UL (ref 150–400)
PMV BLD AUTO: 11.4 FL (ref 8.9–12.9)
POTASSIUM SERPL-SCNC: 4.9 MMOL/L (ref 3.5–5.1)
PROT SERPL-MCNC: 7 G/DL (ref 6.4–8.2)
RBC # BLD AUTO: 4.78 M/UL (ref 3.8–5.2)
SODIUM SERPL-SCNC: 139 MMOL/L (ref 136–145)
WBC # BLD AUTO: 12.1 K/UL (ref 3.6–11)

## 2021-08-11 PROCEDURE — 82962 GLUCOSE BLOOD TEST: CPT

## 2021-08-11 PROCEDURE — 80053 COMPREHEN METABOLIC PANEL: CPT

## 2021-08-11 PROCEDURE — 74011250637 HC RX REV CODE- 250/637: Performed by: INTERNAL MEDICINE

## 2021-08-11 PROCEDURE — 85025 COMPLETE CBC W/AUTO DIFF WBC: CPT

## 2021-08-11 PROCEDURE — 74011250637 HC RX REV CODE- 250/637: Performed by: NURSE PRACTITIONER

## 2021-08-11 PROCEDURE — 77010033678 HC OXYGEN DAILY

## 2021-08-11 PROCEDURE — 65270000029 HC RM PRIVATE

## 2021-08-11 PROCEDURE — 74011250636 HC RX REV CODE- 250/636: Performed by: INTERNAL MEDICINE

## 2021-08-11 PROCEDURE — 83970 ASSAY OF PARATHORMONE: CPT

## 2021-08-11 PROCEDURE — 74011000250 HC RX REV CODE- 250: Performed by: INTERNAL MEDICINE

## 2021-08-11 PROCEDURE — 94760 N-INVAS EAR/PLS OXIMETRY 1: CPT

## 2021-08-11 PROCEDURE — 74011636637 HC RX REV CODE- 636/637: Performed by: FAMILY MEDICINE

## 2021-08-11 PROCEDURE — 36415 COLL VENOUS BLD VENIPUNCTURE: CPT

## 2021-08-11 PROCEDURE — 94640 AIRWAY INHALATION TREATMENT: CPT

## 2021-08-11 RX ORDER — INSULIN GLARGINE 100 [IU]/ML
0.1 INJECTION, SOLUTION SUBCUTANEOUS DAILY
Status: DISCONTINUED | OUTPATIENT
Start: 2021-08-12 | End: 2021-08-14 | Stop reason: HOSPADM

## 2021-08-11 RX ORDER — CARVEDILOL 3.12 MG/1
6.25 TABLET ORAL 2 TIMES DAILY WITH MEALS
Status: DISCONTINUED | OUTPATIENT
Start: 2021-08-11 | End: 2021-08-14 | Stop reason: HOSPADM

## 2021-08-11 RX ADMIN — MONTELUKAST 10 MG: 10 TABLET, FILM COATED ORAL at 10:02

## 2021-08-11 RX ADMIN — GUAIFENESIN 600 MG: 600 TABLET, EXTENDED RELEASE ORAL at 23:05

## 2021-08-11 RX ADMIN — BUDESONIDE AND FORMOTEROL FUMARATE DIHYDRATE 2 PUFF: 160; 4.5 AEROSOL RESPIRATORY (INHALATION) at 08:22

## 2021-08-11 RX ADMIN — IPRATROPIUM BROMIDE AND ALBUTEROL SULFATE 3 ML: .5; 2.5 SOLUTION RESPIRATORY (INHALATION) at 13:36

## 2021-08-11 RX ADMIN — INSULIN LISPRO 4 UNITS: 100 INJECTION, SOLUTION INTRAVENOUS; SUBCUTANEOUS at 10:06

## 2021-08-11 RX ADMIN — Medication 1 TABLET: at 10:21

## 2021-08-11 RX ADMIN — AMLODIPINE BESYLATE 10 MG: 5 TABLET ORAL at 10:02

## 2021-08-11 RX ADMIN — GUAIFENESIN 600 MG: 600 TABLET, EXTENDED RELEASE ORAL at 10:03

## 2021-08-11 RX ADMIN — METHYLPREDNISOLONE SODIUM SUCCINATE 40 MG: 40 INJECTION, POWDER, FOR SOLUTION INTRAMUSCULAR; INTRAVENOUS at 05:52

## 2021-08-11 RX ADMIN — ASPIRIN 81 MG: 81 TABLET, CHEWABLE ORAL at 10:02

## 2021-08-11 RX ADMIN — INSULIN LISPRO 6 UNITS: 100 INJECTION, SOLUTION INTRAVENOUS; SUBCUTANEOUS at 13:29

## 2021-08-11 RX ADMIN — IPRATROPIUM BROMIDE AND ALBUTEROL SULFATE 3 ML: .5; 2.5 SOLUTION RESPIRATORY (INHALATION) at 08:22

## 2021-08-11 RX ADMIN — METHYLPREDNISOLONE SODIUM SUCCINATE 40 MG: 40 INJECTION, POWDER, FOR SOLUTION INTRAMUSCULAR; INTRAVENOUS at 23:05

## 2021-08-11 RX ADMIN — FUROSEMIDE 40 MG: 10 INJECTION, SOLUTION INTRAMUSCULAR; INTRAVENOUS at 10:12

## 2021-08-11 RX ADMIN — INSULIN LISPRO 2 UNITS: 100 INJECTION, SOLUTION INTRAVENOUS; SUBCUTANEOUS at 23:05

## 2021-08-11 RX ADMIN — INSULIN LISPRO 4 UNITS: 100 INJECTION, SOLUTION INTRAVENOUS; SUBCUTANEOUS at 18:15

## 2021-08-11 RX ADMIN — IPRATROPIUM BROMIDE AND ALBUTEROL SULFATE 3 ML: .5; 2.5 SOLUTION RESPIRATORY (INHALATION) at 20:40

## 2021-08-11 RX ADMIN — BUDESONIDE AND FORMOTEROL FUMARATE DIHYDRATE 2 PUFF: 160; 4.5 AEROSOL RESPIRATORY (INHALATION) at 20:40

## 2021-08-11 RX ADMIN — FLUTICASONE PROPIONATE 2 SPRAY: 50 SPRAY, METERED NASAL at 10:11

## 2021-08-11 RX ADMIN — IPRATROPIUM BROMIDE AND ALBUTEROL SULFATE 3 ML: .5; 2.5 SOLUTION RESPIRATORY (INHALATION) at 00:21

## 2021-08-11 RX ADMIN — CARVEDILOL 3.12 MG: 3.12 TABLET, FILM COATED ORAL at 10:02

## 2021-08-11 RX ADMIN — ENOXAPARIN SODIUM 40 MG: 40 INJECTION SUBCUTANEOUS at 15:55

## 2021-08-11 RX ADMIN — POTASSIUM CHLORIDE 10 MEQ: 750 TABLET, FILM COATED, EXTENDED RELEASE ORAL at 10:02

## 2021-08-11 RX ADMIN — CARVEDILOL 6.25 MG: 3.12 TABLET, FILM COATED ORAL at 18:16

## 2021-08-11 RX ADMIN — PANTOPRAZOLE SODIUM 40 MG: 40 TABLET, DELAYED RELEASE ORAL at 05:52

## 2021-08-11 NOTE — PROGRESS NOTES
General Daily Progress Note          Patient Name:   Miri Vizcaino       YOB: 1960       Age:  63 y.o. Admit Date: 8/8/2021      Subjective:     Patient seen in room with pulmonology consultation. She is remains on supplemental oxygen at 4 L    Discharge planning: Continue to try and wean patient down to her chronic oxygen level of 2 L. Weaning down steroids as well. Nephrology was consulted and will continue to monitor likely to follow as outpatient.   Anticipate patient will be ready for discharge in next 24 to 48 hours    Objective:     Visit Vitals  BP (!) 141/78 (BP 1 Location: Right upper arm)   Pulse 88   Temp 98.4 °F (36.9 °C)   Resp 20   Ht 5' 4\" (1.626 m)   Wt 93 kg (205 lb)   SpO2 94%   BMI 35.19 kg/m²        Recent Results (from the past 24 hour(s))   URINALYSIS W/MICROSCOPIC    Collection Time: 08/10/21  3:00 PM   Result Value Ref Range    Color Yellow/Straw      Appearance Clear Clear      Specific gravity 1.019 1.003 - 1.030      pH (UA) 5.0 5.0 - 8.0      Protein 30 (A) Negative mg/dL    Glucose 50 (A) Negative mg/dL    Ketone Negative Negative mg/dL    Bilirubin Negative Negative      Blood Negative Negative      Urobilinogen 0.1 0.1 - 1.0 EU/dL    Nitrites Negative Negative      Leukocyte Esterase Negative Negative      WBC 0-4 0 - 4 /hpf    RBC 0-5 0 - 5 /hpf    Bacteria Negative Negative /hpf    Mucus Trace /lpf   PROTEIN URINE, RANDOM    Collection Time: 08/10/21  3:15 PM   Result Value Ref Range    Protein, urine random 28 (H) 0.0 - 11.9 mg/dL   CREATININE, UR, RANDOM    Collection Time: 08/10/21  3:15 PM   Result Value Ref Range    Creatinine, urine 108.00 mg/dL   SODIUM, UR, RANDOM    Collection Time: 08/10/21  3:15 PM   Result Value Ref Range    Sodium,urine random 32 mmol/L   GLUCOSE, POC    Collection Time: 08/10/21  4:02 PM   Result Value Ref Range    Glucose (POC) 234 (H) 65 - 117 mg/dL    Performed by Jared Barrow, POC    Collection Time: 08/10/21 8:55 PM   Result Value Ref Range    Glucose (POC) 329 (H) 65 - 117 mg/dL    Performed by Wm Patel    METABOLIC PANEL, COMPREHENSIVE    Collection Time: 08/11/21  1:46 AM   Result Value Ref Range    Sodium 139 136 - 145 mmol/L    Potassium 4.9 3.5 - 5.1 mmol/L    Chloride 103 97 - 108 mmol/L    CO2 35 (H) 21 - 32 mmol/L    Anion gap 1 (L) 5 - 15 mmol/L    Glucose 213 (H) 65 - 100 mg/dL    BUN 33 (H) 6 - 20 mg/dL    Creatinine 1.18 (H) 0.55 - 1.02 mg/dL    BUN/Creatinine ratio 28 (H) 12 - 20      GFR est AA 57 (L) >60 ml/min/1.73m2    GFR est non-AA 47 (L) >60 ml/min/1.73m2    Calcium 9.7 8.5 - 10.1 mg/dL    Bilirubin, total 0.3 0.2 - 1.0 mg/dL    AST (SGOT) 23 15 - 37 U/L    ALT (SGPT) 29 12 - 78 U/L    Alk. phosphatase 126 (H) 45 - 117 U/L    Protein, total 7.0 6.4 - 8.2 g/dL    Albumin 3.1 (L) 3.5 - 5.0 g/dL    Globulin 3.9 2.0 - 4.0 g/dL    A-G Ratio 0.8 (L) 1.1 - 2.2     CBC WITH AUTOMATED DIFF    Collection Time: 08/11/21  1:46 AM   Result Value Ref Range    WBC 12.1 (H) 3.6 - 11.0 K/uL    RBC 4.78 3.80 - 5.20 M/uL    HGB 13.9 11.5 - 16.0 g/dL    HCT 45.9 35.0 - 47.0 %    MCV 96.0 80.0 - 99.0 FL    MCH 29.1 26.0 - 34.0 PG    MCHC 30.3 30.0 - 36.5 g/dL    RDW 13.7 11.5 - 14.5 %    PLATELET 890 951 - 986 K/uL    MPV 11.4 8.9 - 12.9 FL    NRBC 0.0 0.0  WBC    ABSOLUTE NRBC 0.00 0.00 - 0.01 K/uL    NEUTROPHILS 93 (H) 32 - 75 %    LYMPHOCYTES 3 (L) 12 - 49 %    MONOCYTES 3 (L) 5 - 13 %    EOSINOPHILS 0 0 - 7 %    BASOPHILS 0 0 - 1 %    IMMATURE GRANULOCYTES 1 (H) 0 - 0.5 %    ABS. NEUTROPHILS 11.5 (H) 1.8 - 8.0 K/UL    ABS. LYMPHOCYTES 0.3 (L) 0.8 - 3.5 K/UL    ABS. MONOCYTES 0.3 0.0 - 1.0 K/UL    ABS. EOSINOPHILS 0.0 0.0 - 0.4 K/UL    ABS. BASOPHILS 0.0 0.0 - 0.1 K/UL    ABS. IMM.  GRANS. 0.1 (H) 0.00 - 0.04 K/UL    DF AUTOMATED     GLUCOSE, POC    Collection Time: 08/11/21  7:57 AM   Result Value Ref Range    Glucose (POC) 217 (H) 65 - 117 mg/dL    Performed by Nathalie Grier (PCT)    GLUCOSE, POC Collection Time: 08/11/21 11:19 AM   Result Value Ref Range    Glucose (POC) 281 (H) 65 - 117 mg/dL    Performed by Sil Dee (PCT)      [unfilled]      Review of Systems    Constitutional: Negative for chills and fever. HENT: Negative. Eyes: Negative. Respiratory: Positive for cough, sputum production, shortness of breath and wheezing  Cardiovascular: Positive for orthopnea and leg swelling. Otherwise negative. Gastrointestinal: Negative for abdominal pain and nausea. Skin: Negative. Neurological: Negative. Physical Exam:      Constitutional: pt is oriented to person, place, and time. HENT:   Head: Normocephalic and atraumatic. Eyes: Pupils are equal, round, and reactive to light. EOM are normal.   Cardiovascular: Normal rate, regular rhythm and normal heart sounds. Pulmonary/Chest: Inspiratory and expiratory wheezing in upper fields, bibasilar diminished breath sounds. On supplemental oxygen. Dyspnea on exertion  Abdominal: Soft. Bowel sounds are normal. There is no abdominal tenderness. There is no rebound and no guarding. Musculoskeletal: Normal range of motion. Neurological: pt is alert and oriented to person, place, and time. XR CHEST PORT   Final Result   Mild CHF/volume overload suspected.            Recent Results (from the past 24 hour(s))   URINALYSIS W/MICROSCOPIC    Collection Time: 08/10/21  3:00 PM   Result Value Ref Range    Color Yellow/Straw      Appearance Clear Clear      Specific gravity 1.019 1.003 - 1.030      pH (UA) 5.0 5.0 - 8.0      Protein 30 (A) Negative mg/dL    Glucose 50 (A) Negative mg/dL    Ketone Negative Negative mg/dL    Bilirubin Negative Negative      Blood Negative Negative      Urobilinogen 0.1 0.1 - 1.0 EU/dL    Nitrites Negative Negative      Leukocyte Esterase Negative Negative      WBC 0-4 0 - 4 /hpf    RBC 0-5 0 - 5 /hpf    Bacteria Negative Negative /hpf    Mucus Trace /lpf   PROTEIN URINE, RANDOM    Collection Time: 08/10/21  3:15 PM   Result Value Ref Range    Protein, urine random 28 (H) 0.0 - 11.9 mg/dL   CREATININE, UR, RANDOM    Collection Time: 08/10/21  3:15 PM   Result Value Ref Range    Creatinine, urine 108.00 mg/dL   SODIUM, UR, RANDOM    Collection Time: 08/10/21  3:15 PM   Result Value Ref Range    Sodium,urine random 32 mmol/L   GLUCOSE, POC    Collection Time: 08/10/21  4:02 PM   Result Value Ref Range    Glucose (POC) 234 (H) 65 - 117 mg/dL    Performed by Jared Barrow, POC    Collection Time: 08/10/21  8:55 PM   Result Value Ref Range    Glucose (POC) 329 (H) 65 - 117 mg/dL    Performed by Trudy Montes    METABOLIC PANEL, COMPREHENSIVE    Collection Time: 08/11/21  1:46 AM   Result Value Ref Range    Sodium 139 136 - 145 mmol/L    Potassium 4.9 3.5 - 5.1 mmol/L    Chloride 103 97 - 108 mmol/L    CO2 35 (H) 21 - 32 mmol/L    Anion gap 1 (L) 5 - 15 mmol/L    Glucose 213 (H) 65 - 100 mg/dL    BUN 33 (H) 6 - 20 mg/dL    Creatinine 1.18 (H) 0.55 - 1.02 mg/dL    BUN/Creatinine ratio 28 (H) 12 - 20      GFR est AA 57 (L) >60 ml/min/1.73m2    GFR est non-AA 47 (L) >60 ml/min/1.73m2    Calcium 9.7 8.5 - 10.1 mg/dL    Bilirubin, total 0.3 0.2 - 1.0 mg/dL    AST (SGOT) 23 15 - 37 U/L    ALT (SGPT) 29 12 - 78 U/L    Alk.  phosphatase 126 (H) 45 - 117 U/L    Protein, total 7.0 6.4 - 8.2 g/dL    Albumin 3.1 (L) 3.5 - 5.0 g/dL    Globulin 3.9 2.0 - 4.0 g/dL    A-G Ratio 0.8 (L) 1.1 - 2.2     CBC WITH AUTOMATED DIFF    Collection Time: 08/11/21  1:46 AM   Result Value Ref Range    WBC 12.1 (H) 3.6 - 11.0 K/uL    RBC 4.78 3.80 - 5.20 M/uL    HGB 13.9 11.5 - 16.0 g/dL    HCT 45.9 35.0 - 47.0 %    MCV 96.0 80.0 - 99.0 FL    MCH 29.1 26.0 - 34.0 PG    MCHC 30.3 30.0 - 36.5 g/dL    RDW 13.7 11.5 - 14.5 %    PLATELET 147 416 - 984 K/uL    MPV 11.4 8.9 - 12.9 FL    NRBC 0.0 0.0  WBC    ABSOLUTE NRBC 0.00 0.00 - 0.01 K/uL    NEUTROPHILS 93 (H) 32 - 75 %    LYMPHOCYTES 3 (L) 12 - 49 %    MONOCYTES 3 (L) 5 - 13 % EOSINOPHILS 0 0 - 7 %    BASOPHILS 0 0 - 1 %    IMMATURE GRANULOCYTES 1 (H) 0 - 0.5 %    ABS. NEUTROPHILS 11.5 (H) 1.8 - 8.0 K/UL    ABS. LYMPHOCYTES 0.3 (L) 0.8 - 3.5 K/UL    ABS. MONOCYTES 0.3 0.0 - 1.0 K/UL    ABS. EOSINOPHILS 0.0 0.0 - 0.4 K/UL    ABS. BASOPHILS 0.0 0.0 - 0.1 K/UL    ABS. IMM. GRANS. 0.1 (H) 0.00 - 0.04 K/UL    DF AUTOMATED     GLUCOSE, POC    Collection Time: 08/11/21  7:57 AM   Result Value Ref Range    Glucose (POC) 217 (H) 65 - 117 mg/dL    Performed by Cara Kendra (PCT)    GLUCOSE, POC    Collection Time: 08/11/21 11:19 AM   Result Value Ref Range    Glucose (POC) 281 (H) 65 - 117 mg/dL    Performed by Cara Kendra (PCT)        Results     Procedure Component Value Units Date/Time    COVID-19 RAPID TEST [[id number]] Collected: 08/08/21 1127    Order Status: Completed Specimen: Nasopharyngeal Updated: 08/08/21 1154     Specimen source       Please find results under separate order           COVID-19 rapid test Not Detected        Comment: Rapid Abbott ID Now   Rapid NAAT:  The specimen is NEGATIVE for SARS-CoV-2, the novel coronavirus associated with COVID-19. Negative results should be treated as presumptive and, if inconsistent with clinical signs and symptoms or necessary for patient management, should be tested with an alternative molecular assay. Negative results do not preclude SARS-CoV-2 infection and should not be used as the sole basis for patient management decisions. This test has been authorized by the FDA under   an Emergency Use Authorization (EUA) for use by authorized laboratories.  Fact sheet for Healthcare Providers: ConventionUpdate.co.nz Fact sheet for Patients: ConventionUpdate.co.nz   Methodology: Isothermal Nucleic Acid Amplification         CULTURE, RESPIRATORY/SPUTUM/BRONCH Robertha Masters STAIN [[id number]]  (Susceptibility) Collected: 08/08/21 0952    Order Status: Completed Specimen: Sputum Updated: 08/10/21 1122     Special Requests: No Special Requests        GRAM STAIN Rare WBCs seen               Occasional Epithelial cells seen            Few Gram Negative Rods         Few Gram Positive Rods         Rare Gram Negative Rods        Culture result: Few Serratia marcescens               Moderate Normal respiratory santos          Susceptibility      Serratia marcescens      BLANCA      Amikacin ($) Susceptible      Cefazolin ($) Resistant      Cefepime ($$) Susceptible      Cefoxitin Intermediate      Ceftazidime ($) Susceptible      Ceftriaxone ($) Susceptible      Ciprofloxacin ($) Susceptible      Gentamicin ($) Susceptible      Levofloxacin ($) Susceptible      Meropenem ($$) Susceptible      Tobramycin ($) Intermediate               Linear View                   CULTURE, BLOOD, PAIRED [[id number]] Collected: 08/08/21 0930    Order Status: Completed Specimen: Blood Updated: 08/11/21 0912     Special Requests: No Special Requests        Culture result: No growth 3 days              Labs:     Recent Labs     08/11/21  0146 08/10/21  0607   WBC 12.1* 12.7*   HGB 13.9 13.7   HCT 45.9 45.9    303     Recent Labs     08/11/21  0146 08/10/21  0607 08/09/21  0440    140 138   K 4.9 4.4 4.8    103 100   CO2 35* 37* 37*   BUN 33* 32* 20   CREA 1.18* 1.21* 1.16*   * 160* 168*   CA 9.7 9.9 9.7     Recent Labs     08/11/21  0146 08/10/21  0607 08/09/21  0440   ALT 29 27 29   * 113 111   TBILI 0.3 0.2 0.2   TP 7.0 7.0 7.8   ALB 3.1* 3.2* 3.6   GLOB 3.9 3.8 4.2*     No results for input(s): INR, PTP, APTT, INREXT, INREXT in the last 72 hours. No results for input(s): FE, TIBC, PSAT, FERR in the last 72 hours.    No results found for: FOL, RBCF   Recent Labs     08/09/21  1355   PH 7.37   PCO2 55*   PO2 174*     Recent Labs     08/09/21  0440   TROIQ <0.05     No results found for: CHOL, CHOLX, CHLST, CHOLV, HDL, HDLP, LDL, LDLC, DLDLP, TGLX, TRIGL, TRIGP, CHHD, CHHDX  Lab Results   Component Value Date/Time    Glucose (POC) 281 (H) 08/11/2021 11:19 AM    Glucose (POC) 217 (H) 08/11/2021 07:57 AM    Glucose (POC) 329 (H) 08/10/2021 08:55 PM    Glucose (POC) 234 (H) 08/10/2021 04:02 PM    Glucose (POC) 149 (H) 08/10/2021 11:08 AM     Lab Results   Component Value Date/Time    Color Yellow/Straw 08/10/2021 03:00 PM    Appearance Clear 08/10/2021 03:00 PM    Specific gravity 1.019 08/10/2021 03:00 PM    pH (UA) 5.0 08/10/2021 03:00 PM    Protein 30 (A) 08/10/2021 03:00 PM    Glucose 50 (A) 08/10/2021 03:00 PM    Ketone Negative 08/10/2021 03:00 PM    Bilirubin Negative 08/10/2021 03:00 PM    Urobilinogen 0.1 08/10/2021 03:00 PM    Nitrites Negative 08/10/2021 03:00 PM    Leukocyte Esterase Negative 08/10/2021 03:00 PM    Bacteria Negative 08/10/2021 03:00 PM    WBC 0-4 08/10/2021 03:00 PM    RBC 0-5 08/10/2021 03:00 PM         Assessment:   Acute on chronic hypoxic respiratory failure requiring supplemental oxygen  Atypical chest pain  COPD with acute exacerbation  Possible reflux esophagitis  Obesity  Heart failure with preserved ejection fraction  Asthma  Obstructive sleep apnea  GI bleed per her report  Nicotine dependence  INOCENTE versus CKD with worsening renal function-on diuretic therapy  Hyperglycemia - on steroids      Plan:   Pulmonology following  Cardiology following  On supplemental oxygen  Continue to monitor labs  Nephrology following  Continue all medications as ordered  Repeat chest x-ray through pulmonology  Solu-Medrol decreased to every 12 hours  Continue to try and wean oxygen per pulmonology  Start lantus and continue glucose monitoring with SSI    Patient is full code    Discussed with Dr. Londa Aschoff      Current Facility-Administered Medications:     carvediloL (COREG) tablet 6.25 mg, 6.25 mg, Oral, BID WITH MEALS, Sarina Mejia NP    methylPREDNISolone (PF) (SOLU-MEDROL) injection 40 mg, 40 mg, IntraVENous, Q12H, Adiel Lebron MD    insulin lispro (HUMALOG) injection, , SubCUTAneous, AC&HS, Shantel, Jo Ann Castelan MD, 4 Units at 08/11/21 1006    glucose chewable tablet 16 g, 4 Tablet, Oral, PRN, Jo Ann Funes MD    glucagon [facility]) injection 1 mg, 1 mg, IntraMUSCular, PRN, Jo Ann Funes MD    dextrose (D50W) injection syrg 12.5-25 g, 25-50 mL, IntraVENous, PRN, Jo Ann Funes MD    acetaminophen (TYLENOL) tablet 650 mg, 650 mg, Oral, Q6H PRN, Kami Funes MD, 650 mg at 08/10/21 1739    furosemide (LASIX) injection 40 mg, 40 mg, IntraVENous, DAILY, Chaitanya NICHOLS MD, 40 mg at 08/11/21 1012    pantoprazole (PROTONIX) tablet 40 mg, 40 mg, Oral, ACB, Jesica Pride MD, 40 mg at 08/11/21 0552    nicotine (NICODERM CQ) 7 mg/24 hr patch 1 Patch, 1 Patch, TransDERmal, DAILY, Shalonda Lebron MD, 1 Patch at 08/11/21 1001    albuterol-ipratropium (DUO-NEB) 2.5 MG-0.5 MG/3 ML, 3 mL, Nebulization, Q6H PRN, Jesica Pride MD, 3 mL at 08/08/21 1728    amLODIPine (NORVASC) tablet 10 mg, 10 mg, Oral, DAILY, Jesica Pride MD, 10 mg at 08/11/21 1002    aspirin chewable tablet 81 mg, 81 mg, Oral, DAILY, Jesica Pride MD, 81 mg at 08/11/21 1002    calcium-vitamin D 600 mg(1,500mg) -200 unit per tablet 1 Tablet, 1 Tablet, Oral, DAILY, Jesica Pride MD, 1 Tablet at 08/11/21 1021    ergocalciferol capsule 50,000 Units, 50,000 Units, Oral, Q7D, Jesica Pride MD, 50,000 Units at 08/10/21 1302    fluticasone propionate (FLONASE) 50 mcg/actuation nasal spray 2 Spray, 2 Spray, Both Nostrils, DAILY, [place] Hamm MD, 2 [place] at 08/11/21 1011    guaiFENesin ER (MUCINEX) tablet 600 mg, 600 mg, Oral, BID, Jesica Pride MD, 600 mg at 08/11/21 1003    budesonide-formoteroL (SYMBICORT) 160-4.5 mcg/actuation HFA inhaler 2 Puff, 2 Puff, Inhalation, BID, Jesica Pride MD, 2 Puff at 08/11/21 0822    montelukast (SINGULAIR) tablet 10 mg, 10 mg, Oral, DAILY, [place] Hamm MD, 10 mg at 08/11/21 1002    docusate sodium (COLACE) capsule 100 mg, 100 mg, Oral, PRN, Doyne Anis I, MD    albuterol-ipratropium (DUO-NEB) 2.5 MG-0.5 MG/3 ML, 3 mL, Nebulization, Q6H RT, North Hamm MD, 3 mL at 08/11/21 1718    enoxaparin (LOVENOX) injection 40 mg, 40 mg, SubCUTAneous, Q24H, North Hamm MD, 40 mg at 08/10/21 1610    potassium chloride SR (KLOR-CON 10) tablet 10 mEq, 10 mEq, Oral, DAILY, North Hamm MD, 10 mEq at 08/11/21 1002                                                                                              *ATTENTION:  This note has been created by a medical student for educational purposes only. Please do not refer to the content of this note for clinical decision-making, billing, or other purposes. Please see attending physicians note to obtain clinical information on this patient. *

## 2021-08-11 NOTE — PROGRESS NOTES
Renal Progress Note    NAME:  Daryl Apgar   :   1960   MRN:   [id number]     Date/Time:  2021       Assessment:   1. INOCENTE -> ? Sec to HF vs Diuresis vs Steroids  2. CKD --> ? Baseline creatinine  3. COPD   4. Respiratory Failure  5. HTN       Plan:   1. There has been an incremental improvement in GFR. 2. Continue the present course for now. 3. Wean steroids when feasible  4. Avoid NSAIDs + IV contrast  5. Follow lytes. 6. Dose meds for her GFR         Subjective:           F/U - INOCENTE , CKD --> 21     Felt better.      Review of Systems:  Y  N       Y  N  []         []          Fever/chills                                               []         []          Chest Pain  []         []          Cough                                                       []         []          Diarrhea   []         []          Sputum                                                     []         []          Constipation  []         []          SOB/ALVARADO                                                []         []          Nausea/Vomit  []         []          Abd Pain                                                    []         []          Tolerating PT  []         []          Dysuria                                                      []         []          Tolerating Diet     []        Unable to obtain  ROS due to  []        mental status change  []        sedated   []        intubated    Medications reviewed:  Current Facility-Administered Medications   Medication Dose Route Frequency    carvediloL (COREG) tablet 6.25 mg  6.25 mg Oral BID WITH MEALS    methylPREDNISolone (PF) (SOLU-MEDROL) injection 40 mg  40 mg IntraVENous Q12H    insulin lispro (HUMALOG) injection   SubCUTAneous AC&HS    glucose chewable tablet 16 g  4 Tablet Oral PRN    glucagon (GLUCAGEN) injection 1 mg  1 mg IntraMUSCular PRN    dextrose (D50W) injection syrg 12.5-25 g  25-50 mL IntraVENous PRN    acetaminophen (TYLENOL) tablet 650 mg  650 mg Oral Q6H PRN    furosemide (LASIX) injection 40 mg  40 mg IntraVENous DAILY    pantoprazole (PROTONIX) tablet 40 mg  40 mg Oral ACB    nicotine (NICODERM CQ) 7 mg/24 hr patch 1 Patch  1 Patch TransDERmal DAILY    albuterol-ipratropium (DUO-NEB) 2.5 MG-0.5 MG/3 ML  3 mL Nebulization Q6H PRN    amLODIPine (NORVASC) tablet 10 mg  10 mg Oral DAILY    aspirin chewable tablet 81 mg  81 mg Oral DAILY    calcium-vitamin D 600 mg(1,500mg) -200 unit per tablet 1 Tablet  1 Tablet Oral DAILY    ergocalciferol capsule 50,000 Units  50,000 Units Oral Q7D    fluticasone propionate (FLONASE) 50 mcg/actuation nasal spray 2 Spray  2 Spray Both Nostrils DAILY    guaiFENesin ER (MUCINEX) tablet 600 mg  600 mg Oral BID    budesonide-formoteroL (SYMBICORT) 160-4.5 mcg/actuation HFA inhaler 2 Puff  2 Puff Inhalation BID    montelukast (SINGULAIR) tablet 10 mg  10 mg Oral DAILY    docusate sodium (COLACE) capsule 100 mg  100 mg Oral PRN    albuterol-ipratropium (DUO-NEB) 2.5 MG-0.5 MG/3 ML  3 mL Nebulization Q6H RT    enoxaparin (LOVENOX) injection 40 mg  40 mg SubCUTAneous Q24H    potassium chloride SR (KLOR-CON 10) tablet 10 mEq  10 mEq Oral DAILY        Objective:   Vitals:  Visit Vitals  BP (!) 141/78 (BP 1 Location: Right upper arm)   Pulse 88   Temp 98.4 °F (36.9 °C)   Resp 20   Ht 5' 4\" (1.626 m)   Wt 93 kg (205 lb)   SpO2 94%   BMI 35.19 kg/m²     Temp (24hrs), Av.1 °F (36.7 °C), Min:97.7 °F (36.5 °C), Max:98.4 °F (36.9 °C)    O2 Flow Rate (L/min): 4 l/min O2 Device: Nasal cannula    Last 24hr Input/Output:  No intake or output data in the 24 hours ending 21 1055     PHYSICAL EXAM:      Seen in Room 570. General:    Alert, cooperative, no distress, appears stated age. Head:   Normocephalic    Eyes:   No icterus    Lungs:   Improved air entry    Heart:   No S3 gallop , no pericardial rub  .     Extremities: Leg edema --> improving      Psych:   Not anxious or agitated.       Neurologic: Alert and oriented        []        Telemetry Reviewed     []        NSR []        PAC/PVCs   []        Afib  []        Paced   []        NSVT   []        Nuñez []        NGT  []        Intubated on vent    Lab Data Reviewed:    Recent Results (from the past 24 hour(s))   GLUCOSE, POC    Collection Time: 08/10/21 11:08 AM   Result Value Ref Range    Glucose (POC) 149 (H) 65 - 117 mg/dL    Performed by José Newell    URINALYSIS W/MICROSCOPIC    Collection Time: 08/10/21  3:00 PM   Result Value Ref Range    Color Yellow/Straw      Appearance Clear Clear      Specific gravity 1.019 1.003 - 1.030      pH (UA) 5.0 5.0 - 8.0      Protein 30 (A) Negative mg/dL    Glucose 50 (A) Negative mg/dL    Ketone Negative Negative mg/dL    Bilirubin Negative Negative      Blood Negative Negative      Urobilinogen 0.1 0.1 - 1.0 EU/dL    Nitrites Negative Negative      Leukocyte Esterase Negative Negative      WBC 0-4 0 - 4 /hpf    RBC 0-5 0 - 5 /hpf    Bacteria Negative Negative /hpf    Mucus Trace /lpf   PROTEIN URINE, RANDOM    Collection Time: 08/10/21  3:15 PM   Result Value Ref Range    Protein, urine random 28 (H) 0.0 - 11.9 mg/dL   CREATININE, UR, RANDOM    Collection Time: 08/10/21  3:15 PM   Result Value Ref Range    Creatinine, urine 108.00 mg/dL   SODIUM, UR, RANDOM    Collection Time: 08/10/21  3:15 PM   Result Value Ref Range    Sodium,urine random 32 mmol/L   GLUCOSE, POC    Collection Time: 08/10/21  4:02 PM   Result Value Ref Range    Glucose (POC) 234 (H) 65 - 117 mg/dL    Performed by Jared Barrow, POC    Collection Time: 08/10/21  8:55 PM   Result Value Ref Range    Glucose (POC) 329 (H) 65 - 117 mg/dL    Performed by Wm Patel    METABOLIC PANEL, COMPREHENSIVE    Collection Time: 08/11/21  1:46 AM   Result Value Ref Range    Sodium 139 136 - 145 mmol/L    Potassium 4.9 3.5 - 5.1 mmol/L    Chloride 103 97 - 108 mmol/L    CO2 35 (H) 21 - 32 mmol/L    Anion gap 1 (L) 5 - 15 mmol/L    Glucose 213 (H) 65 - 100 mg/dL    BUN 33 (H) 6 - 20 mg/dL    Creatinine 1.18 (H) 0.55 - 1.02 mg/dL    BUN/Creatinine ratio 28 (H) 12 - 20      GFR est AA 57 (L) >60 ml/min/1.73m2    GFR est non-AA 47 (L) >60 ml/min/1.73m2    Calcium 9.7 8.5 - 10.1 mg/dL    Bilirubin, total 0.3 0.2 - 1.0 mg/dL    AST (SGOT) 23 15 - 37 U/L    ALT (SGPT) 29 12 - 78 U/L    Alk. phosphatase 126 (H) 45 - 117 U/L    Protein, total 7.0 6.4 - 8.2 g/dL    Albumin 3.1 (L) 3.5 - 5.0 g/dL    Globulin 3.9 2.0 - 4.0 g/dL    A-G Ratio 0.8 (L) 1.1 - 2.2     CBC WITH AUTOMATED DIFF    Collection Time: 08/11/21  1:46 AM   Result Value Ref Range    WBC 12.1 (H) 3.6 - 11.0 K/uL    RBC 4.78 3.80 - 5.20 M/uL    HGB 13.9 11.5 - 16.0 g/dL    HCT 45.9 35.0 - 47.0 %    MCV 96.0 80.0 - 99.0 FL    MCH 29.1 26.0 - 34.0 PG    MCHC 30.3 30.0 - 36.5 g/dL    RDW 13.7 11.5 - 14.5 %    PLATELET 928 616 - 104 K/uL    MPV 11.4 8.9 - 12.9 FL    NRBC 0.0 0.0  WBC    ABSOLUTE NRBC 0.00 0.00 - 0.01 K/uL    NEUTROPHILS 93 (H) 32 - 75 %    LYMPHOCYTES 3 (L) 12 - 49 %    MONOCYTES 3 (L) 5 - 13 %    EOSINOPHILS 0 0 - 7 %    BASOPHILS 0 0 - 1 %    IMMATURE GRANULOCYTES 1 (H) 0 - 0.5 %    ABS. NEUTROPHILS 11.5 (H) 1.8 - 8.0 K/UL    ABS. LYMPHOCYTES 0.3 (L) 0.8 - 3.5 K/UL    ABS. MONOCYTES 0.3 0.0 - 1.0 K/UL    ABS. EOSINOPHILS 0.0 0.0 - 0.4 K/UL    ABS. BASOPHILS 0.0 0.0 - 0.1 K/UL    ABS. IMM.  GRANS. 0.1 (H) 0.00 - 0.04 K/UL    DF AUTOMATED     GLUCOSE, POC    Collection Time: 08/11/21  7:57 AM   Result Value Ref Range    Glucose (POC) 217 (H) 65 - 117 mg/dL    Performed by Samuel Johns (PELON)        Total time spent with patient:  []        15   []        25   []        35   []         __ minutes    []        Critical Care Provided    Care Plan discussed with:      [x]        Patient   []        Family    []        Care Manager   []        Consultant/Specialist :      []          >50% of visit spent in counseling and coordination of care   (Discussed []        CODE status,  []        Care Plan, []        D/C Planning)    ___________________________________________________    Attending Physician: Torsten Hernandez MD

## 2021-08-11 NOTE — PROGRESS NOTES
Pulmonary Progress Note    Subjective:   Daily Progress Note: 2021 9:44 AM    CC: Shortness of breath    HPI: Excerpts from admission 2021 or consult notes as follows:   61-year-old lady came in because of shortness of breath and dyspnea significant past medical history of COPD and also sleep apnea noncompliant to CPAP machine she is a current smoker she is on home oxygen 2 L nasal cannula complaining about generalized weakness shortness of breath dyspnea and swelling of the lower extremities came to the hospital she was put on 5 L oxygen she was severely short of breath dyspneic x-ray shows fluid overload CHF so admitted and pulmonary consult was called for further evaluation.  Patient is ambulating well and can tolerate talking with oxygen. Review of Systems  A comprehensive review of systems was negative except for that written in the HPI. Objective:     Visit Vitals  BP (!) 141/78 (BP 1 Location: Right upper arm)   Pulse 88   Temp 98.4 °F (36.9 °C)   Resp 20   Ht 5' 4\" (1.626 m)   Wt 93 kg (205 lb)   SpO2 94%   BMI 35.19 kg/m²    O2 Flow Rate (L/min): 4 l/min O2 Device: Nasal cannula    Temp (24hrs), Av.1 °F (36.7 °C), Min:97.7 °F (36.5 °C), Max:98.4 °F (36.9 °C)      No intake/output data recorded. No intake/output data recorded.     General appearance: alert, cooperative, no distress, appears stated age  Head: Normocephalic, without obvious abnormality, atraumatic  Neck: supple, symmetrical, trachea midline, no adenopathy, thyroid: not enlarged, symmetric, no tenderness/mass/nodules, no carotid bruit and no JVD  Lungs: clear to auscultation bilaterally  Heart: regular rate and rhythm, S1, S2 normal, no murmur, click, rub or gallop  Extremities: bilateral pedal edema  Neurologic: Grossly normal        Data Review    Recent Results (from the past 24 hour(s))   GLUCOSE, POC    Collection Time: 08/10/21 11:08 AM   Result Value Ref Range    Glucose (POC) 149 (H) 65 - 117 mg/dL Performed by Saleem Murray W/MICROSCOPIC    Collection Time: 08/10/21  3:00 PM   Result Value Ref Range    Color Yellow/Straw      Appearance Clear Clear      Specific gravity 1.019 1.003 - 1.030      pH (UA) 5.0 5.0 - 8.0      Protein 30 (A) Negative mg/dL    Glucose 50 (A) Negative mg/dL    Ketone Negative Negative mg/dL    Bilirubin Negative Negative      Blood Negative Negative      Urobilinogen 0.1 0.1 - 1.0 EU/dL    Nitrites Negative Negative      Leukocyte Esterase Negative Negative      WBC 0-4 0 - 4 /hpf    RBC 0-5 0 - 5 /hpf    Bacteria Negative Negative /hpf    Mucus Trace /lpf   PROTEIN URINE, RANDOM    Collection Time: 08/10/21  3:15 PM   Result Value Ref Range    Protein, urine random 28 (H) 0.0 - 11.9 mg/dL   CREATININE, UR, RANDOM    Collection Time: 08/10/21  3:15 PM   Result Value Ref Range    Creatinine, urine 108.00 mg/dL   SODIUM, UR, RANDOM    Collection Time: 08/10/21  3:15 PM   Result Value Ref Range    Sodium,urine random 32 mmol/L   GLUCOSE, POC    Collection Time: 08/10/21  4:02 PM   Result Value Ref Range    Glucose (POC) 234 (H) 65 - 117 mg/dL    Performed by Jared 150, POC    Collection Time: 08/10/21  8:55 PM   Result Value Ref Range    Glucose (POC) 329 (H) 65 - 117 mg/dL    Performed by Lisa Castro    METABOLIC PANEL, COMPREHENSIVE    Collection Time: 08/11/21  1:46 AM   Result Value Ref Range    Sodium 139 136 - 145 mmol/L    Potassium 4.9 3.5 - 5.1 mmol/L    Chloride 103 97 - 108 mmol/L    CO2 35 (H) 21 - 32 mmol/L    Anion gap 1 (L) 5 - 15 mmol/L    Glucose 213 (H) 65 - 100 mg/dL    BUN 33 (H) 6 - 20 mg/dL    Creatinine 1.18 (H) 0.55 - 1.02 mg/dL    BUN/Creatinine ratio 28 (H) 12 - 20      GFR est AA 57 (L) >60 ml/min/1.73m2    GFR est non-AA 47 (L) >60 ml/min/1.73m2    Calcium 9.7 8.5 - 10.1 mg/dL    Bilirubin, total 0.3 0.2 - 1.0 mg/dL    AST (SGOT) 23 15 - 37 U/L    ALT (SGPT) 29 12 - 78 U/L    Alk.  phosphatase 126 (H) 45 - 117 U/L Protein, total 7.0 6.4 - 8.2 g/dL    Albumin 3.1 (L) 3.5 - 5.0 g/dL    Globulin 3.9 2.0 - 4.0 g/dL    A-G Ratio 0.8 (L) 1.1 - 2.2     CBC WITH AUTOMATED DIFF    Collection Time: 08/11/21  1:46 AM   Result Value Ref Range    WBC 12.1 (H) 3.6 - 11.0 K/uL    RBC 4.78 3.80 - 5.20 M/uL    HGB 13.9 11.5 - 16.0 g/dL    HCT 45.9 35.0 - 47.0 %    MCV 96.0 80.0 - 99.0 FL    MCH 29.1 26.0 - 34.0 PG    MCHC 30.3 30.0 - 36.5 g/dL    RDW 13.7 11.5 - 14.5 %    PLATELET 891 263 - 193 K/uL    MPV 11.4 8.9 - 12.9 FL    NRBC 0.0 0.0  WBC    ABSOLUTE NRBC 0.00 0.00 - 0.01 K/uL    NEUTROPHILS 93 (H) 32 - 75 %    LYMPHOCYTES 3 (L) 12 - 49 %    MONOCYTES 3 (L) 5 - 13 %    EOSINOPHILS 0 0 - 7 %    BASOPHILS 0 0 - 1 %    IMMATURE GRANULOCYTES 1 (H) 0 - 0.5 %    ABS. NEUTROPHILS 11.5 (H) 1.8 - 8.0 K/UL    ABS. LYMPHOCYTES 0.3 (L) 0.8 - 3.5 K/UL    ABS. MONOCYTES 0.3 0.0 - 1.0 K/UL    ABS. EOSINOPHILS 0.0 0.0 - 0.4 K/UL    ABS. BASOPHILS 0.0 0.0 - 0.1 K/UL    ABS. IMM.  GRANS. 0.1 (H) 0.00 - 0.04 K/UL    DF AUTOMATED     GLUCOSE, POC    Collection Time: 08/11/21  7:57 AM   Result Value Ref Range    Glucose (POC) 217 (H) 65 - 117 mg/dL    Performed by Bari Beltran (PCT)        Current Facility-Administered Medications   Medication Dose Route Frequency    insulin lispro (HUMALOG) injection   SubCUTAneous AC&HS    glucose chewable tablet 16 g  4 Tablet Oral PRN    glucagon (GLUCAGEN) injection 1 mg  1 mg IntraMUSCular PRN    dextrose (D50W) injection syrg 12.5-25 g  25-50 mL IntraVENous PRN    acetaminophen (TYLENOL) tablet 650 mg  650 mg Oral Q6H PRN    furosemide (LASIX) injection 40 mg  40 mg IntraVENous DAILY    pantoprazole (PROTONIX) tablet 40 mg  40 mg Oral ACB    nicotine (NICODERM CQ) 7 mg/24 hr patch 1 Patch  1 Patch TransDERmal DAILY    albuterol-ipratropium (DUO-NEB) 2.5 MG-0.5 MG/3 ML  3 mL Nebulization Q6H PRN    amLODIPine (NORVASC) tablet 10 mg  10 mg Oral DAILY    aspirin chewable tablet 81 mg  81 mg Oral DAILY    calcium-vitamin D 600 mg(1,500mg) -200 unit per tablet 1 Tablet  1 Tablet Oral DAILY    ergocalciferol capsule 50,000 Units  50,000 Units Oral Q7D    fluticasone propionate (FLONASE) 50 mcg/actuation nasal spray 2 Spray  2 Spray Both Nostrils DAILY    guaiFENesin ER (MUCINEX) tablet 600 mg  600 mg Oral BID    budesonide-formoteroL (SYMBICORT) 160-4.5 mcg/actuation HFA inhaler 2 Puff  2 Puff Inhalation BID    montelukast (SINGULAIR) tablet 10 mg  10 mg Oral DAILY    carvediloL (COREG) tablet 3.125 mg  3.125 mg Oral BID WITH MEALS    docusate sodium (COLACE) capsule 100 mg  100 mg Oral PRN    albuterol-ipratropium (DUO-NEB) 2.5 MG-0.5 MG/3 ML  3 mL Nebulization Q6H RT    methylPREDNISolone (PF) (SOLU-MEDROL) injection 40 mg  40 mg IntraVENous Q8H    enoxaparin (LOVENOX) injection 40 mg  40 mg SubCUTAneous Q24H    potassium chloride SR (KLOR-CON 10) tablet 10 mEq  10 mEq Oral DAILY         Assessment/Plan:     Active Problems:    Hypoxia (12/13/2020)      Respiratory failure (HCC) (8/8/2021)      COPD exacerbation ([facility] (8/8/2021)      IMPRESSION:   1. Acute on chronic hypoxic respiratory failure  2. Chronic Obstructive Pulmonary Disease  3. Body mass index is 35.19 kg/m². 4. Obstructive sleep apnea syndrome noncompliant to CPAP machine  5. Tobacco abuse  6. RVSP is 23 mmHg  7. INOCENTE  8. Prognosis guarded        RECOMMENDATIONS/PLAN:      1. On 4 liter nasal Cannula oxygen as salvage oxygen delivery device to provide high concentration of oxygen to overcome refractory hypoxia;  2. Decrease FiO2 PCO2 is elevated she is alert awake no need for BiPAP she is noncompliant  3. She is chronically on home oxygen 2 L nasal cannula right now she is on 4 L we will continue to wean  4. She has not been on CPAP machine for a while he is she is noncompliant  5. IV Solu-Medrol nebulizer treatment  6. Patient on Lasix  7. Right heart pressure is 23 mmHg  8. We will start patient on nicotine patch  9.  Mild Left Ventricular diastolic dysfunction   10. Smoking cessation counseling done  11. Pt needs IV fluids with additives and Drug therapy requiring intensive monitoring for toxicity  12.  Prescription drug management with home med reconciliation reviewed

## 2021-08-11 NOTE — PROGRESS NOTES
Progress Note      8/11/2021 9:41 AM  NAME: Geeta Begum   MRN:  [id number]   Admit Diagnosis: COPD exacerbation ([facility] [J44.1]  Respiratory failure ([facility] [J96.90]  Hypoxia [R09.02]      Problem List:   - hypoxia and respiratory failure  - COPD exacerbation, chronic O2 2LNC at home  - HFpEF EF of 55%-60% 8/9/21  - smoker  - sleep apnea - noncompliant with use of CPAP  - HTN     Assessment/Plan:   8/11/21  - patient observed resting in bed with eyes opened. Complains of shortness of breath with activity and rest. Denies chest pain, palpitations, dizziness or distress. No acute distress noted. - no acute cardiovascular events reported overnight  - remains in sinus rhythm  - Hgb 13.9/Hct 45.9  - Bun 33/Creat 1.18  - EF of 55%-60% 8/9/21  - no documented weight or I&O recorded  - continue strict I&O and daily weight  - bilateral compression stockings, may remove for bathing  - cardiac rehab consulted  - we will continue conservative cardiovascular management and follow patient during hospitalization along with the team.         []       High complexity decision making was performed in this patient at high risk for decompensation with multiple organ involvement. Subjective:    HISTORY OF PRESENT ILLNESS:     Larisa is a 63 y.o.   female who has no known established coronary artery disease and presented to the emergency department with shortness of breath and bilateral lower extremity edema responded well to Lasix in the emergency department. Catrina Gosselin was seen me last in my office in May 2019 after initial noninvasive cardiac work-up was unremarkable and secondary to her symptoms her great saphenous veins were occluded by mechanical procedure.  I have not seen her since then and she stated that she was feeling fine and as she was told that she has no heart disease and because of no symptoms she decided not to return to my office since May off 2019,  In the emergency department she was lying comfortably in the bed improved in her symptoms since presented to the hospital and her EKG shows sinus rhythm with no acute ST-T wave changes consistent with ischemia or injury pattern and cardiac enzyme has been unremarkable. Initial diagnosis of acute exacerbation of COPD was made and she got admitted to the hospital.      Review of Systems:    Symptom Y/N Comments  Symptom Y/N Comments   Fever/Chills N   Chest Pain N    Poor Appetite N   Edema N    Cough N   Abdominal Pain N    Sputum N   Joint Pain N    SOB/ALVARADO N   Pruritis/Rash N    Nausea/vomit N   Tolerating PT/OT Y    Diarrhea N   Tolerating Diet Y    Constipation N   Other       Could NOT obtain due to:      Objective:      Physical Exam:    Last 24hrs VS reviewed since prior progress note. Most recent are:    Visit Vitals  BP (!) 141/78 (BP 1 Location: Right upper arm)   Pulse 88   Temp 98.4 °F (36.9 °C)   Resp 20   Ht 5' 4\" (1.626 m)   Wt 93 kg (205 lb)   SpO2 94%   BMI 35.19 kg/m²     No intake or output data in the 24 hours ending 08/11/21 1037     General Appearance: Well developed, well nourished, alert & oriented x 3,    no acute distress. Ears/Nose/Mouth/Throat: Hearing grossly normal.  Neck: Supple. Chest: Lungs diminished to auscultation bilaterally. Cardiovascular: Regular rate and rhythm, S1S2 normal, no murmur. Abdomen: Soft, non-tender, bowel sounds are active. Extremities: non-pitting edema bilaterally. Skin: Warm and dry. PMH/SH reviewed - no change compared to H&P    Data Review    Telemetry: sinus rhythm  EKG:   []  No new EKG for review  XR CHEST PORT   Final Result   Mild CHF/volume overload suspected. Echocardiogram 8/9/21 Result status: Final result   · LV: Estimated LVEF is 55 - 60%. Normal cavity size, wall thickness and systolic function (ejection fraction normal). Mild (grade 1) left ventricular diastolic dysfunction. · LA: Mildly dilated left atrium. · MV: Mild mitral valve prolapse.   · TV: Right Ventricular Arterial Pressure (RVSP) is 23 mmHg. Echo Findings    Left Ventricle Normal cavity size, wall thickness and systolic function (ejection fraction normal). The estimated EF is 55 - 60%. There is mild (grade 1) left ventricular diastolic dysfunction. Left Atrium Mildly dilated left atrium. Right Ventricle Normal cavity size, wall thickness and global systolic function. Right Atrium Normal cavity size. Interatrial Septum No patent foramen ovale visualized. Aortic Valve Normal valve structure, no stenosis and no regurgitation. Mitral Valve Normal valve structure, no stenosis and no regurgitation. Mild mitral valve prolapse. Tricuspid Valve Normal valve structure and no stenosis. Trace regurgitation. Right Ventricular Arterial Pressure (RVSP) is 23 mmHg. Pulmonic Valve Pulmonic valve not well visualized. No stenosis and no regurgitation. Aorta Normal aortic root, ascending aortic, and aortic arch. Normal aortic root. Pulmonary Artery Pulmonary arteries not well visualized. IVC/Hepatic Veins Normal structure. Normal central venous pressure (3 mmHg); IVC diameter is less than 21 mm and collapses more than 50% with respiration. Pericardium Normal pericardium and no evidence of pericardial effusion. Lab Data Personally Reviewed:    Recent Labs     08/11/21  0146 08/10/21  0607   WBC 12.1* 12.7*   HGB 13.9 13.7   HCT 45.9 45.9    303     No results for input(s): INR, PTP, APTT, INREXT in the last 72 hours.    Recent Labs     08/11/21  0146 08/10/21  0607 08/09/21  0440    140 138   K 4.9 4.4 4.8    103 100   CO2 35* 37* 37*   BUN 33* 32* 20   CREA 1.18* 1.21* 1.16*   * 160* 168*   CA 9.7 9.9 9.7     Recent Labs     08/09/21  0440   TROIQ <0.05     No results found for: CHOL, CHOLX, CHLST, CHOLV, HDL, HDLP, LDL, LDLC, DLDLP, [place], CHHD, CHHDX    Recent Labs     08/11/21  0146 08/10/21  0607 08/09/21  0440   * 113 111   TP 7.0 7.0 7.8   ALB 3.1* 3. 2* 3.6   GLOB 3.9 3.8 4.2*     Recent Labs     08/09/21  1355   PH 7.37   PCO2 55*   PO2 174*       Medications Personally Reviewed:    Current Facility-Administered Medications   Medication Dose Route Frequency    carvediloL (COREG) tablet 6.25 mg  6.25 mg Oral BID WITH MEALS    methylPREDNISolone (PF) (SOLU-MEDROL) injection 40 mg  40 mg IntraVENous Q12H    insulin lispro (HUMALOG) injection   SubCUTAneous AC&HS    glucose chewable tablet 16 g  4 Tablet Oral PRN    glucagon (GLUCAGEN) injection 1 mg  1 mg IntraMUSCular PRN    dextrose (D50W) injection syrg 12.5-25 g  25-50 mL IntraVENous PRN    acetaminophen (TYLENOL) tablet 650 mg  650 mg Oral Q6H PRN    furosemide (LASIX) injection 40 mg  40 mg IntraVENous DAILY    pantoprazole (PROTONIX) tablet 40 mg  40 mg Oral ACB    nicotine (NICODERM CQ) 7 mg/24 hr patch 1 Patch  1 Patch TransDERmal DAILY    albuterol-ipratropium (DUO-NEB) 2.5 MG-0.5 MG/3 ML  3 mL Nebulization Q6H PRN    amLODIPine (NORVASC) tablet 10 mg  10 mg Oral DAILY    aspirin chewable tablet 81 mg  81 mg Oral DAILY    calcium-vitamin D 600 mg(1,500mg) -200 unit per tablet 1 Tablet  1 Tablet Oral DAILY    ergocalciferol capsule 50,000 Units  50,000 Units Oral Q7D    fluticasone propionate (FLONASE) 50 mcg/actuation nasal spray 2 Spray  2 Spray Both Nostrils DAILY    guaiFENesin ER (MUCINEX) tablet 600 mg  600 mg Oral BID    budesonide-formoteroL (SYMBICORT) 160-4.5 mcg/actuation HFA inhaler 2 Puff  2 Puff Inhalation BID    montelukast (SINGULAIR) tablet 10 mg  10 mg Oral DAILY    docusate sodium (COLACE) capsule 100 mg  100 mg Oral PRN    albuterol-ipratropium (DUO-NEB) 2.5 MG-0.5 MG/3 ML  3 mL Nebulization Q6H RT    enoxaparin (LOVENOX) injection 40 mg  40 mg SubCUTAneous Q24H    potassium chloride SR (KLOR-CON 10) tablet 10 mEq  10 mEq Oral DAILY         Jeni Vogel NP

## 2021-08-11 NOTE — PROGRESS NOTES
Pulmonary Progress Note    Subjective:   Daily Progress Note: 2021 9:44 AM    CC: Shortness of breath    HPI: Excerpts from admission 2021 or consult notes as follows:   57-year-old lady came in because of shortness of breath and dyspnea significant past medical history of COPD and also sleep apnea noncompliant to CPAP machine she is a current smoker she is on home oxygen 2 L nasal cannula complaining about generalized weakness shortness of breath dyspnea and swelling of the lower extremities came to the hospital she was put on 5 L oxygen she was severely short of breath dyspneic x-ray shows fluid overload CHF so admitted and pulmonary consult was called for further evaluation. Review of Systems  A comprehensive review of systems was negative except for that written in the HPI. Objective:     Visit Vitals  BP (!) 141/78 (BP 1 Location: Right upper arm)   Pulse 88   Temp 98.4 °F (36.9 °C)   Resp 20   Ht 5' 4\" (1.626 m)   Wt 93 kg (205 lb)   SpO2 94%   BMI 35.19 kg/m²    O2 Flow Rate (L/min): 4 l/min O2 Device: Nasal cannula    Temp (24hrs), Av.1 °F (36.7 °C), Min:97.7 °F (36.5 °C), Max:98.4 °F (36.9 °C)      No intake/output data recorded. No intake/output data recorded.     General appearance: alert, cooperative, no distress, appears stated age  Head: Normocephalic, without obvious abnormality, atraumatic  Neck: supple, symmetrical, trachea midline, no adenopathy, thyroid: not enlarged, symmetric, no tenderness/mass/nodules, no carotid bruit and no JVD  Lungs: clear to auscultation bilaterally  Heart: regular rate and rhythm, S1, S2 normal, no murmur, click, rub or gallop  Extremities: bilateral pedal edema  Neurologic: Grossly normal        Data Review    Recent Results (from the past 24 hour(s))   GLUCOSE, POC    Collection Time: 08/10/21 11:08 AM   Result Value Ref Range    Glucose (POC) 149 (H) 65 - 117 mg/dL    Performed by Philippe Talbot W/MICROSCOPIC    Collection Time: 08/10/21  3:00 PM   Result Value Ref Range    Color Yellow/Straw      Appearance Clear Clear      Specific gravity 1.019 1.003 - 1.030      pH (UA) 5.0 5.0 - 8.0      Protein 30 (A) Negative mg/dL    Glucose 50 (A) Negative mg/dL    Ketone Negative Negative mg/dL    Bilirubin Negative Negative      Blood Negative Negative      Urobilinogen 0.1 0.1 - 1.0 EU/dL    Nitrites Negative Negative      Leukocyte Esterase Negative Negative      WBC 0-4 0 - 4 /hpf    RBC 0-5 0 - 5 /hpf    Bacteria Negative Negative /hpf    Mucus Trace /lpf   PROTEIN URINE, RANDOM    Collection Time: 08/10/21  3:15 PM   Result Value Ref Range    Protein, urine random 28 (H) 0.0 - 11.9 mg/dL   CREATININE, UR, RANDOM    Collection Time: 08/10/21  3:15 PM   Result Value Ref Range    Creatinine, urine 108.00 mg/dL   SODIUM, UR, RANDOM    Collection Time: 08/10/21  3:15 PM   Result Value Ref Range    Sodium,urine random 32 mmol/L   GLUCOSE, POC    Collection Time: 08/10/21  4:02 PM   Result Value Ref Range    Glucose (POC) 234 (H) 65 - 117 mg/dL    Performed by Jared 150, POC    Collection Time: 08/10/21  8:55 PM   Result Value Ref Range    Glucose (POC) 329 (H) 65 - 117 mg/dL    Performed by See Thornton    METABOLIC PANEL, COMPREHENSIVE    Collection Time: 08/11/21  1:46 AM   Result Value Ref Range    Sodium 139 136 - 145 mmol/L    Potassium 4.9 3.5 - 5.1 mmol/L    Chloride 103 97 - 108 mmol/L    CO2 35 (H) 21 - 32 mmol/L    Anion gap 1 (L) 5 - 15 mmol/L    Glucose 213 (H) 65 - 100 mg/dL    BUN 33 (H) 6 - 20 mg/dL    Creatinine 1.18 (H) 0.55 - 1.02 mg/dL    BUN/Creatinine ratio 28 (H) 12 - 20      GFR est AA 57 (L) >60 ml/min/1.73m2    GFR est non-AA 47 (L) >60 ml/min/1.73m2    Calcium 9.7 8.5 - 10.1 mg/dL    Bilirubin, total 0.3 0.2 - 1.0 mg/dL    AST (SGOT) 23 15 - 37 U/L    ALT (SGPT) 29 12 - 78 U/L    Alk.  phosphatase 126 (H) 45 - 117 U/L    Protein, total 7.0 6.4 - 8.2 g/dL    Albumin 3.1 (L) 3.5 - 5.0 g/dL    Globulin 3.9 2.0 - 4.0 g/dL    A-G Ratio 0.8 (L) 1.1 - 2.2     CBC WITH AUTOMATED DIFF    Collection Time: 08/11/21  1:46 AM   Result Value Ref Range    WBC 12.1 (H) 3.6 - 11.0 K/uL    RBC 4.78 3.80 - 5.20 M/uL    HGB 13.9 11.5 - 16.0 g/dL    HCT 45.9 35.0 - 47.0 %    MCV 96.0 80.0 - 99.0 FL    MCH 29.1 26.0 - 34.0 PG    MCHC 30.3 30.0 - 36.5 g/dL    RDW 13.7 11.5 - 14.5 %    PLATELET 442 647 - 191 K/uL    MPV 11.4 8.9 - 12.9 FL    NRBC 0.0 0.0  WBC    ABSOLUTE NRBC 0.00 0.00 - 0.01 K/uL    NEUTROPHILS 93 (H) 32 - 75 %    LYMPHOCYTES 3 (L) 12 - 49 %    MONOCYTES 3 (L) 5 - 13 %    EOSINOPHILS 0 0 - 7 %    BASOPHILS 0 0 - 1 %    IMMATURE GRANULOCYTES 1 (H) 0 - 0.5 %    ABS. NEUTROPHILS 11.5 (H) 1.8 - 8.0 K/UL    ABS. LYMPHOCYTES 0.3 (L) 0.8 - 3.5 K/UL    ABS. MONOCYTES 0.3 0.0 - 1.0 K/UL    ABS. EOSINOPHILS 0.0 0.0 - 0.4 K/UL    ABS. BASOPHILS 0.0 0.0 - 0.1 K/UL    ABS. IMM.  GRANS. 0.1 (H) 0.00 - 0.04 K/UL    DF AUTOMATED     GLUCOSE, POC    Collection Time: 08/11/21  7:57 AM   Result Value Ref Range    Glucose (POC) 217 (H) 65 - 117 mg/dL    Performed by Des Arce (PCT)        Current Facility-Administered Medications   Medication Dose Route Frequency    carvediloL (COREG) tablet 6.25 mg  6.25 mg Oral BID WITH MEALS    insulin lispro (HUMALOG) injection   SubCUTAneous AC&HS    glucose chewable tablet 16 g  4 Tablet Oral PRN    glucagon (GLUCAGEN) injection 1 mg  1 mg IntraMUSCular PRN    dextrose (D50W) injection syrg 12.5-25 g  25-50 mL IntraVENous PRN    acetaminophen (TYLENOL) tablet 650 mg  650 mg Oral Q6H PRN    furosemide (LASIX) injection 40 mg  40 mg IntraVENous DAILY    pantoprazole (PROTONIX) tablet 40 mg  40 mg Oral ACB    nicotine (NICODERM CQ) 7 mg/24 hr patch 1 Patch  1 Patch TransDERmal DAILY    albuterol-ipratropium (DUO-NEB) 2.5 MG-0.5 MG/3 ML  3 mL Nebulization Q6H PRN    amLODIPine (NORVASC) tablet 10 mg  10 mg Oral DAILY    aspirin chewable tablet 81 mg  81 mg Oral DAILY    calcium-vitamin D 600 mg(1,500mg) -200 unit per tablet 1 Tablet  1 Tablet Oral DAILY    ergocalciferol capsule 50,000 Units  50,000 Units Oral Q7D    fluticasone propionate (FLONASE) 50 mcg/actuation nasal spray 2 Spray  2 Spray Both Nostrils DAILY    guaiFENesin ER (MUCINEX) tablet 600 mg  600 mg Oral BID    budesonide-formoteroL (SYMBICORT) 160-4.5 mcg/actuation HFA inhaler 2 Puff  2 Puff Inhalation BID    montelukast (SINGULAIR) tablet 10 mg  10 mg Oral DAILY    docusate sodium (COLACE) capsule 100 mg  100 mg Oral PRN    albuterol-ipratropium (DUO-NEB) 2.5 MG-0.5 MG/3 ML  3 mL Nebulization Q6H RT    methylPREDNISolone (PF) (SOLU-MEDROL) injection 40 mg  40 mg IntraVENous Q8H    enoxaparin (LOVENOX) injection 40 mg  40 mg SubCUTAneous Q24H    potassium chloride SR (KLOR-CON 10) tablet 10 mEq  10 mEq Oral DAILY         Assessment/Plan:     Active Problems:    Hypoxia (12/13/2020)      Respiratory failure (HCC) (8/8/2021)      COPD exacerbation ([facility] [address] (8/8/2021)      IMPRESSION:   1. Acute on chronic hypoxic respiratory failure  2. Chronic Obstructive Pulmonary Disease  3. Body mass index is 35.19 kg/m². 4. Obstructive sleep apnea syndrome noncompliant to CPAP machine  5. Tobacco abuse  6. RVSP is 23 mmHg  7. Prognosis guarded        RECOMMENDATIONS/PLAN:      1. On 4 liter nasal Cannula oxygen as salvage oxygen delivery device to provide high concentration of oxygen to overcome refractory hypoxia;  2. Decrease FiO2 PCO2 is elevated she is alert awake no need for BiPAP she is noncompliant  3. She is chronically on home oxygen 2 L nasal cannula right now she is on 4 L we will continue to wean  4. She has not been on CPAP machine for a while he is she is noncompliant  5. IV Solu-Medrol nebulizer treatment will change frequency Solu-Medrol to every 12 hours  6. Patient on Lasix  7. Right heart pressure is 23 mmHg  8. Patient on nicotine patch  9. Mild Left Ventricular diastolic dysfunction   10.  Smoking cessation counseling done

## 2021-08-11 NOTE — PROGRESS NOTES
Two person skin assessment performed by myself and Eduardo Ruffin RN. Patient skin is warm, dry and intact with no signs of breakdown.

## 2021-08-12 ENCOUNTER — APPOINTMENT (OUTPATIENT)
Dept: GENERAL RADIOLOGY | Age: 61
DRG: 133 | End: 2021-08-12
Attending: INTERNAL MEDICINE
Payer: MEDICAID

## 2021-08-12 LAB
ALBUMIN SERPL-MCNC: 3.2 G/DL (ref 3.5–5)
ANION GAP SERPL CALC-SCNC: 1 MMOL/L (ref 5–15)
BUN SERPL-MCNC: 28 MG/DL (ref 6–20)
BUN/CREAT SERPL: 27 (ref 12–20)
CA-I BLD-MCNC: 9.6 MG/DL (ref 8.5–10.1)
CA-I BLD-MCNC: 9.8 MG/DL (ref 8.5–10.1)
CHLORIDE SERPL-SCNC: 100 MMOL/L (ref 97–108)
CO2 SERPL-SCNC: 36 MMOL/L (ref 21–32)
CREAT SERPL-MCNC: 1.05 MG/DL (ref 0.55–1.02)
ERYTHROCYTE [DISTWIDTH] IN BLOOD BY AUTOMATED COUNT: 13.6 % (ref 11.5–14.5)
GLUCOSE BLD STRIP.AUTO-MCNC: 163 MG/DL (ref 65–117)
GLUCOSE BLD STRIP.AUTO-MCNC: 226 MG/DL (ref 65–117)
GLUCOSE BLD STRIP.AUTO-MCNC: 242 MG/DL (ref 65–117)
GLUCOSE BLD STRIP.AUTO-MCNC: 263 MG/DL (ref 65–117)
GLUCOSE SERPL-MCNC: 90 MG/DL (ref 65–100)
HCT VFR BLD AUTO: 47.2 % (ref 35–47)
HGB BLD-MCNC: 14.5 G/DL (ref 11.5–16)
MCH RBC QN AUTO: 28.8 PG (ref 26–34)
MCHC RBC AUTO-ENTMCNC: 30.7 G/DL (ref 30–36.5)
MCV RBC AUTO: 93.7 FL (ref 80–99)
NRBC # BLD: 0 K/UL (ref 0–0.01)
NRBC BLD-RTO: 0 PER 100 WBC
PERFORMED BY, TECHID: ABNORMAL
PHOSPHATE SERPL-MCNC: 2.7 MG/DL (ref 2.6–4.7)
PLATELET # BLD AUTO: 290 K/UL (ref 150–400)
PMV BLD AUTO: 11.2 FL (ref 8.9–12.9)
POTASSIUM SERPL-SCNC: 4.6 MMOL/L (ref 3.5–5.1)
PTH-INTACT SERPL-MCNC: 149.8 PG/ML (ref 18.4–88)
RBC # BLD AUTO: 5.04 M/UL (ref 3.8–5.2)
SODIUM SERPL-SCNC: 137 MMOL/L (ref 136–145)
WBC # BLD AUTO: 9 K/UL (ref 3.6–11)

## 2021-08-12 PROCEDURE — 80069 RENAL FUNCTION PANEL: CPT

## 2021-08-12 PROCEDURE — 94760 N-INVAS EAR/PLS OXIMETRY 1: CPT

## 2021-08-12 PROCEDURE — 74011000250 HC RX REV CODE- 250: Performed by: INTERNAL MEDICINE

## 2021-08-12 PROCEDURE — 97116 GAIT TRAINING THERAPY: CPT

## 2021-08-12 PROCEDURE — 97530 THERAPEUTIC ACTIVITIES: CPT

## 2021-08-12 PROCEDURE — 74011250637 HC RX REV CODE- 250/637: Performed by: INTERNAL MEDICINE

## 2021-08-12 PROCEDURE — 74011250636 HC RX REV CODE- 250/636: Performed by: INTERNAL MEDICINE

## 2021-08-12 PROCEDURE — 74011636637 HC RX REV CODE- 636/637: Performed by: FAMILY MEDICINE

## 2021-08-12 PROCEDURE — 71045 X-RAY EXAM CHEST 1 VIEW: CPT

## 2021-08-12 PROCEDURE — 82962 GLUCOSE BLOOD TEST: CPT

## 2021-08-12 PROCEDURE — 74011636637 HC RX REV CODE- 636/637: Performed by: NURSE PRACTITIONER

## 2021-08-12 PROCEDURE — 36415 COLL VENOUS BLD VENIPUNCTURE: CPT

## 2021-08-12 PROCEDURE — 97110 THERAPEUTIC EXERCISES: CPT

## 2021-08-12 PROCEDURE — 94640 AIRWAY INHALATION TREATMENT: CPT

## 2021-08-12 PROCEDURE — 77010033678 HC OXYGEN DAILY

## 2021-08-12 PROCEDURE — 65270000029 HC RM PRIVATE

## 2021-08-12 PROCEDURE — 74011250637 HC RX REV CODE- 250/637: Performed by: NURSE PRACTITIONER

## 2021-08-12 PROCEDURE — 74011000258 HC RX REV CODE- 258: Performed by: INTERNAL MEDICINE

## 2021-08-12 PROCEDURE — 74011250637 HC RX REV CODE- 250/637: Performed by: FAMILY MEDICINE

## 2021-08-12 PROCEDURE — 85027 COMPLETE CBC AUTOMATED: CPT

## 2021-08-12 RX ORDER — HYDROXYZINE PAMOATE 25 MG/1
25 CAPSULE ORAL
Status: DISCONTINUED | OUTPATIENT
Start: 2021-08-12 | End: 2021-08-14 | Stop reason: HOSPADM

## 2021-08-12 RX ORDER — GUAIFENESIN 600 MG/1
1200 TABLET, EXTENDED RELEASE ORAL 2 TIMES DAILY
Status: DISCONTINUED | OUTPATIENT
Start: 2021-08-12 | End: 2021-08-13

## 2021-08-12 RX ADMIN — INSULIN GLARGINE 9 UNITS: 100 INJECTION, SOLUTION SUBCUTANEOUS at 09:00

## 2021-08-12 RX ADMIN — CARVEDILOL 6.25 MG: 3.12 TABLET, FILM COATED ORAL at 10:53

## 2021-08-12 RX ADMIN — MONTELUKAST 10 MG: 10 TABLET, FILM COATED ORAL at 10:53

## 2021-08-12 RX ADMIN — POTASSIUM CHLORIDE 10 MEQ: 750 TABLET, FILM COATED, EXTENDED RELEASE ORAL at 10:53

## 2021-08-12 RX ADMIN — INSULIN LISPRO 4 UNITS: 100 INJECTION, SOLUTION INTRAVENOUS; SUBCUTANEOUS at 18:19

## 2021-08-12 RX ADMIN — INSULIN LISPRO 6 UNITS: 100 INJECTION, SOLUTION INTRAVENOUS; SUBCUTANEOUS at 07:30

## 2021-08-12 RX ADMIN — INSULIN LISPRO 2 UNITS: 100 INJECTION, SOLUTION INTRAVENOUS; SUBCUTANEOUS at 14:10

## 2021-08-12 RX ADMIN — CARVEDILOL 6.25 MG: 3.12 TABLET, FILM COATED ORAL at 18:20

## 2021-08-12 RX ADMIN — INSULIN LISPRO 4 UNITS: 100 INJECTION, SOLUTION INTRAVENOUS; SUBCUTANEOUS at 21:08

## 2021-08-12 RX ADMIN — METHYLPREDNISOLONE SODIUM SUCCINATE 40 MG: 40 INJECTION, POWDER, FOR SOLUTION INTRAMUSCULAR; INTRAVENOUS at 10:54

## 2021-08-12 RX ADMIN — FLUTICASONE PROPIONATE 2 SPRAY: 50 SPRAY, METERED NASAL at 10:54

## 2021-08-12 RX ADMIN — PIPERACILLIN AND TAZOBACTAM 3.38 G: 3; .375 INJECTION, POWDER, FOR SOLUTION INTRAVENOUS at 14:11

## 2021-08-12 RX ADMIN — Medication 1 TABLET: at 10:53

## 2021-08-12 RX ADMIN — IPRATROPIUM BROMIDE AND ALBUTEROL SULFATE 3 ML: .5; 2.5 SOLUTION RESPIRATORY (INHALATION) at 15:12

## 2021-08-12 RX ADMIN — GUAIFENESIN 1200 MG: 600 TABLET, EXTENDED RELEASE ORAL at 21:08

## 2021-08-12 RX ADMIN — METHYLPREDNISOLONE SODIUM SUCCINATE 40 MG: 40 INJECTION, POWDER, FOR SOLUTION INTRAMUSCULAR; INTRAVENOUS at 21:08

## 2021-08-12 RX ADMIN — PANTOPRAZOLE SODIUM 40 MG: 40 TABLET, DELAYED RELEASE ORAL at 10:54

## 2021-08-12 RX ADMIN — IPRATROPIUM BROMIDE AND ALBUTEROL SULFATE 3 ML: .5; 2.5 SOLUTION RESPIRATORY (INHALATION) at 21:22

## 2021-08-12 RX ADMIN — ACETAMINOPHEN 650 MG: 325 TABLET ORAL at 10:53

## 2021-08-12 RX ADMIN — BUDESONIDE AND FORMOTEROL FUMARATE DIHYDRATE 2 PUFF: 160; 4.5 AEROSOL RESPIRATORY (INHALATION) at 09:17

## 2021-08-12 RX ADMIN — FUROSEMIDE 40 MG: 10 INJECTION, SOLUTION INTRAMUSCULAR; INTRAVENOUS at 10:53

## 2021-08-12 RX ADMIN — ASPIRIN 81 MG: 81 TABLET, CHEWABLE ORAL at 10:53

## 2021-08-12 RX ADMIN — BUDESONIDE AND FORMOTEROL FUMARATE DIHYDRATE 2 PUFF: 160; 4.5 AEROSOL RESPIRATORY (INHALATION) at 21:22

## 2021-08-12 RX ADMIN — PIPERACILLIN AND TAZOBACTAM 3.38 G: 3; .375 INJECTION, POWDER, FOR SOLUTION INTRAVENOUS at 21:08

## 2021-08-12 RX ADMIN — ENOXAPARIN SODIUM 40 MG: 40 INJECTION SUBCUTANEOUS at 18:19

## 2021-08-12 RX ADMIN — AMLODIPINE BESYLATE 10 MG: 5 TABLET ORAL at 10:53

## 2021-08-12 RX ADMIN — IPRATROPIUM BROMIDE AND ALBUTEROL SULFATE 3 ML: .5; 2.5 SOLUTION RESPIRATORY (INHALATION) at 09:15

## 2021-08-12 NOTE — PROGRESS NOTES
Pulmonary Progress Note    Subjective:   Daily Progress Note: 2021 9:44 AM    CC: Shortness of breath    HPI: Excerpts from admission 2021 or consult notes as follows:   45-year-old lady came in because of shortness of breath and dyspnea significant past medical history of COPD and also sleep apnea noncompliant to CPAP machine she is a current smoker she is on home oxygen 2 L nasal cannula complaining about generalized weakness shortness of breath dyspnea and swelling of the lower extremities came to the hospital she was put on 5 L oxygen she was severely short of breath dyspneic x-ray shows fluid overload CHF so admitted and pulmonary consult was called for further evaluation.  Patient is sitting in bed, mild distress. Patient remains on nasal cannula 4 L/m. She states she has improving leg edema. She complains of non-productive cough. Review of Systems  A comprehensive review of systems was negative except for that written in the HPI. Objective:     Visit Vitals  BP (!) 141/82   Pulse 90   Temp 98.1 °F (36.7 °C)   Resp 20   Ht 5' 4\" (1.626 m)   Wt 93 kg (205 lb 0.4 oz)   SpO2 98%   BMI 35.19 kg/m²    O2 Flow Rate (L/min): 4 l/min O2 Device: Nasal cannula    Temp (24hrs), Av.1 °F (36.7 °C), Min:97.9 °F (36.6 °C), Max:98.2 °F (36.8 °C)      No intake/output data recorded. No intake/output data recorded.     General appearance: alert, cooperative, no distress, appears stated age  Head: Normocephalic, without obvious abnormality, atraumatic  Neck: supple, symmetrical, trachea midline, no adenopathy, thyroid: not enlarged, symmetric, no tenderness/mass/nodules, no carotid bruit and no JVD  Lungs: Diffuse wheezing, diminished lung sounds  Heart: regular rate and rhythm, S1, S2 normal, no murmur, click, rub or gallop  Extremities: bilateral pedal edema  Neurologic: Grossly normal        Data Review    Recent Results (from the past 24 hour(s))   GLUCOSE, POC    Collection Time: 21 11:19 AM   Result Value Ref Range    Glucose (POC) 281 (H) 65 - 117 mg/dL    Performed by Samuel Johns (PCT)    GLUCOSE, POC    Collection Time: 08/11/21  4:05 PM   Result Value Ref Range    Glucose (POC) 244 (H) 65 - 117 mg/dL    Performed by Samuel Johns (PCT)    GLUCOSE, POC    Collection Time: 08/11/21  8:21 PM   Result Value Ref Range    Glucose (POC) 182 (H) 65 - 117 mg/dL    Performed by Ho Senior        Current Facility-Administered Medications   Medication Dose Route Frequency    carvediloL (COREG) tablet 6.25 mg  6.25 mg Oral BID WITH MEALS    methylPREDNISolone (PF) (SOLU-MEDROL) injection 40 mg  40 mg IntraVENous Q12H    insulin glargine (LANTUS) injection 9 Units  0.1 Units/kg SubCUTAneous DAILY    insulin lispro (HUMALOG) injection   SubCUTAneous AC&HS    glucose chewable tablet 16 g  4 Tablet Oral PRN    glucagon (GLUCAGEN) injection 1 mg  1 mg IntraMUSCular PRN    dextrose (D50W) injection syrg 12.5-25 g  25-50 mL IntraVENous PRN    acetaminophen (TYLENOL) tablet 650 mg  650 mg Oral Q6H PRN    furosemide (LASIX) injection 40 mg  40 mg IntraVENous DAILY    pantoprazole (PROTONIX) tablet 40 mg  40 mg Oral ACB    nicotine (NICODERM CQ) 7 mg/24 hr patch 1 Patch  1 Patch TransDERmal DAILY    albuterol-ipratropium (DUO-NEB) 2.5 MG-0.5 MG/3 ML  3 mL Nebulization Q6H PRN    amLODIPine (NORVASC) tablet 10 mg  10 mg Oral DAILY    aspirin chewable tablet 81 mg  81 mg Oral DAILY    calcium-vitamin D 600 mg(1,500mg) -200 unit per tablet 1 Tablet  1 Tablet Oral DAILY    ergocalciferol capsule 50,000 Units  50,000 Units Oral Q7D    fluticasone propionate (FLONASE) 50 mcg/actuation nasal spray 2 Spray  2 Spray Both Nostrils DAILY    guaiFENesin ER (MUCINEX) tablet 600 mg  600 mg Oral BID    budesonide-formoteroL (SYMBICORT) 160-4.5 mcg/actuation HFA inhaler 2 Puff  2 Puff Inhalation BID    montelukast (SINGULAIR) tablet 10 mg  10 mg Oral DAILY    docusate sodium (COLACE) capsule 100 mg  100 mg Oral PRN    albuterol-ipratropium (DUO-NEB) 2.5 MG-0.5 MG/3 ML  3 mL Nebulization Q6H RT    enoxaparin (LOVENOX) injection 40 mg  40 mg SubCUTAneous Q24H    potassium chloride SR (KLOR-CON 10) tablet 10 mEq  10 mEq Oral DAILY         Assessment/Plan:     Active Problems:    Hypoxia (12/13/2020)      Respiratory failure ([facility] [address] (8/8/2021)      COPD exacerbation ([facility] [address] (8/8/2021)      IMPRESSION:   1. Acute on chronic hypoxic respiratory failure  2. Chronic Obstructive Pulmonary Disease  3. Body mass index is 35.19 kg/m². 4. Obstructive sleep apnea syndrome noncompliant to CPAP machine  5. Tobacco abuse  6. RVSP is 23 mmHg  7. Prognosis guarded        RECOMMENDATIONS/PLAN:      1. On 4 liter nasal Cannula oxygen as salvage oxygen delivery device to provide high concentration of oxygen to overcome refractory hypoxia;  2. Decrease FiO2 PCO2 is elevated she is alert awake no need for BiPAP she is noncompliant  3. She is chronically on home oxygen 2 L nasal cannula right now she is on 4 L we will continue to wean  4. She has not been on CPAP machine for a while she is noncompliant  5. IV Solu-Medrol nebulizer treatment will change frequency Solu-Medrol to every 12 hours  6. Patient on Lasix, improved leg edema  7. Right heart pressure is 23 mmHg  8. Patient on nicotine patch  9. Mild Left Ventricular diastolic dysfunction   10.  Smoking cessation counseling done

## 2021-08-12 NOTE — PROGRESS NOTES
General Daily Progress Note          Patient Name:   Vinny Huff       YOB: 1960       Age:  63 y.o. Admit Date: 8/8/2021      Subjective:     Patient seen in room with pulmonology consultation. She is remains on supplemental oxygen at 4 L. Complains of anxiety and frustration. Wants repeat cxr which was ordered by pulmonology this morning. Objective:     Visit Vitals  BP (!) 141/82   Pulse 90   Temp 98.1 °F (36.7 °C)   Resp 20   Ht 5' 4\" (1.626 m)   Wt 93 kg (205 lb 0.4 oz)   SpO2 98%   BMI 35.19 kg/m²        Recent Results (from the past 24 hour(s))   GLUCOSE, POC    Collection Time: 08/11/21 11:19 AM   Result Value Ref Range    Glucose (POC) 281 (H) 65 - 117 mg/dL    Performed by Shadi Ospina (PCT)    GLUCOSE, POC    Collection Time: 08/11/21  4:05 PM   Result Value Ref Range    Glucose (POC) 244 (H) 65 - 117 mg/dL    Performed by Shadi Ospina (PCT)    GLUCOSE, POC    Collection Time: 08/11/21  8:21 PM   Result Value Ref Range    Glucose (POC) 182 (H) 65 - 117 mg/dL    Performed by [id number] [place] [address], POC    Collection Time: 08/12/21  8:20 AM   Result Value Ref Range    Glucose (POC) 263 (H) 65 - 117 mg/dL    Performed by Nanette Crum      [unfilled]      Review of Systems    Constitutional: Negative for chills and fever. HENT: Negative. Eyes: Negative. Respiratory: Positive for cough, sputum production, shortness of breath and wheezing  Cardiovascular: Positive for orthopnea and leg swelling. Otherwise negative. Gastrointestinal: Negative for abdominal pain and nausea. Skin: Negative. Neurological: Negative. Physical Exam:      Constitutional: pt is oriented to person, place, and time. HENT:   Head: Normocephalic and atraumatic. Eyes: Pupils are equal, round, and reactive to light. EOM are normal.   Cardiovascular: Normal rate, regular rhythm and normal heart sounds.    Pulmonary/Chest: Inspiratory and expiratory wheezing in upper fields, bibasilar diminished breath sounds. On supplemental oxygen. Dyspnea on exertion  Abdominal: Soft. Bowel sounds are normal. There is no abdominal tenderness. There is no rebound and no guarding. Musculoskeletal: Normal range of motion. Neurological: pt is alert and oriented to person, place, and time. XR CHEST PORT   Final Result   Mild CHF/volume overload suspected. Recent Results (from the past 24 hour(s))   GLUCOSE, POC    Collection Time: 08/11/21 11:19 AM   Result Value Ref Range    Glucose (POC) 281 (H) 65 - 117 mg/dL    Performed by Jessie Mancera (PCT)    GLUCOSE, POC    Collection Time: 08/11/21  4:05 PM   Result Value Ref Range    Glucose (POC) 244 (H) 65 - 117 mg/dL    Performed by Jessie Mancera (PCT)    GLUCOSE, POC    Collection Time: 08/11/21  8:21 PM   Result Value Ref Range    Glucose (POC) 182 (H) 65 - 117 mg/dL    Performed by [address] [place] [address], POC    Collection Time: 08/12/21  8:20 AM   Result Value Ref Range    Glucose (POC) 263 (H) 65 - 117 mg/dL    Performed by Abbe King        Results     Procedure Component Value Units Date/Time    COVID-19 RAPID TEST [[id number]] Collected: 08/08/21 1127    Order Status: Completed Specimen: Nasopharyngeal Updated: 08/08/21 1154     Specimen source       Please find results under separate order           COVID-19 rapid test Not Detected        Comment: Rapid Abbott ID Now   Rapid NAAT:  The specimen is NEGATIVE for SARS-CoV-2, the novel coronavirus associated with COVID-19. Negative results should be treated as presumptive and, if inconsistent with clinical signs and symptoms or necessary for patient management, should be tested with an alternative molecular assay. Negative results do not preclude SARS-CoV-2 infection and should not be used as the sole basis for patient management decisions. This test has been authorized by the FDA under   an Emergency Use Authorization (EUA) for use by authorized laboratories.  Fact sheet for Healthcare Providers: ConventionUpdate.co.nz Fact sheet for Patients: ConventionUpdate.co.nz   Methodology: Isothermal Nucleic Acid Amplification         CULTURE, RESPIRATORY/SPUTUM/BRONCH Deshaun Cole STAIN [[id number]]  (Susceptibility) Collected: 08/08/21 0945    Order Status: Completed Specimen: Sputum Updated: 08/10/21 1122     Special Requests: No Special Requests        GRAM STAIN Rare WBCs seen               Occasional Epithelial cells seen            Few Gram Negative Rods         Few Gram Positive Rods         Rare Gram Negative Rods        Culture result: Few Serratia marcescens               Moderate Normal respiratory santos          Susceptibility      Serratia marcescens      BLANCA      Amikacin ($) Susceptible      Cefazolin ($) Resistant      Cefepime ($$) Susceptible      Cefoxitin Intermediate      Ceftazidime ($) Susceptible      Ceftriaxone ($) Susceptible      Ciprofloxacin ($) Susceptible      Gentamicin ($) Susceptible      Levofloxacin ($) Susceptible      Meropenem ($$) Susceptible      Tobramycin ($) Intermediate               Linear View                   CULTURE, BLOOD, PAIRED [[id number]] Collected: 08/08/21 0930    Order Status: Completed Specimen: Blood Updated: 08/12/21 0740     Special Requests: No Special Requests        Culture result: No growth 4 days              Labs:     Recent Labs     08/11/21  0146 08/10/21  0607   WBC 12.1* 12.7*   HGB 13.9 13.7   HCT 45.9 45.9    303     Recent Labs     08/11/21  0146 08/10/21  0607    140   K 4.9 4.4    103   CO2 35* 37*   BUN 33* 32*   CREA 1.18* 1.21*   * 160*   CA 9.8  9.7 9.9     Recent Labs     08/11/21  0146 08/10/21  0607   ALT 29 27   * 113   TBILI 0.3 0.2   TP 7.0 7.0   ALB 3.1* 3.2*   GLOB 3.9 3.8     No results for input(s): INR, PTP, APTT, INREXT, INREXT in the last 72 hours. No results for input(s): FE, TIBC, PSAT, FERR in the last 72 hours.    No results found for: FOL, RBCF   Recent Labs     08/09/21  1355   PH 7.37   PCO2 55*   PO2 174*     No results for input(s): CPK, CKNDX, TROIQ in the last 72 hours.     No lab exists for component: CPKMB  No results found for: CHOL, CHOLX, CHLST, CHOLV, HDL, HDLP, LDL, LDLC, DLDLP, TGLX, TRIGL, TRIGP, CHHD, CHHDX  Lab Results   Component Value Date/Time    Glucose (POC) 263 (H) 08/12/2021 08:20 AM    Glucose (POC) 182 (H) 08/11/2021 08:21 PM    Glucose (POC) 244 (H) 08/11/2021 04:05 PM    Glucose (POC) 281 (H) 08/11/2021 11:19 AM    Glucose (POC) 217 (H) 08/11/2021 07:57 AM     Lab Results   Component Value Date/Time    Color Yellow/Straw 08/10/2021 03:00 PM    Appearance Clear 08/10/2021 03:00 PM    Specific gravity 1.019 08/10/2021 03:00 PM    pH (UA) 5.0 08/10/2021 03:00 PM    Protein 30 (A) 08/10/2021 03:00 PM    Glucose 50 (A) 08/10/2021 03:00 PM    Ketone Negative 08/10/2021 03:00 PM    Bilirubin Negative 08/10/2021 03:00 PM    Urobilinogen 0.1 08/10/2021 03:00 PM    Nitrites Negative 08/10/2021 03:00 PM    Leukocyte Esterase Negative 08/10/2021 03:00 PM    Bacteria Negative 08/10/2021 03:00 PM    WBC 0-4 08/10/2021 03:00 PM    RBC 0-5 08/10/2021 03:00 PM         Assessment:   Acute on chronic hypoxic respiratory failure requiring supplemental oxygen  Atypical chest pain  COPD with acute exacerbation  Possible reflux esophagitis  Obesity  Heart failure with preserved ejection fraction  Asthma  Obstructive sleep apnea  GI bleed per her report  Nicotine dependence  INOCENTE versus CKD with worsening renal function-on diuretic therapy  Hyperglycemia - on steroids  Anxiety    Plan:   Pulmonology following  Cardiology following  On supplemental oxygen  Continue to monitor labs  Nephrology following  Continue all medications as ordered  Await repeat CXR  Solu-Medrol decreased to every 12 hours yesterday  Continue to try and wean oxygen per pulmonology  Start lantus and continue glucose monitoring with SSI  Add vistaril for anxiety    Patient is full code    Discussed with Dr. Prabhu Brooke      Current Facility-Administered Medications:     carvediloL (COREG) tablet 6.25 mg, 6.25 mg, Oral, BID WITH MEALS, Bhupendra CUEVAS NP, 6.25 mg at 08/11/21 1816    methylPREDNISolone (PF) (SOLU-MEDROL) injection 40 mg, 40 mg, IntraVENous, Q12H, Adiel Lebron MD, 40 mg at 08/11/21 2305    insulin glargine (LANTUS) injection 9 Units, 0.1 Units/kg, SubCUTAneous, DAILY, Nuvia Menezes NP  Jimenez  insulin lispro (HUMALOG) injection, , SubCUTAneous, AC&HS, Disha Ventura MD, 2 Units at 08/11/21 2305    glucose chewable tablet 16 g, 4 Tablet, Oral, PRN, Nathalie Funes MD    glucagon (GLUCAGEN) injection 1 mg, 1 mg, IntraMUSCular, PRN, Nathalie Funes MD    dextrose (D50W) injection syrg 12.5-25 g, 25-50 mL, IntraVENous, PRN, Nathalie Funes MD    acetaminophen (TYLENOL) tablet 650 mg, 650 mg, Oral, Q6H PRN, Kami Funes MD, 650 mg at 08/10/21 1739    furosemide (LASIX) injection 40 mg, 40 mg, IntraVENous, DAILY, North Hamm MD, 40 mg at 08/11/21 1012    pantoprazole (PROTONIX) tablet 40 mg, 40 mg, Oral, ACB, North Hamm MD, 40 mg at 08/11/21 0552    nicotine (NICODERM CQ) 7 mg/24 hr patch 1 Patch, 1 Patch, TransDERmal, DAILY, Adiel Lebron MD, 1 Patch at 08/11/21 1001    albuterol-ipratropium (DUO-NEB) 2.5 MG-0.5 MG/3 ML, 3 mL, Nebulization, Q6H PRN, North Mcdonnell MD, 3 mL at 08/08/21 1728    amLODIPine (NORVASC) tablet 10 mg, 10 mg, Oral, DAILY, North Hamm MD, 10 mg at 08/11/21 1002    aspirin chewable tablet 81 mg, 81 mg, Oral, DAILY, North Hamm MD, 81 mg at 08/11/21 1002    calcium-vitamin D 600 mg(1,500mg) -200 unit per tablet 1 Tablet, 1 Tablet, Oral, DAILY, North Hamm MD, 1 Tablet at 08/11/21 1021    ergocalciferol capsule 50,000 Units, 50,000 Units, Oral, Q7D, North Hamm MD, 50,000 Units at 08/10/21 1302    fluticasone propionate (FLONASE) 50 mcg/actuation nasal spray 2 Spray, 2 Spray, Both Nostrils, DAILY, North Hamm MD, 2 [place] at 08/11/21 1011    guaiFENesin ER (MUCINEX) tablet 600 mg, 600 mg, Oral, BID, North Hamm MD, 600 mg at 08/11/21 2305    budesonide-formoteroL (SYMBICORT) 160-4.5 mcg/actuation HFA inhaler 2 Puff, 2 Puff, Inhalation, BID, Ashleigh NICHOLS MD, 2 Puff at 08/12/21 0917    montelukast (SINGULAIR) tablet 10 mg, 10 mg, Oral, DAILY, North Hamm MD, 10 mg at 08/11/21 1002    docusate sodium (COLACE) capsule 100 mg, 100 mg, Oral, PRN, Ashleigh NICHOLS MD    albuterol-ipratropium (DUO-NEB) 2.5 MG-0.5 MG/3 ML, 3 mL, Nebulization, Q6H RT, North Hamm MD, 3 mL at 08/12/21 0915    enoxaparin (LOVENOX) injection 40 mg, 40 mg, SubCUTAneous, Q24H, North Hamm MD, 40 mg at 08/11/21 1555    potassium chloride SR (KLOR-CON 10) tablet 10 mEq, 10 mEq, Oral, DAILY, North Hamm MD, 10 mEq at 08/11/21 1002                                                                                              *ATTENTION:  This note has been created by a medical student for educational purposes only. Please do not refer to the content of this note for clinical decision-making, billing, or other purposes. Please see attending physicians note to obtain clinical information on this patient. *

## 2021-08-12 NOTE — PROGRESS NOTES
Progress Note      8/12/2021 9:15 AM  NAME: Dick Richardson   MRN:  [id number]   Admit Diagnosis: COPD exacerbation ([facility] [J44.1]  Res[facility] failure ([facility] [J96.90]  Hypoxia [R09.02]      Problem List:   - hypoxia and respiratory failure  - COPD exacerbation, chronic O2 2LNC at home  - HFpEF EF of 55%-60% 8/9/21  - smoker  - sleep apnea - noncompliant with use of CPAP  - HTN  - INOCENTE     Assessment/Plan:   8/12/21  - patient observed sitting to side of bed just returning from restroom. Complains of shortness of breath with activity. Denies chest pain, palpitations, dizziness or distress. No acute distress noted. Patient reports improvement of swelling to both lower legs. - no acute cardiovascular events reported overnight  - vital signs are hemodynamically stable  - Hgb 13.9/Hct 45.9  - Bun 33/Creat 1.18- nephrology following  - no intake/output documented  - EF of 55%-60% 8/9/21, results discussed with patient. - continue current cardiovascular medications  - we will continue conservative cardiovascular management and follow patient on a prn basis during hospitalization. []       High complexity decision making was performed in this patient at high risk for decompensation with multiple organ involvement. Subjective:      HISTORY OF PRESENT ILLNESS:     Larisa is a 63 y.o.   female who has no known established coronary artery disease and presented to the emergency department with shortness of breath and bilateral lower extremity edema responded well to Lasix in the emergency department. Cobymarcelluskimber Hauser was seen me last in my office in May 2019 after initial noninvasive cardiac work-up was unremarkable and secondary to her symptoms her great saphenous veins were occluded by mechanical procedure.  I have not seen her since then and she stated that she was feeling fine and as she was told that she has no heart disease and because of no symptoms she decided not to return to my office since May off 2019,  In the emergency department she was lying comfortably in the bed improved in her symptoms since presented to the hospital and her EKG shows sinus rhythm with no acute ST-T wave changes consistent with ischemia or injury pattern and cardiac enzyme has been unremarkable. Initial diagnosis of acute exacerbation of COPD was made and she got admitted to the hospital.         Review of Systems:    Symptom Y/N Comments  Symptom Y/N Comments   Fever/Chills N   Chest Pain N    Poor Appetite N   Edema N    Cough N   Abdominal Pain N    Sputum N   Joint Pain N    SOB/ALVARADO N   Pruritis/Rash N    Nausea/vomit N   Tolerating PT/OT Y    Diarrhea N   Tolerating Diet Y    Constipation N   Other       Could NOT obtain due to:      Objective:      Physical Exam:    Last 24hrs VS reviewed since prior progress note. Most recent are:    Visit Vitals  BP (!) 141/82   Pulse 90   Temp 98.1 °F (36.7 °C)   Resp 20   Ht 5' 4\" (1.626 m)   Wt 93 kg (205 lb 0.4 oz)   SpO2 98%   BMI 35.19 kg/m²     No intake or output data in the 24 hours ending 08/12/21 0917     General Appearance: Well developed, well nourished, alert & oriented x 3,    no acute distress. Ears/Nose/Mouth/Throat: Hearing grossly normal.  Neck: Supple. Chest: Lungs diminished to auscultation bilaterally. Scattered wheezing noted to bases. Cardiovascular: Regular rate and rhythm, S1S2 normal, no murmur. Abdomen: Soft, non-tender, bowel sounds are active. Extremities: trace, non-pitting edema bilaterally. Skin: Warm and dry. PMH/SH reviewed - no change compared to H&P    Data Review    Telemetry: sinus rhythm    EKG:   []  No new EKG for review  XR CHEST PORT   Final Result   Mild CHF/volume overload suspected. Lab Data Personally Reviewed:    Recent Labs     08/11/21  0146 08/10/21  0607   WBC 12.1* 12.7*   HGB 13.9 13.7   HCT 45.9 45.9    303     No results for input(s): INR, PTP, APTT, INREXT in the last 72 hours.    Recent Labs 08/11/21  0146 08/10/21  0607    140   K 4.9 4.4    103   CO2 35* 37*   BUN 33* 32*   CREA 1.18* 1.21*   * 160*   CA 9.8  9.7 9.9     No results for input(s): CPK, CKNDX, TROIQ in the last 72 hours.     No lab exists for component: CPKMB  No results found for: CHOL, CHOLX, CHLST, CHOLV, HDL, HDLP, LDL, LDLC, DLDLP, Arun Korey, CHHD, CHHDX    Recent Labs     08/11/21  0146 08/10/21  0607   * 113   TP 7.0 7.0   ALB 3.1* 3.2*   GLOB 3.9 3.8     Recent Labs     08/09/21  1355   PH 7.37   PCO2 55*   PO2 174*       Medications Personally Reviewed:    Current Facility-Administered Medications   Medication Dose Route Frequency    carvediloL (COREG) tablet 6.25 mg  6.25 mg Oral BID WITH MEALS    methylPREDNISolone (PF) (SOLU-MEDROL) injection 40 mg  40 mg IntraVENous Q12H    insulin glargine (LANTUS) injection 9 Units  0.1 Units/kg SubCUTAneous DAILY    insulin lispro (HUMALOG) injection   SubCUTAneous AC&HS    glucose chewable tablet 16 g  4 Tablet Oral PRN    glucagon (GLUCAGEN) injection 1 mg  1 mg IntraMUSCular PRN    dextrose (D50W) injection syrg 12.5-25 g  25-50 mL IntraVENous PRN    acetaminophen (TYLENOL) tablet 650 mg  650 mg Oral Q6H PRN    furosemide (LASIX) injection 40 mg  40 mg IntraVENous DAILY    pantoprazole (PROTONIX) tablet 40 mg  40 mg Oral ACB    nicotine (NICODERM CQ) 7 mg/24 hr patch 1 Patch  1 Patch TransDERmal DAILY    albuterol-ipratropium (DUO-NEB) 2.5 MG-0.5 MG/3 ML  3 mL Nebulization Q6H PRN    amLODIPine (NORVASC) tablet 10 mg  10 mg Oral DAILY    aspirin chewable tablet 81 mg  81 mg Oral DAILY    calcium-vitamin D 600 mg(1,500mg) -200 unit per tablet 1 Tablet  1 Tablet Oral DAILY    ergocalciferol capsule 50,000 Units  50,000 Units Oral Q7D    fluticasone propionate (FLONASE) 50 mcg/actuation nasal spray 2 Spray  2 Spray Both Nostrils DAILY    guaiFENesin ER (MUCINEX) tablet 600 mg  600 mg Oral BID    budesonide-formoteroL (SYMBICORT) 160-4.5 mcg/actuation HFA inhaler 2 Puff  2 Puff Inhalation BID    montelukast (SINGULAIR) tablet 10 mg  10 mg Oral DAILY    docusate sodium (COLACE) capsule 100 mg  100 mg Oral PRN    albuterol-ipratropium (DUO-NEB) 2.5 MG-0.5 MG/3 ML  3 mL Nebulization Q6H RT    enoxaparin (LOVENOX) injection 40 mg  40 mg SubCUTAneous Q24H    potassium chloride SR (KLOR-CON 10) tablet 10 mEq  10 mEq Oral DAILY         Marcia Agudelo NP

## 2021-08-12 NOTE — PROGRESS NOTES
PHYSICAL THERAPY TREATMENT  Patient: Nathen Hernandez (41 y.o. female)  Date: 8/12/2021  Diagnosis: COPD exacerbation ([facility] [J44.1]  Respiratory failure ([facility] [J96.90]  Hypoxia [R09.02] <principal problem not specified>       Precautions:    Chart, physical therapy assessment, plan of care and goals were reviewed. ASSESSMENT  Patient continues with skilled PT services and is progressing towards goals. Pt seated in recliner upon entry and agreeable to session. Pt requesting to use restroom prior to therex. Patient ambulated to the bathroom and back with mod I, able to toilet Independently. Pt O2 saturation post ambulation to bathroom was 86% while on 4L via NC. Patient required ~1 minute to return O2 to 96%. Pt completed seated therex while in recliner with rest breaks between sets, during therex O2 saturation remained in mid 90's. Patient then ambulated further with mod I, educated patient to slow down gait speed, focus on breathing, and to take a break if needed. Pt O2 saturation at 94% post second bout of ambulation. Pt left seated in recliner with call bell in reach and needs met. . Recommending d/c home with HHPT, shower chair, and SPC. Current Level of Function Impacting Discharge (mobility/balance): assistance for safety    Other factors to consider for discharge: time since onset, DME         PLAN :  Patient continues to benefit from skilled intervention to address the above impairments. Continue treatment per established plan of care. to address goals. Recommendation for discharge: (in order for the patient to meet his/her long term goals)  Physical therapy at least 2 days/week in the home     This discharge recommendation:  Has been made in collaboration with the attending provider and/or case management    IF patient discharges home will need the following DME: straight cane and shower chair       SUBJECTIVE:   Patient stated it's not me laying in this bed.  I want to get up and do things.     OBJECTIVE DATA SUMMARY:   Critical Behavior:  Neurologic State: Alert  Orientation Level: Oriented X4  Cognition: Follows commands, Appropriate for age attention/concentration    Functional Mobility Training:  Transfers:  Sit to Stand: Modified independent  Stand to Sit: Modified independent    Balance:  Sitting: Intact; With support  Standing: Intact; With support    Ambulation/Gait Training:  Distance (ft): 80 Feet (ft)  Ambulation - Level of Assistance: Modified independent      Therapeutic Exercises:   1 x 20 AP  1 x 15 LAQ  1 x 15 Marches  1 x 15 Hip ab/ad  Educated on semi supine therex    Pain Ratin/10    Activity Tolerance:   Fair, desaturates with exertion and requires oxygen, requires rest breaks, and observed SOB with activity  Please refer to the flowsheet for vital signs taken during this treatment. After treatment patient left in no apparent distress:   Sitting in chair and Call bell within reach    COMMUNICATION/COLLABORATION:   The patients plan of care was discussed with: Registered nurse. Problem: Mobility Impaired (Adult and Pediatric)  Goal: *Acute Goals and Plan of Care (Insert Text)  Description: Pt will be I with LE HEP in 7 days. Pt will perform bed mobility with mod I in 7 days. Pt will perform transfers with mod I in 7 days. Pt will amb 100-200 feet with LRAD safely with mod I in 7 days.        Outcome: Progressing Towards Goal       Shellie Heller PTA   Time Calculation: 34 mins

## 2021-08-12 NOTE — PROGRESS NOTES
Pulmonary Progress Note    Subjective:   Daily Progress Note: 2021 9:44 AM    CC: Shortness of breath    HPI: Excerpts from admission 2021 or consult notes as follows:   56-year-old lady came in because of shortness of breath and dyspnea significant past medical history of COPD and also sleep apnea noncompliant to CPAP machine she is a current smoker she is on home oxygen 2 L nasal cannula complaining about generalized weakness shortness of breath dyspnea and swelling of the lower extremities came to the hospital she was put on 5 L oxygen she was severely short of breath dyspneic x-ray shows fluid overload CHF so admitted and pulmonary consult was called for further evaluation.  Patient is sitting in bed, mild distress. Patient remains on nasal cannula 4 L/m. She states she has improving leg edema. She complains of non-productive cough. Review of Systems  A comprehensive review of systems was negative except for that written in the HPI. Objective:     Visit Vitals  BP (!) 141/82   Pulse 90   Temp 98.1 °F (36.7 °C)   Resp 20   Ht 5' 4\" (1.626 m)   Wt 93 kg (205 lb 0.4 oz)   SpO2 93%   BMI 35.19 kg/m²    O2 Flow Rate (L/min): 4 l/min O2 Device: Nasal cannula    Temp (24hrs), Av.1 °F (36.7 °C), Min:97.9 °F (36.6 °C), Max:98.2 °F (36.8 °C)      No intake/output data recorded. No intake/output data recorded.     General appearance: alert, cooperative, no distress, appears stated age  Head: Normocephalic, without obvious abnormality, atraumatic  Neck: supple, symmetrical, trachea midline, no adenopathy, thyroid: not enlarged, symmetric, no tenderness/mass/nodules, no carotid bruit and no JVD  Lungs: Diffuse wheezing, diminished lung sounds  Heart: regular rate and rhythm, S1, S2 normal, no murmur, click, rub or gallop  Extremities: bilateral pedal edema  Neurologic: Grossly normal        Data Review    Recent Results (from the past 24 hour(s))   GLUCOSE, POC    Collection Time: 21 11:19 AM   Result Value Ref Range    Glucose (POC) 281 (H) 65 - 117 mg/dL    Performed by Brenda aRmirez (PCT)    GLUCOSE, POC    Collection Time: 08/11/21  4:05 PM   Result Value Ref Range    Glucose (POC) 244 (H) 65 - 117 mg/dL    Performed by Brenda Ramirez (PCT)    GLUCOSE, POC    Collection Time: 08/11/21  8:21 PM   Result Value Ref Range    Glucose (POC) 182 (H) 65 - 117 mg/dL    Performed by [address] [place] [address], POC    Collection Time: 08/12/21  8:20 AM   Result Value Ref Range    Glucose (POC) 263 (H) 65 - 117 mg/dL    Performed by Kylah Casanova        Current Facility-Administered Medications   Medication Dose Route Frequency    carvediloL (COREG) tablet 6.25 mg  6.25 mg Oral BID WITH MEALS    methylPREDNISolone (PF) (SOLU-MEDROL) injection 40 mg  40 mg IntraVENous Q12H    insulin glargine (LANTUS) injection 9 Units  0.1 Units/kg SubCUTAneous DAILY    insulin lispro (HUMALOG) injection   SubCUTAneous AC&HS    glucose chewable tablet 16 g  4 Tablet Oral PRN    glucagon (GLUCAGEN) injection 1 mg  1 mg IntraMUSCular PRN    dextrose (D50W) injection syrg 12.5-25 g  25-50 mL IntraVENous PRN    acetaminophen (TYLENOL) tablet 650 mg  650 mg Oral Q6H PRN    furosemide (LASIX) injection 40 mg  40 mg IntraVENous DAILY    pantoprazole (PROTONIX) tablet 40 mg  40 mg Oral ACB    nicotine (NICODERM CQ) 7 mg/24 hr patch 1 Patch  1 Patch TransDERmal DAILY    albuterol-ipratropium (DUO-NEB) 2.5 MG-0.5 MG/3 ML  3 mL Nebulization Q6H PRN    amLODIPine (NORVASC) tablet 10 mg  10 mg Oral DAILY    aspirin chewable tablet 81 mg  81 mg Oral DAILY    calcium-vitamin D 600 mg(1,500mg) -200 unit per tablet 1 Tablet  1 Tablet Oral DAILY    ergocalciferol capsule 50,000 Units  50,000 Units Oral Q7D    fluticasone propionate (FLONASE) 50 mcg/actuation nasal spray 2 Spray  2 Spray Both Nostrils DAILY    guaiFENesin ER (MUCINEX) tablet 600 mg  600 mg Oral BID    budesonide-formoteroL (SYMBICORT) 160-4.5 mcg/actuation HFA inhaler 2 Puff  2 Puff Inhalation BID    montelukast (SINGULAIR) tablet 10 mg  10 mg Oral DAILY    docusate sodium (COLACE) capsule 100 mg  100 mg Oral PRN    albuterol-ipratropium (DUO-NEB) 2.5 MG-0.5 MG/3 ML  3 mL Nebulization Q6H RT    enoxaparin (LOVENOX) injection 40 mg  40 mg SubCUTAneous Q24H    potassium chloride SR (KLOR-CON 10) tablet 10 mEq  10 mEq Oral DAILY         Assessment/Plan:     Active Problems:    Hypoxia (12/13/2020)      Respiratory failure (HCC) (8/8/2021)      COPD exacerbation ([facility] [address] (8/8/2021)      IMPRESSION:   1. Acute on chronic hypoxic respiratory failure  2. Chronic Obstructive Pulmonary Disease  3. Pneumonia with Serratia gram-positive and gram-negative rods  4. Body mass index is 35.19 kg/m². 5. Obstructive sleep apnea syndrome noncompliant to CPAP machine  6. Tobacco abuse  7. RVSP is 23 mmHg  8. Prognosis guarded        RECOMMENDATIONS/PLAN:      1. On 4 liter nasal Cannula oxygen as salvage oxygen delivery device to provide high concentration of oxygen to overcome refractory hypoxia;  2. Decrease FiO2 PCO2 is elevated she is alert awake no need for BiPAP she is noncompliant  3. She is chronically on home oxygen 2 L nasal cannula right now she is on 4 L we will continue to wean  4. Ejection fraction is normal 55 to 60%  5. She has not been on CPAP machine for a while she is noncompliant  6. IV Solu-Medrol nebulizer treatment will change frequency Solu-Medrol to every 12 hours  7. Patient on Lasix, improved leg edema  8. Right heart pressure is 23 mmHg  9. Patient on nicotine patch  10. Mild Left Ventricular diastolic dysfunction   11. Smoking cessation counseling done  12.  We will start her on cough medication

## 2021-08-12 NOTE — PROGRESS NOTES
Renal Progress Note    NAME:  Daryl Apgar   :   1960   MRN:   [id number]     Date/Time:  2021       Assessment:   1. INOCENTE -> ? Sec to HF vs Diuresis vs Steroids  2. CKD --> ? Baseline creatinine  3. COPD   4. Respiratory Failure  5. HTN       Plan:   1. Her GFR is improving. This is encouraging  2. Continue the present course for now. 3. Wean steroids when feasible  4. Avoid NSAIDs + IV contrast  5. Follow lytes. 6. Dose meds for her GFR    Ms Hoda Mata can be seen in the office 3 to 4 weeks post discharge. Subjective:           F/U - INOCENTE , CKD --> 21     Felt better. F/U INOCENTE , CKD --> 21      Dyspnea is improving. Her exercise tolerance has picked up as well.     Review of Systems:  Y  N       Y  N  []         []          Fever/chills                                               []         []          Chest Pain  []         []          Cough                                                       []         []          Diarrhea   []         []          Sputum                                                     []         []          Constipation  []         []          SOB/ALVARADO                                                []         []          Nausea/Vomit  []         []          Abd Pain                                                    []         []          Tolerating PT  []         []          Dysuria                                                      []         []          Tolerating Diet     []        Unable to obtain  ROS due to  []        mental status change  []        sedated   []        intubated    Medications reviewed:  Current Facility-Administered Medications   Medication Dose Route Frequency    piperacillin-tazobactam (ZOSYN) 3.375 g in 0.9% sodium chloride (MBP/ADV) 100 mL MBP  3.375 g IntraVENous Q8H    guaiFENesin ER (MUCINEX) tablet 1,200 mg  1,200 mg Oral BID    hydrOXYzine pamoate (VISTARIL) capsule 25 mg  25 mg Oral TID PRN    carvediloL (COREG) tablet 6.25 mg  6.25 mg Oral BID WITH MEALS    methylPREDNISolone (PF) (SOLU-MEDROL) injection 40 mg  40 mg IntraVENous Q12H    insulin glargine (LANTUS) injection 9 Units  0.1 Units/kg SubCUTAneous DAILY    insulin lispro (HUMALOG) injection   SubCUTAneous AC&HS    glucose chewable tablet 16 g  4 Tablet Oral PRN    glucagon (GLUCAGEN) injection 1 mg  1 mg IntraMUSCular PRN    dextrose (D50W) injection syrg 12.5-25 g  25-50 mL IntraVENous PRN    acetaminophen (TYLENOL) tablet 650 mg  650 mg Oral Q6H PRN    furosemide (LASIX) injection 40 mg  40 mg IntraVENous DAILY    pantoprazole (PROTONIX) tablet 40 mg  40 mg Oral ACB    nicotine (NICODERM CQ) 7 mg/24 hr patch 1 Patch  1 Patch TransDERmal DAILY    albuterol-ipratropium (DUO-NEB) 2.5 MG-0.5 MG/3 ML  3 mL Nebulization Q6H PRN    amLODIPine (NORVASC) tablet 10 mg  10 mg Oral DAILY    aspirin chewable tablet 81 mg  81 mg Oral DAILY    calcium-vitamin D 600 mg(1,500mg) -200 unit per tablet 1 Tablet  1 Tablet Oral DAILY    ergocalciferol capsule 50,000 Units  50,000 Units Oral Q7D    fluticasone propionate (FLONASE) 50 mcg/actuation nasal spray 2 Spray  2 Spray Both Nostrils DAILY    budesonide-formoteroL (SYMBICORT) 160-4.5 mcg/actuation HFA inhaler 2 Puff  2 Puff Inhalation BID    montelukast (SINGULAIR) tablet 10 mg  10 mg Oral DAILY    docusate sodium (COLACE) capsule 100 mg  100 mg Oral PRN    albuterol-ipratropium (DUO-NEB) 2.5 MG-0.5 MG/3 ML  3 mL Nebulization Q6H RT    enoxaparin (LOVENOX) injection 40 mg  40 mg SubCUTAneous Q24H    potassium chloride SR (KLOR-CON 10) tablet 10 mEq  10 mEq Oral DAILY        Objective:   Vitals:  Visit Vitals  /74   Pulse 73   Temp 98.1 °F (36.7 °C)   Resp 20   Ht 5' 4\" (1.626 m)   Wt 93 kg (205 lb 0.4 oz)   SpO2 96%   BMI 35.19 kg/m²     Temp (24hrs), Av.1 °F (36.7 °C), Min:98.1 °F (36.7 °C), Max:98.2 °F (36.8 °C)    O2 Flow Rate (L/min): 4 l/min O2 Device: Nasal cannula    Last 24hr Input/Output:  No intake or output data in the 24 hours ending 08/12/21 1633     PHYSICAL EXAM:      Seen in Room 570. General:    Sitting at the edge of the bed comfortably. Ms Av Velasquez was having a meal.                          Her Brother was present. Head:   Normocephalic    Eyes:   No icterus    Lungs:   Improved air entry    Heart:   No S3 gallop , no pericardial rub  . Extremities: Leg edema --> improving      Psych:   Not anxious or agitated.       Neurologic: Alert and oriented        []        Telemetry Reviewed     []        NSR []        PAC/PVCs   []        Afib  []        Paced   []        NSVT   []        Nñuez []        NGT  []        Intubated on vent    Lab Data Reviewed:    Recent Results (from the past 24 hour(s))   GLUCOSE, POC    Collection Time: 08/11/21  8:21 PM   Result Value Ref Range    Glucose (POC) 182 (H) 65 - 117 mg/dL    Performed by [address] [place] [address], POC    Collection Time: 08/12/21  8:20 AM   Result Value Ref Range    Glucose (POC) 263 (H) 65 - 117 mg/dL    Performed by Jared Barrow, POC    Collection Time: 08/12/21 11:13 AM   Result Value Ref Range    Glucose (POC) 163 (H) 65 - 117 mg/dL    Performed by Laurie Ferguson    CBC W/O DIFF    Collection Time: 08/12/21 12:10 PM   Result Value Ref Range    WBC 9.0 3.6 - 11.0 K/uL    RBC 5.04 3.80 - 5.20 M/uL    HGB 14.5 11.5 - 16.0 g/dL    HCT 47.2 (H) 35.0 - 47.0 %    MCV 93.7 80.0 - 99.0 FL    MCH 28.8 26.0 - 34.0 PG    MCHC 30.7 30.0 - 36.5 g/dL    RDW 13.6 11.5 - 14.5 %    PLATELET 159 873 - 619 K/uL    MPV 11.2 8.9 - 12.9 FL    NRBC 0.0 0.0  WBC    ABSOLUTE NRBC 0.00 0.00 - 0.01 K/uL   RENAL FUNCTION PANEL    Collection Time: 08/12/21 12:10 PM   Result Value Ref Range    Sodium 137 136 - 145 mmol/L    Potassium 4.6 3.5 - 5.1 mmol/L    Chloride 100 97 - 108 mmol/L    CO2 36 (H) 21 - 32 mmol/L    Anion gap 1 (L) 5 - 15 mmol/L    Glucose 90 65 - 100 mg/dL    BUN 28 (H) 6 - 20 mg/dL    Creatinine 1.05 (H) 0.55 - 1.02 mg/dL    BUN/Creatinine ratio 27 (H) 12 - 20      GFR est AA >60 >60 ml/min/1.73m2    GFR est non-AA 54 (L) >60 ml/min/1.73m2    Calcium 9.6 8.5 - 10.1 mg/dL    Phosphorus 2.7 2.6 - 4.7 mg/dL    Albumin 3.2 (L) 3.5 - 5.0 g/dL       Total time spent with patient:  []        15   []        25   []        35   []         __ minutes    []        Critical Care Provided    Care Plan discussed with:      [x]        Patient   []        Family    []        Care Manager   []        Consultant/Specialist :      []          >50% of visit spent in counseling and coordination of care   (Discussed []        CODE status,  []        Care Plan, []        D/C Planning)    ___________________________________________________    Attending Physician: Leanne Edmonds MD

## 2021-08-12 NOTE — PROGRESS NOTES
OCCUPATIONAL THERAPY TREATMENT  Patient: Chris Ott (30 y.o. female)  Date: 8/12/2021  Diagnosis: COPD exacerbation ([facility] [address] [J44.1]  Respiratory failure ([facility] [address] [J96.90]  Hypoxia [R09.02] <principal problem not specified>       Precautions:    Chart, occupational therapy assessment, plan of care, and goals were reviewed. ASSESSMENT  Patient continues with skilled OT services and is progressing towards goals. Upon RAZA arrival, pt standing in room out of breath leaning on wall with RN coming into room to also assist.  Pt returned to EOB and caught breath. Pt noted with 4L of O2 with nasal canula. Pt completed sit>stand with Mod I and ambulated to bathroom. Pt completed standing grooming at sink with supervision: oral hygiene, face washing, and hair brushing. Pt ambulated back to room and transferred onto EOB, pt stating that she would like to have a recliner. Therapist left to retrieve recliner, when therapist returned pt sitting on side of bed with respiratory therapist in room and completing breathing treatment. Once completed with respiratory, pt completed donning of socks at EOB with supervision and ambulated to chair with Mod I.  Pt completed donning of clean gown with setup A. Reviewed UE HEP and pt demonstrated understanding. Pt tolerated session well today. Pt stating she thinks she has anxiety and thinks that she had an anxiety attack last night, nursing notified. [facility] and pt agreeable with therapy at home. Will continue to follow pt throughout remainder of stay and progress towards OT goals. Recommending HHOT at discharge when medically appropriate. Current Level of Function Impacting Discharge (ADLs): supervision grooming, supervision LB dressing, setup A UB dressing    Other factors to consider for discharge: family support, DME, time since onset, severity of deficits         PLAN :  Patient continues to benefit from skilled intervention to address the above impairments. Continue treatment per established plan of care. to address goals. Recommend next OT session: standing grooming, standing therex    Recommendation for discharge: (in order for the patient to meet his/her long term goals)  Occupational therapy at least 2 days/week in the home     This discharge recommendation:  Has been made in collaboration with the attending provider and/or case management    IF patient discharges home will need the following DME: cane: straight and shower chair       SUBJECTIVE:   Patient stated I get anxious, I think I had an anxiety attack last night. I need to talk to them about getting anxiety medication.     OBJECTIVE DATA SUMMARY:   Cognitive/Behavioral Status:  Neurologic State: Alert  Orientation Level: Oriented X4  Cognition: Follows commands; Appropriate for age attention/concentration    Functional Mobility and Transfers for ADLs:    Transfers:  Sit to Stand: Modified independent     Bed to Chair: Stand-by assistance    Balance:  Sitting: Intact; Without support  Standing: Impaired; Without support  Standing - Static: Good; Unsupported  Standing - Dynamic : Fair;Occasional    ADL Intervention:    Grooming  Position Performed: Standing  Washing Face: Supervision  Brushing Teeth: Supervision  Brushing/Combing Hair: Supervision    Upper Body [address]: Set-up    Lower Body Dressing Assistance  Socks: Supervision    Therapeutic Exercises:   Exercise Sets Reps AROM AAROM PROM Self PROM Comments   Shoulder flex/ext 1 5 [x] [] [] []    Elbow flex/ext 1 5 [x] [] [] []    Wrist flex/ext 1 5 [x] [] [] []      Pain:  0/10    Activity Tolerance:   Fair, desaturates with exertion and requires oxygen, requires rest breaks, and observed SOB with activity  Please refer to the flowsheet for vital signs taken during this treatment.     After treatment patient left in no apparent distress:   Sitting in chair and Call bell within reach    COMMUNICATION/COLLABORATION:   The patients plan of care was discussed with: Registered nurse and Respiratory therapist.     RY Logan  Time Calculation: 39 mins    Problem: Self Care Deficits Care Plan (Adult)  Goal: *Acute Goals and Plan of Care (Insert Text)  Description: Pt will be IND sup <> sit in prep for EOB ADLs  Pt will be IND grooming standing sink side LRAD  Pt will be IND LE dressing sitting EOB/long sit  Pt will be IND sit <>  prep for toileting LRAD  Pt will be IND toileting/toilet transfer/cloth mgmt LRAD  Pt will be IND following UE HEP in prep for self care tasks   Outcome: Progressing Towards Goal

## 2021-08-13 VITALS
TEMPERATURE: 98.4 F | WEIGHT: 205.03 LBS | HEIGHT: 64 IN | SYSTOLIC BLOOD PRESSURE: 112 MMHG | BODY MASS INDEX: 35 KG/M2 | OXYGEN SATURATION: 94 % | HEART RATE: 75 BPM | DIASTOLIC BLOOD PRESSURE: 71 MMHG | RESPIRATION RATE: 20 BRPM

## 2021-08-13 LAB
ALBUMIN SERPL-MCNC: 3 G/DL (ref 3.5–5)
ALBUMIN/GLOB SERPL: 0.9 {RATIO} (ref 1.1–2.2)
ALP SERPL-CCNC: 103 U/L (ref 45–117)
ALT SERPL-CCNC: 48 U/L (ref 12–78)
ANION GAP SERPL CALC-SCNC: ABNORMAL MMOL/L (ref 5–15)
AST SERPL W P-5'-P-CCNC: 15 U/L (ref 15–37)
BILIRUB SERPL-MCNC: 0.3 MG/DL (ref 0.2–1)
BUN SERPL-MCNC: 31 MG/DL (ref 6–20)
BUN/CREAT SERPL: 27 (ref 12–20)
CA-I BLD-MCNC: 9.1 MG/DL (ref 8.5–10.1)
CHLORIDE SERPL-SCNC: 102 MMOL/L (ref 97–108)
CO2 SERPL-SCNC: 38 MMOL/L (ref 21–32)
CREAT SERPL-MCNC: 1.15 MG/DL (ref 0.55–1.02)
GLOBULIN SER CALC-MCNC: 3.2 G/DL (ref 2–4)
GLUCOSE BLD STRIP.AUTO-MCNC: 172 MG/DL (ref 65–117)
GLUCOSE BLD STRIP.AUTO-MCNC: 194 MG/DL (ref 65–117)
GLUCOSE BLD STRIP.AUTO-MCNC: 198 MG/DL (ref 65–117)
GLUCOSE BLD STRIP.AUTO-MCNC: 277 MG/DL (ref 65–117)
GLUCOSE SERPL-MCNC: 243 MG/DL (ref 65–100)
PERFORMED BY, TECHID: ABNORMAL
POTASSIUM SERPL-SCNC: 5 MMOL/L (ref 3.5–5.1)
PROT SERPL-MCNC: 6.2 G/DL (ref 6.4–8.2)
SODIUM SERPL-SCNC: 139 MMOL/L (ref 136–145)

## 2021-08-13 PROCEDURE — 74011250636 HC RX REV CODE- 250/636: Performed by: INTERNAL MEDICINE

## 2021-08-13 PROCEDURE — 36415 COLL VENOUS BLD VENIPUNCTURE: CPT

## 2021-08-13 PROCEDURE — 74011250637 HC RX REV CODE- 250/637: Performed by: INTERNAL MEDICINE

## 2021-08-13 PROCEDURE — 74011000250 HC RX REV CODE- 250: Performed by: INTERNAL MEDICINE

## 2021-08-13 PROCEDURE — 80053 COMPREHEN METABOLIC PANEL: CPT

## 2021-08-13 PROCEDURE — 74011000258 HC RX REV CODE- 258: Performed by: INTERNAL MEDICINE

## 2021-08-13 PROCEDURE — 77010033678 HC OXYGEN DAILY

## 2021-08-13 PROCEDURE — 74011636637 HC RX REV CODE- 636/637: Performed by: NURSE PRACTITIONER

## 2021-08-13 PROCEDURE — 94640 AIRWAY INHALATION TREATMENT: CPT

## 2021-08-13 PROCEDURE — 74011250637 HC RX REV CODE- 250/637: Performed by: NURSE PRACTITIONER

## 2021-08-13 PROCEDURE — 97110 THERAPEUTIC EXERCISES: CPT

## 2021-08-13 PROCEDURE — 82962 GLUCOSE BLOOD TEST: CPT

## 2021-08-13 PROCEDURE — 97116 GAIT TRAINING THERAPY: CPT

## 2021-08-13 PROCEDURE — 74011636637 HC RX REV CODE- 636/637: Performed by: FAMILY MEDICINE

## 2021-08-13 PROCEDURE — 94760 N-INVAS EAR/PLS OXIMETRY 1: CPT

## 2021-08-13 RX ORDER — PANTOPRAZOLE SODIUM 40 MG/1
40 TABLET, DELAYED RELEASE ORAL
Qty: 60 TABLET | Refills: 0 | Status: SHIPPED | OUTPATIENT
Start: 2021-08-14

## 2021-08-13 RX ORDER — AMOXICILLIN AND CLAVULANATE POTASSIUM 875; 125 MG/1; MG/1
1 TABLET, FILM COATED ORAL 2 TIMES DAILY
Qty: 20 TABLET | Refills: 0 | OUTPATIENT
Start: 2021-08-13 | End: 2022-10-19

## 2021-08-13 RX ORDER — NICOTINE 7MG/24HR
1 PATCH, TRANSDERMAL 24 HOURS TRANSDERMAL DAILY
Qty: 30 PATCH | Refills: 0 | Status: SHIPPED | OUTPATIENT
Start: 2021-08-14 | End: 2021-09-13

## 2021-08-13 RX ORDER — HYDROXYZINE PAMOATE 25 MG/1
25 CAPSULE ORAL
Qty: 30 CAPSULE | Refills: 0 | Status: SHIPPED | OUTPATIENT
Start: 2021-08-13 | End: 2021-08-27

## 2021-08-13 RX ORDER — PREDNISONE 10 MG/1
TABLET ORAL
Qty: 15 TABLET | Refills: 0 | Status: SHIPPED | OUTPATIENT
Start: 2021-08-13

## 2021-08-13 RX ORDER — GUAIFENESIN 600 MG/1
600 TABLET, EXTENDED RELEASE ORAL 2 TIMES DAILY
Status: DISCONTINUED | OUTPATIENT
Start: 2021-08-13 | End: 2021-08-14 | Stop reason: HOSPADM

## 2021-08-13 RX ORDER — CARVEDILOL 6.25 MG/1
6.25 TABLET ORAL 2 TIMES DAILY WITH MEALS
Qty: 60 TABLET | Refills: 0 | Status: SHIPPED | OUTPATIENT
Start: 2021-08-13

## 2021-08-13 RX ORDER — FUROSEMIDE 40 MG/1
TABLET ORAL
Qty: 30 TABLET | Refills: 0 | Status: SHIPPED | OUTPATIENT
Start: 2021-08-13

## 2021-08-13 RX ORDER — INSULIN GLARGINE 100 [IU]/ML
INJECTION, SOLUTION SUBCUTANEOUS
Qty: 1 VIAL | Refills: 1 | Status: SHIPPED | OUTPATIENT
Start: 2021-08-14

## 2021-08-13 RX ADMIN — IPRATROPIUM BROMIDE AND ALBUTEROL SULFATE 3 ML: .5; 2.5 SOLUTION RESPIRATORY (INHALATION) at 13:34

## 2021-08-13 RX ADMIN — GUAIFENESIN 1200 MG: 600 TABLET, EXTENDED RELEASE ORAL at 10:13

## 2021-08-13 RX ADMIN — PIPERACILLIN AND TAZOBACTAM 3.38 G: 3; .375 INJECTION, POWDER, FOR SOLUTION INTRAVENOUS at 05:13

## 2021-08-13 RX ADMIN — PANTOPRAZOLE SODIUM 40 MG: 40 TABLET, DELAYED RELEASE ORAL at 10:13

## 2021-08-13 RX ADMIN — Medication 1 TABLET: at 10:13

## 2021-08-13 RX ADMIN — CARVEDILOL 6.25 MG: 3.12 TABLET, FILM COATED ORAL at 10:13

## 2021-08-13 RX ADMIN — METHYLPREDNISOLONE SODIUM SUCCINATE 40 MG: 40 INJECTION, POWDER, FOR SOLUTION INTRAMUSCULAR; INTRAVENOUS at 10:13

## 2021-08-13 RX ADMIN — POTASSIUM CHLORIDE 10 MEQ: 750 TABLET, FILM COATED, EXTENDED RELEASE ORAL at 10:14

## 2021-08-13 RX ADMIN — INSULIN LISPRO 2 UNITS: 100 INJECTION, SOLUTION INTRAVENOUS; SUBCUTANEOUS at 13:45

## 2021-08-13 RX ADMIN — BUDESONIDE AND FORMOTEROL FUMARATE DIHYDRATE 2 PUFF: 160; 4.5 AEROSOL RESPIRATORY (INHALATION) at 20:25

## 2021-08-13 RX ADMIN — INSULIN LISPRO 2 UNITS: 100 INJECTION, SOLUTION INTRAVENOUS; SUBCUTANEOUS at 10:14

## 2021-08-13 RX ADMIN — IPRATROPIUM BROMIDE AND ALBUTEROL SULFATE 3 ML: .5; 2.5 SOLUTION RESPIRATORY (INHALATION) at 20:24

## 2021-08-13 RX ADMIN — INSULIN GLARGINE 9 UNITS: 100 INJECTION, SOLUTION SUBCUTANEOUS at 10:14

## 2021-08-13 RX ADMIN — IPRATROPIUM BROMIDE AND ALBUTEROL SULFATE 3 ML: .5; 2.5 SOLUTION RESPIRATORY (INHALATION) at 01:41

## 2021-08-13 RX ADMIN — FUROSEMIDE 40 MG: 10 INJECTION, SOLUTION INTRAMUSCULAR; INTRAVENOUS at 10:14

## 2021-08-13 RX ADMIN — PIPERACILLIN AND TAZOBACTAM 3.38 G: 3; .375 INJECTION, POWDER, FOR SOLUTION INTRAVENOUS at 13:45

## 2021-08-13 RX ADMIN — MONTELUKAST 10 MG: 10 TABLET, FILM COATED ORAL at 10:14

## 2021-08-13 RX ADMIN — ASPIRIN 81 MG: 81 TABLET, CHEWABLE ORAL at 10:13

## 2021-08-13 RX ADMIN — BUDESONIDE AND FORMOTEROL FUMARATE DIHYDRATE 2 PUFF: 160; 4.5 AEROSOL RESPIRATORY (INHALATION) at 08:04

## 2021-08-13 RX ADMIN — IPRATROPIUM BROMIDE AND ALBUTEROL SULFATE 3 ML: .5; 2.5 SOLUTION RESPIRATORY (INHALATION) at 08:03

## 2021-08-13 RX ADMIN — INSULIN LISPRO 6 UNITS: 100 INJECTION, SOLUTION INTRAVENOUS; SUBCUTANEOUS at 16:30

## 2021-08-13 RX ADMIN — AMLODIPINE BESYLATE 10 MG: 5 TABLET ORAL at 10:14

## 2021-08-13 RX ADMIN — FLUTICASONE PROPIONATE 2 SPRAY: 50 SPRAY, METERED NASAL at 10:16

## 2021-08-13 NOTE — PROGRESS NOTES
Problem: Falls - Risk of  Goal: *Absence of Falls  Description: Document Aniket Sterling Fall Risk and appropriate interventions in the flowsheet. Outcome: Progressing Towards Goal  Note: Fall Risk Interventions:  Mobility Interventions: Bed/chair exit alarm, Patient to call before getting OOB, PT Consult for mobility concerns, PT Consult for assist device competence, Utilize walker, cane, or other assistive device         Medication Interventions: Bed/chair exit alarm, Patient to call before getting OOB, Teach patient to arise slowly    Elimination Interventions: Bed/chair exit alarm, Call light in reach, Patient to call for help with toileting needs              Problem: Pressure Injury - Risk of  Goal: *Prevention of pressure injury  Description: Document Driss Scale and appropriate interventions in the flowsheet. Outcome: Progressing Towards Goal  Note: Pressure Injury Interventions:       Moisture Interventions: Absorbent underpads, Apply protective barrier, creams and emollients, Maintain skin hydration (lotion/cream), Minimize layers, Offer toileting Q_hr    Activity Interventions: PT/OT evaluation, Increase time out of bed    Mobility Interventions: PT/OT evaluation, HOB 30 degrees or less, Float heels    Nutrition Interventions: Document food/fluid/supplement intake, Offer support with meals,snacks and hydration    Friction and Shear Interventions: Feet elevated on foot rest, HOB 30 degrees or less, Minimize layers                Problem: Pressure Injury - Risk of  Goal: *Prevention of pressure injury  Description: Document Driss Scale and appropriate interventions in the flowsheet.   Outcome: Progressing Towards Goal  Note: Pressure Injury Interventions:       Moisture Interventions: Absorbent underpads, Apply protective barrier, creams and emollients, Maintain skin hydration (lotion/cream), Minimize layers, Offer toileting Q_hr    Activity Interventions: PT/OT evaluation, Increase time out of bed    Mobility Interventions: PT/OT evaluation, HOB 30 degrees or less, Float heels    Nutrition Interventions: Document food/fluid/supplement intake, Offer support with meals,snacks and hydration    Friction and Shear Interventions: Feet elevated on foot rest, HOB 30 degrees or less, Minimize layers

## 2021-08-13 NOTE — PROGRESS NOTES
Spoke with pt regarding plan to discharge home with home health. She was aware of and agrees with the plan. Discharge plan of care/case management plan validated with provider discharge order.

## 2021-08-13 NOTE — CONSULTS
Full consultation dictated. [id number]. Patient well-known to myself from outpatient. Underlying COPD/asthma and obstructive sleep apnea. She was intolerant of CPAP. Uses oxygen 2 L nocturnally. Agree with discharge plans. She will follow up with me in about 3 to 4 weeks time. Thank you.

## 2021-08-13 NOTE — PROGRESS NOTES
Progress Note      8/13/2021  9:02 AM  NAME: Miri Vizcaino   MRN:  [id number]   Admit Diagnosis: COPD exacerbation ([facility] [J44.1]  Respiratory failure ([facility] [J96.90]  Hypoxia [R09.02]      Problem List:   - hypoxia and respiratory failure  - COPD exacerbation, chronic O2 2LNC at home  - HFpEF EF of 55%-60% 8/9/21  - smoker  - sleep apnea - noncompliant with use of CPAP  - HTN  - INOCENTE     Assessment/Plan:   8/13/21  - patient observed resting in bed with eyes closed. Easily arouses when name is called. Complains of shortness of breath with activity and relieved by rest. No acute distress noted. - sinus rhythm on telemetry   - no acute cardiovascular events overnight  - vital signs stable  - Hgb 14.5/Hct 47.2 8/12/21  - Bun 31/Creat 1.15 8/13/21  - no acute cardiovascular issues at this time. - we will continue to monitor patient during hospitalization and follow along with the team.         []       High complexity decision making was performed in this patient at high risk for decompensation with multiple organ involvement. Subjective:     HISTORY OF PRESENT ILLNESS:     Larisa is a 63 y.o.   female who has no known established coronary artery disease and presented to the emergency department with shortness of breath and bilateral lower extremity edema responded well to Lasix in the emergency department. Jose Grant was seen me last in my office in May 2019 after initial noninvasive cardiac work-up was unremarkable and secondary to her symptoms her great saphenous veins were occluded by mechanical procedure.  I have not seen her since then and she stated that she was feeling fine and as she was told that she has no heart disease and because of no symptoms she decided not to return to my office since May off 2019,  In the emergency department she was lying comfortably in the bed improved in her symptoms since presented to the hospital and her EKG shows sinus rhythm with no acute ST-T wave changes consistent with ischemia or injury pattern and cardiac enzyme has been unremarkable. Initial diagnosis of acute exacerbation of COPD was made and she got admitted to the hospital.    Review of Systems:    Symptom Y/N Comments  Symptom Y/N Comments   Fever/Chills N   Chest Pain N    Poor Appetite N   Edema N    Cough N   Abdominal Pain N    Sputum N   Joint Pain N    SOB/ALVARADO N   Pruritis/Rash N    Nausea/vomit N   Tolerating PT/OT Y    Diarrhea N   Tolerating Diet Y    Constipation N   Other       Could NOT obtain due to:      Objective:      Physical Exam:    Last 24hrs VS reviewed since prior progress note. Most recent are:    Visit Vitals  BP (!) 147/88   Pulse 78   Temp 97.8 °F (36.6 °C)   Resp 20   Ht 5' 4\" (1.626 m)   Wt 93 kg (205 lb 0.4 oz)   SpO2 95%   BMI 35.19 kg/m²     No intake or output data in the 24 hours ending 08/13/21 1026     General Appearance: Well developed, well nourished, alert & oriented x 3,    no acute distress. Ears/Nose/Mouth/Throat: Hearing grossly normal.  Neck: Supple. Chest: Lungs diminished to auscultation bilaterally. Cardiovascular: Regular rate and rhythm, S1S2 normal, no murmur. Abdomen: Soft, non-tender, bowel sounds are active. Extremities: No edema bilaterally. Skin: Warm and dry. PMH/SH reviewed - no change compared to H&P    Data Review    Telemetry: sinus rhythm    EKG:   []  No new EKG for review  XR CHEST PORT   Final Result   Findings/impression:      Cardiac silhouette is enlarged. No pulmonary edema. No consolidative airspace disease, pleural effusion or pneumothorax. No acute osseous abnormality identified. XR CHEST PORT   Final Result   Mild CHF/volume overload suspected. Lab Data Personally Reviewed:    Recent Labs     08/12/21  1210 08/11/21  0146   WBC 9.0 12.1*   HGB 14.5 13.9   HCT 47.2* 45.9    299     No results for input(s): INR, PTP, APTT, INREXT in the last 72 hours.    Recent Labs     08/13/21  0326 08/12/21  1210 08/11/21  0146    137 139   K 5.0 4.6 4.9    100 103   CO2 38* 36* 35*   BUN 31* 28* 33*   CREA 1.15* 1.05* 1.18*   * 90 213*   CA 9.1 9.6 9.8  9.7     No results for input(s): CPK, CKNDX, TROIQ in the last 72 hours. No lab exists for component: CPKMB  No results found for: CHOL, CHOLX, CHLST, CHOLV, HDL, HDLP, LDL, LDLC, DLDLP, Oz Deacon, CHHD, CHHDX    Recent Labs     08/13/21  0326 08/12/21 1210 08/11/21 0146     --  126*   TP 6.2*  --  7.0   ALB 3.0* 3.2* 3.1*   GLOB 3.2  --  3.9     No results for input(s): PH, PCO2, PO2 in the last 72 hours.     Medications Personally Reviewed:    Current Facility-Administered Medications   Medication Dose Route Frequency    piperacillin-tazobactam (ZOSYN) 3.375 g in 0.9% sodium chloride (MBP/ADV) 100 mL MBP  3.375 g IntraVENous Q8H    guaiFENesin ER (MUCINEX) tablet 1,200 mg  1,200 mg Oral BID    hydrOXYzine pamoate (VISTARIL) capsule 25 mg  25 mg Oral TID PRN    carvediloL (COREG) tablet 6.25 mg  6.25 mg Oral BID WITH MEALS    methylPREDNISolone (PF) (SOLU-MEDROL) injection 40 mg  40 mg IntraVENous Q12H    insulin glargine (LANTUS) injection 9 Units  0.1 Units/kg SubCUTAneous DAILY    insulin lispro (HUMALOG) injection   SubCUTAneous AC&HS    glucose chewable tablet 16 g  4 Tablet Oral PRN    glucagon (GLUCAGEN) injection 1 mg  1 mg IntraMUSCular PRN    dextrose (D50W) injection syrg 12.5-25 g  25-50 mL IntraVENous PRN    acetaminophen (TYLENOL) tablet 650 mg  650 mg Oral Q6H PRN    furosemide (LASIX) injection 40 mg  40 mg IntraVENous DAILY    pantoprazole (PROTONIX) tablet 40 mg  40 mg Oral ACB    nicotine (NICODERM CQ) 7 mg/24 hr patch 1 Patch  1 Patch TransDERmal DAILY    albuterol-ipratropium (DUO-NEB) 2.5 MG-0.5 MG/3 ML  3 mL Nebulization Q6H PRN    amLODIPine (NORVASC) tablet 10 mg  10 mg Oral DAILY    aspirin chewable tablet 81 mg  81 mg Oral DAILY    calcium-vitamin D 600 mg(1,500mg) -200 unit per tablet 1 Tablet  1 Tablet Oral DAILY    ergocalciferol capsule 50,000 Units  50,000 Units Oral Q7D    fluticasone propionate (FLONASE) 50 mcg/actuation nasal spray 2 Spray  2 Spray Both Nostrils DAILY    budesonide-formoteroL (SYMBICORT) 160-4.5 mcg/actuation HFA inhaler 2 Puff  2 Puff Inhalation BID    montelukast (SINGULAIR) tablet 10 mg  10 mg Oral DAILY    docusate sodium (COLACE) capsule 100 mg  100 mg Oral PRN    albuterol-ipratropium (DUO-NEB) 2.5 MG-0.5 MG/3 ML  3 mL Nebulization Q6H RT    enoxaparin (LOVENOX) injection 40 mg  40 mg SubCUTAneous Q24H    potassium chloride SR (KLOR-CON 10) tablet 10 mEq  10 mEq Oral DAILY         Mariya Curry NP

## 2021-08-13 NOTE — DISCHARGE INSTRUCTIONS
Patient Education        Chronic Obstructive Pulmonary Disease (COPD): Care Instructions  Your Care Instructions     Chronic obstructive pulmonary disease (COPD) is a general term for a group of lung diseases, including emphysema and chronic bronchitis. People with COPD have decreased airflow in and out of the lungs, which makes it hard to breathe. The airways also can get clogged with thick mucus. Cigarette smoking is a major cause of COPD. Although there is no cure for COPD, you can slow its progress. Following your treatment plan and taking care of yourself can help you feel better and live longer. Follow-up care is a key part of your treatment and safety. Be sure to make and go to all appointments, and call your doctor if you are having problems. It's also a good idea to know your test results and keep a list of the medicines you take. How can you care for yourself at home? Staying healthy    · Do not smoke. This is the most important step you can take to prevent more damage to your lungs. If you need help quitting, talk to your doctor about stop-smoking programs and medicines. These can increase your chances of quitting for good.     · Avoid colds and flu. Get a pneumococcal vaccine shot. If you have had one before, ask your doctor whether you need a second dose. Get the flu vaccine every fall. If you must be around people with colds or the flu, wash your hands often.     · Avoid secondhand smoke, air pollution, and high altitudes. Also avoid cold, dry air and hot, humid air. Stay at home with your windows closed when air pollution is bad. Medicines and oxygen therapy    · Take your medicines exactly as prescribed. Call your doctor if you think you are having a problem with your medicine. You may be taking medicines such as:  ? Bronchodilators. These help open your airways and make breathing easier. They are either short-acting (work for 6 to 9 hours) or long-acting (work for 24 hours).  You inhale most bronchodilators, so they start to act quickly. Always carry your quick-relief inhaler with you in case you need it while you are away from home. ? Corticosteroids (prednisone, budesonide). These reduce airway inflammation. They come in pill or inhaled form. You must take these medicines every day for them to work well.     · Ask your doctor or pharmacist if a spacer is right for you. A spacer may help you get more inhaled medicine to your lungs. If you use one, ask how to use it properly.     · Do not take any vitamins, over-the-counter medicine, or herbal products without talking to your doctor first.     · If your doctor prescribed antibiotics, take them as directed. Do not stop taking them just because you feel better. You need to take the full course of antibiotics.     · If you use oxygen therapy, use the flow rate your doctor has recommended. Don't change it without talking to your doctor first. Oxygen therapy boosts the amount of oxygen in your blood and helps you breathe easier. Activity    · Get regular exercise. Walking is an easy way to get exercise. Start out slowly, and walk a little more each day.     · Pay attention to your breathing. You are exercising too hard if you can't talk while you exercise.     · Take short rest breaks when doing household chores and other activities.     · Learn breathing methods--such as breathing through pursed lips--to help you become less short of breath.     · If your doctor has not set you up with a pulmonary rehabilitation program, ask if rehab is right for you. Rehab includes exercise programs, education about your disease and how to manage it, help with diet and other changes, and emotional support. Diet    · Eat regular, healthy meals. Use bronchodilators about 1 hour before you eat to make it easier to eat. Eat several small meals instead of three large ones.  Drink beverages at the end of the meal. Avoid foods that are hard to chew.     · Eat foods that contain protein so you don't lose muscle mass.     · Talk with your doctor if you gain too much weight or if you lose weight without trying. Mental health    · Talk to your family, friends, or a therapist about your feelings. Some people feel frightened, angry, hopeless, helpless, and even guilty. Talking openly about bad feelings can help you cope. If these feelings last, talk to your doctor. When should you call for help? Call 911 anytime you think you may need emergency care. For example, call if:    · You have severe trouble breathing. Call your doctor now or seek immediate medical care if:    · You have new or worse trouble breathing.     · You cough up blood.     · You have a fever. Watch closely for changes in your health, and be sure to contact your doctor if:    · You cough more deeply or more often, especially if you notice more mucus or a change in the color of your mucus.     · You have new or worse swelling in your legs or belly.     · You are not getting better as expected. Where can you learn more? Go to http://www.gray.com/  Enter V864 in the search box to learn more about \"Chronic Obstructive Pulmonary Disease (COPD): Care Instructions. \"  Current as of: October 26, 2020               Content Version: 12.8  © 2006-2021 [employer]. Care instructions adapted under license by [employer] (which disclaims liability or warranty for this information). If you have questions about a medical condition or this instruction, always ask your healthcare professional. [place] [address] any warranty or liability for your use of this information.

## 2021-08-13 NOTE — DISCHARGE SUMMARY
Discharge Summary       PATIENT ID: Geeta Begum  MRN: [id number]   YOB: 1960    DATE OF ADMISSION: 8/8/2021  9:12 AM    DATE OF DISCHARGE:   PRIMARY CARE PROVIDER: Ra Dominguez MD     ATTENDING PHYSICIAN: Cathleen Funes  DISCHARGING PROVIDER: Cathleen Funes      CONSULTATIONS: IP CONSULT TO CARDIOLOGY  IP CONSULT TO PULMONOLOGY  IP CONSULT TO PULMONOLOGY  IP CONSULT TO NEPHROLOGY    PROCEDURES/SURGERIES: * No surgery found *    ADMITTING DIAGNOSES:    Patient Active Problem List    Diagnosis Date Noted    Respiratory failure ([facility] 08/08/2021    COPD exacerbation ([facility] 08/08/2021    COPD (chronic obstructive pulmonary disease) ([facility] 12/13/2020    Hypoxia 12/13/2020       DISCHARGE DIAGNOSES / PLAN:      Acute on chronic hypoxic respiratory failure requiring supplemental oxygen  Atypical chest pain  COPD with acute exacerbation  Possible reflux esophagitis  Obesity  Heart failure with preserved ejection fraction  Asthma  Obstructive sleep apnea  GI bleed per her report  Nicotine dependence  INOCENTE versus CKD with worsening renal function-on diuretic therapy  Hyperglycemia - on steroids  Anxiety        DISCHARGE MEDICATIONS:  Current Discharge Medication List      START taking these medications    Details   carvediloL (COREG) 6.25 mg tablet Take 1 Tablet by mouth two (2) times daily (with meals). Qty: 60 Tablet, Refills: 0  Start date: 8/13/2021      hydrOXYzine pamoate (VISTARIL) 25 mg capsule Take 1 Capsule by mouth three (3) times daily as needed for Anxiety for up to 14 days. Qty: 30 Capsule, Refills: 0  Start date: 8/13/2021, End date: 8/27/2021      insulin glargine (LANTUS) 100 unit/mL injection As directed  Qty: 1 Vial, Refills: 1  Start date: 8/14/2021      nicotine (NICODERM CQ) 7 mg/24 hr 1 Patch by TransDERmal route daily for 30 days.   Qty: 30 Patch, Refills: 0  Start date: 8/14/2021, End date: 9/13/2021      pantoprazole (PROTONIX) 40 mg tablet Take 1 Tablet by mouth Daily (before breakfast). Qty: 60 Tablet, Refills: 0  Start date: 8/14/2021      amoxicillin-clavulanate (Augmentin) 875-125 mg per tablet Take 1 Tablet by mouth two (2) times a day. Qty: 20 Tablet, Refills: 0  Start date: 8/13/2021         CONTINUE these medications which have CHANGED    Details   furosemide (Lasix) 40 mg tablet Lasix 40 mg daily  Qty: 30 Tablet, Refills: 0  Start date: 8/13/2021      predniSONE (DELTASONE) 10 mg tablet 1 tablet daily  Qty: 15 Tablet, Refills: 0  Start date: 8/13/2021         CONTINUE these medications which have NOT CHANGED    Details   albuterol-ipratropium (DUO-NEB) 2.5 mg-0.5 mg/3 ml nebu 3 mL by Nebulization route every six (6) hours as needed for Wheezing. Qty: 30 Nebule, Refills: 1      aspirin 81 mg chewable tablet Take 1 Tab by mouth daily. Qty: 30 Tab, Refills: 0      guaiFENesin ER (MUCINEX) 600 mg ER tablet Take 1 Tab by mouth two (2) times a day. Qty: 30 Tab, Refills: 0      amLODIPine (NORVASC) 10 mg tablet Take 10 mg by mouth daily. mometasone-formoterol (DULERA) 200-5 mcg/actuation HFA inhaler Take 2 Puffs by inhalation two (2) times a day. montelukast (SINGULAIR) 10 mg tablet Take 10 mg by mouth daily. Calcium-Cholecalciferol, D3, 500 mg(1,250mg) -400 unit tab Take  by mouth daily. benzonatate (TESSALON) 100 mg capsule Take 100 mg by mouth once. fluticasone propionate (FLONASE) 50 mcg/actuation nasal spray 2 Sprays by Both Nostrils route daily. ergocalciferol (Vitamin D2) 1,250 mcg (50,000 unit) capsule Take 50,000 Units by mouth every seven (7) days. Q7days on Monday         STOP taking these medications       albuterol (ProAir HFA) 90 mcg/actuation inhaler Comments:   Reason for Stopping:         therapeutic multivitamin (THERAGRAN) tablet Comments:   Reason for Stopping:                 NOTIFY YOUR PHYSICIAN FOR ANY OF THE FOLLOWING:   Fever over 101 degrees for 24 hours.    Chest pain, shortness of breath, fever, chills, nausea, vomiting, diarrhea, change in mentation, falling, weakness, bleeding. Severe pain or pain not relieved by medications. Or, any other signs or symptoms that you may have questions about. DISPOSITION:  x  Home With:   OT  PT  HH  RN       Long term SNF/Inpatient Rehab    Independent/assisted living    Hospice    Other:       PATIENT CONDITION AT DISCHARGE: Stable      PHYSICAL EXAMINATION AT DISCHARGE:  General:          Alert, cooperative, no distress, appears stated age. HEENT:           Atraumatic, anicteric sclerae, pink conjunctivae                          No oral ulcers, mucosa moist, throat clear, dentition fair  Neck:               Supple, symmetrical  Lungs:             Clear to auscultation bilaterally. No Wheezing or Rhonchi. No rales. Chest wall:      No tenderness  No Accessory muscle use. Heart:              Regular  rhythm,  No  murmur   No edema  Abdomen:        Soft, non-tender. Not distended. Bowel sounds normal  Extremities:     No cyanosis. No clubbing,                            Skin turgor normal, Capillary refill normal  Skin:                Not pale. Not Jaundiced  No rashes   Psych:             Not anxious or agitated. Neurologic:      Alert, moves all extremities, answers questions appropriately and responds to commands     XR CHEST PORT   Final Result   Findings/impression:      Cardiac silhouette is enlarged. No pulmonary edema. No consolidative airspace disease, pleural effusion or pneumothorax. No acute osseous abnormality identified. XR CHEST PORT   Final Result   Mild CHF/volume overload suspected.            Recent Results (from the past 24 hour(s))   GLUCOSE, POC    Collection Time: 08/12/21  3:45 PM   Result Value Ref Range    Glucose (POC) 226 (H) 65 - 117 mg/dL    Performed by José Newell    GLUCOSE, POC    Collection Time: 08/12/21  8:45 PM   Result Value Ref Range    Glucose (POC) 242 (H) 65 - 117 mg/dL    Performed by Yeny Moran METABOLIC PANEL, COMPREHENSIVE    Collection Time: 08/13/21  3:26 AM   Result Value Ref Range    Sodium 139 136 - 145 mmol/L    Potassium 5.0 3.5 - 5.1 mmol/L    Chloride 102 97 - 108 mmol/L    CO2 38 (H) 21 - 32 mmol/L    Anion gap NEG 1 5 - 15 mmol/L    Glucose 243 (H) 65 - 100 mg/dL    BUN 31 (H) 6 - 20 mg/dL    Creatinine 1.15 (H) 0.55 - 1.02 mg/dL    BUN/Creatinine ratio 27 (H) 12 - 20      GFR est AA 59 (L) >60 ml/min/1.73m2    GFR est non-AA 48 (L) >60 ml/min/1.73m2    Calcium 9.1 8.5 - 10.1 mg/dL    Bilirubin, total 0.3 0.2 - 1.0 mg/dL    AST (SGOT) 15 15 - 37 U/L    ALT (SGPT) 48 12 - 78 U/L    Alk.  phosphatase 103 45 - 117 U/L    Protein, total 6.2 (L) 6.4 - 8.2 g/dL    Albumin 3.0 (L) 3.5 - 5.0 g/dL    Globulin 3.2 2.0 - 4.0 g/dL    A-G Ratio 0.9 (L) 1.1 - 2.2     GLUCOSE, POC    Collection Time: 08/13/21  7:46 AM   Result Value Ref Range    Glucose (POC) 172 (H) 65 - 117 mg/dL    Performed by [address] [place] [address], POC    Collection Time: 08/13/21  1:29 PM   Result Value Ref Range    Glucose (POC) 198 (H) 65 - 117 mg/dL    Performed by Gus Sung 73:    61-year-old lady came in because of shortness of breath and dyspnea significant past medical history of COPD and also sleep apnea noncompliant to CPAP machine she is a current smoker she is on home oxygen 2 L nasal cannula complaining about generalized weakness shortness of breath dyspnea and swelling of the lower extremities came to the hospital she was put on 5 L oxygen she was severely short of breath dyspneic x-ray shows fluid overload CHF so admitted and pulmonary consult was called for further evaluation      During his admission patient was seen by the pulmonologist cardiologist and the nephrologist patient start on nebulizer treatment IV steroids patient also seen by PT OT after aggressive treatment patient breathing much improved patient on chronic 3 L oxygen at home patient discharged with home health PT OT medication reconciliation done time discharge plan 35 minutes patient also discharged on p.o.  Augmentin      Signed:   Rafi Contreras MD  8/13/2021  2:06 PM

## 2021-08-13 NOTE — PROGRESS NOTES
Patient has been accepted with [facility]. [facility] scheduled for 8/16/2021. Discharge plan of care/case management plan validated with provider discharge order.

## 2021-08-13 NOTE — PROGRESS NOTES
Renal Progress Note    NAME:  Macario Maldonado   :   1960   MRN:   [id number]     Date/Time:  2021       Assessment:   1. INOCENTE -> ? Sec to HF vs Diuresis vs Steroids  2. CKD --> ? Baseline creatinine  3. COPD   4. Respiratory Failure  5. HTN       Plan:   1. Her GFR is stable  2. Continue the present course for now. 3. Wean steroids when feasible  4. Avoid NSAIDs + IV contrast  5. Follow lytes. 6. Dose meds for her GFR    Ms Patience Antunez can be seen in the office 3 to 4 weeks post discharge. Subjective:           F/U - INOCENTE , CKD --> 21     Felt better. F/U INOCENTE , CKD --> 21      Dyspnea is improving. Her exercise tolerance has picked up as well. F/U - INOCENTE, CKD --> 21    No new complaints. Ms Patience Antunez is anticipating her discharge today.     Review of Systems:  Y  N       Y  N  []         []          Fever/chills                                               []         []          Chest Pain  []         []          Cough                                                       []         []          Diarrhea   []         []          Sputum                                                     []         []          Constipation  []         []          SOB/ALVARADO                                                []         []          Nausea/Vomit  []         []          Abd Pain                                                    []         []          Tolerating PT  []         []          Dysuria                                                      []         []          Tolerating Diet     []        Unable to obtain  ROS due to  []        mental status change  []        sedated   []        intubated    Medications reviewed:  Current Facility-Administered Medications   Medication Dose Route Frequency    guaiFENesin ER (MUCINEX) tablet 600 mg  600 mg Oral BID    piperacillin-tazobactam (ZOSYN) 3.375 g in 0.9% sodium chloride (MBP/ADV) 100 mL MBP  3.375 g IntraVENous Q8H    hydrOXYzine pamoate (VISTARIL) capsule 25 mg  25 mg Oral TID PRN    carvediloL (COREG) tablet 6.25 mg  6.25 mg Oral BID WITH MEALS    methylPREDNISolone (PF) (SOLU-MEDROL) injection 40 mg  40 mg IntraVENous Q12H    insulin glargine (LANTUS) injection 9 Units  0.1 Units/kg SubCUTAneous DAILY    insulin lispro (HUMALOG) injection   SubCUTAneous AC&HS    glucose chewable tablet 16 g  4 Tablet Oral PRN    glucagon (GLUCAGEN) injection 1 mg  1 mg IntraMUSCular PRN    dextrose (D50W) injection syrg 12.5-25 g  25-50 mL IntraVENous PRN    acetaminophen (TYLENOL) tablet 650 mg  650 mg Oral Q6H PRN    furosemide (LASIX) injection 40 mg  40 mg IntraVENous DAILY    pantoprazole (PROTONIX) tablet 40 mg  40 mg Oral ACB    nicotine (NICODERM CQ) 7 mg/24 hr patch 1 Patch  1 Patch TransDERmal DAILY    albuterol-ipratropium (DUO-NEB) 2.5 MG-0.5 MG/3 ML  3 mL Nebulization Q6H PRN    amLODIPine (NORVASC) tablet 10 mg  10 mg Oral DAILY    aspirin chewable tablet 81 mg  81 mg Oral DAILY    calcium-vitamin D 600 mg(1,500mg) -200 unit per tablet 1 Tablet  1 Tablet Oral DAILY    ergocalciferol capsule 50,000 Units  50,000 Units Oral Q7D    fluticasone propionate (FLONASE) 50 mcg/actuation nasal spray 2 Spray  2 Spray Both Nostrils DAILY    budesonide-formoteroL (SYMBICORT) 160-4.5 mcg/actuation HFA inhaler 2 Puff  2 Puff Inhalation BID    montelukast (SINGULAIR) tablet 10 mg  10 mg Oral DAILY    docusate sodium (COLACE) capsule 100 mg  100 mg Oral PRN    albuterol-ipratropium (DUO-NEB) 2.5 MG-0.5 MG/3 ML  3 mL Nebulization Q6H RT    enoxaparin (LOVENOX) injection 40 mg  40 mg SubCUTAneous Q24H    potassium chloride SR (KLOR-CON 10) tablet 10 mEq  10 mEq Oral DAILY        Objective:   Vitals:  Visit Vitals  /71 (BP 1 Location: Right upper arm, BP Patient Position: Sitting)   Pulse 75   Temp 98.4 °F (36.9 °C)   Resp 20   Ht 5' 4\" (1.626 m)   Wt 93 kg (205 lb 0.4 oz)   SpO2 94%   BMI 35.19 kg/m²     Temp (24hrs), Av.1 °F (36.7 °C), Min:97.8 °F (36.6 °C), Max:98.4 °F (36.9 °C)    O2 Flow Rate (L/min): 3 l/min O2 Device: Nasal cannula    Last 24hr Input/Output:  No intake or output data in the 24 hours ending 08/13/21 1701     PHYSICAL EXAM:      Seen in Room 570. General:    Sitting up comfortably. Head:   Normocephalic    Eyes:   No icterus    Lungs:   Improved air entry    Heart:   No S3 gallop , no pericardial rub  . Extremities: Leg edema --> improving      Psych:   Calm      Neurologic: Alert and oriented        []        Telemetry Reviewed     []        NSR []        PAC/PVCs   []        Afib  []        Paced   []        NSVT   []        Nuñez []        NGT  []        Intubated on vent    Lab Data Reviewed:    Recent Results (from the past 24 hour(s))   GLUCOSE, POC    Collection Time: 08/12/21  8:45 PM   Result Value Ref Range    Glucose (POC) 242 (H) 65 - 117 mg/dL    Performed by Guillermo Leigh    METABOLIC PANEL, COMPREHENSIVE    Collection Time: 08/13/21  3:26 AM   Result Value Ref Range    Sodium 139 136 - 145 mmol/L    Potassium 5.0 3.5 - 5.1 mmol/L    Chloride 102 97 - 108 mmol/L    CO2 38 (H) 21 - 32 mmol/L    Anion gap NEG 1 5 - 15 mmol/L    Glucose 243 (H) 65 - 100 mg/dL    BUN 31 (H) 6 - 20 mg/dL    Creatinine 1.15 (H) 0.55 - 1.02 mg/dL    BUN/Creatinine ratio 27 (H) 12 - 20      GFR est AA 59 (L) >60 ml/min/1.73m2    GFR est non-AA 48 (L) >60 ml/min/1.73m2    Calcium 9.1 8.5 - 10.1 mg/dL    Bilirubin, total 0.3 0.2 - 1.0 mg/dL    AST (SGOT) 15 15 - 37 U/L    ALT (SGPT) 48 12 - 78 U/L    Alk.  phosphatase 103 45 - 117 U/L    Protein, total 6.2 (L) 6.4 - 8.2 g/dL    Albumin 3.0 (L) 3.5 - 5.0 g/dL    Globulin 3.2 2.0 - 4.0 g/dL    A-G Ratio 0.9 (L) 1.1 - 2.2     GLUCOSE, POC    Collection Time: 08/13/21  7:46 AM   Result Value Ref Range    Glucose (POC) 172 (H) 65 - 117 mg/dL    Performed by Erika Nolen, POC    Collection Time: 08/13/21  1:29 PM   Result Value Ref Range    Glucose (POC) 198 (H) 65 - 117 mg/dL    Performed by Joshua Gan POC    Collection Time: 08/13/21  3:56 PM   Result Value Ref Range    Glucose (POC) 277 (H) 65 - 117 mg/dL    Performed by [facility])        Total time spent with patient:  []        15   []        25   []        35   []         __ minutes    []        Critical Care Provided    Care Plan discussed with:      [x]        Patient   []        Family    []        Care Manager   []        Consultant/Specialist :      []          >50% of visit spent in counseling and coordination of care   (Discussed []        CODE status,  []        Care Plan, []        D/C Planning)    ___________________________________________________    Attending Physician: Lidia Dickson MD

## 2021-08-13 NOTE — PROGRESS NOTES
PHYSICAL THERAPY TREATMENT  Patient: Miguel Gomes (89 y.o. female)  Date: 8/13/2021  Diagnosis: COPD exacerbation ([facility] [J44.1]  Respiratory failure ([facility] [J96.90]  Hypoxia [R09.02] <principal problem not specified>       Precautions:    Chart, physical therapy assessment, plan of care and goals were reviewed. ASSESSMENT  Patient continues with skilled PT services and is progressing towards goals. Pt seated in recliner upon entry and agreeable to session. Performed all transfers with mod I. Ambulated increased distance with mod I, taking 2 standing rest breaks during gait 2/2 SOB. Patient noted with good carry over of education provided last session about PLB and activity pacing. Patient on 3L via NC throughout session and O2 saturation remaining between 92-95%. Patient completed seated therex in recliner, see details below. Tolerated increased reps of therex well. Patient left seated EOB with call bell in reach and needs met. Recommending d/c home with HHPT, shower chair, and spc. Other factors to consider for discharge: PLOF, family assistance, DME, respiratory status         PLAN :  Patient continues to benefit from skilled intervention to address the above impairments. Continue treatment per established plan of care. to address goals. Recommendation for discharge: (in order for the patient to meet his/her long term goals)  HHPT    This discharge recommendation:  Has been made in collaboration with the attending provider and/or case management    IF patient discharges home will need the following DME: SPC and shower chair       SUBJECTIVE:   Patient stated i'm feeling good today.     OBJECTIVE DATA SUMMARY:   Critical Behavior:  Neurologic State: Alert  Orientation Level: Oriented X4  Cognition: Follows commands    Functional Mobility Training:  Transfers:  Sit to Stand: Modified independent  Stand to Sit: Modified independent    Balance:  Sitting: Intact  Standing: Intact  Standing - Static: Good;Unsupported  Standing - Dynamic : Good; Unsupported    Ambulation/Gait Training:  Distance (ft): 100 Feet (ft)  Ambulation - Level of Assistance: Modified independent      Therapeutic Exercises:   1 x 30 AP  1 x 20 LAQ  1 x 20 Marches  1 x 20 Hip ab/ad    Pain Ratin/10    Activity Tolerance:   Fair, desaturates with exertion and requires oxygen, requires rest breaks, and observed SOB with activity  Please refer to the flowsheet for vital signs taken during this treatment. After treatment patient left in no apparent distress:   Call bell within reach and sitting EOB    COMMUNICATION/COLLABORATION:   The patients plan of care was discussed with: Registered nurse. Problem: Mobility Impaired (Adult and Pediatric)  Goal: *Acute Goals and Plan of Care (Insert Text)  Description: Pt will be I with LE HEP in 7 days. Pt will perform bed mobility with mod I in 7 days. Pt will perform transfers with mod I in 7 days. Pt will amb 100-200 feet with LRAD safely with mod I in 7 days.        Outcome: Progressing Towards Goal       Mary Peñaloza PTA   Time Calculation: 27 mins

## 2021-08-13 NOTE — PROGRESS NOTES
CM spoke with patient about Home Health services. She was in agreement with Home health and had no preference on company. CM sent referrals.

## 2021-08-14 LAB
BACTERIA SPEC CULT: NORMAL
SPECIAL REQUESTS,SREQ: NORMAL

## 2021-08-14 NOTE — CONSULTS
[address] [place] [address]    Name:  Olga Garcia  MR#:  [id number]  :  1960  ACCOUNT #:  [de-identified]  DATE OF SERVICE:  2021    ATTENDING PHYSICIAN:  Cami Guerrero MD.    REASON FOR CONSULTATION:  The patient known to you with COPD and sleep apnea. HISTORY OF PRESENT ILLNESS:  The patient is a 56-year-old obese  female. She is well known to myself. She has a history of asthma and COPD. She has a history of extensive tobacco abuse, and she quit smoking in 2020 after she had a hospitalization at [facility] with COPD exacerbation. At home, she has been on Symbicort inhaler along with Ventolin and nebulized albuterol. She is also on Singulair 10 mg daily. She has sleep apnea, but was intolerant of CPAP and has been on oxygen 2 L nocturnally. She also uses oxygen 2 L at home with ambulation. She was admitted to the hospital because of increasing shortness of breath and fluid retention. Multiple consultants on the case. It appears that she was diuresed with significant improvement in her shortness of breath. She has coughed up mostly clear to whitish sputum. She was treated for COPD exacerbation and also congestive heart failure. Nephrology is also consulted. She is for discharge today. She feels much better. She has an occasional sputum production, on 2 to 3 L of oxygen. PAST MEDICAL HISTORY:  Significant for asthma/COPD, hypertension, and obstructive sleep apnea. She was intolerant of CPAP machine. There is a history of breast cancer, status post chemo and radiation therapy in . She is also status post left mastectomy. History of hysterectomy. History of COPD exacerbation, requiring hospitalizations. ALLERGIES:  LISINOPRIL.     MEDICATIONS:  Currently, the patient is started on nebulized DuoNeb q.6 hourly, amlodipine 10 mg p.o. daily, aspirin 81 mg p.o. daily, Symbicort 160/4.5 two inhalations b.i.d., carvedilol 6.25 mg p.o. b.i.d., Lovenox 40 mg subcutaneously daily, Flonase nasal spray, Lasix 40 mg IV daily, guaifenesin 1200 mg p.o. b.i.d., insulin subcutaneously, Solu-Medrol 40 mg IV q.12 hourly, Singulair 10 mg p.o. daily, nicotine patch 7 mg daily, Protonix 40 mg p.o. daily, Zosyn 3.375 g IV q.8 hourly, and other p.r.n. medications. SOCIAL HISTORY:  She lives by herself. She started smoking at the age of 15 years and was less than one pack a day smoker, quit in 12/2020, according to her. She may occasionally drink beer. 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 FAMILY HISTORY:  Mother passed away at the age of 54 from renal failure, father at the age of 67 from multiple health issues and had sleep apnea. REVIEW OF SYSTEMS:  Complete review of systems was undertaken. Pertinent findings are discussed above. All other systems were reviewed and are negative. PHYSICAL EXAMINATION:  GENERAL:  The patient is an obese middle-aged appearing  female who does not appear to be in any distress. VITAL SIGNS:  Temperature is 97.8, pulse is 78 per minute, respiratory rate is 20 per minute, and blood pressure is 147/88. Saturation is 95% on 3 L of oxygen. HEENT:  Pupils are equal and reactive to light. No pallor or icterus. Nasal passages are patent. Oropharynx is crowded in general.  No thrush or exudation. NECK:  Supple. Trachea is central.  No JVD or lymphadenopathy. CHEST:  She is not using accessory muscles of respiration. Chest is symmetrical with equal but diminished air entry bilaterally. She has an occasional expiratory wheeze. No rhonchi or crackles. No chest wall tenderness. HEART:  Rhythm is regular with distant heart sounds. No loud murmur or gallop. ABDOMEN:  Obese and benign. Bowel sounds are audible. No masses or organomegaly. EXTREMITIES:  Do not show any cyanosis or clubbing. She does not have any pitting edema anymore.   She states that her ankles are looking much better. Pulses are palpable. NEUROLOGIC:  Examination is grossly intact. SKIN:  Warm and dry. LABORATORY AND DIAGNOSTIC DATA:  CBC is mostly within normal limits. Basic metabolic profile showing potassium of 5.0, BUN is 31, creatinine is 1.15, blood glucose of 243. Arterial blood gases done on 5 L nasal cannula on 08/09/2021 showed 7.37/55/174. Rapid COVID test was not detected on 08/08/2021. Chest x-ray done on 08/12/2021 showed cardiomegaly and no acute process. Echocardiogram done on 08/09/2021 showed an EF of 55% to 68% and RV systolic pressure of 23. ASSESSMENT AND PLAN:  3  A 61-year-old [facility] American female with known history of chronic obstructive pulmonary disease/asthma, who presented with increasing shortness of breath and increased ankle edema. It appears that she had a combination of fluid overload/congestive heart failure as well as acute exacerbation of her underlying chronic lung disease. She has been diuresed. She has been doing very well. Edema has resolved. Some mucus production. It is noted that she is for discharge today, agree with discharge planning. She can be discharged on oral antibiotic and tapering doses of prednisone. It is noted that she is on Zosyn, and she may be converted to Augmentin to finish five more days of therapy. At home, she will continue with her usual regimen of Symbicort inhaler, Singulair nasal nebulizer, metered-dose inhaler albuterol p.r.n. She will also continue with 2 L of oxygen p.r.n. during the day. 2.  Congestive heart failure with preserved ejection fraction. Echocardiogram done on 08/09/2021 shows a normal ejection fraction, and pulmonary pressures are not elevated. She has been diuresed. 3.  Possible chronic kidney disease. Nephrology is also seeing the patient. 4.  Obstructive sleep apnea, noncompliant and intolerant. She uses 2 L of oxygen nocturnally which she will continue at home.   5.  For deep vein thrombosis prophylaxis, she is on Lovenox subcutaneously. Thank you for allowing me to participate in the care of this patient. From Pulmonary standpoint, she can be discharged home. She will follow up with me in a few weeks' time. She wants to consider getting the CPAP machine again, but will probably need another polysomnographic evaluation.       Danilo Smith MD      [id number]  D:  08/13/2021 15:24  T:  08/13/2021 19:24  JOB #:  [id number]

## 2021-08-14 NOTE — PROGRESS NOTES
IV removed, discharge paperwork  and belongings handed to pt. Reeducated pt about using her home oxygen as soon as she returns home. Pt wheeled down to private vehicle by myself. Pt was sob, but insisted that she wanted to leave and return home. All questions and concerns answered.

## 2021-08-14 NOTE — PROGRESS NOTES
Pt is going home on 3L of oxygen and does not have anyone to bring her home oxygen to the hospital. Educated the pt multiple times about the importance of having her oxygen during her transportation. Pt has attempted to call friends/family in regards to someone bringing her home oxygen so that she can safely return home. Pt stated that her ride doesn't have a house key to get into her house. Pt is still wanting to leave without her home o2. Notified supervisor and Dr. Ra Monaco about current situation.

## 2021-12-26 ENCOUNTER — HOSPITAL ENCOUNTER (EMERGENCY)
Age: 61
Discharge: HOME | End: 2021-12-26
Attending: STUDENT IN AN ORGANIZED HEALTH CARE EDUCATION/TRAINING PROGRAM
Payer: MEDICAID

## 2021-12-26 ENCOUNTER — APPOINTMENT (OUTPATIENT)
Dept: CT IMAGING | Age: 61
End: 2021-12-26
Attending: STUDENT IN AN ORGANIZED HEALTH CARE EDUCATION/TRAINING PROGRAM
Payer: MEDICAID

## 2021-12-26 VITALS
OXYGEN SATURATION: 91 % | RESPIRATION RATE: 20 BRPM | WEIGHT: 181 LBS | BODY MASS INDEX: 30.9 KG/M2 | DIASTOLIC BLOOD PRESSURE: 87 MMHG | TEMPERATURE: 98.6 F | HEIGHT: 64 IN | HEART RATE: 91 BPM | SYSTOLIC BLOOD PRESSURE: 136 MMHG

## 2021-12-26 DIAGNOSIS — K64.1 GRADE II HEMORRHOIDS: ICD-10-CM

## 2021-12-26 DIAGNOSIS — A04.8 H. PYLORI INFECTION: Primary | ICD-10-CM

## 2021-12-26 LAB
ANION GAP SERPL CALC-SCNC: 3 MMOL/L (ref 5–15)
BASOPHILS # BLD: 0.1 K/UL (ref 0–0.1)
BASOPHILS NFR BLD: 1 % (ref 0–1)
BUN SERPL-MCNC: 17 MG/DL (ref 6–20)
BUN/CREAT SERPL: 17 (ref 12–20)
CA-I BLD-MCNC: 9.9 MG/DL (ref 8.5–10.1)
CHLORIDE SERPL-SCNC: 106 MMOL/L (ref 97–108)
CO2 SERPL-SCNC: 32 MMOL/L (ref 21–32)
CREAT SERPL-MCNC: 0.99 MG/DL (ref 0.55–1.02)
DIFFERENTIAL METHOD BLD: NORMAL
EOSINOPHIL # BLD: 0.1 K/UL (ref 0–0.4)
EOSINOPHIL NFR BLD: 1 % (ref 0–7)
ERYTHROCYTE [DISTWIDTH] IN BLOOD BY AUTOMATED COUNT: 12.7 % (ref 11.5–14.5)
GLUCOSE SERPL-MCNC: 112 MG/DL (ref 65–100)
HCT VFR BLD AUTO: 41.3 % (ref 35–47)
HGB BLD-MCNC: 13.2 G/DL (ref 11.5–16)
IMM GRANULOCYTES # BLD AUTO: 0 K/UL (ref 0–0.04)
IMM GRANULOCYTES NFR BLD AUTO: 0 % (ref 0–0.5)
LYMPHOCYTES # BLD: 1.5 K/UL (ref 0.8–3.5)
LYMPHOCYTES NFR BLD: 24 % (ref 12–49)
MCH RBC QN AUTO: 29.8 PG (ref 26–34)
MCHC RBC AUTO-ENTMCNC: 32 G/DL (ref 30–36.5)
MCV RBC AUTO: 93.2 FL (ref 80–99)
MONOCYTES # BLD: 0.5 K/UL (ref 0–1)
MONOCYTES NFR BLD: 8 % (ref 5–13)
NEUTS SEG # BLD: 4.3 K/UL (ref 1.8–8)
NEUTS SEG NFR BLD: 66 % (ref 32–75)
NRBC # BLD: 0 K/UL (ref 0–0.01)
NRBC BLD-RTO: 0 PER 100 WBC
PLATELET # BLD AUTO: 321 K/UL (ref 150–400)
PMV BLD AUTO: 10.5 FL (ref 8.9–12.9)
POTASSIUM SERPL-SCNC: 4 MMOL/L (ref 3.5–5.1)
RBC # BLD AUTO: 4.43 M/UL (ref 3.8–5.2)
SODIUM SERPL-SCNC: 141 MMOL/L (ref 136–145)
WBC # BLD AUTO: 6.4 K/UL (ref 3.6–11)

## 2021-12-26 PROCEDURE — 74011250636 HC RX REV CODE- 250/636: Performed by: STUDENT IN AN ORGANIZED HEALTH CARE EDUCATION/TRAINING PROGRAM

## 2021-12-26 PROCEDURE — 77010033678 HC OXYGEN DAILY

## 2021-12-26 PROCEDURE — 80048 BASIC METABOLIC PNL TOTAL CA: CPT

## 2021-12-26 PROCEDURE — 74011000636 HC RX REV CODE- 636: Performed by: STUDENT IN AN ORGANIZED HEALTH CARE EDUCATION/TRAINING PROGRAM

## 2021-12-26 PROCEDURE — 74177 CT ABD & PELVIS W/CONTRAST: CPT

## 2021-12-26 PROCEDURE — 85025 COMPLETE CBC W/AUTO DIFF WBC: CPT

## 2021-12-26 PROCEDURE — 99284 EMERGENCY DEPT VISIT MOD MDM: CPT

## 2021-12-26 PROCEDURE — 36415 COLL VENOUS BLD VENIPUNCTURE: CPT

## 2021-12-26 RX ORDER — CLARITHROMYCIN 500 MG/1
500 TABLET, FILM COATED ORAL 2 TIMES DAILY
Qty: 28 TABLET | Refills: 0 | Status: SHIPPED | OUTPATIENT
Start: 2021-12-26 | End: 2022-01-09

## 2021-12-26 RX ORDER — AMOXICILLIN 500 MG/1
1000 TABLET, FILM COATED ORAL 2 TIMES DAILY
Qty: 56 TABLET | Refills: 0 | Status: SHIPPED | OUTPATIENT
Start: 2021-12-26 | End: 2022-01-09

## 2021-12-26 RX ORDER — PANTOPRAZOLE SODIUM 40 MG/1
40 TABLET, DELAYED RELEASE ORAL DAILY
Qty: 20 TABLET | Refills: 0 | Status: SHIPPED | OUTPATIENT
Start: 2021-12-26 | End: 2022-01-15

## 2021-12-26 RX ADMIN — IOPAMIDOL 100 ML: 755 INJECTION, SOLUTION INTRAVENOUS at 07:54

## 2021-12-26 RX ADMIN — SODIUM CHLORIDE 1000 ML: 9 INJECTION, SOLUTION INTRAVENOUS at 07:19

## 2021-12-26 NOTE — ED PROVIDER NOTES
EMERGENCY DEPARTMENT HISTORY AND PHYSICAL EXAM      Date: 12/26/2021  Patient Name: Jeff Goldstein    History of Presenting Illness     Chief Complaint   Patient presents with    Abdominal Pain    Rectal Bleeding       HPI: Jeff Goldstein, 63 y.o. female with a past medical history of hypertension and COPD on 2 L as needed oxygen, and is dependent diabetes on aspirin and amlodipine presenting with abdominal pain and bright red blood per rectum a. The patient has been having abdominal pain for the past week and today had an episode of red blood per rectum. She noted some blood on wiping and some drops of blood on the underwear. No sandra hematochezia or melena. She also noted some blood in the toilet. She passed a small bowel wound today but reports it was painful to sit down and white. No history of hemorrhoids. She denies any fevers or chills. No nausea or vomiting. No chest pain or shortness breath. She does report weight loss and decreased appetite as well as a chronic headache. Patient reported that she had a colonoscopy and endoscopy in July but was never informed of the results. On review of chart, the patient had an endoscopy that showed gastritis and was positive for H. pylori that has not been treated. Colonoscopy was aborted due to a hypoxic episode and a repeat endoscopy was recommended however was never done. Results were given to the patient for follow-up. PCP: Niesha Ernandez MD    Current Outpatient Medications   Medication Sig Dispense Refill    clarithromycin (BIAXIN) 500 mg tablet Take 1 Tablet by mouth two (2) times a day for 14 days. 28 Tablet 0    amoxicillin 500 mg tab Take 1,000 mg by mouth two (2) times a day for 14 days. 56 Tablet 0    pantoprazole (Protonix) 40 mg tablet Take 1 Tablet by mouth daily for 20 days. 20 Tablet 0    carvediloL (COREG) 6.25 mg tablet Take 1 Tablet by mouth two (2) times daily (with meals).  60 Tablet 0    furosemide (Lasix) 40 mg tablet Lasix 40 mg daily 30 Tablet 0    insulin glargine (LANTUS) 100 unit/mL injection As directed 1 Vial 1    pantoprazole (PROTONIX) 40 mg tablet Take 1 Tablet by mouth Daily (before breakfast). 60 Tablet 0    amoxicillin-clavulanate (Augmentin) 875-125 mg per tablet Take 1 Tablet by mouth two (2) times a day. 20 Tablet 0    predniSONE (DELTASONE) 10 mg tablet 1 tablet daily 15 Tablet 0    albuterol-ipratropium (DUO-NEB) 2.5 mg-0.5 mg/3 ml nebu 3 mL by Nebulization route every six (6) hours as needed for Wheezing. 30 Nebule 1    aspirin 81 mg chewable tablet Take 1 Tab by mouth daily. 30 Tab 0    guaiFENesin ER (MUCINEX) 600 mg ER tablet Take 1 Tab by mouth two (2) times a day. 30 Tab 0    amLODIPine (NORVASC) 10 mg tablet Take 10 mg by mouth daily.  mometasone-formoterol (DULERA) 200-5 mcg/actuation HFA inhaler Take 2 Puffs by inhalation two (2) times a day.  montelukast (SINGULAIR) 10 mg tablet Take 10 mg by mouth daily.  Calcium-Cholecalciferol, D3, 500 mg(1,250mg) -400 unit tab Take  by mouth daily.  benzonatate (TESSALON) 100 mg capsule Take 100 mg by mouth once.  fluticasone propionate (FLONASE) 50 mcg/actuation nasal spray 2 Sprays by Both Nostrils route daily.  ergocalciferol (Vitamin D2) 1,250 mcg (50,000 unit) capsule Take 50,000 Units by mouth every seven (7) days. Q7days on Monday         Medical History   I reviewed the medical, surgical, family, and social history, as well as allergies:    Past Medical History:  Past Medical History:   Diagnosis Date    Breast CA ([address]     Chronic obstructive pulmonary [facility] [address] [address]     Hypertension        Past Surgical History:  Past Surgical History:   Procedure Laterality Date    COLONOSCOPY N/A 7/16/2021    .  performed by Jessica Larson MD at [address] [place] [address] HX HYSTERECTOMY      HX MASTECTOMY Left        Family History:  Family History   Problem Relation Age of Onset    Hypertension Mother     Cancer Father        Social History:  Social History     Tobacco Use    Smoking status: Former Smoker    Smokeless tobacco: Never Used   Vaping Use    Vaping Use: Never used   Substance Use Topics    Alcohol use: Yes     Comment: occasionally     Drug use: Never       Allergies: Allergies   Allergen Reactions    Lisinopril Angioedema       Review of Systems     Review of Systems   All other systems reviewed and are negative. Physical Exam and Vital Signs   Vital Signs - Reviewed the patient's vital signs. Patient Vitals for the past 12 hrs:   Temp Pulse Resp BP SpO2   12/26/21 0558 98.6 °F (37 °C) 91 20 136/87 91 %       Physical Exam:    GENERAL: awake, alert, cooperative, not in distress  HEENT:  * Pupils equal, EOMI  * Head atraumatic  CV:  * regular rhythm  * warm and perfused extremities bilaterally  PULMONARY: Good air movement, no wheezes or crackles  ABDOMEN: soft, not distended, no guarding, noted mild diffuse tenderness to palpation. few drops of blood were noted on the underwear as well as a tender small nonthrombosed hemorrhoid without any active bleeding. : No suprapubic tenderness  EXTREMITIES/BACK: warm and perfused, no tenderness, no edema  SKIN: no rashes or signs of trauma  NEURO:  * Speech clear  * Moves U&LE to command      Medical Decision Making and ED Course   - I am the first and primary provider for this patient and am the primary provider of record. - I reviewed the vital signs, available nursing notes, past medical history, past surgical history, family history and social history. - Initial assessment performed. The patients presenting problems have been discussed, and the staff are in agreement with the care plan formulated and outlined with them. I have encouraged them to ask questions as they arise throughout their visit.   - Available medical records, nursing notes, old EKGs, and EMS run sheets (if patient was EMS transported) were reviewed    MDM:   Patient is a 63 y.o. female presenting for abdominal pain and bright red blood per rectum. Vitals reveal no abnormalities and physical exam reveals a tender hemorrhoid. Based on the history, physical exam, risk factors, and vitals signs, differential includes: Hemorrhoids, diverticulosis, diverticulitis, SBO, colon cancer. Results     Labs:  Recent Results (from the past 12 hour(s))   METABOLIC PANEL, BASIC    Collection Time: 12/26/21  5:57 AM   Result Value Ref Range    Sodium 141 136 - 145 mmol/L    Potassium 4.0 3.5 - 5.1 mmol/L    Chloride 106 97 - 108 mmol/L    CO2 32 21 - 32 mmol/L    Anion gap 3 (L) 5 - 15 mmol/L    Glucose 112 (H) 65 - 100 mg/dL    BUN 17 6 - 20 mg/dL    Creatinine 0.99 0.55 - 1.02 mg/dL    BUN/Creatinine ratio 17 12 - 20      GFR est AA >60 >60 ml/min/1.73m2    GFR est non-AA 57 (L) >60 ml/min/1.73m2    Calcium 9.9 8.5 - 10.1 mg/dL   CBC WITH AUTOMATED DIFF    Collection Time: 12/26/21  5:57 AM   Result Value Ref Range    WBC 6.4 3.6 - 11.0 K/uL    RBC 4.43 3.80 - 5.20 M/uL    HGB 13.2 11.5 - 16.0 g/dL    HCT 41.3 35.0 - 47.0 %    MCV 93.2 80.0 - 99.0 FL    MCH 29.8 26.0 - 34.0 PG    MCHC 32.0 30.0 - 36.5 g/dL    RDW 12.7 11.5 - 14.5 %    PLATELET 030 595 - 166 K/uL    MPV 10.5 8.9 - 12.9 FL    NRBC 0.0 0.0  WBC    ABSOLUTE NRBC 0.00 0.00 - 0.01 K/uL    NEUTROPHILS 66 32 - 75 %    LYMPHOCYTES 24 12 - 49 %    MONOCYTES 8 5 - 13 %    EOSINOPHILS 1 0 - 7 %    BASOPHILS 1 0 - 1 %    IMMATURE GRANULOCYTES 0 0 - 0.5 %    ABS. NEUTROPHILS 4.3 1.8 - 8.0 K/UL    ABS. LYMPHOCYTES 1.5 0.8 - 3.5 K/UL    ABS. MONOCYTES 0.5 0.0 - 1.0 K/UL    ABS. EOSINOPHILS 0.1 0.0 - 0.4 K/UL    ABS. BASOPHILS 0.1 0.0 - 0.1 K/UL    ABS. IMM. GRANS. 0.0 0.00 - 0.04 K/UL    DF AUTOMATED         Radiologic Studies:  CT Results  (Last 48 hours)               12/26/21 0814  CT ABD PELV W CONT Final result    Impression:  No evident acute or otherwise active intra-abdominal or pelvic   lesion.                    Narrative:  Exam: CT abdomen and pelvis with intravenous contrast       Comparison: None available       Dose reduction: All CT scans at this facility are performed using dose reduction   optimization techniques as appropriate to perform the exam including the   following: automated exposure control, adjustments of the mA and/or kV according   to patient size, or use of iterative reconstruction technique. Technique: During intravenous contrast administration (100 cc of Isovue-370),   contiguous axial sections were obtained through the abdomen and pelvis. Coronal   and sagittal reconstructions were generated. Findings: Visualized lung bases clear. Allowing for the absence of oral   contrast, bowel appears unremarkable. In particular, negative for bowel   dilatation, bowel wall thickening, pneumatosis intestinalis, mesenteroportal   venous gas, or free subdiaphragmatic air. Mesenteric vascular inflow and   mesenteroportal drainage appear patent. No free or loculated fluid. No mass or   adenopathy. Gallbladder nondistended. No biliary or pancreatic ductal   dilatation. Pancreas unremarkable. Adrenals unremarkable. Spleen unremarkable. Small left renal cortical cyst. Kidneys and renal collecting systems otherwise   unremarkable. Remote hysterectomy. Ovaries not discriminated. CXR Results  (Last 48 hours)    None          Medications ordered:  Medications   sodium chloride 0.9 % bolus infusion 1,000 mL (0 mL IntraVENous IV Completed 12/26/21 1157)   iopamidoL (ISOVUE-370) 76 % injection 100 mL (100 mL IntraVENous Given 12/26/21 5934)        ED Course     ED Course:     ED Course as of 12/26/21 1351   Sun Dec 26, 2021   1346 CBC does not show any evidence of acute process. Leukocytosis not present to suggest infection. Hemoglobin at baseline without evidence of acute anemia. Platelet count is normal.    No significant electrolyte derangements. Creatinine is not elevated more than baseline range making INOCENTE unlikely.     Abdominal CT scan did not show any evidence of acute process. [SS]      ED Course User Index  [SS] Deepthi Lawler MD       Reassessment / Disposition / Discussion:    Will treat for H pylori and DC with follow up and return precautions. Final Disposition     Disposition: Condition stable  DC- Adult Discharges: All of the diagnostic tests were reviewed and questions answered. Diagnosis, care plan and treatment options were discussed. The patient understands the instructions and will follow up as directed. The patients results have been reviewed with them. They have been counseled regarding their diagnosis. The patient verbally convey understanding and agreement of the signs, symptoms, diagnosis, treatment and prognosis and additionally agrees to follow up as recommended with their PCP in 24 - 48 hours. They also agree with the care-plan and convey that all of their questions have been answered. I have also put together some discharge instructions for them that include: 1) educational information regarding their diagnosis, 2) how to care for their diagnosis at home, as well a 3) list of reasons why they would want to return to the ED prior to their follow-up appointment, should their condition change. DISCHARGE PLAN:  1. Current Discharge Medication List      START taking these medications    Details   clarithromycin (BIAXIN) 500 mg tablet Take 1 Tablet by mouth two (2) times a day for 14 days. Qty: 28 Tablet, Refills: 0      amoxicillin 500 mg tab Take 1,000 mg by mouth two (2) times a day for 14 days. Qty: 56 Tablet, Refills: 0      !! pantoprazole (Protonix) 40 mg tablet Take 1 Tablet by mouth daily for 20 days. Qty: 20 Tablet, Refills: 0       !! - Potential duplicate medications found. Please discuss with provider. CONTINUE these medications which have NOT CHANGED    Details   carvediloL (COREG) 6.25 mg tablet Take 1 Tablet by mouth two (2) times daily (with meals).   Qty: 60 Tablet, Refills: 0 furosemide (Lasix) 40 mg tablet Lasix 40 mg daily  Qty: 30 Tablet, Refills: 0      insulin glargine (LANTUS) 100 unit/mL injection As directed  Qty: 1 Vial, Refills: 1      !! pantoprazole (PROTONIX) 40 mg tablet Take 1 Tablet by mouth Daily (before breakfast). Qty: 60 Tablet, Refills: 0      amoxicillin-clavulanate (Augmentin) 875-125 mg per tablet Take 1 Tablet by mouth two (2) times a day. Qty: 20 Tablet, Refills: 0      predniSONE (DELTASONE) 10 mg tablet 1 tablet daily  Qty: 15 Tablet, Refills: 0      albuterol-ipratropium (DUO-NEB) 2.5 mg-0.5 mg/3 ml nebu 3 mL by Nebulization route every six (6) hours as needed for Wheezing. Qty: 30 Nebule, Refills: 1      aspirin 81 mg chewable tablet Take 1 Tab by mouth daily. Qty: 30 Tab, Refills: 0      guaiFENesin ER (MUCINEX) 600 mg ER tablet Take 1 Tab by mouth two (2) times a day. Qty: 30 Tab, Refills: 0      amLODIPine (NORVASC) 10 mg tablet Take 10 mg by mouth daily. mometasone-formoterol (DULERA) 200-5 mcg/actuation HFA inhaler Take 2 Puffs by inhalation two (2) times a day. montelukast (SINGULAIR) 10 mg tablet Take 10 mg by mouth daily. Calcium-Cholecalciferol, D3, 500 mg(1,250mg) -400 unit tab Take  by mouth daily. benzonatate (TESSALON) 100 mg capsule Take 100 mg by mouth once. fluticasone propionate (FLONASE) 50 mcg/actuation nasal spray 2 Sprays by Both Nostrils route daily. ergocalciferol (Vitamin D2) 1,250 mcg (50,000 unit) capsule Take 50,000 Units by mouth every seven (7) days. Q7days on Monday       !! - Potential duplicate medications found. Please discuss with provider.         2.   Follow-up Information     Follow up With Specialties Details Why Contact Info    Hugo Phoenix, MD [facility] Medicine Schedule an appointment as soon as possible for a visit in 2 days  [address]  [phone]      [facility] EMERGENCY DEPT Emergency Medicine Go to  If symptoms worsen [address] [facility] [id number]  [phone]        3. Return to ED if worse   4. Current Discharge Medication List      START taking these medications    Details   clarithromycin (BIAXIN) 500 mg tablet Take 1 Tablet by mouth two (2) times a day for 14 days. Qty: 28 Tablet, Refills: 0  Start date: 12/26/2021, End date: 1/9/2022      amoxicillin 500 mg tab Take 1,000 mg by mouth two (2) times a day for 14 days. Qty: 56 Tablet, Refills: 0  Start date: 12/26/2021, End date: 1/9/2022      !! pantoprazole (Protonix) 40 mg tablet Take 1 Tablet by mouth daily for 20 days. Qty: 20 Tablet, Refills: 0  Start date: 12/26/2021, End date: 1/15/2022       !! - Potential duplicate medications found. Please discuss with provider. CONTINUE these medications which have NOT CHANGED    Details   ! ! pantoprazole (PROTONIX) 40 mg tablet Take 1 Tablet by mouth Daily (before breakfast). Qty: 60 Tablet, Refills: 0       !! - Potential duplicate medications found. Please discuss with provider. Diagnosis     Clinical Impression:   1. H. pylori infection    2. Grade II hemorrhoids        Attestations:    Miguelina Gilman MD    Please note that this dictation was completed with [employer], the [employer] voice recognition software. Quite often unanticipated grammatical, syntax, homophones, and other interpretive errors are inadvertently transcribed by the computer software. Please disregard these errors. Please excuse any errors that have escaped final proofreading. Thank you.

## 2021-12-26 NOTE — ED TRIAGE NOTES
Pt has been having abd pain x 1 week but this morning woke up with rectal bleeding which was bright red.  Pt is on oxygen as needed upon arrival pt ambulated in ED pt a little labored/sob

## 2021-12-26 NOTE — ED NOTES
Pt amb to ER27, states she had a large BM with \"a lot\" of bright red blood, states bilateral upper abd pain, placed on 2L o2 by NC which pt states she is on @home PRN for her COPD, Vss

## 2021-12-26 NOTE — DISCHARGE INSTRUCTIONS
Thank you! Thank you for allowing me to care for you in the emergency department. I sincerely hope that you are satisfied with your visit today. It is my goal to provide you with excellent care. Below you will find a list of your labs and imaging from your visit today. Should you have any questions regarding these results please do not hesitate to call the emergency department. Labs -     Recent Results (from the past 12 hour(s))   METABOLIC PANEL, BASIC    Collection Time: 12/26/21  5:57 AM   Result Value Ref Range    Sodium 141 136 - 145 mmol/L    Potassium 4.0 3.5 - 5.1 mmol/L    Chloride 106 97 - 108 mmol/L    CO2 32 21 - 32 mmol/L    Anion gap 3 (L) 5 - 15 mmol/L    Glucose 112 (H) 65 - 100 mg/dL    BUN 17 6 - 20 mg/dL    Creatinine 0.99 0.55 - 1.02 mg/dL    BUN/Creatinine ratio 17 12 - 20      GFR est AA >60 >60 ml/min/1.73m2    GFR est non-AA 57 (L) >60 ml/min/1.73m2    Calcium 9.9 8.5 - 10.1 mg/dL   CBC WITH AUTOMATED DIFF    Collection Time: 12/26/21  5:57 AM   Result Value Ref Range    WBC 6.4 3.6 - 11.0 K/uL    RBC 4.43 3.80 - 5.20 M/uL    HGB 13.2 11.5 - 16.0 g/dL    HCT 41.3 35.0 - 47.0 %    MCV 93.2 80.0 - 99.0 FL    MCH 29.8 26.0 - 34.0 PG    MCHC 32.0 30.0 - 36.5 g/dL    RDW 12.7 11.5 - 14.5 %    PLATELET 745 230 - 290 K/uL    MPV 10.5 8.9 - 12.9 FL    NRBC 0.0 0.0  WBC    ABSOLUTE NRBC 0.00 0.00 - 0.01 K/uL    NEUTROPHILS 66 32 - 75 %    LYMPHOCYTES 24 12 - 49 %    MONOCYTES 8 5 - 13 %    EOSINOPHILS 1 0 - 7 %    BASOPHILS 1 0 - 1 %    IMMATURE GRANULOCYTES 0 0 - 0.5 %    ABS. NEUTROPHILS 4.3 1.8 - 8.0 K/UL    ABS. LYMPHOCYTES 1.5 0.8 - 3.5 K/UL    ABS. MONOCYTES 0.5 0.0 - 1.0 K/UL    ABS. EOSINOPHILS 0.1 0.0 - 0.4 K/UL    ABS. BASOPHILS 0.1 0.0 - 0.1 K/UL    ABS. IMM. GRANS. 0.0 0.00 - 0.04 K/UL    DF AUTOMATED         Radiologic Studies -   CT ABD PELV W CONT   Final Result   No evident acute or otherwise active intra-abdominal or pelvic   lesion.                  CT Results (Last 48 hours)                 12/26/21 0814  CT ABD PELV W CONT Final result    Impression:  No evident acute or otherwise active intra-abdominal or pelvic   lesion. Narrative:  Exam: CT abdomen and pelvis with intravenous contrast       Comparison: None available       Dose reduction: All CT scans at this facility are performed using dose reduction   optimization techniques as appropriate to perform the exam including the   following: automated exposure control, adjustments of the mA and/or kV according   to patient size, or use of iterative reconstruction technique. Technique: During intravenous contrast administration (100 cc of Isovue-370),   contiguous axial sections were obtained through the abdomen and pelvis. Coronal   and sagittal reconstructions were generated. Findings: Visualized lung bases clear. Allowing for the absence of oral   contrast, bowel appears unremarkable. In particular, negative for bowel   dilatation, bowel wall thickening, pneumatosis intestinalis, mesenteroportal   venous gas, or free subdiaphragmatic air. Mesenteric vascular inflow and   mesenteroportal drainage appear patent. No free or loculated fluid. No mass or   adenopathy. Gallbladder nondistended. No biliary or pancreatic ductal   dilatation. Pancreas unremarkable. Adrenals unremarkable. Spleen unremarkable. Small left renal cortical cyst. Kidneys and renal collecting systems otherwise   unremarkable. Remote hysterectomy. Ovaries not discriminated. CXR Results  (Last 48 hours)      None               If you feel that you have not received excellent quality care or timely care, please ask to speak to the nurse manager. Please choose us in the future for your continued health care needs.    ------------------------------------------------------------------------------------------------------------  The exam and treatment you received in the Emergency Department were for an urgent problem and are not intended as complete care. It is important that you follow-up with a doctor, nurse practitioner, or physician assistant to:  (1) confirm your diagnosis,  (2) re-evaluation of changes in your illness and treatment, and  (3) for ongoing care. If your symptoms become worse or you do not improve as expected and you are unable to reach your usual health care provider, you should return to the Emergency Department. We are available 24 hours a day. Please take your discharge instructions with you when you go to your follow-up appointment. If a prescription has been provided, please have it filled as soon as possible to prevent a delay in treatment. Read the entire medication instruction sheet provided to you by the pharmacy. If you have any questions or reservations about taking the medication due to side effects or interactions with other medications, please call your primary care physician or contact the ER to speak with the charge nurse. Make an appointment with your family doctor or the physician you were referred to for follow-up of this visit as instructed on your discharge paperwork, as this is a mandatory follow-up. Return to the ER if you are unable to be seen or if you are unable to be seen in a timely manner. If you have any problem arranging the follow-up visit, contact the Emergency Department immediately.

## 2022-03-19 PROBLEM — R09.02 HYPOXIA: Status: ACTIVE | Noted: 2020-12-13

## 2022-03-19 PROBLEM — J96.90 RESPIRATORY FAILURE (HCC): Status: ACTIVE | Noted: 2021-08-08

## 2022-03-19 PROBLEM — J44.1 COPD EXACERBATION (HCC): Status: ACTIVE | Noted: 2021-08-08

## 2022-03-20 PROBLEM — J44.9 COPD (CHRONIC OBSTRUCTIVE PULMONARY DISEASE) (HCC): Status: ACTIVE | Noted: 2020-12-13

## 2022-04-13 ENCOUNTER — APPOINTMENT (OUTPATIENT)
Dept: GENERAL RADIOLOGY | Age: 62
End: 2022-04-13
Attending: STUDENT IN AN ORGANIZED HEALTH CARE EDUCATION/TRAINING PROGRAM
Payer: MEDICAID

## 2022-04-13 ENCOUNTER — HOSPITAL ENCOUNTER (EMERGENCY)
Age: 62
Discharge: HOME | End: 2022-04-13
Attending: STUDENT IN AN ORGANIZED HEALTH CARE EDUCATION/TRAINING PROGRAM
Payer: MEDICAID

## 2022-04-13 VITALS
BODY MASS INDEX: 30.73 KG/M2 | DIASTOLIC BLOOD PRESSURE: 88 MMHG | HEIGHT: 64 IN | TEMPERATURE: 98.2 F | SYSTOLIC BLOOD PRESSURE: 152 MMHG | HEART RATE: 84 BPM | OXYGEN SATURATION: 96 % | RESPIRATION RATE: 16 BRPM | WEIGHT: 180 LBS

## 2022-04-13 DIAGNOSIS — R60.9 EDEMA, UNSPECIFIED TYPE: ICD-10-CM

## 2022-04-13 DIAGNOSIS — J44.1 COPD EXACERBATION (HCC): Primary | ICD-10-CM

## 2022-04-13 DIAGNOSIS — R63.0 DECREASED APPETITE: ICD-10-CM

## 2022-04-13 LAB
ALBUMIN SERPL-MCNC: 3.7 G/DL (ref 3.5–5)
ALBUMIN/GLOB SERPL: 0.9 {RATIO} (ref 1.1–2.2)
ALP SERPL-CCNC: 109 U/L (ref 45–117)
ALT SERPL-CCNC: 27 U/L (ref 12–78)
ANION GAP SERPL CALC-SCNC: 1 MMOL/L (ref 5–15)
AST SERPL W P-5'-P-CCNC: 17 U/L (ref 15–37)
BASOPHILS # BLD: 0 K/UL (ref 0–0.1)
BASOPHILS NFR BLD: 1 % (ref 0–1)
BILIRUB SERPL-MCNC: 0.3 MG/DL (ref 0.2–1)
BUN SERPL-MCNC: 17 MG/DL (ref 6–20)
BUN/CREAT SERPL: 16 (ref 12–20)
CA-I BLD-MCNC: 9.4 MG/DL (ref 8.5–10.1)
CHLORIDE SERPL-SCNC: 105 MMOL/L (ref 97–108)
CO2 SERPL-SCNC: 35 MMOL/L (ref 21–32)
CREAT SERPL-MCNC: 1.06 MG/DL (ref 0.55–1.02)
DIFFERENTIAL METHOD BLD: NORMAL
EOSINOPHIL # BLD: 0 K/UL (ref 0–0.4)
EOSINOPHIL NFR BLD: 1 % (ref 0–7)
ERYTHROCYTE [DISTWIDTH] IN BLOOD BY AUTOMATED COUNT: 13.1 % (ref 11.5–14.5)
GLOBULIN SER CALC-MCNC: 3.9 G/DL (ref 2–4)
GLUCOSE SERPL-MCNC: 97 MG/DL (ref 65–100)
HCT VFR BLD AUTO: 43.7 % (ref 35–47)
HGB BLD-MCNC: 13.7 G/DL (ref 11.5–16)
IMM GRANULOCYTES # BLD AUTO: 0 K/UL (ref 0–0.04)
IMM GRANULOCYTES NFR BLD AUTO: 0 % (ref 0–0.5)
LYMPHOCYTES # BLD: 1.2 K/UL (ref 0.8–3.5)
LYMPHOCYTES NFR BLD: 21 % (ref 12–49)
MCH RBC QN AUTO: 29.8 PG (ref 26–34)
MCHC RBC AUTO-ENTMCNC: 31.4 G/DL (ref 30–36.5)
MCV RBC AUTO: 95 FL (ref 80–99)
MONOCYTES # BLD: 0.4 K/UL (ref 0–1)
MONOCYTES NFR BLD: 6 % (ref 5–13)
NEUTS SEG # BLD: 3.9 K/UL (ref 1.8–8)
NEUTS SEG NFR BLD: 71 % (ref 32–75)
NRBC # BLD: 0 K/UL (ref 0–0.01)
NRBC BLD-RTO: 0 PER 100 WBC
PLATELET # BLD AUTO: 287 K/UL (ref 150–400)
PMV BLD AUTO: 10.7 FL (ref 8.9–12.9)
POTASSIUM SERPL-SCNC: 3.8 MMOL/L (ref 3.5–5.1)
PROT SERPL-MCNC: 7.6 G/DL (ref 6.4–8.2)
RBC # BLD AUTO: 4.6 M/UL (ref 3.8–5.2)
SODIUM SERPL-SCNC: 141 MMOL/L (ref 136–145)
TROPONIN-HIGH SENSITIVITY: 8 NG/L (ref 0–51)
TROPONIN-HIGH SENSITIVITY: 8 NG/L (ref 0–51)
WBC # BLD AUTO: 5.5 K/UL (ref 3.6–11)

## 2022-04-13 PROCEDURE — 96374 THER/PROPH/DIAG INJ IV PUSH: CPT

## 2022-04-13 PROCEDURE — 85025 COMPLETE CBC W/AUTO DIFF WBC: CPT

## 2022-04-13 PROCEDURE — 93005 ELECTROCARDIOGRAM TRACING: CPT

## 2022-04-13 PROCEDURE — 74011000250 HC RX REV CODE- 250: Performed by: PHYSICIAN ASSISTANT

## 2022-04-13 PROCEDURE — 71045 X-RAY EXAM CHEST 1 VIEW: CPT

## 2022-04-13 PROCEDURE — 99285 EMERGENCY DEPT VISIT HI MDM: CPT

## 2022-04-13 PROCEDURE — 36415 COLL VENOUS BLD VENIPUNCTURE: CPT

## 2022-04-13 PROCEDURE — 74011250636 HC RX REV CODE- 250/636: Performed by: PHYSICIAN ASSISTANT

## 2022-04-13 PROCEDURE — 96375 TX/PRO/DX INJ NEW DRUG ADDON: CPT

## 2022-04-13 PROCEDURE — 94761 N-INVAS EAR/PLS OXIMETRY MLT: CPT

## 2022-04-13 PROCEDURE — 94640 AIRWAY INHALATION TREATMENT: CPT

## 2022-04-13 PROCEDURE — 84484 ASSAY OF TROPONIN QUANT: CPT

## 2022-04-13 PROCEDURE — 80053 COMPREHEN METABOLIC PANEL: CPT

## 2022-04-13 RX ORDER — IPRATROPIUM BROMIDE AND ALBUTEROL SULFATE 2.5; .5 MG/3ML; MG/3ML
3 SOLUTION RESPIRATORY (INHALATION)
Status: COMPLETED | OUTPATIENT
Start: 2022-04-13 | End: 2022-04-13

## 2022-04-13 RX ORDER — FUROSEMIDE 10 MG/ML
40 INJECTION INTRAMUSCULAR; INTRAVENOUS ONCE
Status: COMPLETED | OUTPATIENT
Start: 2022-04-13 | End: 2022-04-13

## 2022-04-13 RX ADMIN — SODIUM CHLORIDE 1000 ML: 9 INJECTION, SOLUTION INTRAVENOUS at 09:36

## 2022-04-13 RX ADMIN — IPRATROPIUM BROMIDE AND ALBUTEROL SULFATE 3 ML: .5; 3 SOLUTION RESPIRATORY (INHALATION) at 12:20

## 2022-04-13 RX ADMIN — FUROSEMIDE 40 MG: 10 INJECTION, SOLUTION INTRAMUSCULAR; INTRAVENOUS at 09:35

## 2022-04-13 RX ADMIN — METHYLPREDNISOLONE SODIUM SUCCINATE 125 MG: 125 INJECTION, POWDER, FOR SOLUTION INTRAMUSCULAR; INTRAVENOUS at 09:35

## 2022-04-13 NOTE — ED PROVIDER NOTES
EMERGENCY DEPARTMENT HISTORY AND PHYSICAL EXAM      Date: 4/13/2022  Patient Name: Worth Osgood    History of Presenting Illness     Chief Complaint   Patient presents with    Shortness of Breath       History Provided By: Patient    HPI: Worth Osgood, 63 y.o. female with a past medical history significant HTN, COPD presents to the ED with cc of decreased appetite and shortness of breath onset yesterday. Patient reports that she has not had anything to eat or drink since yesterday morning. She states that she did not have an appetite because she felt like she could not breathe. She called EMS this morning, on arrival her oxygen level was 87% and they found a kink in her O2 tubing. Patient is on 2 L chronically due to COPD. EMS replaced her tubing, and she returned to baseline. She specifically denies fever, chills, body aches, chest pain, abdominal pain, nausea, vomiting, diarrhea, sick contacts, headache. She did have a breathing treatment this morning, did not feel any better. There are no other complaints, changes, or physical findings at this time. PCP: Yoni Nguyen MD    Current Outpatient Medications   Medication Sig Dispense Refill    carvediloL (COREG) 6.25 mg tablet Take 1 Tablet by mouth two (2) times daily (with meals). 60 Tablet 0    furosemide (Lasix) 40 mg tablet Lasix 40 mg daily 30 Tablet 0    insulin glargine (LANTUS) 100 unit/mL injection As directed 1 Vial 1    pantoprazole (PROTONIX) 40 mg tablet Take 1 Tablet by mouth Daily (before breakfast). 60 Tablet 0    amoxicillin-clavulanate (Augmentin) 875-125 mg per tablet Take 1 Tablet by mouth two (2) times a day. 20 Tablet 0    predniSONE (DELTASONE) 10 mg tablet 1 tablet daily 15 Tablet 0    albuterol-ipratropium (DUO-NEB) 2.5 mg-0.5 mg/3 ml nebu 3 mL by Nebulization route every six (6) hours as needed for Wheezing. 30 Nebule 1    aspirin 81 mg chewable tablet Take 1 Tab by mouth daily.  30 Tab 0    guaiFENesin ER (MUCINEX) 600 mg ER tablet Take 1 Tab by mouth two (2) times a day. 30 Tab 0    amLODIPine (NORVASC) 10 mg tablet Take 10 mg by mouth daily.  mometasone-formoterol (DULERA) 200-5 mcg/actuation HFA inhaler Take 2 Puffs by inhalation two (2) times a day.  montelukast (SINGULAIR) 10 mg tablet Take 10 mg by mouth daily.  Calcium-Cholecalciferol, D3, 500 mg(1,250mg) -400 unit tab Take  by mouth daily.  benzonatate (TESSALON) 100 mg capsule Take 100 mg by mouth once.  fluticasone propionate (FLONASE) 50 mcg/actuation nasal spray 2 Sprays by Both Nostrils route daily.  ergocalciferol (Vitamin D2) 1,250 mcg (50,000 unit) capsule Take 50,000 Units by mouth every seven (7) days. Q7days on Monday         Past History     Past Medical History:  Past Medical History:   Diagnosis Date    Breast CA ([facility]     Chronic obstructive pulmonary disease ([facility]     Hypertension        Past Surgical History:  Past Surgical History:   Procedure Laterality Date    COLONOSCOPY N/A 7/16/2021    . performed by Los Gray MD at [address] [place] [address] HX HYSTERECTOMY      HX MASTECTOMY Left        Family History:  Family History   Problem Relation Age of Onset    Hypertension Mother     Cancer Father        Social History:  Social History     Tobacco Use    Smoking status: Former Smoker    Smokeless tobacco: Never Used   Vaping Use    Vaping Use: Never used   Substance Use Topics    Alcohol use: Yes     Comment: occasionally     Drug use: Never       Allergies: Allergies   Allergen Reactions    Lisinopril Angioedema         Review of Systems   Review of Systems   Constitutional: Positive for appetite change and fatigue. Negative for activity change, chills and fever. HENT: Negative for congestion, ear pain, rhinorrhea, sneezing and sore throat. Eyes: Negative for pain and visual disturbance. Respiratory: Positive for chest tightness and shortness of breath. Negative for cough.     Cardiovascular: Negative for chest pain and leg swelling. Gastrointestinal: Negative for abdominal pain, diarrhea, nausea and vomiting. Genitourinary: Negative for dysuria and hematuria. Musculoskeletal: Negative for gait problem. Skin: Negative for rash. Neurological: Negative for speech difficulty, weakness and headaches. Psychiatric/Behavioral: The patient is not nervous/anxious. All other systems reviewed and are negative. Physical Exam   Physical Exam  Vitals and nursing note reviewed. Constitutional:       General: She is not in acute distress. Appearance: Normal appearance. She is not ill-appearing, toxic-appearing or diaphoretic. Interventions: Nasal cannula in place. HENT:      Head: Normocephalic and atraumatic. Nose: Nose normal.      Mouth/Throat:      Mouth: Mucous membranes are dry. Eyes:      Extraocular Movements: Extraocular movements intact. Conjunctiva/sclera: Conjunctivae normal.      Pupils: Pupils are equal, round, and reactive to light. Cardiovascular:      Rate and Rhythm: Normal rate. Pulses: Normal pulses. Heart sounds: Normal heart sounds. Pulmonary:      Effort: Pulmonary effort is normal. No tachypnea or respiratory distress. Breath sounds: Normal breath sounds. Decreased air movement present. Abdominal:      General: Bowel sounds are normal.      Palpations: Abdomen is soft. Tenderness: There is no abdominal tenderness. Musculoskeletal:         General: No deformity or signs of injury. Normal range of motion. Cervical back: Normal range of motion. Skin:     General: Skin is warm and dry. Capillary Refill: Capillary refill takes less than 2 seconds. Findings: No rash. Neurological:      General: No focal deficit present. Mental Status: She is alert and oriented to person, place, and time. Cranial Nerves: No cranial nerve deficit. Psychiatric:         Mood and Affect: Mood is anxious.  Affect is tearful. Diagnostic Study Results     Labs -     Recent Results (from the past 200 hour(s))   EKG, 12 LEAD, INITIAL    Collection Time: 04/13/22  8:54 AM   Result Value Ref Range    Ventricular Rate 70 BPM    Atrial Rate 70 BPM    P-R Interval 156 ms    QRS Duration 78 ms    Q-T Interval 386 ms    QTC Calculation (Bezet) 416 ms    Calculated P Axis 59 degrees    Calculated R Axis -23 degrees    Calculated T Axis 44 degrees    Diagnosis       Normal sinus rhythm  Normal ECG    Confirmed by Georgi Minaya MD, [facility] ([id number]) on 4/14/2022 12:45:00 AM     CBC WITH AUTOMATED DIFF    Collection Time: 04/13/22  8:58 AM   Result Value Ref Range    WBC 5.5 3.6 - 11.0 K/uL    RBC 4.60 3.80 - 5.20 M/uL    HGB 13.7 11.5 - 16.0 g/dL    HCT 43.7 35.0 - 47.0 %    MCV 95.0 80.0 - 99.0 FL    MCH 29.8 26.0 - 34.0 PG    MCHC 31.4 30.0 - 36.5 g/dL    RDW 13.1 11.5 - 14.5 %    PLATELET 200 892 - 221 K/uL    MPV 10.7 8.9 - 12.9 FL    NRBC 0.0 0.0  WBC    ABSOLUTE NRBC 0.00 0.00 - 0.01 K/uL    NEUTROPHILS 71 32 - 75 %    LYMPHOCYTES 21 12 - 49 %    MONOCYTES 6 5 - 13 %    EOSINOPHILS 1 0 - 7 %    BASOPHILS 1 0 - 1 %    IMMATURE GRANULOCYTES 0 0 - 0.5 %    ABS. NEUTROPHILS 3.9 1.8 - 8.0 K/UL    ABS. LYMPHOCYTES 1.2 0.8 - 3.5 K/UL    ABS. MONOCYTES 0.4 0.0 - 1.0 K/UL    ABS. EOSINOPHILS 0.0 0.0 - 0.4 K/UL    ABS. BASOPHILS 0.0 0.0 - 0.1 K/UL    ABS. IMM.  GRANS. 0.0 0.00 - 0.04 K/UL    DF AUTOMATED     METABOLIC PANEL, COMPREHENSIVE    Collection Time: 04/13/22  8:58 AM   Result Value Ref Range    Sodium 141 136 - 145 mmol/L    Potassium 3.8 3.5 - 5.1 mmol/L    Chloride 105 97 - 108 mmol/L    CO2 35 (H) 21 - 32 mmol/L    Anion gap 1 (L) 5 - 15 mmol/L    Glucose 97 65 - 100 mg/dL    BUN 17 6 - 20 mg/dL    Creatinine 1.06 (H) 0.55 - 1.02 mg/dL    BUN/Creatinine ratio 16 12 - 20      GFR est AA >60 >60 ml/min/1.73m2    GFR est non-AA 53 (L) >60 ml/min/1.73m2    Calcium 9.4 8.5 - 10.1 mg/dL    Bilirubin, total 0.3 0.2 - 1.0 mg/dL    AST (SGOT) 17 15 - 37 U/L    ALT (SGPT) 27 12 - 78 U/L    Alk. phosphatase 109 45 - 117 U/L    Protein, total 7.6 6.4 - 8.2 g/dL    Albumin 3.7 3.5 - 5.0 g/dL    Globulin 3.9 2.0 - 4.0 g/dL    A-G Ratio 0.9 (L) 1.1 - 2.2     TROPONIN-HIGH SENSITIVITY    Collection Time: 04/13/22 10:49 AM   Result Value Ref Range    Troponin-High Sensitivity 8 0 - 51 ng/L   TROPONIN-HIGH SENSITIVITY    Collection Time: 04/13/22 12:32 PM   Result Value Ref Range    Troponin-High Sensitivity 8 0 - 51 ng/L       Radiologic Studies -   XR Results (most recent):  Results from Hospital Encounter encounter on 04/13/22    XR CHEST PORT    Narrative  Exam: XR CHEST PORT    Indication:  SOB; Comparison: Chest x-ray from 8/12/2021    Findings:    Stable cardiomegaly. There is mild vascular congestion with cephalization of the  pulmonary vasculature. Perhaps early interstitial pulmonary edema. No focal  infiltrate or consolidation. No pleural effusion. No pneumothorax. Surgical  clips are seen within the left axilla. No acute bony abnormality. Impression  Cardiomegaly with possible early interstitial edema. CT Results  (Last 48 hours)    None            Medical Decision Making and ED Course   I am the first provider for this patient. I reviewed the vital signs, available nursing notes, past medical history, past surgical history, family history and social history. Vital Signs-Reviewed the patient's vital signs. Wt Readings from Last 3 Encounters:   04/13/22 81.6 kg (180 lb)   12/26/21 82.1 kg (181 lb)   08/11/21 93 kg (205 lb 0.4 oz)     Temp Readings from Last 3 Encounters:   04/13/22 98.2 °F (36.8 °C)   12/26/21 98.6 °F (37 °C)   08/13/21 98.4 °F (36.9 °C)     BP Readings from Last 3 Encounters:   04/13/22 (!) 152/88   12/26/21 136/87   08/13/21 112/71     Pulse Readings from Last 3 Encounters:   04/13/22 84   12/26/21 91   08/13/21 75       No data found.     EKG interpretation: (Preliminary)  Normal sinus rhythm at 70 bpm, no ST-T wave changes, wandering baseline, read by Dr. Jairo Mathis at 9:23 AM    Records Reviewed: Nursing Notes, Old Medical Records, Previous Radiology Studies and Previous Laboratory Studies    Provider Notes (Medical Decision Making):       MDM  Number of Diagnoses or Management Options  COPD exacerbation ([address]  Decreased appetite  Edema, unspecified type  Diagnosis management comments: 53-year-old female who presents with shortness of breath, hypoxic on EMS arrival after finding a kink in her oxygen line. Patient is currently saturating 96% on baseline 2 L, no acute distress. Her breath sounds are diminished, will give DuoNeb and Solu-Medrol. She also has a decreased appetite and appears dehydrated. Will give IV fluid bolus. Will evaluate with basic labs and chest x-ray. X-ray with interstitial edema, given dose of Lasix. Troponin negative x2. Mildly elevated bicarb, likely secondary to COPD and chronic hypercapnia. Patient is saturating 98% on 2 L of oxygen via nasal cannula, no acute distress, sitting up in bed eating, stable for discharge. Amount and/or Complexity of Data Reviewed  Clinical lab tests: ordered and reviewed  Tests in the radiology section of CPT®: ordered and reviewed  Review and summarize past medical records: yes          ED Course:   Initial assessment performed. The patients presenting problems have been discussed, and they are in agreement with the care plan formulated and outlined with them. I have encouraged them to ask questions as they arise throughout their visit. Procedures       Roland Palacios PA-C    Procedures     Roland Palacios PA-C        Disposition     Disposition: DC- Adult Discharges: All of the diagnostic tests were reviewed and questions answered. Diagnosis, care plan and treatment options were discussed. The patient understands the instructions and will follow up as directed. The patients results have been reviewed with them.   They have been counseled regarding their diagnosis. The patient verbally convey understanding and agreement of the signs, symptoms, diagnosis, treatment and prognosis and additionally agrees to follow up as recommended with their PCP in 24 - 48 hours. They also agree with the care-plan and convey that all of their questions have been answered. I have also put together some discharge instructions for them that include: 1) educational information regarding their diagnosis, 2) how to care for their diagnosis at home, as well a 3) list of reasons why they would want to return to the ED prior to their follow-up appointment, should their condition change. Discharged    DISCHARGE PLAN:  1. Current Discharge Medication List      CONTINUE these medications which have NOT CHANGED    Details   carvediloL (COREG) 6.25 mg tablet Take 1 Tablet by mouth two (2) times daily (with meals). Qty: 60 Tablet, Refills: 0      furosemide (Lasix) 40 mg tablet Lasix 40 mg daily  Qty: 30 Tablet, Refills: 0      insulin glargine (LANTUS) 100 unit/mL injection As directed  Qty: 1 Vial, Refills: 1      pantoprazole (PROTONIX) 40 mg tablet Take 1 Tablet by mouth Daily (before breakfast). Qty: 60 Tablet, Refills: 0      amoxicillin-clavulanate (Augmentin) 875-125 mg per tablet Take 1 Tablet by mouth two (2) times a day. Qty: 20 Tablet, Refills: 0      predniSONE (DELTASONE) 10 mg tablet 1 tablet daily  Qty: 15 Tablet, Refills: 0      albuterol-ipratropium (DUO-NEB) 2.5 mg-0.5 mg/3 ml nebu 3 mL by Nebulization route every six (6) hours as needed for Wheezing. Qty: 30 Nebule, Refills: 1      aspirin 81 mg chewable tablet Take 1 Tab by mouth daily. Qty: 30 Tab, Refills: 0      guaiFENesin ER (MUCINEX) 600 mg ER tablet Take 1 Tab by mouth two (2) times a day. Qty: 30 Tab, Refills: 0      amLODIPine (NORVASC) 10 mg tablet Take 10 mg by mouth daily.       mometasone-formoterol (DULERA) 200-5 mcg/actuation HFA inhaler Take 2 Puffs by inhalation two (2) times a day. montelukast (SINGULAIR) 10 mg tablet Take 10 mg by mouth daily. Calcium-Cholecalciferol, D3, 500 mg(1,250mg) -400 unit tab Take  by mouth daily. benzonatate (TESSALON) 100 mg capsule Take 100 mg by mouth once. fluticasone propionate (FLONASE) 50 mcg/actuation nasal spray 2 Sprays by Both Nostrils route daily. ergocalciferol (Vitamin D2) 1,250 mcg (50,000 unit) capsule Take 50,000 Units by mouth every seven (7) days. Q7days on Monday           2. Follow-up Information     Follow up With Specialties Details Why Contact Info    Yoni Nguyen MD [facility] Medicine Schedule an appointment as soon as possible for a visit  for follow up from ER visit [address]  [phone] [address] [facility] EMERGENCY DEPT Emergency Medicine  As needed, If symptoms worsen [address] [address]  [phone]        3. Return to ED if worse   4. Discharge Medication List as of 4/13/2022  1:24 PM          Diagnosis     Clinical Impression:   1. COPD exacerbation ([address]    2. Edema, unspecified type    3. Decreased appetite        Attestations:    Sergei Mckeon PA-C    Please note that this dictation was completed with [employer], the computer voice recognition software. Quite often unanticipated grammatical, syntax, homophones, and other interpretive errors are inadvertently transcribed by the computer software. Please disregard these errors. Please excuse any errors that have escaped final proofreading. Thank you.

## 2022-04-13 NOTE — ED TRIAGE NOTES
Pt's O2 tubing kinked, began feeling SOB last night. EMS stated sats 87% on arrival, replaced tubing, sats returned to baseline.

## 2022-04-14 LAB
ATRIAL RATE: 70 BPM
CALCULATED P AXIS, ECG09: 59 DEGREES
CALCULATED R AXIS, ECG10: -23 DEGREES
CALCULATED T AXIS, ECG11: 44 DEGREES
DIAGNOSIS, 93000: NORMAL
P-R INTERVAL, ECG05: 156 MS
Q-T INTERVAL, ECG07: 386 MS
QRS DURATION, ECG06: 78 MS
QTC CALCULATION (BEZET), ECG08: 416 MS
VENTRICULAR RATE, ECG03: 70 BPM

## 2022-05-13 ENCOUNTER — TRANSCRIBE ORDER (OUTPATIENT)
Dept: SCHEDULING | Age: 62
End: 2022-05-13

## 2022-05-13 DIAGNOSIS — R06.02 SOB (SHORTNESS OF BREATH): Primary | ICD-10-CM

## 2022-10-19 ENCOUNTER — APPOINTMENT (OUTPATIENT)
Dept: GENERAL RADIOLOGY | Age: 62
End: 2022-10-19
Attending: PHYSICIAN ASSISTANT
Payer: MEDICAID

## 2022-10-19 ENCOUNTER — HOSPITAL ENCOUNTER (EMERGENCY)
Age: 62
Discharge: HOME | End: 2022-10-19
Attending: STUDENT IN AN ORGANIZED HEALTH CARE EDUCATION/TRAINING PROGRAM
Payer: MEDICAID

## 2022-10-19 VITALS
DIASTOLIC BLOOD PRESSURE: 87 MMHG | HEIGHT: 64 IN | OXYGEN SATURATION: 93 % | TEMPERATURE: 99.4 F | WEIGHT: 170 LBS | BODY MASS INDEX: 29.02 KG/M2 | HEART RATE: 98 BPM | SYSTOLIC BLOOD PRESSURE: 147 MMHG | RESPIRATION RATE: 18 BRPM

## 2022-10-19 DIAGNOSIS — E86.0 DEHYDRATION: ICD-10-CM

## 2022-10-19 DIAGNOSIS — N30.01 ACUTE CYSTITIS WITH HEMATURIA: Primary | ICD-10-CM

## 2022-10-19 LAB
ALBUMIN SERPL-MCNC: 3.8 G/DL (ref 3.5–5)
ALBUMIN/GLOB SERPL: 1.1 {RATIO} (ref 1.1–2.2)
ALP SERPL-CCNC: 107 U/L (ref 45–117)
ALT SERPL-CCNC: 25 U/L (ref 12–78)
ANION GAP SERPL CALC-SCNC: 4 MMOL/L (ref 5–15)
APPEARANCE UR: ABNORMAL
AST SERPL W P-5'-P-CCNC: 12 U/L (ref 15–37)
BACTERIA URNS QL MICRO: ABNORMAL /HPF
BASOPHILS # BLD: 0 K/UL (ref 0–0.1)
BASOPHILS NFR BLD: 0 % (ref 0–1)
BILIRUB SERPL-MCNC: 0.2 MG/DL (ref 0.2–1)
BILIRUB UR QL: NEGATIVE
BUN SERPL-MCNC: 24 MG/DL (ref 6–20)
BUN/CREAT SERPL: 20 (ref 12–20)
CA-I BLD-MCNC: 9.5 MG/DL (ref 8.5–10.1)
CHLORIDE SERPL-SCNC: 106 MMOL/L (ref 97–108)
CO2 SERPL-SCNC: 29 MMOL/L (ref 21–32)
COLOR UR: ABNORMAL
CREAT SERPL-MCNC: 1.18 MG/DL (ref 0.55–1.02)
DIFFERENTIAL METHOD BLD: NORMAL
EOSINOPHIL # BLD: 0.1 K/UL (ref 0–0.4)
EOSINOPHIL NFR BLD: 2 % (ref 0–7)
ERYTHROCYTE [DISTWIDTH] IN BLOOD BY AUTOMATED COUNT: 12.3 % (ref 11.5–14.5)
GLOBULIN SER CALC-MCNC: 3.5 G/DL (ref 2–4)
GLUCOSE SERPL-MCNC: 85 MG/DL (ref 65–100)
GLUCOSE UR STRIP.AUTO-MCNC: NEGATIVE MG/DL
HCT VFR BLD AUTO: 36.1 % (ref 35–47)
HGB BLD-MCNC: 11.9 G/DL (ref 11.5–16)
HGB UR QL STRIP: ABNORMAL
IMM GRANULOCYTES # BLD AUTO: 0 K/UL (ref 0–0.04)
IMM GRANULOCYTES NFR BLD AUTO: 0 % (ref 0–0.5)
KETONES UR QL STRIP.AUTO: NEGATIVE MG/DL
LEUKOCYTE ESTERASE UR QL STRIP.AUTO: ABNORMAL
LIPASE SERPL-CCNC: 138 U/L (ref 73–393)
LYMPHOCYTES # BLD: 1.5 K/UL (ref 0.8–3.5)
LYMPHOCYTES NFR BLD: 21 % (ref 12–49)
MCH RBC QN AUTO: 30 PG (ref 26–34)
MCHC RBC AUTO-ENTMCNC: 33 G/DL (ref 30–36.5)
MCV RBC AUTO: 90.9 FL (ref 80–99)
MONOCYTES # BLD: 0.6 K/UL (ref 0–1)
MONOCYTES NFR BLD: 8 % (ref 5–13)
MUCOUS THREADS URNS QL MICRO: ABNORMAL /LPF
NEUTS SEG # BLD: 4.8 K/UL (ref 1.8–8)
NEUTS SEG NFR BLD: 69 % (ref 32–75)
NITRITE UR QL STRIP.AUTO: NEGATIVE
NRBC # BLD: 0 K/UL (ref 0–0.01)
NRBC BLD-RTO: 0 PER 100 WBC
PH UR STRIP: 5 [PH] (ref 5–8)
PLATELET # BLD AUTO: 301 K/UL (ref 150–400)
PMV BLD AUTO: 10.7 FL (ref 8.9–12.9)
POTASSIUM SERPL-SCNC: 4 MMOL/L (ref 3.5–5.1)
PROT SERPL-MCNC: 7.3 G/DL (ref 6.4–8.2)
PROT UR STRIP-MCNC: 30 MG/DL
RBC # BLD AUTO: 3.97 M/UL (ref 3.8–5.2)
RBC #/AREA URNS HPF: ABNORMAL /HPF (ref 0–5)
SODIUM SERPL-SCNC: 139 MMOL/L (ref 136–145)
SP GR UR REFRACTOMETRY: 1.02 (ref 1–1.03)
UROBILINOGEN UR QL STRIP.AUTO: 0.1 EU/DL (ref 0.1–1)
WBC # BLD AUTO: 6.9 K/UL (ref 3.6–11)
WBC URNS QL MICRO: ABNORMAL /HPF (ref 0–4)

## 2022-10-19 PROCEDURE — 74011250636 HC RX REV CODE- 250/636: Performed by: PHYSICIAN ASSISTANT

## 2022-10-19 PROCEDURE — 85025 COMPLETE CBC W/AUTO DIFF WBC: CPT

## 2022-10-19 PROCEDURE — 96374 THER/PROPH/DIAG INJ IV PUSH: CPT

## 2022-10-19 PROCEDURE — 74011000250 HC RX REV CODE- 250: Performed by: PHYSICIAN ASSISTANT

## 2022-10-19 PROCEDURE — 96361 HYDRATE IV INFUSION ADD-ON: CPT

## 2022-10-19 PROCEDURE — 36415 COLL VENOUS BLD VENIPUNCTURE: CPT

## 2022-10-19 PROCEDURE — 80053 COMPREHEN METABOLIC PANEL: CPT

## 2022-10-19 PROCEDURE — 74018 RADEX ABDOMEN 1 VIEW: CPT

## 2022-10-19 PROCEDURE — 81001 URINALYSIS AUTO W/SCOPE: CPT

## 2022-10-19 PROCEDURE — 99284 EMERGENCY DEPT VISIT MOD MDM: CPT

## 2022-10-19 PROCEDURE — 83690 ASSAY OF LIPASE: CPT

## 2022-10-19 RX ORDER — CEPHALEXIN 500 MG/1
500 CAPSULE ORAL 4 TIMES DAILY
Qty: 28 CAPSULE | Refills: 0 | Status: SHIPPED | OUTPATIENT
Start: 2022-10-19 | End: 2022-10-26

## 2022-10-19 RX ADMIN — CEFTRIAXONE SODIUM 1 G: 1 INJECTION, POWDER, FOR SOLUTION INTRAMUSCULAR; INTRAVENOUS at 16:43

## 2022-10-19 RX ADMIN — SODIUM CHLORIDE 1000 ML: 9 INJECTION, SOLUTION INTRAVENOUS at 16:43

## 2022-10-19 NOTE — ED PROVIDER NOTES
EMERGENCY DEPARTMENT HISTORY AND PHYSICAL EXAM      Date: 10/19/2022  Patient Name: Janet Cool    History of Presenting Illness     Chief Complaint   Patient presents with    Abdominal Pain       History Provided By: Patient    HPI: Janet Cool, 63 y.o. female with a past medical history significant for HTN, COPD, and breast CA who presents to the ED with cc of abdominal pain. Patient reports 4 to 5-day history of lower abdominal pain described as a \"cramping sensation\". Patient also complains of constipation and malodorous urine. Reports last bowel movement was yesterday was smaller than normal.  Patient notes no alleviating or exacerbating factors. Denies treating her pain with anything. Patient denies any associated fevers, chills, nausea, vomiting, diarrhea, dysuria, hematuria, or frequency. There are no other complaints, changes, or physical findings at this time. PCP: Bakari Hager MD    No current facility-administered medications on file prior to encounter. Current Outpatient Medications on File Prior to Encounter   Medication Sig Dispense Refill    carvediloL (COREG) 6.25 mg tablet Take 1 Tablet by mouth two (2) times daily (with meals). 60 Tablet 0    furosemide (Lasix) 40 mg tablet Lasix 40 mg daily 30 Tablet 0    insulin glargine (LANTUS) 100 unit/mL injection As directed 1 Vial 1    pantoprazole (PROTONIX) 40 mg tablet Take 1 Tablet by mouth Daily (before breakfast). 60 Tablet 0    predniSONE (DELTASONE) 10 mg tablet 1 tablet daily 15 Tablet 0    [DISCONTINUED] amoxicillin-clavulanate (Augmentin) 875-125 mg per tablet Take 1 Tablet by mouth two (2) times a day. 20 Tablet 0    albuterol-ipratropium (DUO-NEB) 2.5 mg-0.5 mg/3 ml nebu 3 mL by Nebulization route every six (6) hours as needed for Wheezing. 30 Nebule 1    aspirin 81 mg chewable tablet Take 1 Tab by mouth daily. 30 Tab 0    guaiFENesin ER (MUCINEX) 600 mg ER tablet Take 1 Tab by mouth two (2) times a day.  30 Tab 0 amLODIPine (NORVASC) 10 mg tablet Take 10 mg by mouth daily. mometasone-formoterol (DULERA) 200-5 mcg/actuation HFA inhaler Take 2 Puffs by inhalation two (2) times a day. montelukast (SINGULAIR) 10 mg tablet Take 10 mg by mouth daily. Calcium-Cholecalciferol, D3, 500 mg(1,250mg) -400 unit tab Take  by mouth daily. benzonatate (TESSALON) 100 mg capsule Take 100 mg by mouth once. fluticasone propionate (FLONASE) 50 mcg/actuation nasal spray 2 Sprays by Both Nostrils route daily. ergocalciferol (Vitamin D2) 1,250 mcg (50,000 unit) capsule Take 50,000 Units by mouth every seven (7) days. Q7days on Monday         Past History     Past Medical History:  Past Medical History:   Diagnosis Date    Breast CA ([facility] [address]     Chronic obstructive pulmonary disease ([facility] [address]     Hypertension        Past Surgical History:  Past Surgical History:   Procedure Laterality Date    COLONOSCOPY N/A 7/16/2021    . performed by Randall Bhatia MD at [facility] ENDOSCOPY    HX HYSTERECTOMY      HX MASTECTOMY Left        Family History:  Family History   Problem Relation Age of Onset    Hypertension Mother     Cancer Father        Social History:  Social History     Tobacco Use    Smoking status: Former    Smokeless tobacco: Never   Vaping Use    Vaping Use: Never used   Substance Use Topics    Alcohol use: Yes     Comment: occasionally     Drug use: Never       Allergies: Allergies   Allergen Reactions    Lisinopril Angioedema       Review of Systems   Review of Systems   Constitutional: Negative. Negative for chills, diaphoresis and fever. HENT: Negative. Negative for congestion, rhinorrhea and sore throat. Eyes: Negative. Respiratory: Negative. Negative for cough, chest tightness, shortness of breath and wheezing. Cardiovascular: Negative. Negative for chest pain and palpitations. Gastrointestinal:  Positive for abdominal pain and constipation. Negative for diarrhea, nausea and vomiting.    Genitourinary: Negative for difficulty urinating, dysuria, flank pain, frequency and hematuria. Malodorous urine   Musculoskeletal: Negative. Negative for back pain and gait problem. Skin: Negative. Negative for rash. Neurological: Negative. Negative for dizziness, syncope, weakness, light-headedness, numbness and headaches. Psychiatric/Behavioral: Negative. All other systems reviewed and are negative. Physical Exam   Physical Exam  Vitals and nursing note reviewed. Constitutional:       General: She is not in acute distress. Appearance: Normal appearance. She is overweight. She is not ill-appearing or toxic-appearing. HENT:      Head: Normocephalic and atraumatic. Mouth/Throat:      Mouth: Mucous membranes are moist.      Pharynx: Oropharynx is clear. Eyes:      Extraocular Movements: Extraocular movements intact. Conjunctiva/sclera: Conjunctivae normal.      Pupils: Pupils are equal, round, and reactive to light. Cardiovascular:      Rate and Rhythm: Normal rate and regular rhythm. Heart sounds: No murmur heard. No friction rub. No gallop. Pulmonary:      Effort: Pulmonary effort is normal.      Breath sounds: Normal breath sounds. No wheezing, rhonchi or rales. Abdominal:      General: Bowel sounds are normal. There is distension. Palpations: Abdomen is soft. Tenderness: There is abdominal tenderness (diffusely across lower abdomen) in the suprapubic area. There is no right CVA tenderness, left CVA tenderness, guarding or rebound. Negative signs include Gutierrez's sign and McBurney's sign. Musculoskeletal:      Cervical back: Neck supple. No tenderness. Skin:     General: Skin is warm and dry. Capillary Refill: Capillary refill takes less than 2 seconds. Findings: No rash. Neurological:      General: No focal deficit present. Mental Status: She is alert and oriented to person, place, and time.    Psychiatric:         Mood and Affect: Mood normal.         Behavior: Behavior normal.       Lab and Diagnostic Study Results   Labs -     Recent Results (from the past 12 hour(s))   CBC WITH AUTOMATED DIFF    Collection Time: 10/19/22  3:35 PM   Result Value Ref Range    WBC 6.9 3.6 - 11.0 K/uL    RBC 3.97 3.80 - 5.20 M/uL    HGB 11.9 11.5 - 16.0 g/dL    HCT 36.1 35.0 - 47.0 %    MCV 90.9 80.0 - 99.0 FL    MCH 30.0 26.0 - 34.0 PG    MCHC 33.0 30.0 - 36.5 g/dL    RDW 12.3 11.5 - 14.5 %    PLATELET 622 241 - 384 K/uL    MPV 10.7 8.9 - 12.9 FL    NRBC 0.0 0.0  WBC    ABSOLUTE NRBC 0.00 0.00 - 0.01 K/uL    NEUTROPHILS 69 32 - 75 %    LYMPHOCYTES 21 12 - 49 %    MONOCYTES 8 5 - 13 %    EOSINOPHILS 2 0 - 7 %    BASOPHILS 0 0 - 1 %    IMMATURE GRANULOCYTES 0 0 - 0.5 %    ABS. NEUTROPHILS 4.8 1.8 - 8.0 K/UL    ABS. LYMPHOCYTES 1.5 0.8 - 3.5 K/UL    ABS. MONOCYTES 0.6 0.0 - 1.0 K/UL    ABS. EOSINOPHILS 0.1 0.0 - 0.4 K/UL    ABS. BASOPHILS 0.0 0.0 - 0.1 K/UL    ABS. IMM. GRANS. 0.0 0.00 - 0.04 K/UL    DF AUTOMATED     METABOLIC PANEL, COMPREHENSIVE    Collection Time: 10/19/22  3:35 PM   Result Value Ref Range    Sodium 139 136 - 145 mmol/L    Potassium 4.0 3.5 - 5.1 mmol/L    Chloride 106 97 - 108 mmol/L    CO2 29 21 - 32 mmol/L    Anion gap 4 (L) 5 - 15 mmol/L    Glucose 85 65 - 100 mg/dL    BUN 24 (H) 6 - 20 mg/dL    Creatinine 1.18 (H) 0.55 - 1.02 mg/dL    BUN/Creatinine ratio 20 12 - 20      eGFR 53 (L) >60 ml/min/1.73m2    Calcium 9.5 8.5 - 10.1 mg/dL    Bilirubin, total 0.2 0.2 - 1.0 mg/dL    AST (SGOT) 12 (L) 15 - 37 U/L    ALT (SGPT) 25 12 - 78 U/L    Alk.  phosphatase 107 45 - 117 U/L    Protein, total 7.3 6.4 - 8.2 g/dL    Albumin 3.8 3.5 - 5.0 g/dL    Globulin 3.5 2.0 - 4.0 g/dL    A-G Ratio 1.1 1.1 - 2.2     URINALYSIS W/ RFLX MICROSCOPIC    Collection Time: 10/19/22  3:35 PM   Result Value Ref Range    Color Yellow/Straw      Appearance Turbid (A) Clear      Specific gravity 1.020 1.003 - 1.030      pH (UA) 5.0 5.0 - 8.0      Protein 30 (A) Negative mg/dL    Glucose Negative Negative mg/dL    Ketone Negative Negative mg/dL    Bilirubin Negative Negative      Blood Small (A) Negative      Urobilinogen 0.1 0.1 - 1.0 EU/dL    Nitrites Negative Negative      Leukocyte Esterase Large (A) Negative     LIPASE    Collection Time: 10/19/22  3:35 PM   Result Value Ref Range    Lipase 138 73 - 393 U/L   URINE MICROSCOPIC    Collection Time: 10/19/22  3:35 PM   Result Value Ref Range    WBC  0 - 4 /hpf    RBC  0 - 5 /hpf    Bacteria 1+ (A) Negative /hpf    Mucus Trace (A) Negative /lpf       Radiologic Studies -   @lastxrresult@  CT Results  (Last 48 hours)      None          CXR Results  (Last 48 hours)      None            Medical Decision Making and ED Course   Differential Diagnosis & Medical Decision Making Provider Note:   Pt presents with acute abdominal pain; vital signs stable with currently a non-peritoneal exam; DDx includes: Gastroenteritis, UTI, hepatitis, pancreatitis, obstruction, appendicitis, viral illness, IBD, diverticulitis, mesenteric ischemia, AAA or descending dissection, ACS, kidney stone. Will get labs, treat symptomatically and obtain serial abdominal exams to determine if additional imaging is indicated. Will reassess and monitor closely. - I am the first provider for this patient. I reviewed the vital signs, available nursing notes, past medical history, past surgical history, family history and social history. The patients presenting problems have been discussed, and they are in agreement with the care plan formulated and outlined with them. I have encouraged them to ask questions as they arise throughout their visit. Vital Signs-Reviewed the patient's vital signs. Patient Vitals for the past 12 hrs:   Temp Pulse Resp BP SpO2   10/19/22 1521 99.4 °F (37.4 °C) 98 18 (!) 147/87 93 %       ED Course:   4:53 PM  Patient reassessed and updated on all results and findings. Work-up demonstrates UTI.   Patient treated with ceftriaxone in ED and will be prescribed Keflex on an outpatient basis. She has been educated on strict return precautions conveys good understanding agree with care plan as outlined. Patient will follow up with her primary care within the next week should condition persist, return to ED sooner if worse. She has no new complaints or concerns all her questions have been answered. Anticipate discharge home shortly. ALCOHOL/SUBSTANCE ABUSE COUNSELING: Upon evaluation, pt endorsed recent alcohol/illicit drug use. For approximately 15 minutes, pt has been counseled on the dangers of alcohol and illicit drug use on their health, and they were encouraged to quit as soon as possible in order to decrease further risks to their health. Pt has conveyed their understanding of the risks involved should they continue to use these products. Procedures   Performed by: Megan Sánchez PA-C  Procedures      Disposition   Disposition: DC- Adult Discharges: All of the diagnostic tests were reviewed and questions answered. Diagnosis, care plan and treatment options were discussed. The patient understands the instructions and will follow up as directed. The patients results have been reviewed with them. They have been counseled regarding their diagnosis. The patient verbally convey understanding and agreement of the signs, symptoms, diagnosis, treatment and prognosis and additionally agrees to follow up as recommended with their PCP in 24 - 48 hours. They also agree with the care-plan and convey that all of their questions have been answered. I have also put together some discharge instructions for them that include: 1) educational information regarding their diagnosis, 2) how to care for their diagnosis at home, as well a 3) list of reasons why they would want to return to the ED prior to their follow-up appointment, should their condition change. DISCHARGE PLAN:  1.    Current Discharge Medication List CONTINUE these medications which have NOT CHANGED    Details   carvediloL (COREG) 6.25 mg tablet Take 1 Tablet by mouth two (2) times daily (with meals). Qty: 60 Tablet, Refills: 0      furosemide (Lasix) 40 mg tablet Lasix 40 mg daily  Qty: 30 Tablet, Refills: 0      insulin glargine (LANTUS) 100 unit/mL injection As directed  Qty: 1 Vial, Refills: 1      pantoprazole (PROTONIX) 40 mg tablet Take 1 Tablet by mouth Daily (before breakfast). Qty: 60 Tablet, Refills: 0      amoxicillin-clavulanate (Augmentin) 875-125 mg per tablet Take 1 Tablet by mouth two (2) times a day. Qty: 20 Tablet, Refills: 0      predniSONE (DELTASONE) 10 mg tablet 1 tablet daily  Qty: 15 Tablet, Refills: 0      albuterol-ipratropium (DUO-NEB) 2.5 mg-0.5 mg/3 ml nebu 3 mL by Nebulization route every six (6) hours as needed for Wheezing. Qty: 30 Nebule, Refills: 1      aspirin 81 mg chewable tablet Take 1 Tab by mouth daily. Qty: 30 Tab, Refills: 0      guaiFENesin ER (MUCINEX) 600 mg ER tablet Take 1 Tab by mouth two (2) times a day. Qty: 30 Tab, Refills: 0      amLODIPine (NORVASC) 10 mg tablet Take 10 mg by mouth daily. mometasone-formoterol (DULERA) 200-5 mcg/actuation HFA inhaler Take 2 Puffs by inhalation two (2) times a day. montelukast (SINGULAIR) 10 mg tablet Take 10 mg by mouth daily. Calcium-Cholecalciferol, D3, 500 mg(1,250mg) -400 unit tab Take  by mouth daily. benzonatate (TESSALON) 100 mg capsule Take 100 mg by mouth once. fluticasone propionate (FLONASE) 50 mcg/actuation nasal spray 2 Sprays by Both Nostrils route daily. ergocalciferol (Vitamin D2) 1,250 mcg (50,000 unit) capsule Take 50,000 Units by mouth every seven (7) days. Q7days on Monday           2.    Follow-up Information       Follow up With Specialties Details Major Sherman MD [facility] Medicine Schedule an appointment as soon as possible for a visit  As needed [address] [place] [address]  [phone]            3. Return to ED if worse   4. Current Discharge Medication List        START taking these medications    Details   cephALEXin (Keflex) 500 mg capsule Take 1 Capsule by mouth four (4) times daily for 7 days. Qty: 28 Capsule, Refills: 0  Start date: 10/19/2022, End date: 10/26/2022           STOP taking these medications       amoxicillin-clavulanate (Augmentin) 875-125 mg per tablet Comments:   Reason for Stopping:             Remove if admitted/transferred    Diagnosis/Clinical Impression     Clinical Impression:   1. Acute cystitis with hematuria    2. Dehydration        Attestations: Lion Brito PA-C, am the primary clinician of record. Please note that this dictation was completed with [employer], the [employer] voice recognition software. Quite often unanticipated grammatical, syntax, homophones, and other interpretive errors are inadvertently transcribed by the computer software. Please disregard these errors. Please excuse any errors that have escaped final proofreading. Thank you.

## 2022-10-19 NOTE — Clinical Note
[facility] EMERGENCY DEPT  [address] [place] [id number]  [phone]    Work/School Note    Date: 10/19/2022    To Whom It May concern:      Kera Pendleton was seen and treated today in the emergency room by the following provider(s):  Attending Provider: Cayla Peraza MD  Physician Assistant: Dhara Leija PA-C. Kera Pendleton is excused from work/school on 10/19/22. She is clear to return to work/school on 10/20/22.         Sincerely,          Lynette Brito PA-C

## 2022-10-19 NOTE — Clinical Note
[facility] EMERGENCY DEPT  [address] [place] [id number]  [phone]    Work/School Note    Date: 10/19/2022    To Whom It May concern:    Megan David was seen and treated today in the emergency room by the following provider(s):  Attending Provider: Nicolas Espino MD  Physician Assistant: Marlena Mccarthy PA-C. Megan David is excused from work/school on 10/19/22 and 10/20/22. She is medically clear to return to work/school on 10/21/2022.        Sincerely,          Filemon Brito PA-C

## 2022-10-19 NOTE — ED TRIAGE NOTES
Pt c/o abdominal pain for a few days. Denies n/v/d. Pt states she has not had a good BM in over a week.

## 2022-11-21 ENCOUNTER — APPOINTMENT (OUTPATIENT)
Dept: CT IMAGING | Age: 62
End: 2022-11-21
Attending: STUDENT IN AN ORGANIZED HEALTH CARE EDUCATION/TRAINING PROGRAM
Payer: MEDICAID

## 2022-11-21 ENCOUNTER — HOSPITAL ENCOUNTER (EMERGENCY)
Age: 62
Discharge: HOME | End: 2022-11-21
Attending: STUDENT IN AN ORGANIZED HEALTH CARE EDUCATION/TRAINING PROGRAM
Payer: MEDICAID

## 2022-11-21 VITALS
DIASTOLIC BLOOD PRESSURE: 76 MMHG | BODY MASS INDEX: 34.38 KG/M2 | OXYGEN SATURATION: 99 % | SYSTOLIC BLOOD PRESSURE: 127 MMHG | TEMPERATURE: 98.7 F | HEIGHT: 64 IN | HEART RATE: 81 BPM | RESPIRATION RATE: 18 BRPM | WEIGHT: 201.4 LBS

## 2022-11-21 DIAGNOSIS — N39.0 URINARY TRACT INFECTION WITHOUT HEMATURIA, SITE UNSPECIFIED: Primary | ICD-10-CM

## 2022-11-21 LAB
ALBUMIN SERPL-MCNC: 3.6 G/DL (ref 3.5–5)
ALBUMIN/GLOB SERPL: 1.2 {RATIO} (ref 1.1–2.2)
ALP SERPL-CCNC: 87 U/L (ref 45–117)
ALT SERPL-CCNC: 23 U/L (ref 12–78)
ANION GAP SERPL CALC-SCNC: 3 MMOL/L (ref 5–15)
APPEARANCE UR: ABNORMAL
AST SERPL W P-5'-P-CCNC: 12 U/L (ref 15–37)
BACTERIA URNS QL MICRO: NEGATIVE /HPF
BASOPHILS # BLD: 0 K/UL (ref 0–0.1)
BASOPHILS NFR BLD: 0 % (ref 0–1)
BILIRUB SERPL-MCNC: 0.2 MG/DL (ref 0.2–1)
BILIRUB UR QL: NEGATIVE
BUN SERPL-MCNC: 18 MG/DL (ref 6–20)
BUN/CREAT SERPL: 14 (ref 12–20)
CA-I BLD-MCNC: 9.7 MG/DL (ref 8.5–10.1)
CHLORIDE SERPL-SCNC: 108 MMOL/L (ref 97–108)
CO2 SERPL-SCNC: 32 MMOL/L (ref 21–32)
COLOR UR: ABNORMAL
CREAT SERPL-MCNC: 1.32 MG/DL (ref 0.55–1.02)
DIFFERENTIAL METHOD BLD: ABNORMAL
EOSINOPHIL # BLD: 0 K/UL (ref 0–0.4)
EOSINOPHIL NFR BLD: 1 % (ref 0–7)
ERYTHROCYTE [DISTWIDTH] IN BLOOD BY AUTOMATED COUNT: 12.5 % (ref 11.5–14.5)
GLOBULIN SER CALC-MCNC: 3.1 G/DL (ref 2–4)
GLUCOSE SERPL-MCNC: 111 MG/DL (ref 65–100)
GLUCOSE UR STRIP.AUTO-MCNC: NEGATIVE MG/DL
HCT VFR BLD AUTO: 36.2 % (ref 35–47)
HGB BLD-MCNC: 11 G/DL (ref 11.5–16)
HGB UR QL STRIP: NEGATIVE
IMM GRANULOCYTES # BLD AUTO: 0 K/UL (ref 0–0.04)
IMM GRANULOCYTES NFR BLD AUTO: 0 % (ref 0–0.5)
KETONES UR QL STRIP.AUTO: NEGATIVE MG/DL
LEUKOCYTE ESTERASE UR QL STRIP.AUTO: ABNORMAL
LIPASE SERPL-CCNC: 122 U/L (ref 73–393)
LYMPHOCYTES # BLD: 1.2 K/UL (ref 0.8–3.5)
LYMPHOCYTES NFR BLD: 17 % (ref 12–49)
MCH RBC QN AUTO: 29.1 PG (ref 26–34)
MCHC RBC AUTO-ENTMCNC: 30.4 G/DL (ref 30–36.5)
MCV RBC AUTO: 95.8 FL (ref 80–99)
MONOCYTES # BLD: 0.4 K/UL (ref 0–1)
MONOCYTES NFR BLD: 6 % (ref 5–13)
MUCOUS THREADS URNS QL MICRO: ABNORMAL /LPF
NEUTS SEG # BLD: 5.3 K/UL (ref 1.8–8)
NEUTS SEG NFR BLD: 76 % (ref 32–75)
NITRITE UR QL STRIP.AUTO: NEGATIVE
NRBC # BLD: 0 K/UL (ref 0–0.01)
NRBC BLD-RTO: 0 PER 100 WBC
PH UR STRIP: 5 [PH] (ref 5–8)
PLATELET # BLD AUTO: 313 K/UL (ref 150–400)
PMV BLD AUTO: 11.1 FL (ref 8.9–12.9)
POTASSIUM SERPL-SCNC: 4.2 MMOL/L (ref 3.5–5.1)
PROT SERPL-MCNC: 6.7 G/DL (ref 6.4–8.2)
PROT UR STRIP-MCNC: 30 MG/DL
RBC # BLD AUTO: 3.78 M/UL (ref 3.8–5.2)
RBC #/AREA URNS HPF: ABNORMAL /HPF (ref 0–5)
SODIUM SERPL-SCNC: 143 MMOL/L (ref 136–145)
SP GR UR REFRACTOMETRY: 1.02 (ref 1–1.03)
UA: UC IF INDICATED,UAUC: ABNORMAL
UROBILINOGEN UR QL STRIP.AUTO: 0.1 EU/DL (ref 0.1–1)
WBC # BLD AUTO: 7 K/UL (ref 3.6–11)
WBC URNS QL MICRO: ABNORMAL /HPF (ref 0–4)

## 2022-11-21 PROCEDURE — 81001 URINALYSIS AUTO W/SCOPE: CPT

## 2022-11-21 PROCEDURE — 80053 COMPREHEN METABOLIC PANEL: CPT

## 2022-11-21 PROCEDURE — 99285 EMERGENCY DEPT VISIT HI MDM: CPT

## 2022-11-21 PROCEDURE — 74011000636 HC RX REV CODE- 636: Performed by: STUDENT IN AN ORGANIZED HEALTH CARE EDUCATION/TRAINING PROGRAM

## 2022-11-21 PROCEDURE — 83690 ASSAY OF LIPASE: CPT

## 2022-11-21 PROCEDURE — 85025 COMPLETE CBC W/AUTO DIFF WBC: CPT

## 2022-11-21 PROCEDURE — 74011000250 HC RX REV CODE- 250: Performed by: STUDENT IN AN ORGANIZED HEALTH CARE EDUCATION/TRAINING PROGRAM

## 2022-11-21 PROCEDURE — 74177 CT ABD & PELVIS W/CONTRAST: CPT

## 2022-11-21 PROCEDURE — 94640 AIRWAY INHALATION TREATMENT: CPT

## 2022-11-21 RX ORDER — IPRATROPIUM BROMIDE AND ALBUTEROL SULFATE 2.5; .5 MG/3ML; MG/3ML
3 SOLUTION RESPIRATORY (INHALATION)
Status: COMPLETED | OUTPATIENT
Start: 2022-11-21 | End: 2022-11-21

## 2022-11-21 RX ORDER — CEPHALEXIN 500 MG/1
500 CAPSULE ORAL 2 TIMES DAILY
Qty: 14 CAPSULE | Refills: 0 | Status: ON HOLD | OUTPATIENT
Start: 2022-11-21 | End: 2022-11-28

## 2022-11-21 RX ORDER — DOCUSATE SODIUM 100 MG/1
100 CAPSULE, LIQUID FILLED ORAL 2 TIMES DAILY
Qty: 60 CAPSULE | Refills: 2 | Status: ON HOLD | OUTPATIENT
Start: 2022-11-21 | End: 2023-02-19

## 2022-11-21 RX ADMIN — IOPAMIDOL 100 ML: 755 INJECTION, SOLUTION INTRAVENOUS at 12:32

## 2022-11-21 RX ADMIN — IPRATROPIUM BROMIDE AND ALBUTEROL SULFATE 3 ML: .5; 2.5 SOLUTION RESPIRATORY (INHALATION) at 14:52

## 2022-11-21 NOTE — ED TRIAGE NOTES
Reports abd pain - points to all quads. Reports she was dx c UTI a couple of weeks ago, feels like it either did not go away or it came back. Also c/o constipation.

## 2022-11-21 NOTE — ED PROVIDER NOTES
EMERGENCY DEPARTMENT HISTORY AND PHYSICAL EXAM      Date: 11/21/2022  Patient Name: Kera Pendleton    History of Presenting Illness     Chief Complaint   Patient presents with    Abdominal Pain       History Provided By: Patient    HPI: Kera Pendleton, 63 y.o. female with past history as reported below, COPD, breast cancer presenting to the ED for abdominal pain and possible UTI. Patient states she has had prior UTIs and concerned she has another 1. She reports that she had some slight pelvic pain that was radiating up to to the upper quadrants. Denies any nausea or vomiting, no appetite changes, no chest pain or shortness of breath, no fever or chills    There are no other complaints, changes, or physical findings at this time. Past History     Past Medical History:  Past Medical History:   Diagnosis Date    Breast CA ([facility] [address]     Chronic obstructive pulmonary disease ([facility] [address]     Hypertension        Past Surgical History:  Past Surgical History:   Procedure Laterality Date    COLONOSCOPY N/A 7/16/2021    . performed by Jerod Jon MD at [address] [place] [address] ENDOSCOPY    HX HYSTERECTOMY      HX MASTECTOMY Left        Family History:  Family History   Problem Relation Age of Onset    Hypertension Mother     Cancer Father        Social History:  Social History     Tobacco Use    Smoking status: Former    Smokeless tobacco: Never   Vaping Use    Vaping Use: Never used   Substance Use Topics    Alcohol use: Yes     Comment: occasionally     Drug use: Never       Allergies: Allergies   Allergen Reactions    Lisinopril Angioedema       PCP: Jay Yates MD    No current facility-administered medications on file prior to encounter. Current Outpatient Medications on File Prior to Encounter   Medication Sig Dispense Refill    carvediloL (COREG) 6.25 mg tablet Take 1 Tablet by mouth two (2) times daily (with meals).  60 Tablet 0    furosemide (Lasix) 40 mg tablet Lasix 40 mg daily 30 Tablet 0    insulin glargine (LANTUS) 100 unit/mL injection As directed 1 Vial 1    pantoprazole (PROTONIX) 40 mg tablet Take 1 Tablet by mouth Daily (before breakfast). 60 Tablet 0    predniSONE (DELTASONE) 10 mg tablet 1 tablet daily 15 Tablet 0    albuterol-ipratropium (DUO-NEB) 2.5 mg-0.5 mg/3 ml nebu 3 mL by Nebulization route every six (6) hours as needed for Wheezing. 30 Nebule 1    aspirin 81 mg chewable tablet Take 1 Tab by mouth daily. 30 Tab 0    guaiFENesin ER (MUCINEX) 600 mg ER tablet Take 1 Tab by mouth two (2) times a day. 30 Tab 0    amLODIPine (NORVASC) 10 mg tablet Take 10 mg by mouth daily. mometasone-formoterol (DULERA) 200-5 mcg/actuation HFA inhaler Take 2 Puffs by inhalation two (2) times a day. montelukast (SINGULAIR) 10 mg tablet Take 10 mg by mouth daily. Calcium-Cholecalciferol, D3, 500 mg(1,250mg) -400 unit tab Take  by mouth daily. benzonatate (TESSALON) 100 mg capsule Take 100 mg by mouth once. fluticasone propionate (FLONASE) 50 mcg/actuation nasal spray 2 Sprays by Both Nostrils route daily. ergocalciferol (Vitamin D2) 1,250 mcg (50,000 unit) capsule Take 50,000 Units by mouth every seven (7) days. Q7days on Monday         Review of Systems   Review of Systems  A 10 point review of system was performed and was negative except as noted above in HPI    Physical Exam   Physical Exam  Vitals and nursing note reviewed. Constitutional:       General: She is not in acute distress. Appearance: Normal appearance. HENT:      Head: Normocephalic. Eyes:      Extraocular Movements: Extraocular movements intact. Pupils: Pupils are equal, round, and reactive to light. Cardiovascular:      Rate and Rhythm: Normal rate. Pulmonary:      Effort: Pulmonary effort is normal.      Breath sounds: Normal breath sounds. Abdominal:      General: Abdomen is flat. Palpations: Abdomen is soft. Tenderness: There is abdominal tenderness in the left upper quadrant.       Hernia: No hernia is present. Musculoskeletal:      Cervical back: Neck supple. Skin:     General: Skin is warm. Neurological:      Mental Status: She is alert and oriented to person, place, and time. Lab and Diagnostic Study Results   Labs -     Recent Results (from the past 12 hour(s))   URINALYSIS W/ REFLEX CULTURE    Collection Time: 11/21/22 10:26 AM    Specimen: Urine   Result Value Ref Range    Color Yellow/Straw      Appearance Turbid (A) Clear      Specific gravity 1.021 1.003 - 1.030      pH (UA) 5.0 5.0 - 8.0      Protein 30 (A) Negative mg/dL    Glucose Negative Negative mg/dL    Ketone Negative Negative mg/dL    Bilirubin Negative Negative      Blood Negative Negative      Urobilinogen 0.1 0.1 - 1.0 EU/dL    Nitrites Negative Negative      Leukocyte Esterase Moderate (A) Negative      UA:UC IF INDICATED Culture not indicated by UA result Culture not indicated by UA result      WBC 5-10 0 - 4 /hpf    RBC 5-10 0 - 5 /hpf    Bacteria Negative Negative /hpf    Mucus 2+ /lpf   CBC WITH AUTOMATED DIFF    Collection Time: 11/21/22 10:55 AM   Result Value Ref Range    WBC 7.0 3.6 - 11.0 K/uL    RBC 3.78 (L) 3.80 - 5.20 M/uL    HGB 11.0 (L) 11.5 - 16.0 g/dL    HCT 36.2 35.0 - 47.0 %    MCV 95.8 80.0 - 99.0 FL    MCH 29.1 26.0 - 34.0 PG    MCHC 30.4 30.0 - 36.5 g/dL    RDW 12.5 11.5 - 14.5 %    PLATELET 329 776 - 351 K/uL    MPV 11.1 8.9 - 12.9 FL    NRBC 0.0 0.0  WBC    ABSOLUTE NRBC 0.00 0.00 - 0.01 K/uL    NEUTROPHILS 76 (H) 32 - 75 %    LYMPHOCYTES 17 12 - 49 %    MONOCYTES 6 5 - 13 %    EOSINOPHILS 1 0 - 7 %    BASOPHILS 0 0 - 1 %    IMMATURE GRANULOCYTES 0 0 - 0.5 %    ABS. NEUTROPHILS 5.3 1.8 - 8.0 K/UL    ABS. LYMPHOCYTES 1.2 0.8 - 3.5 K/UL    ABS. MONOCYTES 0.4 0.0 - 1.0 K/UL    ABS. EOSINOPHILS 0.0 0.0 - 0.4 K/UL    ABS. BASOPHILS 0.0 0.0 - 0.1 K/UL    ABS. IMM.  GRANS. 0.0 0.00 - 0.04 K/UL    DF AUTOMATED     METABOLIC PANEL, COMPREHENSIVE    Collection Time: 11/21/22 10:55 AM   Result Value Ref Range Sodium 143 136 - 145 mmol/L    Potassium 4.2 3.5 - 5.1 mmol/L    Chloride 108 97 - 108 mmol/L    CO2 32 21 - 32 mmol/L    Anion gap 3 (L) 5 - 15 mmol/L    Glucose 111 (H) 65 - 100 mg/dL    BUN 18 6 - 20 mg/dL    Creatinine 1.32 (H) 0.55 - 1.02 mg/dL    BUN/Creatinine ratio 14 12 - 20      eGFR 46 (L) >60 ml/min/1.73m2    Calcium 9.7 8.5 - 10.1 mg/dL    Bilirubin, total 0.2 0.2 - 1.0 mg/dL    AST (SGOT) 12 (L) 15 - 37 U/L    ALT (SGPT) 23 12 - 78 U/L    Alk. phosphatase 87 45 - 117 U/L    Protein, total 6.7 6.4 - 8.2 g/dL    Albumin 3.6 3.5 - 5.0 g/dL    Globulin 3.1 2.0 - 4.0 g/dL    A-G Ratio 1.2 1.1 - 2.2     LIPASE    Collection Time: 11/21/22 10:55 AM   Result Value Ref Range    Lipase 122 73 - 393 U/L       Radiologic Studies -   @[email or web address]@  CT Results  (Last 48 hours)                 11/21/22 1232  CT ABD PELV W CONT Final result    Impression:  1. No acute abdominal or pelvic findings. Incidental findings as above. Narrative:  EXAM: CT ABD PELV W CONT       INDICATION: abd pain across LUQ, hx of cancer. COMPARISON: CT abdomen pelvis from 12/26/2021        CONTRAST: 100 mL of Isovue-370. ORAL CONTRAST: None       TECHNIQUE:    Following the uneventful intravenous administration of contrast, thin axial   images were obtained through the abdomen and pelvis. Coronal and sagittal   reconstructions were generated. CT dose reduction was achieved through use of a   standardized protocol tailored for this examination and automatic exposure   control for dose modulation. FINDINGS:    LOWER THORAX: Prominent heart size. No consolidations. LIVER: Multiple simple hepatic cysts, increased in size from prior study. The   largest is located in segment 5 and measures 4.0 x 3.2 cm TV and 3.2 cm CC,   4-102. BILIARY TREE: Gallbladder is within normal limits. CBD is not dilated. SPLEEN: within normal limits. PANCREAS: No mass or ductal dilatation. ADRENALS: Unremarkable.     KIDNEYS: No mass, calculus, or hydronephrosis. Simple left renal cyst does not   require follow-up. STOMACH: Unremarkable. SMALL BOWEL: No dilatation or wall thickening. COLON: No dilatation or wall thickening. APPENDIX: Unremarkable. PERITONEUM: No ascites or pneumoperitoneum. RETROPERITONEUM: No lymphadenopathy or aortic aneurysm. REPRODUCTIVE ORGANS: Hysterectomy. No adnexal masses. URINARY BLADDER: No mass or calculus. BONES: No destructive bone lesion. ABDOMINAL WALL: No mass or hernia. ADDITIONAL COMMENTS: N/A                 CXR Results  (Last 48 hours)      None            Medical Decision Making and ED Course   Differential Diagnosis & Medical Decision Making Provider Note:   Is a 46-year-old female presenting to the ED for chief complaint of of abdominal pain, concern for UTI. Vital signs are normal, and the patient appears well on exam with some tenderness to the left upper quadrant given history of cancer and patient concern will obtain CT. Will also obtain basic blood work and test for UTI. - I am the first provider for this patient. I reviewed the vital signs, available nursing notes, past medical history, past surgical history, family history and social history. The patients presenting problems have been discussed, and they are in agreement with the care plan formulated and outlined with them. I have encouraged them to ask questions as they arise throughout their visit. Vital Signs-Reviewed the patient's vital signs. Patient Vitals for the past 12 hrs:   Temp Pulse Resp BP SpO2   11/21/22 1205 -- 81 -- 127/76 99 %   11/21/22 1105 -- 80 -- 129/73 98 %   11/21/22 1005 98.7 °F (37.1 °C) 84 18 119/67 97 %       ED Course: Will prescribe antibiotics for UTI. I will also have patient follow-up with PCP. For continuous UTIs. Patient requesting breathing treatment before she leaves and also prescription for constipation. Which I will happily provide.   Patient in stable condition for discharge    Procedures   Performed by: Una Fernandez MD  Procedures      Disposition   Disposition: DC- Adult Discharges: All of the diagnostic tests were reviewed and questions answered. Diagnosis, care plan and treatment options were discussed. The patient understands the instructions and will follow up as directed. The patients results have been reviewed with them. They have been counseled regarding their diagnosis. The patient verbally convey understanding and agreement of the signs, symptoms, diagnosis, treatment and prognosis and additionally agrees to follow up as recommended with their PCP in 24 - 48 hours. They also agree with the care-plan and convey that all of their questions have been answered. I have also put together some discharge instructions for them that include: 1) educational information regarding their diagnosis, 2) how to care for their diagnosis at home, as well a 3) list of reasons why they would want to return to the ED prior to their follow-up appointment, should their condition change. DISCHARGE PLAN:  1. Current Discharge Medication List        START taking these medications    Details   cephALEXin (Keflex) 500 mg capsule Take 1 Capsule by mouth two (2) times a day for 7 days. Qty: 14 Capsule, Refills: 0           CONTINUE these medications which have NOT CHANGED    Details   carvediloL (COREG) 6.25 mg tablet Take 1 Tablet by mouth two (2) times daily (with meals). Qty: 60 Tablet, Refills: 0      furosemide (Lasix) 40 mg tablet Lasix 40 mg daily  Qty: 30 Tablet, Refills: 0      insulin glargine (LANTUS) 100 unit/mL injection As directed  Qty: 1 Vial, Refills: 1      pantoprazole (PROTONIX) 40 mg tablet Take 1 Tablet by mouth Daily (before breakfast).   Qty: 60 Tablet, Refills: 0      predniSONE (DELTASONE) 10 mg tablet 1 tablet daily  Qty: 15 Tablet, Refills: 0      albuterol-ipratropium (DUO-NEB) 2.5 mg-0.5 mg/3 ml nebu 3 mL by Nebulization route every six (6) hours as needed for Wheezing. Qty: 30 Nebule, Refills: 1      aspirin 81 mg chewable tablet Take 1 Tab by mouth daily. Qty: 30 Tab, Refills: 0      guaiFENesin ER (MUCINEX) 600 mg ER tablet Take 1 Tab by mouth two (2) times a day. Qty: 30 Tab, Refills: 0      amLODIPine (NORVASC) 10 mg tablet Take 10 mg by mouth daily. mometasone-formoterol (DULERA) 200-5 mcg/actuation HFA inhaler Take 2 Puffs by inhalation two (2) times a day. montelukast (SINGULAIR) 10 mg tablet Take 10 mg by mouth daily. Calcium-Cholecalciferol, D3, 500 mg(1,250mg) -400 unit tab Take  by mouth daily. benzonatate (TESSALON) 100 mg capsule Take 100 mg by mouth once. fluticasone propionate (FLONASE) 50 mcg/actuation nasal spray 2 Sprays by Both Nostrils route daily. ergocalciferol (Vitamin D2) 1,250 mcg (50,000 unit) capsule Take 50,000 Units by mouth every seven (7) days. Q7days on Monday           2. Follow-up Information       Follow up With Specialties Details Why Fox Craft MD Family Medicine In 1 day  [address]  [phone]            3. Return to ED if worse   4. Current Discharge Medication List        START taking these medications    Details   cephALEXin (Keflex) 500 mg capsule Take 1 Capsule by mouth two (2) times a day for 7 days. Qty: 14 Capsule, Refills: 0  Start date: 11/21/2022, End date: 11/28/2022               Diagnosis/Clinical Impression     Clinical Impression:   1. Urinary tract infection without hematuria, site unspecified        Attestations: Yadira NICHOLS MD, am the primary clinician of record. Please note that this dictation was completed with [employer], the [email or web address] voice recognition software. Quite often unanticipated grammatical, syntax, homophones, and other interpretive errors are inadvertently transcribed by the computer software. Please disregard these errors.   Please excuse any errors that have escaped final proofreading. Thank you.

## 2022-11-21 NOTE — DISCHARGE INSTRUCTIONS
Thank you! Thank you for allowing me to care for you in the emergency department. I sincerely hope that you are satisfied with your visit today. It is my goal to provide you with excellent care. Below you will find a list of your labs and imaging from your visit today if applicable. Should you have any questions regarding these results please do not hesitate to call the emergency department. Please review [facility] [employer] [facility] for a more detailed result list since the below list may not be comprehensive. Instructions on how to sign up to [facility] [facility] [employer] [facility] should be provided in this packet.     Labs -     Recent Results (from the past 12 hour(s))   URINALYSIS W/ REFLEX CULTURE    Collection Time: 11/21/22 10:26 AM    Specimen: Urine   Result Value Ref Range    Color Yellow/Straw      Appearance Turbid (A) Clear      Specific gravity 1.021 1.003 - 1.030      pH (UA) 5.0 5.0 - 8.0      Protein 30 (A) Negative mg/dL    Glucose Negative Negative mg/dL    Ketone Negative Negative mg/dL    Bilirubin Negative Negative      Blood Negative Negative      Urobilinogen 0.1 0.1 - 1.0 EU/dL    Nitrites Negative Negative      Leukocyte Esterase Moderate (A) Negative      UA:UC IF INDICATED Culture not indicated by UA result Culture not indicated by UA result      WBC 5-10 0 - 4 /hpf    RBC 5-10 0 - 5 /hpf    Bacteria Negative Negative /hpf    Mucus 2+ /lpf   CBC WITH AUTOMATED DIFF    Collection Time: 11/21/22 10:55 AM   Result Value Ref Range    WBC 7.0 3.6 - 11.0 K/uL    RBC 3.78 (L) 3.80 - 5.20 M/uL    HGB 11.0 (L) 11.5 - 16.0 g/dL    HCT 36.2 35.0 - 47.0 %    MCV 95.8 80.0 - 99.0 FL    MCH 29.1 26.0 - 34.0 PG    MCHC 30.4 30.0 - 36.5 g/dL    RDW 12.5 11.5 - 14.5 %    PLATELET 366 058 - 043 K/uL    MPV 11.1 8.9 - 12.9 FL    NRBC 0.0 0.0  WBC    ABSOLUTE NRBC 0.00 0.00 - 0.01 K/uL    NEUTROPHILS 76 (H) 32 - 75 %    LYMPHOCYTES 17 12 - 49 %    MONOCYTES 6 5 - 13 %    EOSINOPHILS 1 0 - 7 %    BASOPHILS 0 0 - 1 %    IMMATURE GRANULOCYTES 0 0 - 0.5 %    ABS. NEUTROPHILS 5.3 1.8 - 8.0 K/UL    ABS. LYMPHOCYTES 1.2 0.8 - 3.5 K/UL    ABS. MONOCYTES 0.4 0.0 - 1.0 K/UL    ABS. EOSINOPHILS 0.0 0.0 - 0.4 K/UL    ABS. BASOPHILS 0.0 0.0 - 0.1 K/UL    ABS. IMM. GRANS. 0.0 0.00 - 0.04 K/UL    DF AUTOMATED     METABOLIC PANEL, COMPREHENSIVE    Collection Time: 11/21/22 10:55 AM   Result Value Ref Range    Sodium 143 136 - 145 mmol/L    Potassium 4.2 3.5 - 5.1 mmol/L    Chloride 108 97 - 108 mmol/L    CO2 32 21 - 32 mmol/L    Anion gap 3 (L) 5 - 15 mmol/L    Glucose 111 (H) 65 - 100 mg/dL    BUN 18 6 - 20 mg/dL    Creatinine 1.32 (H) 0.55 - 1.02 mg/dL    BUN/Creatinine ratio 14 12 - 20      eGFR 46 (L) >60 ml/min/1.73m2    Calcium 9.7 8.5 - 10.1 mg/dL    Bilirubin, total 0.2 0.2 - 1.0 mg/dL    AST (SGOT) 12 (L) 15 - 37 U/L    ALT (SGPT) 23 12 - 78 U/L    Alk. phosphatase 87 45 - 117 U/L    Protein, total 6.7 6.4 - 8.2 g/dL    Albumin 3.6 3.5 - 5.0 g/dL    Globulin 3.1 2.0 - 4.0 g/dL    A-G Ratio 1.2 1.1 - 2.2     LIPASE    Collection Time: 11/21/22 10:55 AM   Result Value Ref Range    Lipase 122 73 - 393 U/L       Radiologic Studies -   CT ABD PELV W CONT   Final Result   1. No acute abdominal or pelvic findings. Incidental findings as above. CT Results  (Last 48 hours)                 11/21/22 1232  CT ABD PELV W CONT Final result    Impression:  1. No acute abdominal or pelvic findings. Incidental findings as above. Narrative:  EXAM: CT ABD PELV W CONT       INDICATION: abd pain across LUQ, hx of cancer. COMPARISON: CT abdomen pelvis from 12/26/2021        CONTRAST: 100 mL of Isovue-370. ORAL CONTRAST: None       TECHNIQUE:    Following the uneventful intravenous administration of contrast, thin axial   images were obtained through the abdomen and pelvis. Coronal and sagittal   reconstructions were generated.  CT dose reduction was achieved through use of a   standardized protocol tailored for this examination and automatic exposure control for dose modulation. FINDINGS:    LOWER THORAX: Prominent heart size. No consolidations. LIVER: Multiple simple hepatic cysts, increased in size from prior study. The   largest is located in segment 5 and measures 4.0 x 3.2 cm TV and 3.2 cm CC,   4-102. BILIARY TREE: Gallbladder is within normal limits. CBD is not dilated. SPLEEN: within normal limits. PANCREAS: No mass or ductal dilatation. ADRENALS: Unremarkable. KIDNEYS: No mass, calculus, or hydronephrosis. Simple left renal cyst does not   require follow-up. STOMACH: Unremarkable. SMALL BOWEL: No dilatation or wall thickening. COLON: No dilatation or wall thickening. APPENDIX: Unremarkable. PERITONEUM: No ascites or pneumoperitoneum. RETROPERITONEUM: No lymphadenopathy or aortic aneurysm. REPRODUCTIVE ORGANS: Hysterectomy. No adnexal masses. URINARY BLADDER: No mass or calculus. BONES: No destructive bone lesion. ABDOMINAL WALL: No mass or hernia. ADDITIONAL COMMENTS: N/A                 CXR Results  (Last 48 hours)      None               If you feel that you have not received excellent quality care or timely care, please ask to speak to the nurse manager. Please choose us in the future for your continued health care needs. ------------------------------------------------------------------------------------------------------------  The exam and treatment you received in the Emergency Department were for an urgent problem and are not intended as complete care. It is very important that you follow-up with a doctor, nurse practitioner, or physician assistant in a timely manner to:  (1) confirm your diagnosis and review all imaging and lab results,  (2) re-evaluation of changes in your illness and treatment, and  (3) for ongoing care. If your symptoms become worse or you do not improve as expected and you are unable to reach your usual health care provider, you should return to the Emergency Department.  We are available 24 hours a day. Please take your discharge instructions with you when you go to your follow-up appointment. If a prescription has been provided, please have it filled as soon as possible to prevent a delay in treatment. Read the entire medication instruction sheet provided to you by the pharmacy. If you have any questions or reservations about taking the medication due to side effects or interactions with other medications, please call your primary care physician or contact the ER to speak with the charge nurse. Make an appointment with your family doctor or the physician you were referred to for follow-up of this visit as instructed on your discharge paperwork, as this is a mandatory follow-up. Return to the ER if you are unable to be seen or if you are unable to be seen in a timely manner. If you have any problem arranging the follow-up visit, contact the Emergency Department immediately.

## 2022-11-26 ENCOUNTER — HOSPITAL ENCOUNTER (INPATIENT)
Age: 62
LOS: 17 days | Discharge: NURSING, REHAB OR LONG-TERM CARE | DRG: 140 | End: 2022-12-15
Attending: EMERGENCY MEDICINE | Admitting: INTERNAL MEDICINE
Payer: MEDICAID

## 2022-11-26 ENCOUNTER — APPOINTMENT (OUTPATIENT)
Dept: GENERAL RADIOLOGY | Age: 62
DRG: 140 | End: 2022-11-26
Attending: EMERGENCY MEDICINE
Payer: MEDICAID

## 2022-11-26 DIAGNOSIS — J44.1 COPD EXACERBATION (HCC): Primary | ICD-10-CM

## 2022-11-26 LAB
ALBUMIN SERPL-MCNC: 3.6 G/DL (ref 3.5–5)
ALBUMIN/GLOB SERPL: 1.1 {RATIO} (ref 1.1–2.2)
ALP SERPL-CCNC: 92 U/L (ref 45–117)
ALT SERPL-CCNC: 28 U/L (ref 12–78)
ANION GAP SERPL CALC-SCNC: 1 MMOL/L (ref 5–15)
APPEARANCE UR: CLEAR
AST SERPL W P-5'-P-CCNC: 22 U/L (ref 15–37)
BACTERIA URNS QL MICRO: NEGATIVE /HPF
BASOPHILS # BLD: 0 K/UL (ref 0–0.1)
BASOPHILS NFR BLD: 1 % (ref 0–1)
BILIRUB SERPL-MCNC: 0.3 MG/DL (ref 0.2–1)
BILIRUB UR QL: NEGATIVE
BNP SERPL-MCNC: 28 PG/ML
BUN SERPL-MCNC: 18 MG/DL (ref 6–20)
BUN/CREAT SERPL: 16 (ref 12–20)
CA-I BLD-MCNC: 9.5 MG/DL (ref 8.5–10.1)
CHLORIDE SERPL-SCNC: 106 MMOL/L (ref 97–108)
CO2 SERPL-SCNC: 34 MMOL/L (ref 21–32)
COLOR UR: ABNORMAL
CREAT SERPL-MCNC: 1.11 MG/DL (ref 0.55–1.02)
DIFFERENTIAL METHOD BLD: ABNORMAL
EOSINOPHIL # BLD: 0 K/UL (ref 0–0.4)
EOSINOPHIL NFR BLD: 1 % (ref 0–7)
ERYTHROCYTE [DISTWIDTH] IN BLOOD BY AUTOMATED COUNT: 12.4 % (ref 11.5–14.5)
GLOBULIN SER CALC-MCNC: 3.2 G/DL (ref 2–4)
GLUCOSE BLD STRIP.AUTO-MCNC: 395 MG/DL (ref 65–100)
GLUCOSE SERPL-MCNC: 105 MG/DL (ref 65–100)
GLUCOSE UR STRIP.AUTO-MCNC: NEGATIVE MG/DL
HCT VFR BLD AUTO: 35.6 % (ref 35–47)
HGB BLD-MCNC: 11.2 G/DL (ref 11.5–16)
HGB UR QL STRIP: NEGATIVE
IMM GRANULOCYTES # BLD AUTO: 0 K/UL (ref 0–0.04)
IMM GRANULOCYTES NFR BLD AUTO: 1 % (ref 0–0.5)
KETONES UR QL STRIP.AUTO: NEGATIVE MG/DL
LEUKOCYTE ESTERASE UR QL STRIP.AUTO: ABNORMAL
LYMPHOCYTES # BLD: 0.9 K/UL (ref 0.8–3.5)
LYMPHOCYTES NFR BLD: 15 % (ref 12–49)
MCH RBC QN AUTO: 29.3 PG (ref 26–34)
MCHC RBC AUTO-ENTMCNC: 31.5 G/DL (ref 30–36.5)
MCV RBC AUTO: 93.2 FL (ref 80–99)
MONOCYTES # BLD: 0.5 K/UL (ref 0–1)
MONOCYTES NFR BLD: 9 % (ref 5–13)
MUCOUS THREADS URNS QL MICRO: ABNORMAL /LPF
NEUTS SEG # BLD: 4.2 K/UL (ref 1.8–8)
NEUTS SEG NFR BLD: 73 % (ref 32–75)
NITRITE UR QL STRIP.AUTO: NEGATIVE
NRBC # BLD: 0 K/UL (ref 0–0.01)
NRBC BLD-RTO: 0 PER 100 WBC
PERFORMED BY, TECHID: ABNORMAL
PH UR STRIP: 5 [PH] (ref 5–8)
PLATELET # BLD AUTO: 289 K/UL (ref 150–400)
PMV BLD AUTO: 10.6 FL (ref 8.9–12.9)
POTASSIUM SERPL-SCNC: 4.2 MMOL/L (ref 3.5–5.1)
PROT SERPL-MCNC: 6.8 G/DL (ref 6.4–8.2)
PROT UR STRIP-MCNC: NEGATIVE MG/DL
RBC # BLD AUTO: 3.82 M/UL (ref 3.8–5.2)
RBC #/AREA URNS HPF: ABNORMAL /HPF (ref 0–5)
SODIUM SERPL-SCNC: 141 MMOL/L (ref 136–145)
SP GR UR REFRACTOMETRY: 1.01 (ref 1–1.03)
TROPONIN-HIGH SENSITIVITY: 6 NG/L (ref 0–51)
UA: UC IF INDICATED,UAUC: ABNORMAL
UROBILINOGEN UR QL STRIP.AUTO: 0.1 EU/DL (ref 0.1–1)
WBC # BLD AUTO: 5.7 K/UL (ref 3.6–11)
WBC URNS QL MICRO: ABNORMAL /HPF (ref 0–4)

## 2022-11-26 PROCEDURE — 94640 AIRWAY INHALATION TREATMENT: CPT

## 2022-11-26 PROCEDURE — 74011000250 HC RX REV CODE- 250: Performed by: INTERNAL MEDICINE

## 2022-11-26 PROCEDURE — 99285 EMERGENCY DEPT VISIT HI MDM: CPT

## 2022-11-26 PROCEDURE — 96365 THER/PROPH/DIAG IV INF INIT: CPT

## 2022-11-26 PROCEDURE — 71045 X-RAY EXAM CHEST 1 VIEW: CPT

## 2022-11-26 PROCEDURE — 36415 COLL VENOUS BLD VENIPUNCTURE: CPT

## 2022-11-26 PROCEDURE — 96366 THER/PROPH/DIAG IV INF ADDON: CPT

## 2022-11-26 PROCEDURE — G0378 HOSPITAL OBSERVATION PER HR: HCPCS

## 2022-11-26 PROCEDURE — 83880 ASSAY OF NATRIURETIC PEPTIDE: CPT

## 2022-11-26 PROCEDURE — 74011636637 HC RX REV CODE- 636/637: Performed by: INTERNAL MEDICINE

## 2022-11-26 PROCEDURE — 74011250637 HC RX REV CODE- 250/637: Performed by: INTERNAL MEDICINE

## 2022-11-26 PROCEDURE — 82962 GLUCOSE BLOOD TEST: CPT

## 2022-11-26 PROCEDURE — 96376 TX/PRO/DX INJ SAME DRUG ADON: CPT

## 2022-11-26 PROCEDURE — 84484 ASSAY OF TROPONIN QUANT: CPT

## 2022-11-26 PROCEDURE — 74011250636 HC RX REV CODE- 250/636: Performed by: EMERGENCY MEDICINE

## 2022-11-26 PROCEDURE — 74011250636 HC RX REV CODE- 250/636: Performed by: INTERNAL MEDICINE

## 2022-11-26 PROCEDURE — 96375 TX/PRO/DX INJ NEW DRUG ADDON: CPT

## 2022-11-26 PROCEDURE — 93005 ELECTROCARDIOGRAM TRACING: CPT

## 2022-11-26 PROCEDURE — 81001 URINALYSIS AUTO W/SCOPE: CPT

## 2022-11-26 PROCEDURE — 80053 COMPREHEN METABOLIC PANEL: CPT

## 2022-11-26 PROCEDURE — 74011000250 HC RX REV CODE- 250: Performed by: EMERGENCY MEDICINE

## 2022-11-26 PROCEDURE — 85025 COMPLETE CBC W/AUTO DIFF WBC: CPT

## 2022-11-26 RX ORDER — DOCUSATE SODIUM 100 MG/1
100 CAPSULE, LIQUID FILLED ORAL 2 TIMES DAILY
Status: DISCONTINUED | OUTPATIENT
Start: 2022-11-26 | End: 2022-12-15 | Stop reason: HOSPADM

## 2022-11-26 RX ORDER — GUAIFENESIN 100 MG/5ML
81 LIQUID (ML) ORAL DAILY
Status: DISCONTINUED | OUTPATIENT
Start: 2022-11-27 | End: 2022-12-15 | Stop reason: HOSPADM

## 2022-11-26 RX ORDER — MORPHINE SULFATE 2 MG/ML
2 INJECTION, SOLUTION INTRAMUSCULAR; INTRAVENOUS ONCE
Status: COMPLETED | OUTPATIENT
Start: 2022-11-26 | End: 2022-11-26

## 2022-11-26 RX ORDER — PANTOPRAZOLE SODIUM 40 MG/1
40 TABLET, DELAYED RELEASE ORAL
Status: DISCONTINUED | OUTPATIENT
Start: 2022-11-27 | End: 2022-11-26

## 2022-11-26 RX ORDER — ONDANSETRON 4 MG/1
4 TABLET, ORALLY DISINTEGRATING ORAL
Status: DISCONTINUED | OUTPATIENT
Start: 2022-11-26 | End: 2022-12-15 | Stop reason: HOSPADM

## 2022-11-26 RX ORDER — FLUTICASONE PROPIONATE 50 MCG
2 SPRAY, SUSPENSION (ML) NASAL DAILY
Status: DISCONTINUED | OUTPATIENT
Start: 2022-11-27 | End: 2022-12-15 | Stop reason: HOSPADM

## 2022-11-26 RX ORDER — IPRATROPIUM BROMIDE AND ALBUTEROL SULFATE 2.5; .5 MG/3ML; MG/3ML
3 SOLUTION RESPIRATORY (INHALATION)
Status: COMPLETED | OUTPATIENT
Start: 2022-11-26 | End: 2022-11-26

## 2022-11-26 RX ORDER — ACETAMINOPHEN 650 MG/1
650 SUPPOSITORY RECTAL
Status: DISCONTINUED | OUTPATIENT
Start: 2022-11-26 | End: 2022-12-15 | Stop reason: HOSPADM

## 2022-11-26 RX ORDER — ACETAMINOPHEN 325 MG/1
650 TABLET ORAL
Status: DISCONTINUED | OUTPATIENT
Start: 2022-11-26 | End: 2022-12-15 | Stop reason: HOSPADM

## 2022-11-26 RX ORDER — MONTELUKAST SODIUM 10 MG/1
10 TABLET ORAL DAILY
Status: DISCONTINUED | OUTPATIENT
Start: 2022-11-27 | End: 2022-12-15 | Stop reason: HOSPADM

## 2022-11-26 RX ORDER — FUROSEMIDE 40 MG/1
20 TABLET ORAL DAILY
Status: DISCONTINUED | OUTPATIENT
Start: 2022-11-27 | End: 2022-12-15 | Stop reason: HOSPADM

## 2022-11-26 RX ORDER — IPRATROPIUM BROMIDE AND ALBUTEROL SULFATE 2.5; .5 MG/3ML; MG/3ML
3 SOLUTION RESPIRATORY (INHALATION)
Status: DISCONTINUED | OUTPATIENT
Start: 2022-11-26 | End: 2022-11-28

## 2022-11-26 RX ORDER — GUAIFENESIN 600 MG/1
600 TABLET, EXTENDED RELEASE ORAL 2 TIMES DAILY
Status: DISCONTINUED | OUTPATIENT
Start: 2022-11-26 | End: 2022-11-27

## 2022-11-26 RX ORDER — ONDANSETRON 2 MG/ML
4 INJECTION INTRAMUSCULAR; INTRAVENOUS
Status: DISCONTINUED | OUTPATIENT
Start: 2022-11-26 | End: 2022-12-15 | Stop reason: HOSPADM

## 2022-11-26 RX ORDER — CALCIUM CARBONATE/VITAMIN D3 600MG-5MCG
1 TABLET ORAL DAILY
Status: DISCONTINUED | OUTPATIENT
Start: 2022-11-27 | End: 2022-12-15 | Stop reason: HOSPADM

## 2022-11-26 RX ORDER — BENZONATATE 100 MG/1
100 CAPSULE ORAL ONCE
Status: COMPLETED | OUTPATIENT
Start: 2022-11-26 | End: 2022-11-26

## 2022-11-26 RX ORDER — CETIRIZINE HYDROCHLORIDE 10 MG/1
10 TABLET ORAL DAILY
Status: DISCONTINUED | OUTPATIENT
Start: 2022-11-27 | End: 2022-12-15 | Stop reason: HOSPADM

## 2022-11-26 RX ORDER — AMLODIPINE BESYLATE 5 MG/1
10 TABLET ORAL DAILY
Status: DISCONTINUED | OUTPATIENT
Start: 2022-11-27 | End: 2022-12-15 | Stop reason: HOSPADM

## 2022-11-26 RX ORDER — PANTOPRAZOLE SODIUM 40 MG/1
40 TABLET, DELAYED RELEASE ORAL
Status: DISCONTINUED | OUTPATIENT
Start: 2022-11-27 | End: 2022-12-15 | Stop reason: HOSPADM

## 2022-11-26 RX ORDER — BUDESONIDE AND FORMOTEROL FUMARATE DIHYDRATE 160; 4.5 UG/1; UG/1
2 AEROSOL RESPIRATORY (INHALATION) 2 TIMES DAILY
Status: DISCONTINUED | OUTPATIENT
Start: 2022-11-26 | End: 2022-12-15 | Stop reason: HOSPADM

## 2022-11-26 RX ORDER — POLYETHYLENE GLYCOL 3350 17 G/17G
17 POWDER, FOR SOLUTION ORAL DAILY PRN
Status: DISCONTINUED | OUTPATIENT
Start: 2022-11-26 | End: 2022-12-15 | Stop reason: HOSPADM

## 2022-11-26 RX ORDER — CARVEDILOL 3.12 MG/1
6.25 TABLET ORAL 2 TIMES DAILY WITH MEALS
Status: DISCONTINUED | OUTPATIENT
Start: 2022-11-26 | End: 2022-12-15 | Stop reason: HOSPADM

## 2022-11-26 RX ORDER — INSULIN GLARGINE 100 [IU]/ML
15 INJECTION, SOLUTION SUBCUTANEOUS
Status: DISCONTINUED | OUTPATIENT
Start: 2022-11-26 | End: 2022-12-15 | Stop reason: HOSPADM

## 2022-11-26 RX ORDER — ENOXAPARIN SODIUM 100 MG/ML
40 INJECTION SUBCUTANEOUS DAILY
Status: DISCONTINUED | OUTPATIENT
Start: 2022-11-27 | End: 2022-12-15 | Stop reason: HOSPADM

## 2022-11-26 RX ORDER — SODIUM CHLORIDE 0.9 % (FLUSH) 0.9 %
5-40 SYRINGE (ML) INJECTION EVERY 8 HOURS
Status: DISCONTINUED | OUTPATIENT
Start: 2022-11-26 | End: 2022-12-12

## 2022-11-26 RX ORDER — AZITHROMYCIN 500 MG/1
500 TABLET, FILM COATED ORAL DAILY
Status: COMPLETED | OUTPATIENT
Start: 2022-11-27 | End: 2022-12-04

## 2022-11-26 RX ORDER — SODIUM CHLORIDE 0.9 % (FLUSH) 0.9 %
5-40 SYRINGE (ML) INJECTION AS NEEDED
Status: DISCONTINUED | OUTPATIENT
Start: 2022-11-26 | End: 2022-12-15 | Stop reason: HOSPADM

## 2022-11-26 RX ADMIN — IPRATROPIUM BROMIDE AND ALBUTEROL SULFATE 3 ML: 2.5; .5 SOLUTION RESPIRATORY (INHALATION) at 13:23

## 2022-11-26 RX ADMIN — MORPHINE SULFATE 2 MG: 2 INJECTION, SOLUTION INTRAMUSCULAR; INTRAVENOUS at 16:13

## 2022-11-26 RX ADMIN — BUDESONIDE AND FORMOTEROL FUMARATE DIHYDRATE 2 PUFF: 160; 4.5 AEROSOL RESPIRATORY (INHALATION) at 19:40

## 2022-11-26 RX ADMIN — GUAIFENESIN 600 MG: 600 TABLET, EXTENDED RELEASE ORAL at 20:17

## 2022-11-26 RX ADMIN — METHYLPREDNISOLONE SODIUM SUCCINATE 125 MG: 125 INJECTION, POWDER, FOR SOLUTION INTRAMUSCULAR; INTRAVENOUS at 14:57

## 2022-11-26 RX ADMIN — IPRATROPIUM BROMIDE AND ALBUTEROL SULFATE 3 ML: 2.5; .5 SOLUTION RESPIRATORY (INHALATION) at 23:47

## 2022-11-26 RX ADMIN — IPRATROPIUM BROMIDE AND ALBUTEROL SULFATE 3 ML: .5; 2.5 SOLUTION RESPIRATORY (INHALATION) at 16:00

## 2022-11-26 RX ADMIN — AZITHROMYCIN 500 MG: 500 INJECTION, POWDER, LYOPHILIZED, FOR SOLUTION INTRAVENOUS at 14:59

## 2022-11-26 RX ADMIN — CEFTRIAXONE SODIUM 1 G: 1 INJECTION, POWDER, FOR SOLUTION INTRAMUSCULAR; INTRAVENOUS at 14:58

## 2022-11-26 RX ADMIN — CARVEDILOL 6.25 MG: 3.12 TABLET, FILM COATED ORAL at 20:18

## 2022-11-26 RX ADMIN — INSULIN GLARGINE 15 UNITS: 100 INJECTION, SOLUTION SUBCUTANEOUS at 23:08

## 2022-11-26 RX ADMIN — IPRATROPIUM BROMIDE AND ALBUTEROL SULFATE 3 ML: 2.5; .5 SOLUTION RESPIRATORY (INHALATION) at 19:22

## 2022-11-26 RX ADMIN — BENZONATATE 100 MG: 100 CAPSULE ORAL at 20:17

## 2022-11-26 RX ADMIN — DOCUSATE SODIUM 100 MG: 100 CAPSULE, LIQUID FILLED ORAL at 20:17

## 2022-11-26 RX ADMIN — METHYLPREDNISOLONE SODIUM SUCCINATE 40 MG: 40 INJECTION, POWDER, FOR SOLUTION INTRAMUSCULAR; INTRAVENOUS at 23:08

## 2022-11-26 RX ADMIN — SODIUM CHLORIDE, PRESERVATIVE FREE 10 ML: 5 INJECTION INTRAVENOUS at 23:09

## 2022-11-26 RX ADMIN — IPRATROPIUM BROMIDE AND ALBUTEROL SULFATE 3 ML: 2.5; .5 SOLUTION RESPIRATORY (INHALATION) at 13:22

## 2022-11-26 NOTE — ED TRIAGE NOTES
[facility] 15 pt stated that she started to have increasing SOB this am; pt stated that she became diaphoretic and the oxygen was \"not working\"

## 2022-11-26 NOTE — ED PROVIDER NOTES
EMERGENCY DEPARTMENT HISTORY AND PHYSICAL EXAM      Date: 11/26/2022  Patient Name: Alexandra Dior    History of Presenting Illness     Chief Complaint   Patient presents with    Shortness of Breath       History Provided By: Patient    HPI: Alexandra Dior, 63 y.o. female with history of breast cancer, COPD, and hypertension who presents with cough and difficulty breathing. Symptoms started yesterday. She has some chest pain from coughing as well. Denies any fevers. There are no other complaints, changes, or physical findings at this time. PCP: Rc Anthony MD    Current Outpatient Medications   Medication Sig Dispense Refill    cephALEXin (Keflex) 500 mg capsule Take 1 Capsule by mouth two (2) times a day for 7 days. 14 Capsule 0    docusate sodium (Colace) 100 mg capsule Take 1 Capsule by mouth two (2) times a day for 90 days. 60 Capsule 2    carvediloL (COREG) 6.25 mg tablet Take 1 Tablet by mouth two (2) times daily (with meals). 60 Tablet 0    furosemide (Lasix) 40 mg tablet Lasix 40 mg daily 30 Tablet 0    insulin glargine (LANTUS) 100 unit/mL injection As directed 1 Vial 1    pantoprazole (PROTONIX) 40 mg tablet Take 1 Tablet by mouth Daily (before breakfast). 60 Tablet 0    predniSONE (DELTASONE) 10 mg tablet 1 tablet daily 15 Tablet 0    albuterol-ipratropium (DUO-NEB) 2.5 mg-0.5 mg/3 ml nebu 3 mL by Nebulization route every six (6) hours as needed for Wheezing. 30 Nebule 1    aspirin 81 mg chewable tablet Take 1 Tab by mouth daily. 30 Tab 0    guaiFENesin ER (MUCINEX) 600 mg ER tablet Take 1 Tab by mouth two (2) times a day. 30 Tab 0    amLODIPine (NORVASC) 10 mg tablet Take 10 mg by mouth daily. mometasone-formoterol (DULERA) 200-5 mcg/actuation HFA inhaler Take 2 Puffs by inhalation two (2) times a day. montelukast (SINGULAIR) 10 mg tablet Take 10 mg by mouth daily. Calcium-Cholecalciferol, D3, 500 mg(1,250mg) -400 unit tab Take  by mouth daily.       benzonatate (TESSALON) 100 mg capsule Take 100 mg by mouth once. fluticasone propionate (FLONASE) 50 mcg/actuation nasal spray 2 Sprays by Both Nostrils route daily. ergocalciferol (Vitamin D2) 1,250 mcg (50,000 unit) capsule Take 50,000 Units by mouth every seven (7) days. Q7days on Monday         Past History   Past Medical History:  Past Medical History:   Diagnosis Date    Breast CA ([facility]     Chronic obstructive pulmonary disease ([facility] [address]     Hypertension         Past Surgical History:  Past Surgical History:   Procedure Laterality Date    COLONOSCOPY N/A 7/16/2021    . performed by Art Velasquez MD at [address] [place] [address] ENDOSCOPY    HX HYSTERECTOMY      HX MASTECTOMY Left        Family History:  Family History   Problem Relation Age of Onset    Hypertension Mother     Cancer Father        Social History:  Social History     Tobacco Use    Smoking status: Former    Smokeless tobacco: Never   Vaping Use    Vaping Use: Never used   Substance Use Topics    Alcohol use: Yes     Comment: occasionally     Drug use: Never       Allergies: Allergies   Allergen Reactions    Lisinopril Angioedema        Review of Systems   Review of Systems   Constitutional:  Negative for fever. HENT:  Negative for congestion. Eyes:  Negative for visual disturbance. Respiratory:  Positive for cough and shortness of breath. Cardiovascular:  Positive for chest pain. Gastrointestinal:  Negative for abdominal pain. Genitourinary:  Negative for dysuria. Musculoskeletal:  Negative for arthralgias. Skin:  Negative for rash. Neurological:  Negative for headaches. Physical Exam   Constitutional: Anxious but nontoxic. Well-nourished. Skin: No rash. ENT: No rhinorrhea. No cough. Head is normocephalic and atraumatic. Eye: No proptosis or conjunctival injections. Respiratory: Lung sounds are wheezy bilaterally. No obvious respiratory distress. Gastrointestinal: Nondistended. Musculoskeletal: No obvious bony deformities.   Cardiac: Regular rate and rhythm. 2+ radial pulses. No murmurs. Lab and Diagnostic Study Results   Labs -     Recent Results (from the past 12 hour(s))   CBC WITH AUTOMATED DIFF    Collection Time: 11/26/22  1:15 PM   Result Value Ref Range    WBC 5.7 3.6 - 11.0 K/uL    RBC 3.82 3.80 - 5.20 M/uL    HGB 11.2 (L) 11.5 - 16.0 g/dL    HCT 35.6 35.0 - 47.0 %    MCV 93.2 80.0 - 99.0 FL    MCH 29.3 26.0 - 34.0 PG    MCHC 31.5 30.0 - 36.5 g/dL    RDW 12.4 11.5 - 14.5 %    PLATELET 677 414 - 998 K/uL    MPV 10.6 8.9 - 12.9 FL    NRBC 0.0 0.0  WBC    ABSOLUTE NRBC 0.00 0.00 - 0.01 K/uL    NEUTROPHILS 73 32 - 75 %    LYMPHOCYTES 15 12 - 49 %    MONOCYTES 9 5 - 13 %    EOSINOPHILS 1 0 - 7 %    BASOPHILS 1 0 - 1 %    IMMATURE GRANULOCYTES 1 (H) 0 - 0.5 %    ABS. NEUTROPHILS 4.2 1.8 - 8.0 K/UL    ABS. LYMPHOCYTES 0.9 0.8 - 3.5 K/UL    ABS. MONOCYTES 0.5 0.0 - 1.0 K/UL    ABS. EOSINOPHILS 0.0 0.0 - 0.4 K/UL    ABS. BASOPHILS 0.0 0.0 - 0.1 K/UL    ABS. IMM. GRANS. 0.0 0.00 - 0.04 K/UL    DF AUTOMATED     METABOLIC PANEL, COMPREHENSIVE    Collection Time: 11/26/22  1:15 PM   Result Value Ref Range    Sodium 141 136 - 145 mmol/L    Potassium 4.2 3.5 - 5.1 mmol/L    Chloride 106 97 - 108 mmol/L    CO2 34 (H) 21 - 32 mmol/L    Anion gap 1 (L) 5 - 15 mmol/L    Glucose 105 (H) 65 - 100 mg/dL    BUN 18 6 - 20 mg/dL    Creatinine 1.11 (H) 0.55 - 1.02 mg/dL    BUN/Creatinine ratio 16 12 - 20      eGFR 57 (L) >60 ml/min/1.73m2    Calcium 9.5 8.5 - 10.1 mg/dL    Bilirubin, total 0.3 0.2 - 1.0 mg/dL    AST (SGOT) 22 15 - 37 U/L    ALT (SGPT) 28 12 - 78 U/L    Alk.  phosphatase 92 45 - 117 U/L    Protein, total 6.8 6.4 - 8.2 g/dL    Albumin 3.6 3.5 - 5.0 g/dL    Globulin 3.2 2.0 - 4.0 g/dL    A-G Ratio 1.1 1.1 - 2.2     NT-PRO BNP    Collection Time: 11/26/22  1:15 PM   Result Value Ref Range    NT pro-BNP 28 <125 pg/mL   TROPONIN-HIGH SENSITIVITY    Collection Time: 11/26/22  1:15 PM   Result Value Ref Range    Troponin-High Sensitivity 6 0 - 51 ng/L URINALYSIS W/ REFLEX CULTURE    Collection Time: 11/26/22  2:57 PM    Specimen: Urine   Result Value Ref Range    Color Yellow/Straw      Appearance Clear Clear      Specific gravity 1.009 1.003 - 1.030      pH (UA) 5.0 5.0 - 8.0      Protein Negative Negative mg/dL    Glucose Negative Negative mg/dL    Ketone Negative Negative mg/dL    Bilirubin Negative Negative      Blood Negative Negative      Urobilinogen 0.1 0.1 - 1.0 EU/dL    Nitrites Negative Negative      Leukocyte Esterase Trace (A) Negative      UA:UC IF INDICATED Culture not indicated by UA result Culture not indicated by UA result      WBC 0-4 0 - 4 /hpf    RBC 0-5 0 - 5 /hpf    Bacteria Negative Negative /hpf    Mucus Trace /lpf       Radiologic Studies -   [unfilled]  CT Results  (Last 48 hours)      None          CXR Results  (Last 48 hours)                 11/26/22 1336  XR CHEST SNGL V Final result    Impression:  No Acute Disease. Narrative:  EXAM: Portable CXR. 1329 hours. INDICATION: cough, sob       FINDINGS:   The lungs appear clear. Heart is normal in size. There is no pulmonary edema. There is no evident pneumothorax or pleural effusion. Medical Decision Making and ED Course   - I am the first and primary provider for this patient AND AM THE PRIMARY PROVIDER OF RECORD. I reviewed the vital signs, available nursing notes, past medical history, past surgical history, family history and social history. - Initial assessment performed. The patients presenting problems have been discussed, and the staff are in agreement with the care plan formulated and outlined with them. I have encouraged them to ask questions as they arise throughout their visit. Vital Signs-Reviewed the patient's vital signs.   Patient Vitals for the past 12 hrs:   Temp Pulse Resp BP SpO2   11/26/22 1600 -- -- -- -- 98 %   11/26/22 1328 -- -- -- -- 97 %   11/26/22 1323 -- -- -- -- 97 %   11/26/22 1313 97.9 °F (36.6 °C) 91 15 (!) 125/93 99 %     MDM  The differential diagnosis is pneumonia, CHF, COPD. Presented with wheezing and significant breathing difficulty with cough. Concern for COPD exacerbation. She was given duo nebs and Solu-Medrol. She still has severe breathing difficulty and has wheezing bilaterally. She is on 3 L nasal cannula. I will admit to Dr. Mikhail Reza for further care. Procedures and Critical Care   Critical Care:   CRITICAL CARE:  I personally spent a total of 40 minutes of critical care time in obtaining history, performing a physical exam, bedside monitoring of interventions, collecting and interpreting tests but excluding time spent performing procedures, treating other patients and teaching time. Discussion with consultant: Hospitalist.  Clinical Concern: hypoxia. Intervention: supplemental oxygen, duonebs x 3. Mary Harris D.O. Disposition     Disposition: Admitted to Dr. Mikhail Reza    Diagnosis/Clinical Impression     Clinical Impression:   1. COPD exacerbation ([facility] [address]         Attestations:  Rajni Tan, DO    Please note that this dictation was completed with [employer], the computer voice recognition software. Quite often unanticipated grammatical, syntax, homophones, and other interpretive errors are inadvertently transcribed by the computer software. Please disregard these errors. Please excuse any errors that have escaped final proofreading. Thank you.

## 2022-11-27 LAB
GLUCOSE BLD STRIP.AUTO-MCNC: 218 MG/DL (ref 65–100)
GLUCOSE BLD STRIP.AUTO-MCNC: 220 MG/DL (ref 65–100)
GLUCOSE BLD STRIP.AUTO-MCNC: 236 MG/DL (ref 65–100)
GLUCOSE BLD STRIP.AUTO-MCNC: 297 MG/DL (ref 65–100)
PERFORMED BY, TECHID: ABNORMAL

## 2022-11-27 PROCEDURE — 74011250637 HC RX REV CODE- 250/637: Performed by: INTERNAL MEDICINE

## 2022-11-27 PROCEDURE — 94640 AIRWAY INHALATION TREATMENT: CPT

## 2022-11-27 PROCEDURE — 96372 THER/PROPH/DIAG INJ SC/IM: CPT

## 2022-11-27 PROCEDURE — G0378 HOSPITAL OBSERVATION PER HR: HCPCS

## 2022-11-27 PROCEDURE — 94761 N-INVAS EAR/PLS OXIMETRY MLT: CPT

## 2022-11-27 PROCEDURE — 74011636637 HC RX REV CODE- 636/637: Performed by: INTERNAL MEDICINE

## 2022-11-27 PROCEDURE — 82962 GLUCOSE BLOOD TEST: CPT

## 2022-11-27 PROCEDURE — 77010033678 HC OXYGEN DAILY

## 2022-11-27 PROCEDURE — 74011250636 HC RX REV CODE- 250/636: Performed by: INTERNAL MEDICINE

## 2022-11-27 PROCEDURE — 96376 TX/PRO/DX INJ SAME DRUG ADON: CPT

## 2022-11-27 PROCEDURE — 74011000250 HC RX REV CODE- 250: Performed by: INTERNAL MEDICINE

## 2022-11-27 RX ORDER — CODEINE PHOSPHATE AND GUAIFENESIN 10; 100 MG/5ML; MG/5ML
10 SOLUTION ORAL EVERY 6 HOURS
Status: DISCONTINUED | OUTPATIENT
Start: 2022-11-27 | End: 2022-11-28

## 2022-11-27 RX ORDER — BUSPIRONE HYDROCHLORIDE 5 MG/1
5 TABLET ORAL 2 TIMES DAILY
Status: DISCONTINUED | OUTPATIENT
Start: 2022-11-27 | End: 2022-12-15 | Stop reason: HOSPADM

## 2022-11-27 RX ADMIN — Medication 1 TABLET: at 10:24

## 2022-11-27 RX ADMIN — CEFTRIAXONE SODIUM 1 G: 1 INJECTION, POWDER, FOR SOLUTION INTRAMUSCULAR; INTRAVENOUS at 15:33

## 2022-11-27 RX ADMIN — METHYLPREDNISOLONE SODIUM SUCCINATE 40 MG: 40 INJECTION, POWDER, FOR SOLUTION INTRAMUSCULAR; INTRAVENOUS at 12:43

## 2022-11-27 RX ADMIN — MONTELUKAST 10 MG: 10 TABLET, FILM COATED ORAL at 10:24

## 2022-11-27 RX ADMIN — FUROSEMIDE 20 MG: 40 TABLET ORAL at 10:24

## 2022-11-27 RX ADMIN — CARVEDILOL 6.25 MG: 3.12 TABLET, FILM COATED ORAL at 17:22

## 2022-11-27 RX ADMIN — SODIUM CHLORIDE, PRESERVATIVE FREE 10 ML: 5 INJECTION INTRAVENOUS at 12:44

## 2022-11-27 RX ADMIN — SODIUM CHLORIDE, PRESERVATIVE FREE 10 ML: 5 INJECTION INTRAVENOUS at 21:51

## 2022-11-27 RX ADMIN — PANTOPRAZOLE SODIUM 40 MG: 40 TABLET, DELAYED RELEASE ORAL at 10:24

## 2022-11-27 RX ADMIN — BUDESONIDE AND FORMOTEROL FUMARATE DIHYDRATE 2 PUFF: 160; 4.5 AEROSOL RESPIRATORY (INHALATION) at 20:14

## 2022-11-27 RX ADMIN — IPRATROPIUM BROMIDE AND ALBUTEROL SULFATE 3 ML: 2.5; .5 SOLUTION RESPIRATORY (INHALATION) at 15:21

## 2022-11-27 RX ADMIN — GUAIFENESIN 600 MG: 600 TABLET, EXTENDED RELEASE ORAL at 10:24

## 2022-11-27 RX ADMIN — ASPIRIN 81 MG 81 MG: 81 TABLET ORAL at 10:25

## 2022-11-27 RX ADMIN — CETIRIZINE HYDROCHLORIDE 10 MG: 10 TABLET, FILM COATED ORAL at 10:24

## 2022-11-27 RX ADMIN — ENOXAPARIN SODIUM 40 MG: 100 INJECTION SUBCUTANEOUS at 10:23

## 2022-11-27 RX ADMIN — BUDESONIDE AND FORMOTEROL FUMARATE DIHYDRATE 2 PUFF: 160; 4.5 AEROSOL RESPIRATORY (INHALATION) at 07:26

## 2022-11-27 RX ADMIN — IPRATROPIUM BROMIDE AND ALBUTEROL SULFATE 3 ML: 2.5; .5 SOLUTION RESPIRATORY (INHALATION) at 11:14

## 2022-11-27 RX ADMIN — METHYLPREDNISOLONE SODIUM SUCCINATE 60 MG: 40 INJECTION, POWDER, FOR SOLUTION INTRAMUSCULAR; INTRAVENOUS at 17:22

## 2022-11-27 RX ADMIN — METHYLPREDNISOLONE SODIUM SUCCINATE 40 MG: 40 INJECTION, POWDER, FOR SOLUTION INTRAMUSCULAR; INTRAVENOUS at 06:02

## 2022-11-27 RX ADMIN — IPRATROPIUM BROMIDE AND ALBUTEROL SULFATE 3 ML: 2.5; .5 SOLUTION RESPIRATORY (INHALATION) at 07:26

## 2022-11-27 RX ADMIN — IPRATROPIUM BROMIDE AND ALBUTEROL SULFATE 3 ML: 2.5; .5 SOLUTION RESPIRATORY (INHALATION) at 23:14

## 2022-11-27 RX ADMIN — INSULIN GLARGINE 15 UNITS: 100 INJECTION, SOLUTION SUBCUTANEOUS at 21:48

## 2022-11-27 RX ADMIN — AMLODIPINE BESYLATE 10 MG: 5 TABLET ORAL at 10:24

## 2022-11-27 RX ADMIN — IPRATROPIUM BROMIDE AND ALBUTEROL SULFATE 3 ML: 2.5; .5 SOLUTION RESPIRATORY (INHALATION) at 20:14

## 2022-11-27 RX ADMIN — FLUTICASONE PROPIONATE 2 SPRAY: 50 SPRAY, METERED NASAL at 10:24

## 2022-11-27 RX ADMIN — DOCUSATE SODIUM 100 MG: 100 CAPSULE, LIQUID FILLED ORAL at 21:50

## 2022-11-27 RX ADMIN — GUAIFENESIN AND CODEINE PHOSPHATE 10 ML: 10; 100 LIQUID ORAL at 15:33

## 2022-11-27 RX ADMIN — SODIUM CHLORIDE, PRESERVATIVE FREE 10 ML: 5 INJECTION INTRAVENOUS at 15:34

## 2022-11-27 RX ADMIN — SODIUM CHLORIDE, PRESERVATIVE FREE 10 ML: 5 INJECTION INTRAVENOUS at 06:02

## 2022-11-27 RX ADMIN — PANTOPRAZOLE SODIUM 40 MG: 40 TABLET, DELAYED RELEASE ORAL at 15:34

## 2022-11-27 RX ADMIN — DOCUSATE SODIUM 100 MG: 100 CAPSULE, LIQUID FILLED ORAL at 10:25

## 2022-11-27 RX ADMIN — CARVEDILOL 6.25 MG: 3.12 TABLET, FILM COATED ORAL at 10:24

## 2022-11-27 RX ADMIN — AZITHROMYCIN MONOHYDRATE 500 MG: 500 TABLET ORAL at 10:25

## 2022-11-27 RX ADMIN — BUSPIRONE HYDROCHLORIDE 5 MG: 5 TABLET ORAL at 21:50

## 2022-11-27 RX ADMIN — IPRATROPIUM BROMIDE AND ALBUTEROL SULFATE 3 ML: 2.5; .5 SOLUTION RESPIRATORY (INHALATION) at 03:14

## 2022-11-27 NOTE — PROGRESS NOTES
Medicare Outpatient Observation Notice (MOON)/ [place] Outpatient Observation Notice (Ancel Cords) provided to patient/representative with verbal explanation of the notice. Time allotted for questions regarding the notice. Patient /representative provided a completed copy of the MOON/VOON notice. Copy placed on bedside chart.

## 2022-11-27 NOTE — PROGRESS NOTES
Progress Note    Patient: Jessica Herbert MRN: [id number]  SSN: [id number]    YOB: 1960  Age: 63 y.o. Sex: female      Admit Date: 11/26/2022    LOS: 0 days     Subjective:     Spasmodic current cough  On oxygen  M pulmonary added Robitussin with codeine    Objective:     Vitals:    11/27/22 1024 11/27/22 1114 11/27/22 1402 11/27/22 1521   BP: 130/78  127/76    Pulse: (!) 102  97    Resp:   22    Temp:   97.7 °F (36.5 °C)    SpO2: 94% 91% 95% 96%   Weight:       Height:            Intake and Output:  Current Shift: No intake/output data recorded. Last three shifts: 11/25 1901 - 11/27 0700  In: 250 [I.V.:250]  Out: -     Physical Exam:   General:  Alert, cooperative, no distress, appears stated age. Eyes:  Conjunctivae/corneas clear. PERRL, EOMs intact. Fundi benign   Ears:  Normal TMs and external ear canals both ears. Nose: Nares normal. Septum midline. Mucosa normal. No drainage or sinus tenderness. Mouth/Throat: Lips, mucosa, and tongue normal. Teeth and gums normal.   Neck: Supple, symmetrical, trachea midline, no adenopathy, thyroid: no enlargment/tenderness/nodules, no carotid bruit and no JVD. Back:   Symmetric, no curvature. ROM normal. No CVA tenderness. Lungs:   Rhonchi with diminished air entry to auscultation bilaterally. Heart:  Regular rate and rhythm, S1, S2 normal, no murmur, click, rub or gallop. Abdomen:   Soft, non-tender. Bowel sounds normal. No masses,  No organomegaly. Extremities: Extremities normal, atraumatic, no cyanosis or edema. Pulses: 2+ and symmetric all extremities. Skin: Skin color, texture, turgor normal. No rashes or lesions   Lymph nodes: Cervical, supraclavicular, and axillary nodes normal.   Neurologic: CNII-XII intact. Normal strength, sensation and reflexes throughout. Lab/Data Review: All lab results for the last 24 hours reviewed.      Recent Results (from the past 24 hour(s))   GLUCOSE, POC    Collection Time: 11/26/22 11:07 PM   Result Value Ref Range    Glucose (POC) 395 (H) 65 - 100 mg/dL    Performed by Dorene Cardenas, POC    Collection Time: 11/27/22  7:53 AM   Result Value Ref Range    Glucose (POC) 220 (H) 65 - 100 mg/dL    Performed by Neida Coello, POC    Collection Time: 11/27/22 11:47 AM   Result Value Ref Range    Glucose (POC) 218 (H) 65 - 100 mg/dL    Performed by Dago Briggs          Assessment:     Active Problems:    COPD exacerbation ([facility] [address] (8/8/2021)    Severe acute on chronic COPD with exacerbation  Acute on chronic hypoxic respiratory failure  With anxiety  Morbid obesity  Possible reflux esophagitis      Plan:     Add Vistaril      Signed By: Jovanny Brito MD     November 27, 2022

## 2022-11-27 NOTE — PROGRESS NOTES
Problem: Falls - Risk of  Goal: *Absence of Falls  Description: Document Pedro Oris Fall Risk and appropriate interventions in the flowsheet.   Outcome: Progressing Towards Goal  Note: Fall Risk Interventions:            Medication Interventions: Assess postural VS orthostatic hypotension

## 2022-11-27 NOTE — ROUTINE PROCESS
Bedside shift change report given to Ghanshyam (oncoming nurse) by GAYLA Otoole (offgoing nurse). Report included the following information SBAR, MAR, and Recent Results.

## 2022-11-27 NOTE — CONSULTS
Pulmonary/ CC Consult    Subjective:   Date of Consultation:  November 27, 2022  Referring Physician: Gary Oscar MD    Ms. Lolita Spicer is a 63years old female who is known to have history of COPD, hypertension, chronic hypoxia on supplemental oxygen. She additionally history of breast CVA with complete remission. She follows with Dr. Corby Vásquez her office. Last seen about 3 months ago. She is admitted to hospital because of increasing symptoms of cough, shortness of breath and wheezing. She used her inhalers and nebulizer but without any improvement. Ultimately had to come to the hospital.  She was found to be hypoxic. Chest x-ray was unremarkable for any infiltrates. Got admitted on floor for further management. Denies any hemoptysis. Has complaints of chest discomfort because of her aggressive coughing. She denies any smoking. She lives in the basement and denies any exposure to mold. Patient Active Problem List   Diagnosis Code    COPD (chronic obstructive pulmonary disease) ([facility] [address] J44.9    Hypoxia R09.02    Respiratory failure ([facility]) J96.90    [facility] J44.1     Past Medical History:   Diagnosis Date    Breast CA ([facility] [address]     Chronic obstructive pulmonary disease ([facility] [address]     Hypertension       Family History   Problem Relation Age of Onset    Hypertension Mother     Cancer Father       Social History     Tobacco Use    Smoking status: Former    Smokeless tobacco: Never   Substance Use Topics    Alcohol use: Yes     Comment: occasionally      Past Surgical History:   Procedure Laterality Date    COLONOSCOPY N/A 7/16/2021    . performed by Chantel Lopez MD at [facility]      HX MASTECTOMY Left       Prior to Admission medications    Medication Sig Start Date End Date Taking? Authorizing Provider   cephALEXin (Keflex) 500 mg capsule Take 1 Capsule by mouth two (2) times a day for 7 days.  11/21/22 11/28/22  Tami Loo MD   docusate sodium (Colace) 100 mg capsule Take 1 Capsule by mouth two (2) times a day for 90 days. 11/21/22 2/19/23  Jn Loo MD   carvediloL (COREG) 6.25 mg tablet Take 1 Tablet by mouth two (2) times daily (with meals). 8/13/21   Radha Funes MD   furosemide (Lasix) 40 mg tablet Lasix 40 mg daily 8/13/21   Jose Hemphill MD   insulin glargine (LANTUS) 100 unit/mL injection As directed 8/14/21   Jose Hemphill MD   pantoprazole (PROTONIX) 40 mg tablet Take 1 Tablet by mouth Daily (before breakfast). 8/14/21   Radha Funes MD   predniSONE (DELTASONE) 10 mg tablet 1 tablet daily 8/13/21   Jose Hemphill MD   albuterol-ipratropium (DUO-NEB) 2.5 mg-0.5 mg/3 ml nebu 3 mL by Nebulization route every six (6) hours as needed for Wheezing. 12/18/20   Radha Funes MD   aspirin 81 mg chewable tablet Take 1 Tab by mouth daily. 12/19/20   Rahda Funes MD   guaiFENesin ER (MUCINEX) 600 mg ER tablet Take 1 Tab by mouth two (2) times a day. 12/18/20   Radha Funes MD   amLODIPine (NORVASC) 10 mg tablet Take 10 mg by mouth daily. Provider, Historical   mometasone-formoterol (DULERA) 200-5 mcg/actuation HFA inhaler Take 2 Puffs by inhalation two (2) times a day. Provider, Historical   montelukast (SINGULAIR) 10 mg tablet Take 10 mg by mouth daily. Provider, Historical   Calcium-Cholecalciferol, D3, 500 mg(1,250mg) -400 unit tab Take  by mouth daily. Provider, Historical   benzonatate (TESSALON) 100 mg capsule Take 100 mg by mouth once. Provider, Historical   fluticasone propionate (FLONASE) 50 mcg/actuation nasal spray 2 Sprays by Both Nostrils route daily. Provider, Historical   ergocalciferol (Vitamin D2) 1,250 mcg (50,000 unit) capsule Take 50,000 Units by mouth every seven (7) days.  Q7days on Monday    Provider, Historical     Allergies   Allergen Reactions    Lisinopril Angioedema        Review of Systems:  A comprehensive review of systems was negative except for that written in the History of Present Illness. Objective:   Blood pressure 130/78, pulse (!) 102, temperature 97.6 °F (36.4 °C), resp. rate 20, height 5' 4\" (1.626 m), weight 91.2 kg (201 lb), SpO2 91 %, not currently breastfeeding. Temp (24hrs), Av.8 °F (36.6 °C), Min:97.6 °F (36.4 °C), Max:98.3 °F (36.8 °C)    XR CHEST SNGL V   Final Result   No Acute Disease. Data Review: CBC:   Recent Labs     22  1315   WBC 5.7   RBC 3.82   HGB 11.2*   HCT 35.6      GRANS 73   LYMPH 15   EOS 1     CMP:   Recent Labs     22  1315   *      K 4.2      CO2 34*   BUN 18   CREA 1.11*   CA 9.5   AGAP 1*   BUCR 16   AP 92   TP 6.8   ALB 3.6   GLOB 3.2   AGRAT 1.1     Liver Enzymes:   Recent Labs     22  1315   TP 6.8   ALB 3.6   AP 92     ABGs: No results for input(s): PH, PCO2, PO2, HCO3 in the last 72 hours.   Inflammation studies: No results found for: SR, ESRA, CRP  Current Facility-Administered Medications   Medication Dose Route Frequency    albuterol-ipratropium (DUO-NEB) 2.5 MG-0.5 MG/3 ML  3 mL Nebulization Q4H RT    amLODIPine (NORVASC) tablet 10 mg  10 mg Oral DAILY    aspirin chewable tablet 81 mg  81 mg Oral DAILY    calcium-vitamin D 600 mg-5 mcg (200 unit) per tablet 1 Tablet  1 Tablet Oral DAILY    carvediloL (COREG) tablet 6.25 mg  6.25 mg Oral BID WITH MEALS    docusate sodium (COLACE) capsule 100 mg  100 mg Oral BID    fluticasone propionate (FLONASE) 50 mcg/actuation nasal spray 2 Spray  2 Spray Both Nostrils DAILY    furosemide (LASIX) tablet 20 mg  20 mg Oral DAILY    guaiFENesin ER (MUCINEX) tablet 600 mg  600 mg Oral BID    insulin glargine (LANTUS) injection 15 Units  15 Units SubCUTAneous QHS    budesonide-formoteroL (SYMBICORT) 160-4.5 mcg/actuation HFA inhaler 2 Puff  2 Puff Inhalation BID    montelukast (SINGULAIR) tablet 10 mg  10 mg Oral DAILY    methylPREDNISolone (PF) (SOLU-MEDROL) injection 40 mg  40 mg IntraVENous Q6H    azithromycin (ZITHROMAX) tablet 500 mg  500 mg Oral DAILY    sodium chloride (NS) flush 5-40 mL  5-40 mL IntraVENous Q8H    sodium chloride (NS) flush 5-40 mL  5-40 mL IntraVENous PRN    acetaminophen (TYLENOL) tablet 650 mg  650 mg Oral Q6H PRN    Or    acetaminophen (TYLENOL) suppository 650 mg  650 mg Rectal Q6H PRN    polyethylene glycol (MIRALAX) packet 17 g  17 g Oral DAILY PRN    ondansetron (ZOFRAN ODT) tablet 4 mg  4 mg Oral Q8H PRN    Or    ondansetron (ZOFRAN) injection 4 mg  4 mg IntraVENous Q6H PRN    enoxaparin (LOVENOX) injection 40 mg  40 mg SubCUTAneous DAILY    cefTRIAXone (ROCEPHIN) 1 g in sterile water (preservative free) 10 mL IV syringe  1 g IntraVENous Q24H    pantoprazole (PROTONIX) tablet 40 mg  40 mg Oral ACB&D    cetirizine (ZYRTEC) tablet 10 mg  10 mg Oral DAILY        Exam:      This is middle aged female obese. Currently coughing aggressively. Alert and oriented x3. Head normocephalic and atraumatic, pupils are round responsive to light sclera icteric and conjunctive are pink. JVD is absent. Chest: Bilateral coarse expiratory wheezing audible. Prolonged phase of expiration  Heart: S1-S2 normal  Abdomen: Soft, nontender, no visceromegaly  Extremities: No edema, sinus or clubbing  Neuro: No focal motor deficit. Impression:   Ms. Michelle Oh is a 63years old female who is known to have history of COPD, hypertension, chronic hypoxia on supplemental oxygen. She additionally history of breast CVA with complete remission. She follows with Dr. Koby Samuels her office. Last seen about 3 months ago. She is admitted to hospital because of increasing symptoms of cough, shortness of breath and wheezing. She used her inhalers and nebulizer but without any improvement. X-rays unremarkable for any infiltrates    Plan:   1. Acute on chronic hypoxic respiratory failure: This is secondary to acute exacerbation of COPD. Any supplemental oxygen at 3 L/min  2.   Acute exacerbation of COPD:  Chest x-ray is unremarkable for any infiltrates. Patient has bilateral coarse expiratory wheezing. She is not a smoker. Patient got started on nebulized albuterol and Atrovent along with systemic steroids. Continue nebulized albuterol and Atrovent, Solu-Medrol 40 mg every 6 hours. The Zithromax  3. Hypertension :  Continue antihypertensive medication  4. Aggressive coughing:  Start patient on cough suppressant.       Anne Ramachandran MD  Pulmonary/CC

## 2022-11-27 NOTE — H&P
History and Physical    Patient: Laurie Goel MRN: [id number]  SSN: [id number]    YOB: 1960  Age: 63 y.o. Sex: female      Subjective:      Laurie Goel is a 63 y.o. female who has history of breast CA, COPD, hypertension chronic hypoxic respiratory failure on home oxygen presented with a complaint of cough shortness of breath. Patient failed treatment in the emergency room and also has outpatient. Past Medical History:   Diagnosis Date    Breast CA ([facility] [address]     Chronic obstructive pulmonary disease ([facility] [address]     Hypertension      Past Surgical History:   Procedure Laterality Date    COLONOSCOPY N/A 7/16/2021    . performed by Shannan Mcdermott MD at [facility] ENDOSCOPY    HX HYSTERECTOMY      HX MASTECTOMY Left       Family History   Problem Relation Age of Onset    Hypertension Mother     Cancer Father      Social History     Tobacco Use    Smoking status: Former    Smokeless tobacco: Never   Substance Use Topics    Alcohol use: Yes     Comment: occasionally       Prior to Admission medications    Medication Sig Start Date End Date Taking? Authorizing Provider   cephALEXin (Keflex) 500 mg capsule Take 1 Capsule by mouth two (2) times a day for 7 days. 11/21/22 11/28/22  Mateo Loo MD   docusate sodium (Colace) 100 mg capsule Take 1 Capsule by mouth two (2) times a day for 90 days. 11/21/22 2/19/23  Mateo Loo MD   carvediloL (COREG) 6.25 mg tablet Take 1 Tablet by mouth two (2) times daily (with meals). 8/13/21   Rosa Funes MD   furosemide (Lasix) 40 mg tablet Lasix 40 mg daily 8/13/21   Christiano Lara MD   insulin glargine (LANTUS) 100 unit/mL injection As directed 8/14/21   Christiano Lara MD   pantoprazole (PROTONIX) 40 mg tablet Take 1 Tablet by mouth Daily (before breakfast).  8/14/21   Rosa Funes MD   predniSONE (DELTASONE) 10 mg tablet 1 tablet daily 8/13/21   Christiano Lara MD   albuterol-ipratropium (DUO-NEB) 2.5 mg-0.5 mg/3 ml nebu 3 mL by Nebulization route every six (6) hours as needed for Wheezing. 12/18/20   Kenneth Funes MD   aspirin 81 mg chewable tablet Take 1 Tab by mouth daily. 12/19/20   Kenneth Funes MD   guaiFENesin ER (MUCINEX) 600 mg ER tablet Take 1 Tab by mouth two (2) times a day. 12/18/20   Kenneth Funes MD   amLODIPine (NORVASC) 10 mg tablet Take 10 mg by mouth daily. Provider, Historical   mometasone-formoterol (DULERA) 200-5 mcg/actuation HFA inhaler Take 2 Puffs by inhalation two (2) times a day. Provider, Historical   montelukast (SINGULAIR) 10 mg tablet Take 10 mg by mouth daily. Provider, Historical   Calcium-Cholecalciferol, D3, 500 mg(1,250mg) -400 unit tab Take  by mouth daily. Provider, Historical   benzonatate (TESSALON) 100 mg capsule Take 100 mg by mouth once. Provider, Historical   fluticasone propionate (FLONASE) 50 mcg/actuation nasal spray 2 Sprays by Both Nostrils route daily. Provider, Historical   ergocalciferol (Vitamin D2) 1,250 mcg (50,000 unit) capsule Take 50,000 Units by mouth every seven (7) days. Q7days on Monday    Provider, Historical        Allergies   Allergen Reactions    Lisinopril Angioedema       Review of Systems:  A comprehensive review of systems was negative except for that written in the History of Present Illness. Objective:     Vitals:    11/26/22 1328 11/26/22 1600 11/26/22 1807 11/26/22 1923   BP:   (!) 148/85    Pulse:   88    Resp:   20    Temp:       SpO2: 97% 98% 95% 96%   Weight:       Height:            Physical Exam:  General:  Alert, cooperative, no distress, appears stated age. Eyes:  Conjunctivae/corneas clear. PERRL, EOMs intact. Fundi benign   Ears:  Normal TMs and external ear canals both ears. Nose: Nares normal. Septum midline. Mucosa normal. No drainage or sinus tenderness.    Mouth/Throat: Lips, mucosa, and tongue normal. Teeth and gums normal.   Neck: Supple, symmetrical, trachea midline, no adenopathy, thyroid: no enlargment/tenderness/nodules, no carotid bruit and no JVD. Back:   Symmetric, no curvature. ROM normal. No CVA tenderness. Lungs:   Ronchi   to auscultation bilaterally. Heart:  Regular rate and rhythm, S1, S2 normal, no murmur, click, rub or gallop. Abdomen:   Soft, non-tender. Bowel sounds normal. No masses,  No organomegaly. Extremities: Extremities normal, atraumatic, no cyanosis or edema. Pulses: 2+ and symmetric all extremities. Skin: Skin color, texture, turgor normal. No rashes or lesions   Lymph nodes: Cervical, supraclavicular, and axillary nodes normal.   Neurologic: CNII-XII intact. Normal strength, sensation and reflexes throughout. Recent Results (from the past 24 hour(s))   CBC WITH AUTOMATED DIFF    Collection Time: 11/26/22  1:15 PM   Result Value Ref Range    WBC 5.7 3.6 - 11.0 K/uL    RBC 3.82 3.80 - 5.20 M/uL    HGB 11.2 (L) 11.5 - 16.0 g/dL    HCT 35.6 35.0 - 47.0 %    MCV 93.2 80.0 - 99.0 FL    MCH 29.3 26.0 - 34.0 PG    MCHC 31.5 30.0 - 36.5 g/dL    RDW 12.4 11.5 - 14.5 %    PLATELET 702 175 - 154 K/uL    MPV 10.6 8.9 - 12.9 FL    NRBC 0.0 0.0  WBC    ABSOLUTE NRBC 0.00 0.00 - 0.01 K/uL    NEUTROPHILS 73 32 - 75 %    LYMPHOCYTES 15 12 - 49 %    MONOCYTES 9 5 - 13 %    EOSINOPHILS 1 0 - 7 %    BASOPHILS 1 0 - 1 %    IMMATURE GRANULOCYTES 1 (H) 0 - 0.5 %    ABS. NEUTROPHILS 4.2 1.8 - 8.0 K/UL    ABS. LYMPHOCYTES 0.9 0.8 - 3.5 K/UL    ABS. MONOCYTES 0.5 0.0 - 1.0 K/UL    ABS. EOSINOPHILS 0.0 0.0 - 0.4 K/UL    ABS. BASOPHILS 0.0 0.0 - 0.1 K/UL    ABS. IMM.  GRANS. 0.0 0.00 - 0.04 K/UL    DF AUTOMATED     METABOLIC PANEL, COMPREHENSIVE    Collection Time: 11/26/22  1:15 PM   Result Value Ref Range    Sodium 141 136 - 145 mmol/L    Potassium 4.2 3.5 - 5.1 mmol/L    Chloride 106 97 - 108 mmol/L    CO2 34 (H) 21 - 32 mmol/L    Anion gap 1 (L) 5 - 15 mmol/L    Glucose 105 (H) 65 - 100 mg/dL    BUN 18 6 - 20 mg/dL    Creatinine 1.11 (H) 0.55 - 1.02 mg/dL    BUN/Creatinine ratio 16 12 - 20      eGFR 57 (L) >60 ml/min/1.73m2    Calcium 9.5 8.5 - 10.1 mg/dL    Bilirubin, total 0.3 0.2 - 1.0 mg/dL    AST (SGOT) 22 15 - 37 U/L    ALT (SGPT) 28 12 - 78 U/L    Alk.  phosphatase 92 45 - 117 U/L    Protein, total 6.8 6.4 - 8.2 g/dL    Albumin 3.6 3.5 - 5.0 g/dL    Globulin 3.2 2.0 - 4.0 g/dL    A-G Ratio 1.1 1.1 - 2.2     NT-PRO BNP    Collection Time: 11/26/22  1:15 PM   Result Value Ref Range    NT pro-BNP 28 <125 pg/mL   TROPONIN-HIGH SENSITIVITY    Collection Time: 11/26/22  1:15 PM   Result Value Ref Range    Troponin-High Sensitivity 6 0 - 51 ng/L   URINALYSIS W/ REFLEX CULTURE    Collection Time: 11/26/22  2:57 PM    Specimen: Urine   Result Value Ref Range    Color Yellow/Straw      Appearance Clear Clear      Specific gravity 1.009 1.003 - 1.030      pH (UA) 5.0 5.0 - 8.0      Protein Negative Negative mg/dL    Glucose Negative Negative mg/dL    Ketone Negative Negative mg/dL    Bilirubin Negative Negative      Blood Negative Negative      Urobilinogen 0.1 0.1 - 1.0 EU/dL    Nitrites Negative Negative      Leukocyte Esterase Trace (A) Negative      UA:UC IF INDICATED Culture not indicated by UA result Culture not indicated by UA result      WBC 0-4 0 - 4 /hpf    RBC 0-5 0 - 5 /hpf    Bacteria Negative Negative /hpf    Mucus Trace /lpf         Assessment:     Hospital Problems  Date Reviewed: 7/16/2021            Codes Class Noted POA    COPD exacerbation ([facility] [address] ICD-10-CM: J44.1  ICD-9-CM: 491.21  8/8/2021 Unknown       COPD severe with acute extubation  Acute on chronic hypoxic respiratory failure  Obesity  Possible reflux vaginitis    Plan:     Continue patient on steroids DuoNebs Protonix consult pulmonary critical care time of 30 minutes    Signed By: Sadaf Figueroa MD     November 26, 2022

## 2022-11-28 LAB
GLUCOSE BLD STRIP.AUTO-MCNC: 168 MG/DL (ref 65–100)
GLUCOSE BLD STRIP.AUTO-MCNC: 183 MG/DL (ref 65–100)
GLUCOSE BLD STRIP.AUTO-MCNC: 196 MG/DL (ref 65–100)
GLUCOSE BLD STRIP.AUTO-MCNC: 218 MG/DL (ref 65–100)
PERFORMED BY, TECHID: ABNORMAL

## 2022-11-28 PROCEDURE — 82962 GLUCOSE BLOOD TEST: CPT

## 2022-11-28 PROCEDURE — 77010033678 HC OXYGEN DAILY

## 2022-11-28 PROCEDURE — 74011000250 HC RX REV CODE- 250: Performed by: INTERNAL MEDICINE

## 2022-11-28 PROCEDURE — 94640 AIRWAY INHALATION TREATMENT: CPT

## 2022-11-28 PROCEDURE — 65270000029 HC RM PRIVATE

## 2022-11-28 PROCEDURE — 74011250637 HC RX REV CODE- 250/637: Performed by: INTERNAL MEDICINE

## 2022-11-28 PROCEDURE — 74011636637 HC RX REV CODE- 636/637: Performed by: INTERNAL MEDICINE

## 2022-11-28 PROCEDURE — 96376 TX/PRO/DX INJ SAME DRUG ADON: CPT

## 2022-11-28 PROCEDURE — G0378 HOSPITAL OBSERVATION PER HR: HCPCS

## 2022-11-28 PROCEDURE — 74011250636 HC RX REV CODE- 250/636: Performed by: INTERNAL MEDICINE

## 2022-11-28 RX ORDER — IPRATROPIUM BROMIDE AND ALBUTEROL SULFATE 2.5; .5 MG/3ML; MG/3ML
3 SOLUTION RESPIRATORY (INHALATION)
Status: DISCONTINUED | OUTPATIENT
Start: 2022-11-28 | End: 2022-12-09

## 2022-11-28 RX ORDER — CODEINE PHOSPHATE AND GUAIFENESIN 10; 100 MG/5ML; MG/5ML
10 SOLUTION ORAL EVERY 6 HOURS
Status: DISCONTINUED | OUTPATIENT
Start: 2022-11-28 | End: 2022-11-29

## 2022-11-28 RX ADMIN — AMLODIPINE BESYLATE 10 MG: 5 TABLET ORAL at 10:01

## 2022-11-28 RX ADMIN — CETIRIZINE HYDROCHLORIDE 10 MG: 10 TABLET, FILM COATED ORAL at 10:01

## 2022-11-28 RX ADMIN — METHYLPREDNISOLONE SODIUM SUCCINATE 60 MG: 40 INJECTION, POWDER, FOR SOLUTION INTRAMUSCULAR; INTRAVENOUS at 02:08

## 2022-11-28 RX ADMIN — IPRATROPIUM BROMIDE AND ALBUTEROL SULFATE 3 ML: 2.5; .5 SOLUTION RESPIRATORY (INHALATION) at 09:38

## 2022-11-28 RX ADMIN — ASPIRIN 81 MG 81 MG: 81 TABLET ORAL at 10:01

## 2022-11-28 RX ADMIN — GUAIFENESIN AND CODEINE PHOSPHATE 10 ML: 10; 100 LIQUID ORAL at 22:54

## 2022-11-28 RX ADMIN — CARVEDILOL 6.25 MG: 3.12 TABLET, FILM COATED ORAL at 16:33

## 2022-11-28 RX ADMIN — INSULIN GLARGINE 15 UNITS: 100 INJECTION, SOLUTION SUBCUTANEOUS at 22:00

## 2022-11-28 RX ADMIN — PANTOPRAZOLE SODIUM 40 MG: 40 TABLET, DELAYED RELEASE ORAL at 10:01

## 2022-11-28 RX ADMIN — FUROSEMIDE 20 MG: 40 TABLET ORAL at 10:01

## 2022-11-28 RX ADMIN — DOCUSATE SODIUM 100 MG: 100 CAPSULE, LIQUID FILLED ORAL at 22:55

## 2022-11-28 RX ADMIN — METHYLPREDNISOLONE SODIUM SUCCINATE 60 MG: 40 INJECTION, POWDER, FOR SOLUTION INTRAMUSCULAR; INTRAVENOUS at 22:54

## 2022-11-28 RX ADMIN — GUAIFENESIN AND CODEINE PHOSPHATE 10 ML: 10; 100 LIQUID ORAL at 16:32

## 2022-11-28 RX ADMIN — IPRATROPIUM BROMIDE AND ALBUTEROL SULFATE 3 ML: 2.5; .5 SOLUTION RESPIRATORY (INHALATION) at 12:20

## 2022-11-28 RX ADMIN — METHYLPREDNISOLONE SODIUM SUCCINATE 60 MG: 40 INJECTION, POWDER, FOR SOLUTION INTRAMUSCULAR; INTRAVENOUS at 10:01

## 2022-11-28 RX ADMIN — METHYLPREDNISOLONE SODIUM SUCCINATE 60 MG: 40 INJECTION, POWDER, FOR SOLUTION INTRAMUSCULAR; INTRAVENOUS at 16:32

## 2022-11-28 RX ADMIN — ENOXAPARIN SODIUM 40 MG: 100 INJECTION SUBCUTANEOUS at 10:00

## 2022-11-28 RX ADMIN — PANTOPRAZOLE SODIUM 40 MG: 40 TABLET, DELAYED RELEASE ORAL at 16:34

## 2022-11-28 RX ADMIN — SODIUM CHLORIDE, PRESERVATIVE FREE 10 ML: 5 INJECTION INTRAVENOUS at 06:37

## 2022-11-28 RX ADMIN — SODIUM CHLORIDE, PRESERVATIVE FREE 10 ML: 5 INJECTION INTRAVENOUS at 16:31

## 2022-11-28 RX ADMIN — Medication 1 TABLET: at 10:01

## 2022-11-28 RX ADMIN — SODIUM CHLORIDE, PRESERVATIVE FREE 5 ML: 5 INJECTION INTRAVENOUS at 22:55

## 2022-11-28 RX ADMIN — AZITHROMYCIN MONOHYDRATE 500 MG: 500 TABLET ORAL at 10:01

## 2022-11-28 RX ADMIN — DOCUSATE SODIUM 100 MG: 100 CAPSULE, LIQUID FILLED ORAL at 10:02

## 2022-11-28 RX ADMIN — BUSPIRONE HYDROCHLORIDE 5 MG: 5 TABLET ORAL at 16:49

## 2022-11-28 RX ADMIN — IPRATROPIUM BROMIDE AND ALBUTEROL SULFATE 3 ML: 2.5; .5 SOLUTION RESPIRATORY (INHALATION) at 03:11

## 2022-11-28 RX ADMIN — GUAIFENESIN AND CODEINE PHOSPHATE 10 ML: 10; 100 LIQUID ORAL at 10:00

## 2022-11-28 RX ADMIN — CEFTRIAXONE SODIUM 1 G: 1 INJECTION, POWDER, FOR SOLUTION INTRAMUSCULAR; INTRAVENOUS at 16:31

## 2022-11-28 RX ADMIN — MONTELUKAST 10 MG: 10 TABLET, FILM COATED ORAL at 10:01

## 2022-11-28 RX ADMIN — CARVEDILOL 6.25 MG: 3.12 TABLET, FILM COATED ORAL at 10:02

## 2022-11-28 RX ADMIN — GUAIFENESIN AND CODEINE PHOSPHATE 10 ML: 10; 100 LIQUID ORAL at 02:09

## 2022-11-28 RX ADMIN — BUDESONIDE AND FORMOTEROL FUMARATE DIHYDRATE 2 PUFF: 160; 4.5 AEROSOL RESPIRATORY (INHALATION) at 09:38

## 2022-11-28 RX ADMIN — IPRATROPIUM BROMIDE AND ALBUTEROL SULFATE 3 ML: 2.5; .5 SOLUTION RESPIRATORY (INHALATION) at 21:09

## 2022-11-28 RX ADMIN — BUDESONIDE AND FORMOTEROL FUMARATE DIHYDRATE 2 PUFF: 160; 4.5 AEROSOL RESPIRATORY (INHALATION) at 21:10

## 2022-11-28 NOTE — PROGRESS NOTES
Pulmonary/ CC progress note    Subjective:   Date of Consultation:  November 28, 2022  Referring Physician: Zaire Pérez MD    Patient seen and examined  Overnight events noted    Lying in bed comfortably  Awake alert  On 4 L nasal cannula oxygen  No acute distress     Prior to Admission medications    Medication Sig Start Date End Date Taking? Authorizing Provider   cephALEXin (Keflex) 500 mg capsule Take 1 Capsule by mouth two (2) times a day for 7 days. 11/21/22 11/28/22  Aletha Loo MD   docusate sodium (Colace) 100 mg capsule Take 1 Capsule by mouth two (2) times a day for 90 days. 11/21/22 2/19/23  Aletha Loo MD   carvediloL (COREG) 6.25 mg tablet Take 1 Tablet by mouth two (2) times daily (with meals). 8/13/21   Amy Funes MD   furosemide (Lasix) 40 mg tablet Lasix 40 mg daily 8/13/21   Varun Escobedo MD   insulin glargine (LANTUS) 100 unit/mL injection As directed 8/14/21   Varun Escobedo MD   pantoprazole (PROTONIX) 40 mg tablet Take 1 Tablet by mouth Daily (before breakfast). 8/14/21   Amy Funes MD   predniSONE (DELTASONE) 10 mg tablet 1 tablet daily 8/13/21   Varun Escobedo MD   albuterol-ipratropium (DUO-NEB) 2.5 mg-0.5 mg/3 ml nebu 3 mL by Nebulization route every six (6) hours as needed for Wheezing. 12/18/20   Amy Funes MD   aspirin 81 mg chewable tablet Take 1 Tab by mouth daily. 12/19/20   Amy Funes MD   guaiFENesin ER (MUCINEX) 600 mg ER tablet Take 1 Tab by mouth two (2) times a day. 12/18/20   Amy Funes MD   amLODIPine (NORVASC) 10 mg tablet Take 10 mg by mouth daily. Provider, Historical   mometasone-formoterol (DULERA) 200-5 mcg/actuation HFA inhaler Take 2 Puffs by inhalation two (2) times a day. Provider, Historical   montelukast (SINGULAIR) 10 mg tablet Take 10 mg by mouth daily. Provider, Historical   Calcium-Cholecalciferol, D3, 500 mg(1,250mg) -400 unit tab Take  by mouth daily.     Provider, Historical benzonatate (TESSALON) 100 mg capsule Take 100 mg by mouth once. Provider, Historical   fluticasone propionate (FLONASE) 50 mcg/actuation nasal spray 2 Sprays by Both Nostrils route daily. Provider, Historical   ergocalciferol (Vitamin D2) 1,250 mcg (50,000 unit) capsule Take 50,000 Units by mouth every seven (7) days. Q7days on Monday    Provider, Historical     Allergies   Allergen Reactions    Lisinopril Angioedema        Review of Systems:  A comprehensive review of systems was negative except for that written in the History of Present Illness. Objective:   Blood pressure 134/80, pulse 94, temperature 98.1 °F (36.7 °C), resp. rate 22, height 5' 4\" (1.626 m), weight 91.2 kg (201 lb), SpO2 96 %, not currently breastfeeding. Temp (24hrs), Av.2 °F (36.8 °C), Min:98.1 °F (36.7 °C), Max:98.4 °F (36.9 °C)    XR CHEST SNGL V   Final Result   No Acute Disease. Data Review: CBC:   Recent Labs     22  1315   WBC 5.7   RBC 3.82   HGB 11.2*   HCT 35.6      GRANS 73   LYMPH 15   EOS 1       CMP:   Recent Labs     22  1315   *      K 4.2      CO2 34*   BUN 18   CREA 1.11*   CA 9.5   AGAP 1*   BUCR 16   AP 92   TP 6.8   ALB 3.6   GLOB 3.2   AGRAT 1.1       Liver Enzymes:   Recent Labs     22  1315   TP 6.8   ALB 3.6   AP 92       ABGs: No results for input(s): PH, PCO2, PO2, HCO3 in the last 72 hours.   Inflammation studies: No results found for: SR, ESRA, CRP  Current Facility-Administered Medications   Medication Dose Route Frequency    methylPREDNISolone (PF) (SOLU-MEDROL) injection 60 mg  60 mg IntraVENous Q6H    guaiFENesin-codeine (ROBITUSSIN AC) 100-10 mg/5 mL solution 10 mL  10 mL Oral Q6H    albuterol-ipratropium (DUO-NEB) 2.5 MG-0.5 MG/3 ML  3 mL Nebulization Q6H RT    busPIRone (BUSPAR) tablet 5 mg  5 mg Oral BID    amLODIPine (NORVASC) tablet 10 mg  10 mg Oral DAILY    aspirin chewable tablet 81 mg  81 mg Oral DAILY    calcium-vitamin D 600 mg-5 mcg (200 unit) per tablet 1 Tablet  1 Tablet Oral DAILY    carvediloL (COREG) tablet 6.25 mg  6.25 mg Oral BID WITH MEALS    docusate sodium (COLACE) capsule 100 mg  100 mg Oral BID    fluticasone propionate (FLONASE) 50 mcg/actuation nasal spray 2 Spray  2 Spray Both Nostrils DAILY    furosemide (LASIX) tablet 20 mg  20 mg Oral DAILY    insulin glargine (LANTUS) injection 15 Units  15 Units SubCUTAneous QHS    budesonide-formoteroL (SYMBICORT) 160-4.5 mcg/actuation HFA inhaler 2 Puff  2 Puff Inhalation BID    montelukast (SINGULAIR) tablet 10 mg  10 mg Oral DAILY    azithromycin (ZITHROMAX) tablet 500 mg  500 mg Oral DAILY    sodium chloride (NS) flush 5-40 mL  5-40 mL IntraVENous Q8H    sodium chloride (NS) flush 5-40 mL  5-40 mL IntraVENous PRN    acetaminophen (TYLENOL) tablet 650 mg  650 mg Oral Q6H PRN    Or    acetaminophen (TYLENOL) suppository 650 mg  650 mg Rectal Q6H PRN    polyethylene glycol (MIRALAX) packet 17 g  17 g Oral DAILY PRN    ondansetron (ZOFRAN ODT) tablet 4 mg  4 mg Oral Q8H PRN    Or    ondansetron (ZOFRAN) injection 4 mg  4 mg IntraVENous Q6H PRN    enoxaparin (LOVENOX) injection 40 mg  40 mg SubCUTAneous DAILY    cefTRIAXone (ROCEPHIN) 1 g in sterile water (preservative free) 10 mL IV syringe  1 g IntraVENous Q24H    pantoprazole (PROTONIX) tablet 40 mg  40 mg Oral ACB&D    cetirizine (ZYRTEC) tablet 10 mg  10 mg Oral DAILY        Exam:      This is middle aged female obese. Currently coughing aggressively. Alert and oriented x3. Head normocephalic and atraumatic, pupils are round responsive to light sclera icteric and conjunctive are pink. JVD is absent. Chest: Bilateral coarse expiratory wheezing audible. Prolonged phase of expiration  Heart: S1-S2 normal  Abdomen: Soft, nontender, no visceromegaly  Extremities: No edema, sinus or clubbing  Neuro: No focal motor deficit.     Impression:   Ms. Merlin Lindsey is a 63years old female who is known to have history of COPD, hypertension, chronic hypoxia on supplemental oxygen. She additionally history of breast CVA with complete remission. She follows with Dr. Lombardo Current her office. Last seen about 3 months ago. She is admitted to hospital because of increasing symptoms of cough, shortness of breath and wheezing. She used her inhalers and nebulizer but without any improvement. X-rays unremarkable for any infiltrates    Plan:   1. Acute on chronic hypoxic respiratory failure: This is secondary to acute exacerbation of COPD. Any supplemental oxygen at 4 L/min    2. Acute exacerbation of COPD:  Chest x-ray is unremarkable for any infiltrates. Patient has bilateral coarse expiratory wheezing. She is not a smoker. Patient got started on nebulized albuterol and Atrovent along with systemic steroids. Continue nebulized albuterol and Atrovent, Solu-Medrol 40 mg every 6 hours. The Zithromax    3. Hypertension :  Continue antihypertensive medication    4. Aggressive coughing:  Start patient on cough suppressant. Questions of patient were answered at bedside in detail  Case discussed in detail with RN, RT, and care team  Thank you for involving me in the care of this patient  I will follow with you closely during hospitalization    Time spent more than 30 minutes under patient care with no overlap reviewing results and records, decision making, and answering questions.     Jolie Rod MD  Pulmonary/CC

## 2022-11-28 NOTE — PROGRESS NOTES
Problem: Falls - Risk of  Goal: *Absence of Falls  Description: Document Elaine Last Fall Risk and appropriate interventions in the flowsheet.   Outcome: Progressing Towards Goal  Note: Fall Risk Interventions:            Medication Interventions: Patient to call before getting OOB

## 2022-11-28 NOTE — PROGRESS NOTES
Bedside and Verbal shift change report given to [place] (oncoming nurse) by Antonio Hatch (offgoing nurse). Report included the following information SBAR, Kardex, ED Summary, OR Summary, Procedure Summary, Intake/Output, MAR, Recent Results, and Med Rec Status.

## 2022-11-28 NOTE — PROGRESS NOTES
Reason for Admission:  COPD                     RUR Score:      N/A               Plan for utilizing home health:      ? PCP: First and Last name:  Angel Christina MD     Name of Practice:    Are you a current patient: Yes/No: Yes   Approximate date of last visit: Pt's next appointment is on 12/8/22   Can you participate in a virtual visit with your PCP:                     Current Advanced Directive/Advance Care Plan: Full Code      Healthcare Decision Maker:   Click here to complete [address] including selection of the Healthcare Decision Maker Relationship (ie \"Primary\")           Kike New (brother) [phone]                  Transition of Care Plan:                      Cm met with pt at the bedside to complete discharge assessment. Pt's nurse was present. Cm verified pt's home address and emergency contact - Kike New (brother). Pt indicated she has an advance directive completed and her brother and daughter are her decision makers. Pt reports she lives in a rooming house and ambulates without DME. Pt has home O2-2L through [place]. Pt is not current with home health services. Pt reports she has an appointment with her PCP on December 8th. Cm verified pt's health insurance - [employer]. Cm will continue to follow.

## 2022-11-28 NOTE — PROGRESS NOTES
Progress Note    Patient: Fer Quintero MRN: [id number]  SSN: [id number]    YOB: 1960  Age: 63 y.o. Sex: female      Admit Date: 11/26/2022    LOS: 0 days     Subjective:     Patient feels better    Objective:     Vitals:    11/28/22 0938 11/28/22 1000 11/28/22 1414 11/28/22 1633   BP:  132/69 (!) 144/78 134/80   Pulse:  (!) 102 100 94   Resp:   22    Temp:   98.1 °F (36.7 °C)    SpO2: 92%  96%    Weight:       Height:            Intake and Output:  Current Shift: 11/28 0701 - 11/28 1900  In: 180 [P.O.:180]  Out: 2 [Urine:2]  Last three shifts: 11/26 1901 - 11/28 0700  In: 10 [I.V.:10]  Out: -     Physical Exam:   General:  Alert, cooperative, no distress, appears stated age. Eyes:  Conjunctivae/corneas clear. PERRL, EOMs intact. Fundi benign   Ears:  Normal TMs and external ear canals both ears. Nose: Nares normal. Septum midline. Mucosa normal. No drainage or sinus tenderness. Mouth/Throat: Lips, mucosa, and tongue normal. Teeth and gums normal.   Neck: Supple, symmetrical, trachea midline, no adenopathy, thyroid: no enlargment/tenderness/nodules, no carotid bruit and no JVD. Back:   Symmetric, no curvature. ROM normal. No CVA tenderness. Lungs:   Decreased to auscultation bilaterally. Heart:  Regular rate and rhythm, S1, S2 normal, no murmur, click, rub or gallop. Abdomen:   Soft, non-tender. Bowel sounds normal. No masses,  No organomegaly. Extremities: Extremities normal, atraumatic, no cyanosis or edema. Pulses: 2+ and symmetric all extremities. Skin: Skin color, texture, turgor normal. No rashes or lesions   Lymph nodes: Cervical, supraclavicular, and axillary nodes normal.   Neurologic: CNII-XII intact. Normal strength, sensation and reflexes throughout. Lab/Data Review: All lab results for the last 24 hours reviewed.      left  Recent Results (from the past 24 hour(s))   GLUCOSE, POC    Collection Time: 11/27/22  9:43 PM   Result Value Ref Range    Glucose (POC) 236 (H) 65 - 100 mg/dL    Performed by Yvon Kirkland, POC    Collection Time: 11/28/22  7:29 AM   Result Value Ref Range    Glucose (POC) 168 (H) 65 - 100 mg/dL    Performed by [address] [place] [address], POC    Collection Time: 11/28/22 10:58 AM   Result Value Ref Range    Glucose (POC) 196 (H) 65 - 100 mg/dL    Performed by Kelsey Javier    GLUCOSE, POC    Collection Time: 11/28/22  4:20 PM   Result Value Ref Range    Glucose (POC) 183 (H) 65 - 100 mg/dL    Performed by Erwin Baeza          Assessment:     Active Problems:    COPD exacerbation ([facility] [address] (8/8/2021)      COPD with acute exacerbation  Possible pneumonia  Acute hypoxic respiratory failure improved  Plan:     Continue with present treatment  Transfer to Dr. Roselia Donaldson  service in a..     Signed By: Malissa Ramirez MD     November 28, 2022

## 2022-11-28 NOTE — PROGRESS NOTES
Patient's blood sugar 297 at 1500. RN contacted Dr. Jake Rubin, but no response and mailbox was full. RN tried calling Jake Rubin MD a second time at 1600 and 1800, but no response. Blood sugar discussed with onging RN during shift change.

## 2022-11-29 LAB
ATRIAL RATE: 88 BPM
ATRIAL RATE: 94 BPM
CALCULATED P AXIS, ECG09: 59 DEGREES
CALCULATED P AXIS, ECG09: 64 DEGREES
CALCULATED R AXIS, ECG10: -13 DEGREES
CALCULATED R AXIS, ECG10: -9 DEGREES
CALCULATED T AXIS, ECG11: 42 DEGREES
CALCULATED T AXIS, ECG11: 57 DEGREES
DIAGNOSIS, 93000: NORMAL
DIAGNOSIS, 93000: NORMAL
GLUCOSE BLD STRIP.AUTO-MCNC: 165 MG/DL (ref 65–100)
GLUCOSE BLD STRIP.AUTO-MCNC: 172 MG/DL (ref 65–100)
GLUCOSE BLD STRIP.AUTO-MCNC: 253 MG/DL (ref 65–100)
GLUCOSE BLD STRIP.AUTO-MCNC: 274 MG/DL (ref 65–100)
P-R INTERVAL, ECG05: 158 MS
P-R INTERVAL, ECG05: 160 MS
PERFORMED BY, TECHID: ABNORMAL
Q-T INTERVAL, ECG07: 346 MS
Q-T INTERVAL, ECG07: 358 MS
QRS DURATION, ECG06: 82 MS
QRS DURATION, ECG06: 84 MS
QTC CALCULATION (BEZET), ECG08: 432 MS
QTC CALCULATION (BEZET), ECG08: 433 MS
VENTRICULAR RATE, ECG03: 88 BPM
VENTRICULAR RATE, ECG03: 94 BPM

## 2022-11-29 PROCEDURE — 77010033678 HC OXYGEN DAILY

## 2022-11-29 PROCEDURE — 65270000029 HC RM PRIVATE

## 2022-11-29 PROCEDURE — 74011636637 HC RX REV CODE- 636/637: Performed by: INTERNAL MEDICINE

## 2022-11-29 PROCEDURE — 94640 AIRWAY INHALATION TREATMENT: CPT

## 2022-11-29 PROCEDURE — 74011000250 HC RX REV CODE- 250: Performed by: INTERNAL MEDICINE

## 2022-11-29 PROCEDURE — 74011636637 HC RX REV CODE- 636/637: Performed by: FAMILY MEDICINE

## 2022-11-29 PROCEDURE — 74011250636 HC RX REV CODE- 250/636: Performed by: INTERNAL MEDICINE

## 2022-11-29 PROCEDURE — 74011250637 HC RX REV CODE- 250/637: Performed by: INTERNAL MEDICINE

## 2022-11-29 PROCEDURE — 82962 GLUCOSE BLOOD TEST: CPT

## 2022-11-29 PROCEDURE — 94761 N-INVAS EAR/PLS OXIMETRY MLT: CPT

## 2022-11-29 RX ORDER — CODEINE PHOSPHATE AND GUAIFENESIN 10; 100 MG/5ML; MG/5ML
5 SOLUTION ORAL
Status: DISCONTINUED | OUTPATIENT
Start: 2022-11-29 | End: 2022-12-15 | Stop reason: HOSPADM

## 2022-11-29 RX ORDER — INSULIN LISPRO 100 [IU]/ML
INJECTION, SOLUTION INTRAVENOUS; SUBCUTANEOUS
Status: DISCONTINUED | OUTPATIENT
Start: 2022-11-29 | End: 2022-12-15 | Stop reason: HOSPADM

## 2022-11-29 RX ORDER — GUAIFENESIN 600 MG/1
600 TABLET, EXTENDED RELEASE ORAL EVERY 12 HOURS
Status: DISCONTINUED | OUTPATIENT
Start: 2022-11-29 | End: 2022-12-03

## 2022-11-29 RX ORDER — MAGNESIUM SULFATE 100 %
4 CRYSTALS MISCELLANEOUS AS NEEDED
Status: DISCONTINUED | OUTPATIENT
Start: 2022-11-29 | End: 2022-12-15 | Stop reason: HOSPADM

## 2022-11-29 RX ORDER — CODEINE PHOSPHATE AND GUAIFENESIN 10; 100 MG/5ML; MG/5ML
10 SOLUTION ORAL
Status: DISCONTINUED | OUTPATIENT
Start: 2022-11-29 | End: 2022-11-29 | Stop reason: DRUGHIGH

## 2022-11-29 RX ADMIN — METHYLPREDNISOLONE SODIUM SUCCINATE 60 MG: 40 INJECTION, POWDER, FOR SOLUTION INTRAMUSCULAR; INTRAVENOUS at 16:14

## 2022-11-29 RX ADMIN — ENOXAPARIN SODIUM 40 MG: 100 INJECTION SUBCUTANEOUS at 10:10

## 2022-11-29 RX ADMIN — CARVEDILOL 6.25 MG: 3.12 TABLET, FILM COATED ORAL at 10:10

## 2022-11-29 RX ADMIN — SODIUM CHLORIDE, PRESERVATIVE FREE 10 ML: 5 INJECTION INTRAVENOUS at 05:46

## 2022-11-29 RX ADMIN — GUAIFENESIN 600 MG: 600 TABLET, EXTENDED RELEASE ORAL at 23:17

## 2022-11-29 RX ADMIN — GUAIFENESIN AND CODEINE PHOSPHATE 10 ML: 10; 100 LIQUID ORAL at 16:14

## 2022-11-29 RX ADMIN — METHYLPREDNISOLONE SODIUM SUCCINATE 60 MG: 40 INJECTION, POWDER, FOR SOLUTION INTRAMUSCULAR; INTRAVENOUS at 10:11

## 2022-11-29 RX ADMIN — IPRATROPIUM BROMIDE AND ALBUTEROL SULFATE 3 ML: 2.5; .5 SOLUTION RESPIRATORY (INHALATION) at 08:33

## 2022-11-29 RX ADMIN — DOCUSATE SODIUM 100 MG: 100 CAPSULE, LIQUID FILLED ORAL at 10:11

## 2022-11-29 RX ADMIN — FUROSEMIDE 20 MG: 40 TABLET ORAL at 10:11

## 2022-11-29 RX ADMIN — METHYLPREDNISOLONE SODIUM SUCCINATE 60 MG: 40 INJECTION, POWDER, FOR SOLUTION INTRAMUSCULAR; INTRAVENOUS at 03:50

## 2022-11-29 RX ADMIN — MONTELUKAST 10 MG: 10 TABLET, FILM COATED ORAL at 10:10

## 2022-11-29 RX ADMIN — IPRATROPIUM BROMIDE AND ALBUTEROL SULFATE 3 ML: 2.5; .5 SOLUTION RESPIRATORY (INHALATION) at 02:51

## 2022-11-29 RX ADMIN — PANTOPRAZOLE SODIUM 40 MG: 40 TABLET, DELAYED RELEASE ORAL at 16:16

## 2022-11-29 RX ADMIN — SODIUM CHLORIDE, PRESERVATIVE FREE 10 ML: 5 INJECTION INTRAVENOUS at 16:59

## 2022-11-29 RX ADMIN — DOCUSATE SODIUM 100 MG: 100 CAPSULE, LIQUID FILLED ORAL at 23:17

## 2022-11-29 RX ADMIN — METHYLPREDNISOLONE SODIUM SUCCINATE 60 MG: 40 INJECTION, POWDER, FOR SOLUTION INTRAMUSCULAR; INTRAVENOUS at 23:17

## 2022-11-29 RX ADMIN — BUDESONIDE AND FORMOTEROL FUMARATE DIHYDRATE 2 PUFF: 160; 4.5 AEROSOL RESPIRATORY (INHALATION) at 21:01

## 2022-11-29 RX ADMIN — BUSPIRONE HYDROCHLORIDE 5 MG: 5 TABLET ORAL at 23:17

## 2022-11-29 RX ADMIN — ACETAMINOPHEN 650 MG: 325 TABLET, FILM COATED ORAL at 10:04

## 2022-11-29 RX ADMIN — BUDESONIDE AND FORMOTEROL FUMARATE DIHYDRATE 2 PUFF: 160; 4.5 AEROSOL RESPIRATORY (INHALATION) at 08:33

## 2022-11-29 RX ADMIN — CETIRIZINE HYDROCHLORIDE 10 MG: 10 TABLET, FILM COATED ORAL at 10:11

## 2022-11-29 RX ADMIN — AZITHROMYCIN MONOHYDRATE 500 MG: 500 TABLET ORAL at 10:12

## 2022-11-29 RX ADMIN — INSULIN GLARGINE 15 UNITS: 100 INJECTION, SOLUTION SUBCUTANEOUS at 23:16

## 2022-11-29 RX ADMIN — GUAIFENESIN AND CODEINE PHOSPHATE 10 ML: 10; 100 LIQUID ORAL at 10:10

## 2022-11-29 RX ADMIN — SODIUM CHLORIDE, PRESERVATIVE FREE 10 ML: 5 INJECTION INTRAVENOUS at 23:18

## 2022-11-29 RX ADMIN — BUSPIRONE HYDROCHLORIDE 5 MG: 5 TABLET ORAL at 16:15

## 2022-11-29 RX ADMIN — IPRATROPIUM BROMIDE AND ALBUTEROL SULFATE 3 ML: 2.5; .5 SOLUTION RESPIRATORY (INHALATION) at 13:34

## 2022-11-29 RX ADMIN — GUAIFENESIN AND CODEINE PHOSPHATE 10 ML: 10; 100 LIQUID ORAL at 03:51

## 2022-11-29 RX ADMIN — CEFTRIAXONE SODIUM 1 G: 1 INJECTION, POWDER, FOR SOLUTION INTRAMUSCULAR; INTRAVENOUS at 16:58

## 2022-11-29 RX ADMIN — AMLODIPINE BESYLATE 10 MG: 5 TABLET ORAL at 10:11

## 2022-11-29 RX ADMIN — IPRATROPIUM BROMIDE AND ALBUTEROL SULFATE 3 ML: 2.5; .5 SOLUTION RESPIRATORY (INHALATION) at 21:01

## 2022-11-29 RX ADMIN — Medication 3 UNITS: at 16:58

## 2022-11-29 RX ADMIN — ASPIRIN 81 MG 81 MG: 81 TABLET ORAL at 10:11

## 2022-11-29 RX ADMIN — Medication 1 TABLET: at 10:11

## 2022-11-29 RX ADMIN — PANTOPRAZOLE SODIUM 40 MG: 40 TABLET, DELAYED RELEASE ORAL at 10:11

## 2022-11-29 RX ADMIN — CARVEDILOL 6.25 MG: 3.12 TABLET, FILM COATED ORAL at 16:13

## 2022-11-29 NOTE — ROUTINE PROCESS
Bedside shift change report given to [address] [facility]   (oncoming nurse) by Bong Ruffin RN (offgoing nurse). Report included the following information SBAR, Intake/Output, MAR, and Recent Results.

## 2022-11-29 NOTE — PROGRESS NOTES
General Daily Progress Note          Patient Name:   Wai Lynne       YOB: 1960       Age:  63 y.o. Admit Date: 11/26/2022      Subjective:     She was transferred to my service as per patient request    Resting the bed alert awake complaining of coughing shortness of breath no fever no chills        Objective:     Visit Vitals  BP (!) 148/83   Pulse 86   Temp 98.1 °F (36.7 °C)   Resp 20   Ht 5' 4\" (1.626 m)   Wt 91.2 kg (201 lb)   SpO2 94%   Breastfeeding No   BMI 34.50 kg/m²        Recent Results (from the past 24 hour(s))   GLUCOSE, POC    Collection Time: 11/28/22  4:20 PM   Result Value Ref Range    Glucose (POC) 183 (H) 65 - 100 mg/dL    Performed by Stanford Still    GLUCOSE, POC    Collection Time: 11/28/22  9:45 PM   Result Value Ref Range    Glucose (POC) 218 (H) 65 - 100 mg/dL    Performed by [address], POC    Collection Time: 11/29/22  8:01 AM   Result Value Ref Range    Glucose (POC) 172 (H) 65 - 100 mg/dL    Performed by [address] [place] [address], POC    Collection Time: 11/29/22 12:03 PM   Result Value Ref Range    Glucose (POC) 274 (H) 65 - 100 mg/dL    Performed by Stanford Still      [unfilled]      Review of Systems    Constitutional: Negative for chills and fever. HENT: Negative. Eyes: Negative. Respiratory: Negative. Cardiovascular: Negative. Gastrointestinal: Negative for abdominal pain and nausea. Skin: Negative. Neurological: Negative. Physical Exam:      Constitutional: pt is oriented to person, place, and time. HENT:   Head: Normocephalic and atraumatic. Eyes: Pupils are equal, round, and reactive to light. EOM are normal.   Cardiovascular: Normal rate, regular rhythm and normal heart sounds. Pulmonary/Chest: Breath sounds normal. No wheezes. No rales. Exhibits no tenderness. Abdominal: Soft. Bowel sounds are normal. There is no abdominal tenderness. There is no rebound and no guarding.    Musculoskeletal: Normal range of motion. Neurological: pt is alert and oriented to person, place, and time. XR CHEST SNGL V   Final Result   No Acute Disease. Recent Results (from the past 24 hour(s))   GLUCOSE, POC    Collection Time: 11/28/22  4:20 PM   Result Value Ref Range    Glucose (POC) 183 (H) 65 - 100 mg/dL    Performed by Maritza Marquez    GLUCOSE, POC    Collection Time: 11/28/22  9:45 PM   Result Value Ref Range    Glucose (POC) 218 (H) 65 - 100 mg/dL    Performed by [address], POC    Collection Time: 11/29/22  8:01 AM   Result Value Ref Range    Glucose (POC) 172 (H) 65 - 100 mg/dL    Performed by [address] [place] [address], POC    Collection Time: 11/29/22 12:03 PM   Result Value Ref Range    Glucose (POC) 274 (H) 65 - 100 mg/dL    Performed by Maritza Marquez        Results       ** No results found for the last 336 hours. **             Labs:     Recent Labs     11/26/22  1315   WBC 5.7   HGB 11.2*   HCT 35.6        Recent Labs     11/26/22  1315      K 4.2      CO2 34*   BUN 18   CREA 1.11*   *   CA 9.5     Recent Labs     11/26/22  1315   ALT 28   AP 92   TBILI 0.3   TP 6.8   ALB 3.6   GLOB 3.2     No results for input(s): INR, PTP, APTT, INREXT in the last 72 hours. No results for input(s): FE, TIBC, PSAT, FERR in the last 72 hours. No results found for: FOL, RBCF   No results for input(s): PH, PCO2, PO2 in the last 72 hours. No results for input(s): CPK, CKNDX, TROIQ in the last 72 hours.     No lab exists for component: CPKMB  No results found for: CHOL, CHOLX, CHLST, CHOLV, HDL, HDLP, LDL, LDLC, DLDLP, TGLX, TRIGL, TRIGP, CHHD, CHHDX  Lab Results   Component Value Date/Time    Glucose (POC) 274 (H) 11/29/2022 12:03 PM    Glucose (POC) 172 (H) 11/29/2022 08:01 AM    Glucose (POC) 218 (H) 11/28/2022 09:45 PM    Glucose (POC) 183 (H) 11/28/2022 04:20 PM    Glucose (POC) 196 (H) 11/28/2022 10:58 AM     Lab Results   Component Value Date/Time    Color Yellow/Straw 11/26/2022 02:57 PM    Appearance Clear 11/26/2022 02:57 PM    Specific gravity 1.009 11/26/2022 02:57 PM    Specific gravity 1.019 08/10/2021 03:00 PM    pH (UA) 5.0 11/26/2022 02:57 PM    Protein Negative 11/26/2022 02:57 PM    Glucose Negative 11/26/2022 02:57 PM    Ketone Negative 11/26/2022 02:57 PM    Bilirubin Negative 11/26/2022 02:57 PM    Urobilinogen 0.1 11/26/2022 02:57 PM    Nitrites Negative 11/26/2022 02:57 PM    Leukocyte Esterase Trace (A) 11/26/2022 02:57 PM    Bacteria Negative 11/26/2022 02:57 PM    WBC 0-4 11/26/2022 02:57 PM    RBC 0-5 11/26/2022 02:57 PM         Assessment:   Acute on chronic hypoxic respiratory failure  Acute exacerbation of COPD  Hypertension  Cough        Plan:       Continue DuoNeb nebulizer  On Zithromax and Rocephin  On IV Solu-Medrol    Add Robitussin with codeine for cough      Current Facility-Administered Medications:     methylPREDNISolone (PF) (SOLU-MEDROL) injection 60 mg, 60 mg, IntraVENous, Q6H, North Hamm MD, 60 mg at 11/29/22 1011    guaiFENesin-codeine (ROBITUSSIN AC) 100-10 mg/5 mL solution 10 mL, 10 mL, Oral, Q6H, North Hamm MD, 10 mL at 11/29/22 1010    albuterol-ipratropium (DUO-NEB) 2.5 MG-0.5 MG/3 ML, 3 mL, Nebulization, Q6H RT, Catalina Sotelo MD, 3 mL at 11/29/22 0833    busPIRone (BUSPAR) tablet 5 mg, 5 mg, Oral, BID, North Hamm MD, 5 mg at 11/28/22 1649    amLODIPine (NORVASC) tablet 10 mg, 10 mg, Oral, DAILY, North Hamm MD, 10 mg at 11/29/22 1011    aspirin chewable tablet 81 mg, 81 mg, Oral, DAILY, North Hamm MD, 81 mg at 11/29/22 1011    calcium-vitamin D 600 mg-5 mcg (200 unit) per tablet 1 Tablet, 1 Tablet, Oral, DAILY, North Hamm MD, 1 Tablet at 11/29/22 1011    carvediloL (COREG) tablet 6.25 mg, 6.25 mg, Oral, BID WITH MEALS, North Hamm MD, 6.25 mg at 11/29/22 1010    docusate sodium (COLACE) capsule 100 mg, 100 mg, Oral, BID, North Hamm MD, 100 mg at 11/29/22 1011 fluticasone propionate (FLONASE) 50 mcg/actuation nasal spray 2 Spray, 2 Spray, Both Nostrils, DAILY, North Hamm MD, 2 [place] at 11/27/22 1024    furosemide (LASIX) tablet 20 mg, 20 mg, Oral, DAILY, North Hamm MD, 20 mg at 11/29/22 1011    insulin glargine (LANTUS) injection 15 Units, 15 Units, SubCUTAneous, QHS, North Hamm MD, 15 Units at 11/28/22 2200    budesonide-formoteroL (SYMBICORT) 160-4.5 mcg/actuation HFA inhaler 2 Puff, 2 Puff, Inhalation, BID, Anneliese NICHOLS MD, 2 Puff at 11/29/22 0833    montelukast (SINGULAIR) tablet 10 mg, 10 mg, Oral, DAILY, North Hamm MD, 10 mg at 11/29/22 1010    azithromycin (ZITHROMAX) tablet 500 mg, 500 mg, Oral, DAILY, North Hamm MD, 500 mg at 11/29/22 1012    sodium chloride (NS) flush 5-40 mL, 5-40 mL, IntraVENous, Q8H, North Hamm MD, 10 mL at 11/29/22 0546    sodium chloride (NS) flush 5-40 mL, 5-40 mL, IntraVENous, PRN, Anneliese NICHOLS MD    acetaminophen (TYLENOL) tablet 650 mg, 650 mg, Oral, Q6H PRN, 650 mg at 11/29/22 1004 **OR** acetaminophen (TYLENOL) suppository 650 mg, 650 mg, Rectal, Q6H PRN, North Garg MD    polyethylene glycol (MIRALAX) packet 17 g, 17 g, Oral, DAILY PRN, North Hamm MD    ondansetron (ZOFRAN ODT) tablet 4 mg, 4 mg, Oral, Q8H PRN **OR** ondansetron (ZOFRAN) injection 4 mg, 4 mg, IntraVENous, Q6H PRN, North Hamm MD    enoxaparin (LOVENOX) injection 40 mg, 40 mg, SubCUTAneous, DAILY, North Hamm MD, 40 mg at 11/29/22 1010    cefTRIAXone (ROCEPHIN) 1 g in sterile water (preservative free) 10 mL IV syringe, 1 g, IntraVENous, Q24H, North Hamm MD, 1 g at 11/28/22 1631    pantoprazole (PROTONIX) tablet 40 mg, 40 mg, Oral, ACB&D, North Hamm MD, 40 mg at 11/29/22 1011    cetirizine (ZYRTEC) tablet 10 mg, 10 mg, Oral, DAILY, North Hamm MD, 10 mg at 11/29/22 1011

## 2022-11-29 NOTE — PROGRESS NOTES
Pulmonary/ CC progress note    Subjective:   Date of Consultation:  November 29, 2022  Referring Physician: Mariela Mccarthy MD    Patient seen and examined  Overnight events noted    Patient lying comfortably in bed. On 4 L oxygen. At home she is on 2 L most of the time. She is now starting to cough up some beige-colored phlegm. She feels somewhat better. She is asking for Mucinex. Prior to Admission medications    Medication Sig Start Date End Date Taking? Authorizing Provider   cephALEXin (Keflex) 500 mg capsule Take 1 Capsule by mouth two (2) times a day for 7 days. 11/21/22 11/28/22  Ashanti Loo MD   docusate sodium (Colace) 100 mg capsule Take 1 Capsule by mouth two (2) times a day for 90 days. 11/21/22 2/19/23  Ashanti Loo MD   carvediloL (COREG) 6.25 mg tablet Take 1 Tablet by mouth two (2) times daily (with meals). 8/13/21   Anatoliy Funes MD   furosemide (Lasix) 40 mg tablet Lasix 40 mg daily 8/13/21   Fatmata Hickey MD   insulin glargine (LANTUS) 100 unit/mL injection As directed 8/14/21   Fatmata Hickey MD   pantoprazole (PROTONIX) 40 mg tablet Take 1 Tablet by mouth Daily (before breakfast). 8/14/21   Anatoliy Funes MD   predniSONE (DELTASONE) 10 mg tablet 1 tablet daily 8/13/21   Fatmata Hickey MD   albuterol-ipratropium (DUO-NEB) 2.5 mg-0.5 mg/3 ml nebu 3 mL by Nebulization route every six (6) hours as needed for Wheezing. 12/18/20   Anatoliy Funes MD   aspirin 81 mg chewable tablet Take 1 Tab by mouth daily. 12/19/20   Anatoliy Funes MD   guaiFENesin ER (MUCINEX) 600 mg ER tablet Take 1 Tab by mouth two (2) times a day. 12/18/20   Anatoliy Funes MD   amLODIPine (NORVASC) 10 mg tablet Take 10 mg by mouth daily. Provider, Historical   mometasone-formoterol (DULERA) 200-5 mcg/actuation HFA inhaler Take 2 Puffs by inhalation two (2) times a day. Provider, Historical   montelukast (SINGULAIR) 10 mg tablet Take 10 mg by mouth daily.     Provider, Historical   Calcium-Cholecalciferol, D3, 500 mg(1,250mg) -400 unit tab Take  by mouth daily. Provider, Historical   benzonatate (TESSALON) 100 mg capsule Take 100 mg by mouth once. Provider, Historical   fluticasone propionate (FLONASE) 50 mcg/actuation nasal spray 2 Sprays by Both Nostrils route daily. Provider, Historical   ergocalciferol (Vitamin D2) 1,250 mcg (50,000 unit) capsule Take 50,000 Units by mouth every seven (7) days. Q7days on Monday    Provider, Historical     Allergies   Allergen Reactions    Lisinopril Angioedema        Review of Systems:  A comprehensive review of systems was negative except for that written in the History of Present Illness. Objective:   Blood pressure 132/73, pulse 81, temperature 98.1 °F (36.7 °C), resp. rate 22, height 5' 4\" (1.626 m), weight 91.2 kg (201 lb), SpO2 96 %, not currently breastfeeding. Temp (24hrs), Av.2 °F (36.8 °C), Min:97.9 °F (36.6 °C), Max:98.4 °F (36.9 °C)    XR CHEST SNGL V   Final Result   No Acute Disease. Data Review: CBC:   No results for input(s): WBC, RBC, HGB, HCT, PLT, GRANS, LYMPH, EOS, HGBEXT, HCTEXT, PLTEXT in the last 72 hours. CMP:   No results for input(s): GLU, NA, K, CL, CO2, BUN, CREA, CA, AGAP, BUCR, TBIL, AP, TP, ALB, GLOB, AGRAT in the last 72 hours. No lab exists for component: GPT    Liver Enzymes:   No results for input(s): TP, ALB, TBIL, AP in the last 72 hours. No lab exists for component: SGOT, GPT, DBIL    ABGs: No results for input(s): PH, PCO2, PO2, HCO3 in the last 72 hours.   Inflammation studies: No results found for: SR, ESRA, CRP  Current Facility-Administered Medications   Medication Dose Route Frequency    guaiFENesin-codeine (ROBITUSSIN AC) 100-10 mg/5 mL solution 10 mL  10 mL Oral Q4H PRN    insulin lispro (HUMALOG) injection   SubCUTAneous AC&HS    glucose chewable tablet 16 g  4 Tablet Oral PRN    glucagon (GLUCAGEN) injection 1 mg  1 mg IntraMUSCular PRN    methylPREDNISolone (PF) (SOLU-MEDROL) injection 60 mg  60 mg IntraVENous Q6H    guaiFENesin-codeine (ROBITUSSIN AC) 100-10 mg/5 mL solution 10 mL  10 mL Oral Q6H    albuterol-ipratropium (DUO-NEB) 2.5 MG-0.5 MG/3 ML  3 mL Nebulization Q6H RT    busPIRone (BUSPAR) tablet 5 mg  5 mg Oral BID    amLODIPine (NORVASC) tablet 10 mg  10 mg Oral DAILY    aspirin chewable tablet 81 mg  81 mg Oral DAILY    calcium-vitamin D 600 mg-5 mcg (200 unit) per tablet 1 Tablet  1 Tablet Oral DAILY    carvediloL (COREG) tablet 6.25 mg  6.25 mg Oral BID WITH MEALS    docusate sodium (COLACE) capsule 100 mg  100 mg Oral BID    fluticasone propionate (FLONASE) 50 mcg/actuation nasal spray 2 Spray  2 Spray Both Nostrils DAILY    furosemide (LASIX) tablet 20 mg  20 mg Oral DAILY    insulin glargine (LANTUS) injection 15 Units  15 Units SubCUTAneous QHS    budesonide-formoteroL (SYMBICORT) 160-4.5 mcg/actuation HFA inhaler 2 Puff  2 Puff Inhalation BID    montelukast (SINGULAIR) tablet 10 mg  10 mg Oral DAILY    azithromycin (ZITHROMAX) tablet 500 mg  500 mg Oral DAILY    sodium chloride (NS) flush 5-40 mL  5-40 mL IntraVENous Q8H    sodium chloride (NS) flush 5-40 mL  5-40 mL IntraVENous PRN    acetaminophen (TYLENOL) tablet 650 mg  650 mg Oral Q6H PRN    Or    acetaminophen (TYLENOL) suppository 650 mg  650 mg Rectal Q6H PRN    polyethylene glycol (MIRALAX) packet 17 g  17 g Oral DAILY PRN    ondansetron (ZOFRAN ODT) tablet 4 mg  4 mg Oral Q8H PRN    Or    ondansetron (ZOFRAN) injection 4 mg  4 mg IntraVENous Q6H PRN    enoxaparin (LOVENOX) injection 40 mg  40 mg SubCUTAneous DAILY    cefTRIAXone (ROCEPHIN) 1 g in sterile water (preservative free) 10 mL IV syringe  1 g IntraVENous Q24H    pantoprazole (PROTONIX) tablet 40 mg  40 mg Oral ACB&D    cetirizine (ZYRTEC) tablet 10 mg  10 mg Oral DAILY        Exam:      This is middle aged female obese. Alert and oriented x3.   Head normocephalic and atraumatic, pupils are round responsive to light sclera icteric and conjunctive are pink. JVD is absent. Chest: Bilateral coarse expiratory wheezing audible. Prolonged phase of expiration  Heart: S1-S2 normal  Abdomen: Soft, nontender, no visceromegaly  Extremities: No edema, sinus or clubbing  Neuro: No focal motor deficit. Impression:   Ms. Ken Felix is a 63years old female who is known to have history of COPD, hypertension, chronic hypoxia on supplemental oxygen. She additionally history of breast CVA with complete remission. She follows with Dr. David Ellis her office. Last seen about 3 months ago. She is admitted to hospital because of increasing symptoms of cough, shortness of breath and wheezing. She used her inhalers and nebulizer but without any improvement. X-rays unremarkable for any infiltrates    Plan:   1. Acute on chronic hypoxic respiratory failure: This is secondary to acute exacerbation of COPD. Any supplemental oxygen at 4 L/min    2. Acute exacerbation of COPD:  Chest x-ray is unremarkable for any infiltrates. Patient has bilateral coarse expiratory wheezing. However improved. She is not a smoker. Patient got started on nebulized albuterol and Atrovent along with systemic steroids. She is also empirically started on azithromycin and Rocephin. She is asking for Mucinex but the patient has been started on Robitussin with codeine around-the-clock. I will change that to as needed and add Mucinex twice daily. 3.  Hypertension :  Continue antihypertensive medication    4. DVT prophylaxis with Lovenox subcutaneously. Questions of patient were answered at bedside in detail  Case discussed in detail with RN, RT, and care team  Thank you for involving me in the care of this patient  I will follow with you closely during hospitalization    Time spent more than 30 minutes under patient care with no overlap reviewing results and records, decision making, and answering questions.     Luh Sanchez MD  Pulmonary/CC

## 2022-11-29 NOTE — PROGRESS NOTES
IV PLACED -attempted to contact nurse on without success. ..   Patient has infiltration to the right forearm left arm limb alert

## 2022-11-30 LAB
GLUCOSE BLD STRIP.AUTO-MCNC: 143 MG/DL (ref 65–100)
GLUCOSE BLD STRIP.AUTO-MCNC: 193 MG/DL (ref 65–100)
GLUCOSE BLD STRIP.AUTO-MCNC: 198 MG/DL (ref 65–100)
PERFORMED BY, TECHID: ABNORMAL

## 2022-11-30 PROCEDURE — 74011636637 HC RX REV CODE- 636/637: Performed by: FAMILY MEDICINE

## 2022-11-30 PROCEDURE — 74011250636 HC RX REV CODE- 250/636: Performed by: INTERNAL MEDICINE

## 2022-11-30 PROCEDURE — 74011250637 HC RX REV CODE- 250/637: Performed by: INTERNAL MEDICINE

## 2022-11-30 PROCEDURE — 94761 N-INVAS EAR/PLS OXIMETRY MLT: CPT

## 2022-11-30 PROCEDURE — 74011000250 HC RX REV CODE- 250: Performed by: INTERNAL MEDICINE

## 2022-11-30 PROCEDURE — 65270000029 HC RM PRIVATE

## 2022-11-30 PROCEDURE — 74011636637 HC RX REV CODE- 636/637: Performed by: INTERNAL MEDICINE

## 2022-11-30 PROCEDURE — 82962 GLUCOSE BLOOD TEST: CPT

## 2022-11-30 PROCEDURE — 74011250637 HC RX REV CODE- 250/637: Performed by: FAMILY MEDICINE

## 2022-11-30 PROCEDURE — 94640 AIRWAY INHALATION TREATMENT: CPT

## 2022-11-30 PROCEDURE — 77010033678 HC OXYGEN DAILY

## 2022-11-30 RX ORDER — HYDROXYZINE PAMOATE 25 MG/1
25 CAPSULE ORAL
Status: DISCONTINUED | OUTPATIENT
Start: 2022-11-30 | End: 2022-12-15 | Stop reason: HOSPADM

## 2022-11-30 RX ADMIN — SODIUM CHLORIDE, PRESERVATIVE FREE 10 ML: 5 INJECTION INTRAVENOUS at 14:28

## 2022-11-30 RX ADMIN — Medication 1 UNITS: at 18:05

## 2022-11-30 RX ADMIN — FUROSEMIDE 20 MG: 40 TABLET ORAL at 09:47

## 2022-11-30 RX ADMIN — ASPIRIN 81 MG 81 MG: 81 TABLET ORAL at 09:47

## 2022-11-30 RX ADMIN — CARVEDILOL 6.25 MG: 3.12 TABLET, FILM COATED ORAL at 09:47

## 2022-11-30 RX ADMIN — GUAIFENESIN 600 MG: 600 TABLET, EXTENDED RELEASE ORAL at 21:33

## 2022-11-30 RX ADMIN — ENOXAPARIN SODIUM 40 MG: 100 INJECTION SUBCUTANEOUS at 09:46

## 2022-11-30 RX ADMIN — HYDROXYZINE PAMOATE 25 MG: 25 CAPSULE ORAL at 13:16

## 2022-11-30 RX ADMIN — GUAIFENESIN AND CODEINE PHOSPHATE 5 ML: 10; 100 LIQUID ORAL at 04:33

## 2022-11-30 RX ADMIN — INSULIN GLARGINE 15 UNITS: 100 INJECTION, SOLUTION SUBCUTANEOUS at 21:32

## 2022-11-30 RX ADMIN — HYDROXYZINE PAMOATE 25 MG: 25 CAPSULE ORAL at 21:45

## 2022-11-30 RX ADMIN — Medication 2 UNITS: at 21:32

## 2022-11-30 RX ADMIN — IPRATROPIUM BROMIDE AND ALBUTEROL SULFATE 3 ML: 2.5; .5 SOLUTION RESPIRATORY (INHALATION) at 21:26

## 2022-11-30 RX ADMIN — BUSPIRONE HYDROCHLORIDE 5 MG: 5 TABLET ORAL at 09:47

## 2022-11-30 RX ADMIN — GUAIFENESIN AND CODEINE PHOSPHATE 5 ML: 10; 100 LIQUID ORAL at 21:45

## 2022-11-30 RX ADMIN — METHYLPREDNISOLONE SODIUM SUCCINATE 60 MG: 40 INJECTION, POWDER, FOR SOLUTION INTRAMUSCULAR; INTRAVENOUS at 04:17

## 2022-11-30 RX ADMIN — METHYLPREDNISOLONE SODIUM SUCCINATE 60 MG: 40 INJECTION, POWDER, FOR SOLUTION INTRAMUSCULAR; INTRAVENOUS at 09:46

## 2022-11-30 RX ADMIN — CEFTRIAXONE SODIUM 1 G: 1 INJECTION, POWDER, FOR SOLUTION INTRAMUSCULAR; INTRAVENOUS at 17:12

## 2022-11-30 RX ADMIN — CETIRIZINE HYDROCHLORIDE 10 MG: 10 TABLET, FILM COATED ORAL at 09:47

## 2022-11-30 RX ADMIN — METHYLPREDNISOLONE SODIUM SUCCINATE 60 MG: 40 INJECTION, POWDER, FOR SOLUTION INTRAMUSCULAR; INTRAVENOUS at 21:33

## 2022-11-30 RX ADMIN — PANTOPRAZOLE SODIUM 40 MG: 40 TABLET, DELAYED RELEASE ORAL at 09:47

## 2022-11-30 RX ADMIN — BUDESONIDE AND FORMOTEROL FUMARATE DIHYDRATE 2 PUFF: 160; 4.5 AEROSOL RESPIRATORY (INHALATION) at 21:26

## 2022-11-30 RX ADMIN — IPRATROPIUM BROMIDE AND ALBUTEROL SULFATE 3 ML: 2.5; .5 SOLUTION RESPIRATORY (INHALATION) at 01:40

## 2022-11-30 RX ADMIN — GUAIFENESIN 600 MG: 600 TABLET, EXTENDED RELEASE ORAL at 09:47

## 2022-11-30 RX ADMIN — DOCUSATE SODIUM 100 MG: 100 CAPSULE, LIQUID FILLED ORAL at 09:47

## 2022-11-30 RX ADMIN — AMLODIPINE BESYLATE 10 MG: 5 TABLET ORAL at 09:47

## 2022-11-30 RX ADMIN — SODIUM CHLORIDE, PRESERVATIVE FREE 10 ML: 5 INJECTION INTRAVENOUS at 21:33

## 2022-11-30 RX ADMIN — BUSPIRONE HYDROCHLORIDE 5 MG: 5 TABLET ORAL at 21:33

## 2022-11-30 RX ADMIN — CARVEDILOL 6.25 MG: 3.12 TABLET, FILM COATED ORAL at 17:12

## 2022-11-30 RX ADMIN — AZITHROMYCIN MONOHYDRATE 500 MG: 500 TABLET ORAL at 09:47

## 2022-11-30 RX ADMIN — Medication 1 UNITS: at 13:00

## 2022-11-30 RX ADMIN — Medication 1 TABLET: at 09:47

## 2022-11-30 RX ADMIN — SODIUM CHLORIDE, PRESERVATIVE FREE 10 ML: 5 INJECTION INTRAVENOUS at 05:43

## 2022-11-30 RX ADMIN — IPRATROPIUM BROMIDE AND ALBUTEROL SULFATE 3 ML: 2.5; .5 SOLUTION RESPIRATORY (INHALATION) at 13:06

## 2022-11-30 RX ADMIN — METHYLPREDNISOLONE SODIUM SUCCINATE 60 MG: 40 INJECTION, POWDER, FOR SOLUTION INTRAMUSCULAR; INTRAVENOUS at 17:12

## 2022-11-30 RX ADMIN — MONTELUKAST 10 MG: 10 TABLET, FILM COATED ORAL at 09:47

## 2022-11-30 RX ADMIN — PANTOPRAZOLE SODIUM 40 MG: 40 TABLET, DELAYED RELEASE ORAL at 17:12

## 2022-11-30 NOTE — PROGRESS NOTES
General Daily Progress Note          Patient Name:   Jodi Charmaine       YOB: 1960       Age:  63 y.o. Admit Date: 11/26/2022      Subjective:     She was transferred to my service as per patient request    Resting the bed alert awake complaining of coughing shortness of breath no fever no chills    11/30  Patient currently on 4L oxygen, reports 2L at home. Endorses productive cough. Denies headache, fever, chest pain, abdominal pain. CXR unremarkable for any infiltrates. Objective:     Visit Vitals  BP (!) 141/91 (BP 1 Location: Right upper arm, BP Patient Position: Sitting)   Pulse 80   Temp 98.5 °F (36.9 °C)   Resp 18   Ht 5' 4\" (1.626 m)   Wt 91.2 kg (201 lb)   SpO2 92%   Breastfeeding No   BMI 34.50 kg/m²        Recent Results (from the past 24 hour(s))   GLUCOSE, POC    Collection Time: 11/29/22  4:10 PM   Result Value Ref Range    Glucose (POC) 253 (H) 65 - 100 mg/dL    Performed by Crystal Guzman    GLUCOSE, POC    Collection Time: 11/29/22 11:04 PM   Result Value Ref Range    Glucose (POC) 165 (H) 65 - 100 mg/dL    Performed by Crystal Barbsoa    GLUCOSE, POC    Collection Time: 11/30/22  7:40 AM   Result Value Ref Range    Glucose (POC) 143 (H) 65 - 100 mg/dL    Performed by Cristobal Anne    GLUCOSE, POC    Collection Time: 11/30/22 11:15 AM   Result Value Ref Range    Glucose (POC) 198 (H) 65 - 100 mg/dL    Performed by Choco Peña      [unfilled]      Review of Systems    Constitutional: Negative for chills and fever. HENT: Negative. Eyes: Negative. Respiratory: Negative. Cardiovascular: Negative. Gastrointestinal: Negative for abdominal pain and nausea. Skin: Negative. Neurological: Negative. Physical Exam:      Constitutional: pt is oriented to person, place, and time. HENT:   Head: Normocephalic and atraumatic. Eyes: Pupils are equal, round, and reactive to light.  EOM are normal.   Cardiovascular: Normal rate, regular rhythm and normal heart sounds. Pulmonary/Chest: Breath sounds normal. No wheezes. No rales. Exhibits no tenderness. Abdominal: Soft. Bowel sounds are normal. There is no abdominal tenderness. There is no rebound and no guarding. Musculoskeletal: Normal range of motion. Neurological: pt is alert and oriented to person, place, and time. XR CHEST SNGL V   Final Result   No Acute Disease. Recent Results (from the past 24 hour(s))   GLUCOSE, POC    Collection Time: 11/29/22  4:10 PM   Result Value Ref Range    Glucose (POC) 253 (H) 65 - 100 mg/dL    Performed by Patty Godfrey    GLUCOSE, POC    Collection Time: 11/29/22 11:04 PM   Result Value Ref Range    Glucose (POC) 165 (H) 65 - 100 mg/dL    Performed by Tameka Reza    GLUCOSE, POC    Collection Time: 11/30/22  7:40 AM   Result Value Ref Range    Glucose (POC) 143 (H) 65 - 100 mg/dL    Performed by Mimi Levi    GLUCOSE, POC    Collection Time: 11/30/22 11:15 AM   Result Value Ref Range    Glucose (POC) 198 (H) 65 - 100 mg/dL    Performed by Ranell Meter        Results       ** No results found for the last 336 hours. **             Labs:     No results for input(s): WBC, HGB, HCT, PLT, HGBEXT, HCTEXT, PLTEXT, HGBEXT, HCTEXT, PLTEXT in the last 72 hours. No results for input(s): NA, K, CL, CO2, BUN, CREA, GLU, CA, MG, PHOS, URICA in the last 72 hours. No results for input(s): ALT, AP, TBIL, TBILI, TP, ALB, GLOB, GGT, AML, LPSE in the last 72 hours. No lab exists for component: SGOT, GPT, AMYP, HLPSE    No results for input(s): INR, PTP, APTT, INREXT, INREXT in the last 72 hours. No results for input(s): FE, TIBC, PSAT, FERR in the last 72 hours. No results found for: FOL, RBCF   No results for input(s): PH, PCO2, PO2 in the last 72 hours. No results for input(s): CPK, CKNDX, TROIQ in the last 72 hours.     No lab exists for component: CPKMB  No results found for: CHOL, CHOLX, CHLST, CHOLV, HDL, HDLP, LDL, LDLC, DLDLP, TGLX, TRIGL, Lauren Begum, [facility]  Lab Results   Component Value Date/Time    Glucose (POC) 198 (H) 11/30/2022 11:15 AM    Glucose (POC) 143 (H) 11/30/2022 07:40 AM    Glucose (POC) 165 (H) 11/29/2022 11:04 PM    Glucose (POC) 253 (H) 11/29/2022 04:10 PM    Glucose (POC) 274 (H) 11/29/2022 12:03 PM     Lab Results   Component Value Date/Time    Color Yellow/Straw 11/26/2022 02:57 PM    Appearance Clear 11/26/2022 02:57 PM    Specific gravity 1.009 11/26/2022 02:57 PM    Specific gravity 1.019 08/10/2021 03:00 PM    pH (UA) 5.0 11/26/2022 02:57 PM    Protein Negative 11/26/2022 02:57 PM    Glucose Negative 11/26/2022 02:57 PM    Ketone Negative 11/26/2022 02:57 PM    Bilirubin Negative 11/26/2022 02:57 PM    Urobilinogen 0.1 11/26/2022 02:57 PM    Nitrites Negative 11/26/2022 02:57 PM    Leukocyte Esterase Trace (A) 11/26/2022 02:57 PM    Bacteria Negative 11/26/2022 02:57 PM    WBC 0-4 11/26/2022 02:57 PM    RBC 0-5 11/26/2022 02:57 PM         Assessment:   Acute on chronic hypoxic respiratory failure  Acute exacerbation of COPD  Hypertension  Cough  Hyperglycemia /due to steroids    Plan:       Continue DuoNeb nebulizer and Atrovent  Oxygen prn - wean as tolerated  On Empiric Zithromax and Rocephin  On IV Solu-Medrol  DVT proph: Lovenox     Add Mucinex Bid, Robitussin with codeine prn for cough    Pulmonary following    Possible discharge in next 24 hours  PT OT consult    Current Facility-Administered Medications:     insulin lispro (HUMALOG) injection, , SubCUTAneous, AC&HS, Kami Funes MD, 3 Units at 11/29/22 1658    glucose chewable tablet 16 g, 4 Tablet, Oral, PRN, Kami Funes MD    glucagon (GLUCAGEN) injection 1 mg, 1 mg, IntraMUSCular, PRN, Kami Funes MD    guaiFENesin-codeine (ROBITUSSIN AC) 100-10 mg/5 mL solution 5 mL, 5 mL, Oral, Q6H PRN, Henok Garza MD, 5 mL at 11/30/22 0433    guaiFENesin ER (MUCINEX) tablet 600 mg, 600 mg, Oral, Q12H, Henok Garza MD, 600 mg at 11/30/22 4930 methylPREDNISolone (PF) (SOLU-MEDROL) injection 60 mg, 60 mg, IntraVENous, Q6H, North Hamm MD, 60 mg at 11/30/22 0946    albuterol-ipratropium (DUO-NEB) 2.5 MG-0.5 MG/3 ML, 3 mL, Nebulization, Q6H RT, Hugh Maria MD, 3 mL at 11/30/22 0140    busPIRone (BUSPAR) tablet 5 mg, 5 mg, Oral, BID, Vivian NICHOLS MD, 5 mg at 11/30/22 0947    amLODIPine (NORVASC) tablet 10 mg, 10 mg, Oral, DAILY, Zaire Pérez MD, 10 mg at 11/30/22 0961    aspirin chewable tablet 81 mg, 81 mg, Oral, DAILY, North Hamm MD, 81 mg at 11/30/22 0947    calcium-vitamin D 600 mg-5 mcg (200 unit) per tablet 1 Tablet, 1 Tablet, Oral, DAILY, North Hamm MD, 1 Tablet at 11/30/22 0947    carvediloL (COREG) tablet 6.25 mg, 6.25 mg, Oral, BID WITH MEALS, North Hamm MD, 6.25 mg at 11/30/22 0947    docusate sodium (COLACE) capsule 100 mg, 100 mg, Oral, BID, Vivian NICHOLS MD, 100 mg at 11/30/22 0947    fluticasone propionate (FLONASE) 50 mcg/actuation nasal spray 2 Spray, 2 Spray, Both Nostrils, DAILY, North Hamm MD, 2 [place] at 11/27/22 1024    furosemide (LASIX) tablet 20 mg, 20 mg, Oral, DAILY, North Hamm MD, 20 mg at 11/30/22 0947    insulin glargine (LANTUS) injection 15 Units, 15 Units, SubCUTAneous, QHS, North Hamm MD, 15 Units at 11/29/22 2316    budesonide-formoteroL (SYMBICORT) 160-4.5 mcg/actuation HFA inhaler 2 Puff, 2 Puff, Inhalation, BID, North Hamm MD, 2 Puff at 11/29/22 2101    montelukast (SINGULAIR) tablet 10 mg, 10 mg, Oral, DAILY, North Hamm MD, 10 mg at 11/30/22 0947    azithromycin (ZITHROMAX) tablet 500 mg, 500 mg, Oral, DAILY, North Hamm MD, 500 mg at 11/30/22 0947    sodium chloride (NS) flush 5-40 mL, 5-40 mL, IntraVENous, Q8H, North Hamm MD, 10 mL at 11/30/22 0543    sodium chloride (NS) flush 5-40 mL, 5-40 mL, IntraVENous, PRN, North Koch MD    acetaminophen (TYLENOL) tablet 650 mg, 650 mg, Oral, Q6H PRN, 650 mg at 11/29/22 1004 **OR** acetaminophen (TYLENOL) suppository 650 mg, 650 mg, Rectal, Q6H PRN, North Avila MD    polyethylene glycol (MIRALAX) packet 17 g, 17 g, Oral, DAILY PRN, North Hamm MD    ondansetron (ZOFRAN ODT) tablet 4 mg, 4 mg, Oral, Q8H PRN **OR** ondansetron (ZOFRAN) injection 4 mg, 4 mg, IntraVENous, Q6H PRN, North Hamm MD    enoxaparin (LOVENOX) injection 40 mg, 40 mg, SubCUTAneous, DAILY, North Hamm MD, 40 mg at 11/30/22 0913    cefTRIAXone (ROCEPHIN) 1 g in sterile water (preservative free) 10 mL IV syringe, 1 g, IntraVENous, Q24H, North Hamm MD, 1 g at 11/29/22 1658    pantoprazole (PROTONIX) tablet 40 mg, 40 mg, Oral, ACB&D, Rachid NICHOLS MD, 40 mg at 11/30/22 0997    cetirizine (ZYRTEC) tablet 10 mg, 10 mg, Oral, DAILY, Rachid NICHOLS MD, 10 mg at 11/30/22 2145

## 2022-11-30 NOTE — PROGRESS NOTES
Pulmonary/ CC progress note    Subjective:   Date of Consultation:  November 30, 2022  Referring Physician: Seferino De La Cruz MD    Patient seen and examined  Overnight events noted    Patient sitting in bed. She is tearful. Apparently she was desaturating on 3 L and oxygen was increased to 6 L. Currently on 7 L. I decreased it down to 6 L. She is anxious about her lungs. I briefed her about her overall condition and she settled down. Sputum production is better today. Prior to Admission medications    Medication Sig Start Date End Date Taking? Authorizing Provider   docusate sodium (Colace) 100 mg capsule Take 1 Capsule by mouth two (2) times a day for 90 days. 11/21/22 2/19/23  Ivy Loo MD   carvediloL (COREG) 6.25 mg tablet Take 1 Tablet by mouth two (2) times daily (with meals). 8/13/21   Tequila Funes MD   furosemide (Lasix) 40 mg tablet Lasix 40 mg daily 8/13/21   Bakari Hager MD   insulin glargine (LANTUS) 100 unit/mL injection As directed 8/14/21   Bakari Hager MD   pantoprazole (PROTONIX) 40 mg tablet Take 1 Tablet by mouth Daily (before breakfast). 8/14/21   Tequila Funes MD   predniSONE (DELTASONE) 10 mg tablet 1 tablet daily 8/13/21   Bakari Hager MD   albuterol-ipratropium (DUO-NEB) 2.5 mg-0.5 mg/3 ml nebu 3 mL by Nebulization route every six (6) hours as needed for Wheezing. 12/18/20   Tequila Funes MD   aspirin 81 mg chewable tablet Take 1 Tab by mouth daily. 12/19/20   Tequila Funes MD   guaiFENesin ER (MUCINEX) 600 mg ER tablet Take 1 Tab by mouth two (2) times a day. 12/18/20   Tequila Funes MD   amLODIPine (NORVASC) 10 mg tablet Take 10 mg by mouth daily. Provider, Historical   mometasone-formoterol (DULERA) 200-5 mcg/actuation HFA inhaler Take 2 Puffs by inhalation two (2) times a day. Provider, Historical   montelukast (SINGULAIR) 10 mg tablet Take 10 mg by mouth daily.     Provider, Historical   Calcium-Cholecalciferol, D3, 500 mg(1,250mg) -400 unit tab Take  by mouth daily. Provider, Historical   benzonatate (TESSALON) 100 mg capsule Take 100 mg by mouth once. Provider, Historical   fluticasone propionate (FLONASE) 50 mcg/actuation nasal spray 2 Sprays by Both Nostrils route daily. Provider, Historical   ergocalciferol (Vitamin D2) 1,250 mcg (50,000 unit) capsule Take 50,000 Units by mouth every seven (7) days. Q7days on Monday    Provider, Historical     Allergies   Allergen Reactions    Lisinopril Angioedema        Review of Systems:  A comprehensive review of systems was negative except for that written in the History of Present Illness. Objective:   Blood pressure 131/79, pulse 78, temperature 98.2 °F (36.8 °C), resp. rate 18, height 5' 4\" (1.626 m), weight 91.2 kg (201 lb), SpO2 93 %, not currently breastfeeding. Temp (24hrs), Av.3 °F (36.8 °C), Min:98.1 °F (36.7 °C), Max:98.5 °F (36.9 °C)    XR CHEST SNGL V   Final Result   No Acute Disease. Data Review: CBC:   No results for input(s): WBC, RBC, HGB, HCT, PLT, GRANS, LYMPH, EOS, HGBEXT, HCTEXT, PLTEXT in the last 72 hours. CMP:   No results for input(s): GLU, NA, K, CL, CO2, BUN, CREA, CA, AGAP, BUCR, TBIL, AP, TP, ALB, GLOB, AGRAT in the last 72 hours. No lab exists for component: GPT    Liver Enzymes:   No results for input(s): TP, ALB, TBIL, AP in the last 72 hours. No lab exists for component: SGOT, GPT, DBIL    ABGs: No results for input(s): PH, PCO2, PO2, HCO3 in the last 72 hours.   Inflammation studies: No results found for: SR, ESRA, CRP  Current Facility-Administered Medications   Medication Dose Route Frequency    hydrOXYzine pamoate (VISTARIL) capsule 25 mg  25 mg Oral Q6H PRN    insulin lispro (HUMALOG) injection   SubCUTAneous AC&HS    glucose chewable tablet 16 g  4 Tablet Oral PRN    glucagon (GLUCAGEN) injection 1 mg  1 mg IntraMUSCular PRN    guaiFENesin-codeine (ROBITUSSIN AC) 100-10 mg/5 mL solution 5 mL  5 mL Oral Q6H PRN guaiFENesin ER (MUCINEX) tablet 600 mg  600 mg Oral Q12H    methylPREDNISolone (PF) (SOLU-MEDROL) injection 60 mg  60 mg IntraVENous Q6H    albuterol-ipratropium (DUO-NEB) 2.5 MG-0.5 MG/3 ML  3 mL Nebulization Q6H RT    busPIRone (BUSPAR) tablet 5 mg  5 mg Oral BID    amLODIPine (NORVASC) tablet 10 mg  10 mg Oral DAILY    aspirin chewable tablet 81 mg  81 mg Oral DAILY    calcium-vitamin D 600 mg-5 mcg (200 unit) per tablet 1 Tablet  1 Tablet Oral DAILY    carvediloL (COREG) tablet 6.25 mg  6.25 mg Oral BID WITH MEALS    docusate sodium (COLACE) capsule 100 mg  100 mg Oral BID    fluticasone propionate (FLONASE) 50 mcg/actuation nasal spray 2 Spray  2 Spray Both Nostrils DAILY    furosemide (LASIX) tablet 20 mg  20 mg Oral DAILY    insulin glargine (LANTUS) injection 15 Units  15 Units SubCUTAneous QHS    budesonide-formoteroL (SYMBICORT) 160-4.5 mcg/actuation HFA inhaler 2 Puff  2 Puff Inhalation BID    montelukast (SINGULAIR) tablet 10 mg  10 mg Oral DAILY    azithromycin (ZITHROMAX) tablet 500 mg  500 mg Oral DAILY    sodium chloride (NS) flush 5-40 mL  5-40 mL IntraVENous Q8H    sodium chloride (NS) flush 5-40 mL  5-40 mL IntraVENous PRN    acetaminophen (TYLENOL) tablet 650 mg  650 mg Oral Q6H PRN    Or    acetaminophen (TYLENOL) suppository 650 mg  650 mg Rectal Q6H PRN    polyethylene glycol (MIRALAX) packet 17 g  17 g Oral DAILY PRN    ondansetron (ZOFRAN ODT) tablet 4 mg  4 mg Oral Q8H PRN    Or    ondansetron (ZOFRAN) injection 4 mg  4 mg IntraVENous Q6H PRN    enoxaparin (LOVENOX) injection 40 mg  40 mg SubCUTAneous DAILY    cefTRIAXone (ROCEPHIN) 1 g in sterile water (preservative free) 10 mL IV syringe  1 g IntraVENous Q24H    pantoprazole (PROTONIX) tablet 40 mg  40 mg Oral ACB&D    cetirizine (ZYRTEC) tablet 10 mg  10 mg Oral DAILY        Exam:      This is middle aged female obese. Alert and oriented x3.   Head normocephalic and atraumatic, pupils are round responsive to light sclera icteric and conjunctive are pink. JVD is absent. Chest: Bilateral coarse expiratory wheezing audible. Prolonged phase of expiration, however air entry is better. Heart: S1-S2 normal  Abdomen: Soft, nontender, no visceromegaly  Extremities: No edema, sinus or clubbing  Neuro: No focal motor deficit. Impression:   Ms. Abby Serrano is a 63years old female who is known to have history of COPD, hypertension, chronic hypoxia on supplemental oxygen. She additionally history of breast CVA with complete remission. She follows with Dr. Rob Slot her office. Last seen about 3 months ago. She is admitted to hospital because of increasing symptoms of cough, shortness of breath and wheezing. She used her inhalers and nebulizer but without any improvement. X-rays unremarkable for any infiltrates    Plan:   1. Acute on chronic hypoxic respiratory failure: This is secondary to acute exacerbation of COPD. She is currently on 6 L oxygen. Wean down to keep saturation more than 90%. 2.  Acute exacerbation of COPD:  Chest x-ray is unremarkable for any infiltrates. Patient has bilateral coarse expiratory wheezing. However improved. She is not a smoker. Patient got started on nebulized albuterol and Atrovent along with systemic steroids. She is also empirically started on azithromycin and Rocephin. However Rocephin can be discontinued. Continue with Mucinex around-the-clock and Robitussin with codeine as needed. 3.  Hypertension :  Continue antihypertensive medication    4. DVT prophylaxis with Lovenox subcutaneously. Questions of patient were answered at bedside in detail  Case discussed in detail with RN, RT, and care team  Thank you for involving me in the care of this patient  I will follow with you closely during hospitalization    Time spent more than 30 minutes reviewing results and records, decision making, and answering questions.     Arthur Jesus MD  Pulmonary/CC

## 2022-11-30 NOTE — PROGRESS NOTES
UROLOGY Progress Note         [phone]      Daily Progress Note: 11/30/2022      Subjective: The patient is seen for UROLOGIC follow up for frequent UTI infection  The patient is a 62 yo female with  history of breast CA, COPD, hypertension chronic hypoxic respiratory failure on home oxygen presented with a complaint of cough shortness of breath. She was found to be in Acute COPD exacerbation. Patient's UA revealed moderate leuk. Urine culture is not available. The patient reports having second UTI infection in the past two months. She reports no previus UTIs  She always has frequency due to being on fluid pills. She reports history of stress incontinence, frequency and discomfort with voiding( with UTI). Today she reports no pain/ discomfort with voiding. She has frequency and using pure wick.     Problem List:  Patient Active Problem List   Diagnosis Code    [facility] pulmonary [facility]) [facility] J44.9    Hypoxia R09.02    Respiratory failure ([facility]) J96.90    COPD exacerbation ([facility]) J44.1         Medications reviewed  Current Facility-Administered Medications   Medication Dose Route Frequency    hydrOXYzine pamoate (VISTARIL) capsule 25 mg  25 mg Oral Q6H PRN    insulin lispro (HUMALOG) injection   SubCUTAneous AC&HS    glucose chewable tablet 16 g  4 Tablet Oral PRN    glucagon (GLUCAGEN) injection 1 mg  1 mg IntraMUSCular PRN    guaiFENesin-codeine (ROBITUSSIN AC) 100-10 mg/5 mL solution 5 mL  5 mL Oral Q6H PRN    guaiFENesin ER (MUCINEX) tablet 600 mg  600 mg Oral Q12H    methylPREDNISolone (PF) (SOLU-MEDROL) injection 60 mg  60 mg IntraVENous Q6H    albuterol-ipratropium (DUO-NEB) 2.5 MG-0.5 MG/3 ML  3 mL Nebulization Q6H RT    busPIRone (BUSPAR) tablet 5 mg  5 mg Oral BID    amLODIPine (NORVASC) tablet 10 mg  10 mg Oral DAILY    aspirin chewable tablet 81 mg  81 mg Oral DAILY    calcium-vitamin D 600 mg-5 mcg (200 unit) per tablet 1 Tablet  1 Tablet Oral DAILY    carvediloL (COREG) tablet 6.25 mg  6.25 mg Oral BID WITH MEALS    docusate sodium (COLACE) capsule 100 mg  100 mg Oral BID    fluticasone propionate (FLONASE) 50 mcg/actuation nasal spray 2 Spray  2 Spray Both Nostrils DAILY    furosemide (LASIX) tablet 20 mg  20 mg Oral DAILY    insulin glargine (LANTUS) injection 15 Units  15 Units SubCUTAneous QHS    budesonide-formoteroL (SYMBICORT) 160-4.5 mcg/actuation HFA inhaler 2 Puff  2 Puff Inhalation BID    montelukast (SINGULAIR) tablet 10 mg  10 mg Oral DAILY    azithromycin (ZITHROMAX) tablet 500 mg  500 mg Oral DAILY    sodium chloride (NS) flush 5-40 mL  5-40 mL IntraVENous Q8H    sodium chloride (NS) flush 5-40 mL  5-40 mL IntraVENous PRN    acetaminophen (TYLENOL) tablet 650 mg  650 mg Oral Q6H PRN    Or    acetaminophen (TYLENOL) suppository 650 mg  650 mg Rectal Q6H PRN    polyethylene glycol (MIRALAX) packet 17 g  17 g Oral DAILY PRN    ondansetron (ZOFRAN ODT) tablet 4 mg  4 mg Oral Q8H PRN    Or    ondansetron (ZOFRAN) injection 4 mg  4 mg IntraVENous Q6H PRN    enoxaparin (LOVENOX) injection 40 mg  40 mg SubCUTAneous DAILY    cefTRIAXone (ROCEPHIN) 1 g in sterile water (preservative free) 10 mL IV syringe  1 g IntraVENous Q24H    pantoprazole (PROTONIX) tablet 40 mg  40 mg Oral ACB&D    cetirizine (ZYRTEC) tablet 10 mg  10 mg Oral DAILY       Review of Systems:   Review of Systems   Constitutional: Negative. HENT: Negative. Eyes: Negative. Respiratory:  Positive for cough and shortness of breath. Gastrointestinal: Negative. Genitourinary:  Positive for frequency and urgency. Musculoskeletal: Negative. Skin: Negative. Neurological: Negative. Endo/Heme/Allergies: Negative. Psychiatric/Behavioral: Negative. Objective:   Physical Exam  HENT:      Head: Normocephalic. Eyes:      Pupils: Pupils are equal, round, and reactive to light. Pulmonary:      Breath sounds: Wheezing present. Abdominal:      General: Abdomen is flat. Palpations: Abdomen is soft. Musculoskeletal:         General: Normal range of motion. Skin:     General: Skin is warm. Neurological:      Mental Status: She is alert. Psychiatric:         Mood and Affect: Mood normal.        Visit Vitals  /79 (BP 1 Location: Right upper arm)   Pulse 78   Temp 98.2 °F (36.8 °C)   Resp 18   Ht 5' 4\" (1.626 m)   Wt 201 lb (91.2 kg)   SpO2 93%   Breastfeeding No   BMI 34.50 kg/m²         Data Review:       Recent Days:  No results for input(s): WBC, HGB, HCT, PLT, HGBEXT, HCTEXT, PLTEXT in the last 72 hours. No results for input(s): NA, K, CL, CO2, GLU, BUN, CREA, CA, MG, PHOS, ALB, TBIL, TBILI, ALT, INR, INREXT in the last 72 hours. No lab exists for component: SGOT    24 Hour Results:  Recent Results (from the past 24 hour(s))   GLUCOSE, POC    Collection Time: 11/29/22 11:04 PM   Result Value Ref Range    Glucose (POC) 165 (H) 65 - 100 mg/dL    Performed by Mike Davila    GLUCOSE, POC    Collection Time: 11/30/22  7:40 AM   Result Value Ref Range    Glucose (POC) 143 (H) 65 - 100 mg/dL    Performed by Jeremy Meraz    GLUCOSE, POC    Collection Time: 11/30/22 11:15 AM   Result Value Ref Range    Glucose (POC) 198 (H) 65 - 100 mg/dL    Performed by Cecilia Thrasher            Assessment/     Patient Active Problem List   Diagnosis Code    [facility] J[facility].9    Hypoxia R09.02    Respiratory failure (HCC) J96.90    COPD exacerbation (HCC) J44.1       Plan:  1.UTI  -patient has been on Rocephin IV  - will need to follow up outpatient for evaluation of UtI infection,   Probably will benefit from prophylaxis abx  - UA positive for leukocytes, urine culture not ordered  - will collect urine culture to check for residual infection, due to patient's frequency and need to use pure wick  2. Acute COPD Exacerbation   followed by pulmonary team      Care Plan discussed with: Dr. Jayden Cueva, Attending Physician      Quiana Matamoros Ko, NP

## 2022-11-30 NOTE — PROGRESS NOTES
0830: Chart reviewed. Per MD notes, patient on IV steroids, IV ABX and neb. Currently on 4L O2 via NC: home oxygen at 2L (per 11/29 pulmonary note).     Discharge Dispo: Home Self-Care    CM will continue to follow patient and recs of medical team.

## 2022-11-30 NOTE — ANTIMICROBIAL STEWARDSHIP
The Antimicrobial Stewardship Team has reviewed current therapy. Patient is on day #5 of Rocephin and Azithromycin for COPD exacerbation. CXR is clear and patient has normal WBCs. Consider d/c Rocephin, as it puts the patient at increased risk for developing resistance, along with C.difficile.

## 2022-11-30 NOTE — PROGRESS NOTES
Pt desat to 86% on 3L  Increased to 6L NC for sat of 92%  Pt is short of breath and using accessory muscles   Neb given  Pt ambulation is delayed at this time

## 2022-11-30 NOTE — PROGRESS NOTES
General Daily Progress Note          Patient Name:   Jose Gilmore       YOB: 1960       Age:  63 y.o. Admit Date: 11/26/2022      Subjective:     She was transferred to my service as per patient request    Resting the bed alert awake complaining of coughing shortness of breath no fever no chills    11/30  Patient currently on 4L oxygen, reports 2L at home. Endorses productive cough. Denies headache, fever, chest pain, abdominal pain. CXR unremarkable for any infiltrates. Objective:     Visit Vitals  BP (!) 141/91 (BP 1 Location: Right upper arm, BP Patient Position: Sitting)   Pulse 80   Temp 98.5 °F (36.9 °C)   Resp 18   Ht 5' 4\" (1.626 m)   Wt 91.2 kg (201 lb)   SpO2 92%   Breastfeeding No   BMI 34.50 kg/m²        Recent Results (from the past 24 hour(s))   GLUCOSE, POC    Collection Time: 11/29/22 12:03 PM   Result Value Ref Range    Glucose (POC) 274 (H) 65 - 100 mg/dL    Performed by Kyle Govea    GLUCOSE, POC    Collection Time: 11/29/22  4:10 PM   Result Value Ref Range    Glucose (POC) 253 (H) 65 - 100 mg/dL    Performed by Kyle Govea    GLUCOSE, POC    Collection Time: 11/29/22 11:04 PM   Result Value Ref Range    Glucose (POC) 165 (H) 65 - 100 mg/dL    Performed by Loreta Hurley    GLUCOSE, POC    Collection Time: 11/30/22  7:40 AM   Result Value Ref Range    Glucose (POC) 143 (H) 65 - 100 mg/dL    Performed by Inocencia Juan      [unfilled]      Review of Systems    Constitutional: Negative for chills and fever. HENT: Negative. Eyes: Negative. Respiratory: Negative. Cardiovascular: Negative. Gastrointestinal: Negative for abdominal pain and nausea. Skin: Negative. Neurological: Negative. Physical Exam:      Constitutional: pt is oriented to person, place, and time. HENT:   Head: Normocephalic and atraumatic. Eyes: Pupils are equal, round, and reactive to light.  EOM are normal.   Cardiovascular: Normal rate, regular rhythm and normal heart sounds. Pulmonary/Chest: Breath sounds normal. No wheezes. No rales. Exhibits no tenderness. Abdominal: Soft. Bowel sounds are normal. There is no abdominal tenderness. There is no rebound and no guarding. Musculoskeletal: Normal range of motion. Neurological: pt is alert and oriented to person, place, and time. XR CHEST SNGL V   Final Result   No Acute Disease. Recent Results (from the past 24 hour(s))   GLUCOSE, POC    Collection Time: 11/29/22 12:03 PM   Result Value Ref Range    Glucose (POC) 274 (H) 65 - 100 mg/dL    Performed by Giuseppe Bates    GLUCOSE, POC    Collection Time: 11/29/22  4:10 PM   Result Value Ref Range    Glucose (POC) 253 (H) 65 - 100 mg/dL    Performed by Giuseppe Bates    GLUCOSE, POC    Collection Time: 11/29/22 11:04 PM   Result Value Ref Range    Glucose (POC) 165 (H) 65 - 100 mg/dL    Performed by Mary Nguyen    GLUCOSE, POC    Collection Time: 11/30/22  7:40 AM   Result Value Ref Range    Glucose (POC) 143 (H) 65 - 100 mg/dL    Performed by Jassi Buckner        Results       ** No results found for the last 336 hours. **             Labs:     No results for input(s): WBC, HGB, HCT, PLT, HGBEXT, HCTEXT, PLTEXT, HGBEXT, HCTEXT, PLTEXT in the last 72 hours. No results for input(s): NA, K, CL, CO2, BUN, CREA, GLU, CA, MG, PHOS, URICA in the last 72 hours. No results for input(s): ALT, AP, TBIL, TBILI, TP, ALB, GLOB, GGT, AML, LPSE in the last 72 hours. No lab exists for component: SGOT, GPT, AMYP, HLPSE    No results for input(s): INR, PTP, APTT, INREXT, INREXT in the last 72 hours. No results for input(s): FE, TIBC, PSAT, FERR in the last 72 hours. No results found for: FOL, RBCF   No results for input(s): PH, PCO2, PO2 in the last 72 hours. No results for input(s): CPK, CKNDX, TROIQ in the last 72 hours.     No lab exists for component: CPKMB  No results found for: CHOL, CHOLX, CHLST, CHOLV, HDL, HDLP, LDL, LDLC, DLDLP, TGLX, TRIGL, Flory Wells, [facility]  Lab Results   Component Value Date/Time    Glucose (POC) 143 (H) 11/30/2022 07:40 AM    Glucose (POC) 165 (H) 11/29/2022 11:04 PM    Glucose (POC) 253 (H) 11/29/2022 04:10 PM    Glucose (POC) 274 (H) 11/29/2022 12:03 PM    Glucose (POC) 172 (H) 11/29/2022 08:01 AM     Lab Results   Component Value Date/Time    Color Yellow/Straw 11/26/2022 02:57 PM    Appearance Clear 11/26/2022 02:57 PM    Specific gravity 1.009 11/26/2022 02:57 PM    Specific gravity 1.019 08/10/2021 03:00 PM    pH (UA) 5.0 11/26/2022 02:57 PM    Protein Negative 11/26/2022 02:57 PM    Glucose Negative 11/26/2022 02:57 PM    Ketone Negative 11/26/2022 02:57 PM    Bilirubin Negative 11/26/2022 02:57 PM    Urobilinogen 0.1 11/26/2022 02:57 PM    Nitrites Negative 11/26/2022 02:57 PM    Leukocyte Esterase Trace (A) 11/26/2022 02:57 PM    Bacteria Negative 11/26/2022 02:57 PM    WBC 0-4 11/26/2022 02:57 PM    RBC 0-5 11/26/2022 02:57 PM         Assessment:   Acute on chronic hypoxic respiratory failure  Acute exacerbation of COPD  Hypertension  Cough  Hyperglycemia     Plan:   Persistent hyperglycemia - increase lantus to 18 units     Continue DuoNeb nebulizer and Atrovent  Oxygen prn - wean as tolerated  On Empiric Zithromax and Rocephin  On IV Solu-Medrol  DVT proph: Lovenox     Add Mucinex Bid, Robitussin with codeine prn for cough    Pulmonary following    Current Facility-Administered Medications:     insulin lispro (HUMALOG) injection, , SubCUTAneous, AC&HS, Kami Funes MD, 3 Units at 11/29/22 1658    glucose chewable tablet 16 g, 4 Tablet, Oral, PRN, Kami Funes MD    glucagon (GLUCAGEN) injection 1 mg, 1 mg, IntraMUSCular, PRN, Kami Funes MD    guaiFENesin-codeine (ROBITUSSIN AC) 100-10 mg/5 mL solution 5 mL, 5 mL, Oral, Q6H PRN, Henok Garza MD, 5 mL at 11/30/22 0433    guaiFENesin ER (MUCINEX) tablet 600 mg, 600 mg, Oral, Q12H, Henok Garza MD, 600 mg at 11/29/22 2317    methylPREDNISolone (PF) (SOLU-MEDROL) injection 60 mg, 60 mg, IntraVENous, Q6H, [place] Hamm MD, 60 mg at 11/30/22 0417    albuterol-ipratropium (DUO-NEB) 2.5 MG-0.5 MG/3 ML, 3 mL, Nebulization, Q6H RT, Zion Gordon MD, 3 mL at 11/30/22 0140    busPIRone (BUSPAR) tablet 5 mg, 5 mg, Oral, BID, [place] Hamm MD, 5 mg at 11/29/22 2317    amLODIPine (NORVASC) tablet 10 mg, 10 mg, Oral, DAILY, Deborah Nieves MD, 10 mg at 11/29/22 1011    aspirin chewable tablet 81 mg, 81 mg, Oral, DAILY, North Hamm MD, 81 mg at 11/29/22 1011    calcium-vitamin D 600 mg-5 mcg (200 unit) per tablet 1 Tablet, 1 Tablet, Oral, DAILY, [place] Hamm MD, 1 Tablet at 11/29/22 1011    carvediloL (COREG) tablet 6.25 mg, 6.25 mg, Oral, BID WITH MEALS, North Hamm MD, 6.25 mg at 11/29/22 1613    docusate sodium (COLACE) capsule 100 mg, 100 mg, Oral, BID, North Hamm MD, 100 mg at 11/29/22 2317    fluticasone propionate (FLONASE) 50 mcg/actuation nasal spray 2 Spray, 2 Spray, Both Nostrils, DAILY, North Hamm MD, 2 [place] at 11/27/22 1024    furosemide (LASIX) tablet 20 mg, 20 mg, Oral, DAILY, North Hamm MD, 20 mg at 11/29/22 1011    insulin glargine (LANTUS) injection 15 Units, 15 Units, SubCUTAneous, QHS, North Hamm MD, 15 Units at 11/29/22 2316    budesonide-formoteroL (SYMBICORT) 160-4.5 mcg/actuation HFA inhaler 2 Puff, 2 Puff, Inhalation, BID, Eros NICHOLS MD, 2 Puff at 11/29/22 2101    montelukast (SINGULAIR) tablet 10 mg, 10 mg, Oral, DAILY, North Hamm MD, 10 mg at 11/29/22 1010    azithromycin (ZITHROMAX) tablet 500 mg, 500 mg, Oral, DAILY, North Hamm MD, 500 mg at 11/29/22 1012    sodium chloride (NS) flush 5-40 mL, 5-40 mL, IntraVENous, Q8H, North Hamm MD, 10 mL at 11/30/22 0543    sodium chloride (NS) flush 5-40 mL, 5-40 mL, IntraVENous, PRN, Sharlee Cassette, North NICHOLS MD    acetaminophen (TYLENOL) tablet 650 mg, 650 mg, Oral, Q6H PRN, 650 mg at 11/29/22 1004 **OR** acetaminophen (TYLENOL) suppository 650 mg, 650 mg, Rectal, Q6H PRN, North Conley MD    polyethylene glycol (MIRALAX) packet 17 g, 17 g, Oral, DAILY PRN, North Hamm MD    ondansetron (ZOFRAN ODT) tablet 4 mg, 4 mg, Oral, Q8H PRN **OR** ondansetron (ZOFRAN) injection 4 mg, 4 mg, IntraVENous, Q6H PRN, North Hamm MD    enoxaparin (LOVENOX) injection 40 mg, 40 mg, SubCUTAneous, DAILY, North Hamm MD, 40 mg at 11/29/22 1010    cefTRIAXone (ROCEPHIN) 1 g in sterile water (preservative free) 10 mL IV syringe, 1 g, IntraVENous, Q24H, North Hamm MD, 1 g at 11/29/22 1658    pantoprazole (PROTONIX) tablet 40 mg, 40 mg, Oral, ACB&D, Sima NICHOLS MD, 40 mg at 11/29/22 1616    cetirizine (ZYRTEC) tablet 10 mg, 10 mg, Oral, DAILY, North Hamm MD, 10 mg at 11/29/22 1011

## 2022-11-30 NOTE — PROGRESS NOTES
Problem: Falls - Risk of  Goal: *Absence of Falls  Description: Document [place] Gamma Fall Risk and appropriate interventions in the flowsheet.   Outcome: Progressing Towards Goal  Note: Fall Risk Interventions:            Medication Interventions: Evaluate medications/consider consulting pharmacy                   Problem: Patient Education: Go to Patient Education Activity  Goal: Patient/Family Education  Outcome: Progressing Towards Goal

## 2022-12-01 LAB
GLUCOSE BLD STRIP.AUTO-MCNC: 193 MG/DL (ref 65–100)
GLUCOSE BLD STRIP.AUTO-MCNC: 205 MG/DL (ref 65–100)
GLUCOSE BLD STRIP.AUTO-MCNC: 215 MG/DL (ref 65–100)
GLUCOSE BLD STRIP.AUTO-MCNC: 236 MG/DL (ref 65–100)
GLUCOSE BLD STRIP.AUTO-MCNC: 310 MG/DL (ref 65–100)
PERFORMED BY, TECHID: ABNORMAL

## 2022-12-01 PROCEDURE — 74011636637 HC RX REV CODE- 636/637: Performed by: INTERNAL MEDICINE

## 2022-12-01 PROCEDURE — 74011000250 HC RX REV CODE- 250: Performed by: INTERNAL MEDICINE

## 2022-12-01 PROCEDURE — 82962 GLUCOSE BLOOD TEST: CPT

## 2022-12-01 PROCEDURE — 97116 GAIT TRAINING THERAPY: CPT

## 2022-12-01 PROCEDURE — 74011250637 HC RX REV CODE- 250/637: Performed by: FAMILY MEDICINE

## 2022-12-01 PROCEDURE — 74011250637 HC RX REV CODE- 250/637: Performed by: INTERNAL MEDICINE

## 2022-12-01 PROCEDURE — 74011636637 HC RX REV CODE- 636/637: Performed by: FAMILY MEDICINE

## 2022-12-01 PROCEDURE — 87086 URINE CULTURE/COLONY COUNT: CPT

## 2022-12-01 PROCEDURE — 97161 PT EVAL LOW COMPLEX 20 MIN: CPT

## 2022-12-01 PROCEDURE — 77010033678 HC OXYGEN DAILY

## 2022-12-01 PROCEDURE — 94640 AIRWAY INHALATION TREATMENT: CPT

## 2022-12-01 PROCEDURE — 65270000029 HC RM PRIVATE

## 2022-12-01 PROCEDURE — 94761 N-INVAS EAR/PLS OXIMETRY MLT: CPT

## 2022-12-01 PROCEDURE — 74011250636 HC RX REV CODE- 250/636: Performed by: INTERNAL MEDICINE

## 2022-12-01 PROCEDURE — 97165 OT EVAL LOW COMPLEX 30 MIN: CPT

## 2022-12-01 RX ADMIN — CARVEDILOL 6.25 MG: 3.12 TABLET, FILM COATED ORAL at 17:26

## 2022-12-01 RX ADMIN — BUDESONIDE AND FORMOTEROL FUMARATE DIHYDRATE 2 PUFF: 160; 4.5 AEROSOL RESPIRATORY (INHALATION) at 07:39

## 2022-12-01 RX ADMIN — METHYLPREDNISOLONE SODIUM SUCCINATE 60 MG: 40 INJECTION, POWDER, FOR SOLUTION INTRAMUSCULAR; INTRAVENOUS at 09:15

## 2022-12-01 RX ADMIN — SODIUM CHLORIDE, PRESERVATIVE FREE 10 ML: 5 INJECTION INTRAVENOUS at 23:05

## 2022-12-01 RX ADMIN — Medication 4 UNITS: at 17:25

## 2022-12-01 RX ADMIN — IPRATROPIUM BROMIDE AND ALBUTEROL SULFATE 3 ML: 2.5; .5 SOLUTION RESPIRATORY (INHALATION) at 07:39

## 2022-12-01 RX ADMIN — AZITHROMYCIN MONOHYDRATE 500 MG: 500 TABLET ORAL at 08:42

## 2022-12-01 RX ADMIN — ASPIRIN 81 MG 81 MG: 81 TABLET ORAL at 08:42

## 2022-12-01 RX ADMIN — METHYLPREDNISOLONE SODIUM SUCCINATE 60 MG: 40 INJECTION, POWDER, FOR SOLUTION INTRAMUSCULAR; INTRAVENOUS at 22:52

## 2022-12-01 RX ADMIN — CARVEDILOL 6.25 MG: 3.12 TABLET, FILM COATED ORAL at 08:42

## 2022-12-01 RX ADMIN — PANTOPRAZOLE SODIUM 40 MG: 40 TABLET, DELAYED RELEASE ORAL at 08:42

## 2022-12-01 RX ADMIN — Medication 2 UNITS: at 12:35

## 2022-12-01 RX ADMIN — SODIUM CHLORIDE, PRESERVATIVE FREE 10 ML: 5 INJECTION INTRAVENOUS at 05:57

## 2022-12-01 RX ADMIN — ENOXAPARIN SODIUM 40 MG: 100 INJECTION SUBCUTANEOUS at 08:42

## 2022-12-01 RX ADMIN — METHYLPREDNISOLONE SODIUM SUCCINATE 60 MG: 40 INJECTION, POWDER, FOR SOLUTION INTRAMUSCULAR; INTRAVENOUS at 04:25

## 2022-12-01 RX ADMIN — MONTELUKAST 10 MG: 10 TABLET, FILM COATED ORAL at 08:42

## 2022-12-01 RX ADMIN — IPRATROPIUM BROMIDE AND ALBUTEROL SULFATE 3 ML: 2.5; .5 SOLUTION RESPIRATORY (INHALATION) at 14:01

## 2022-12-01 RX ADMIN — FUROSEMIDE 20 MG: 40 TABLET ORAL at 08:42

## 2022-12-01 RX ADMIN — CETIRIZINE HYDROCHLORIDE 10 MG: 10 TABLET, FILM COATED ORAL at 08:42

## 2022-12-01 RX ADMIN — METHYLPREDNISOLONE SODIUM SUCCINATE 60 MG: 40 INJECTION, POWDER, FOR SOLUTION INTRAMUSCULAR; INTRAVENOUS at 17:26

## 2022-12-01 RX ADMIN — IPRATROPIUM BROMIDE AND ALBUTEROL SULFATE 3 ML: 2.5; .5 SOLUTION RESPIRATORY (INHALATION) at 02:07

## 2022-12-01 RX ADMIN — GUAIFENESIN 600 MG: 600 TABLET, EXTENDED RELEASE ORAL at 08:42

## 2022-12-01 RX ADMIN — HYDROXYZINE PAMOATE 25 MG: 25 CAPSULE ORAL at 04:32

## 2022-12-01 RX ADMIN — DOCUSATE SODIUM 100 MG: 100 CAPSULE, LIQUID FILLED ORAL at 22:52

## 2022-12-01 RX ADMIN — INSULIN GLARGINE 15 UNITS: 100 INJECTION, SOLUTION SUBCUTANEOUS at 23:05

## 2022-12-01 RX ADMIN — GUAIFENESIN 600 MG: 600 TABLET, EXTENDED RELEASE ORAL at 22:52

## 2022-12-01 RX ADMIN — PANTOPRAZOLE SODIUM 40 MG: 40 TABLET, DELAYED RELEASE ORAL at 17:26

## 2022-12-01 RX ADMIN — SODIUM CHLORIDE, PRESERVATIVE FREE 10 ML: 5 INJECTION INTRAVENOUS at 14:09

## 2022-12-01 RX ADMIN — Medication 2 UNITS: at 08:42

## 2022-12-01 RX ADMIN — GUAIFENESIN AND CODEINE PHOSPHATE 5 ML: 10; 100 LIQUID ORAL at 23:05

## 2022-12-01 RX ADMIN — IPRATROPIUM BROMIDE AND ALBUTEROL SULFATE 3 ML: 2.5; .5 SOLUTION RESPIRATORY (INHALATION) at 20:28

## 2022-12-01 RX ADMIN — BUSPIRONE HYDROCHLORIDE 5 MG: 5 TABLET ORAL at 22:52

## 2022-12-01 RX ADMIN — Medication 1 UNITS: at 23:05

## 2022-12-01 RX ADMIN — AMLODIPINE BESYLATE 10 MG: 5 TABLET ORAL at 08:42

## 2022-12-01 RX ADMIN — GUAIFENESIN AND CODEINE PHOSPHATE 5 ML: 10; 100 LIQUID ORAL at 04:32

## 2022-12-01 RX ADMIN — BUSPIRONE HYDROCHLORIDE 5 MG: 5 TABLET ORAL at 08:42

## 2022-12-01 RX ADMIN — Medication 1 TABLET: at 08:42

## 2022-12-01 RX ADMIN — DOCUSATE SODIUM 100 MG: 100 CAPSULE, LIQUID FILLED ORAL at 08:42

## 2022-12-01 RX ADMIN — BUDESONIDE AND FORMOTEROL FUMARATE DIHYDRATE 2 PUFF: 160; 4.5 AEROSOL RESPIRATORY (INHALATION) at 07:46

## 2022-12-01 RX ADMIN — BUDESONIDE AND FORMOTEROL FUMARATE DIHYDRATE 2 PUFF: 160; 4.5 AEROSOL RESPIRATORY (INHALATION) at 20:28

## 2022-12-01 NOTE — PROGRESS NOTES
General Daily Progress Note          Patient Name:   Megan David       YOB: 1960       Age:  63 y.o. Admit Date: 11/26/2022      Subjective:     She was transferred to my service as per patient request    Resting the bed alert awake complaining of coughing shortness of breath no fever no chills    11/30  Patient currently on 4L oxygen, reports 2L at home. Endorses productive cough. Denies headache, fever, chest pain, abdominal pain. CXR unremarkable for any infiltrates. 12/1  Patient is alert and oriented, currently on 5L. Reports an increase O2 need yesterday to 6-7L, is anxious about her lungs. Says she is worried about physical activity due to increase O2 need. Alli chest pain, headache. Objective:     Visit Vitals  BP (!) 154/95 (BP 1 Location: Left upper arm, BP Patient Position: At rest;Lying)   Pulse 75   Temp 97.8 °F (36.6 °C)   Resp 23   Ht 5' 4\" (1.626 m)   Wt 91.2 kg (201 lb)   SpO2 90%   Breastfeeding No   BMI 34.50 kg/m²        Recent Results (from the past 24 hour(s))   GLUCOSE, POC    Collection Time: 11/30/22  5:43 PM   Result Value Ref Range    Glucose (POC) 193 (H) 65 - 100 mg/dL    Performed by Nitin 27, POC    Collection Time: 11/30/22  8:43 PM   Result Value Ref Range    Glucose (POC) 215 (H) 65 - 100 mg/dL    Performed by Michael Oliveros    GLUCOSE, POC    Collection Time: 12/01/22  8:16 AM   Result Value Ref Range    Glucose (POC) 205 (H) 65 - 100 mg/dL    Performed by Niya Lama    GLUCOSE, POC    Collection Time: 12/01/22 11:29 AM   Result Value Ref Range    Glucose (POC) 236 (H) 65 - 100 mg/dL    Performed by Erwin Baeza      [unfilled]      Review of Systems    Constitutional: Negative for chills and fever. HENT: Negative. Eyes: Negative. Respiratory: Negative. Cardiovascular: Negative. Gastrointestinal: Negative for abdominal pain and nausea. Skin: Negative. Neurological: Negative.         Physical Exam: Constitutional: pt is oriented to person, place, and time. HENT:   Head: Normocephalic and atraumatic. Eyes: Pupils are equal, round, and reactive to light. EOM are normal.   Cardiovascular: Normal rate, regular rhythm and normal heart sounds. Pulmonary/Chest: Breath sounds normal. No wheezes. No rales. Exhibits no tenderness. Abdominal: Soft. Bowel sounds are normal. There is no abdominal tenderness. There is no rebound and no guarding. Musculoskeletal: Normal range of motion. Neurological: pt is alert and oriented to person, place, and time. XR CHEST SNGL V   Final Result   No Acute Disease. Recent Results (from the past 24 hour(s))   GLUCOSE, POC    Collection Time: 11/30/22  5:43 PM   Result Value Ref Range    Glucose (POC) 193 (H) 65 - 100 mg/dL    Performed by Nitin 27, POC    Collection Time: 11/30/22  8:43 PM   Result Value Ref Range    Glucose (POC) 215 (H) 65 - 100 mg/dL    Performed by Antoni Haque    GLUCOSE, POC    Collection Time: 12/01/22  8:16 AM   Result Value Ref Range    Glucose (POC) 205 (H) 65 - 100 mg/dL    Performed by Varun Mahajan, POC    Collection Time: 12/01/22 11:29 AM   Result Value Ref Range    Glucose (POC) 236 (H) 65 - 100 mg/dL    Performed by Josie Marshall        Results       Procedure Component Value Units Date/Time    CULTURE, URINE [[id number]] Collected: 12/01/22 0530    Order Status: Sent Specimen: Urine Updated: 12/01/22 0544             Labs:     No results for input(s): WBC, HGB, HCT, PLT, HGBEXT, HCTEXT, PLTEXT, HGBEXT, HCTEXT, PLTEXT in the last 72 hours. No results for input(s): NA, K, CL, CO2, BUN, CREA, GLU, CA, MG, PHOS, URICA in the last 72 hours. No results for input(s): ALT, AP, TBIL, TBILI, TP, ALB, GLOB, GGT, AML, LPSE in the last 72 hours. No lab exists for component: SGOT, GPT, AMYP, HLPSE    No results for input(s): INR, PTP, APTT, INREXT, INREXT in the last 72 hours.    No results for input(s): FE, TIBC, PSAT, FERR in the last 72 hours. No results found for: FOL, RBCF   No results for input(s): PH, PCO2, PO2 in the last 72 hours. No results for input(s): CPK, CKNDX, TROIQ in the last 72 hours.     No lab exists for component: CPKMB  No results found for: CHOL, CHOLX, CHLST, CHOLV, HDL, HDLP, LDL, LDLC, DLDLP, Zoe Bis, CHHD, CHHDX  Lab Results   Component Value Date/Time    Glucose (POC) 236 (H) 12/01/2022 11:29 AM    Glucose (POC) 205 (H) 12/01/2022 08:16 AM    Glucose (POC) 215 (H) 11/30/2022 08:43 PM    Glucose (POC) 193 (H) 11/30/2022 05:43 PM    Glucose (POC) 198 (H) 11/30/2022 11:15 AM     Lab Results   Component Value Date/Time    Color Yellow/Straw 11/26/2022 02:57 PM    Appearance Clear 11/26/2022 02:57 PM    Specific gravity 1.009 11/26/2022 02:57 PM    Specific gravity 1.019 08/10/2021 03:00 PM    pH (UA) 5.0 11/26/2022 02:57 PM    Protein Negative 11/26/2022 02:57 PM    Glucose Negative 11/26/2022 02:57 PM    Ketone Negative 11/26/2022 02:57 PM    Bilirubin Negative 11/26/2022 02:57 PM    Urobilinogen 0.1 11/26/2022 02:57 PM    Nitrites Negative 11/26/2022 02:57 PM    Leukocyte Esterase Trace (A) 11/26/2022 02:57 PM    Bacteria Negative 11/26/2022 02:57 PM    WBC 0-4 11/26/2022 02:57 PM    RBC 0-5 11/26/2022 02:57 PM         Assessment:   Acute on chronic hypoxic respiratory failure  Acute exacerbation of COPD  Hypertension  Cough  Hyperglycemia due to steroids  UTI    Plan:   Pharmacy recommends d/c Rocephin  Continue DuoNeb nebulizer and Atrovent  Oxygen prn - wean as tolerated  On Empiric Zithromax  On IV Solu-Medrol  Mucinex BID  Robitussin with codeine prn for cough  DVT proph: Lovenox     PT/OT if patient oxygen saturation stable   Pulmonary and Urology following - appreciate recs    PT OT consult  Consult respiratory for resting and ambulatory pulse ox  Patient preferred to go to skilled care rehab   consult for discharge planning    Possible discharge in next 24 hours      Current Facility-Administered Medications:     hydrOXYzine pamoate (VISTARIL) capsule 25 mg, 25 mg, Oral, Q6H PRN, Kami Funes MD, 25 mg at 12/01/22 0432    insulin lispro (HUMALOG) injection, , SubCUTAneous, AC&HS, Kami Funes MD, 2 Units at 12/01/22 0842    glucose chewable tablet 16 g, 4 Tablet, Oral, PRN, Kami Funes MD    glucagon (GLUCAGEN) injection 1 mg, 1 mg, IntraMUSCular, PRN, Mao Jaramillo MD    guaiFENesin-codeine (ROBITUSSIN AC) 100-10 mg/5 mL solution 5 mL, 5 mL, Oral, Q6H PRN, Henok Garza MD, 5 mL at 12/01/22 0432    guaiFENesin ER (MUCINEX) tablet 600 mg, 600 mg, Oral, Q12H, Henok Garza MD, 600 mg at 12/01/22 0842    methylPREDNISolone (PF) (SOLU-MEDROL) injection 60 mg, 60 mg, IntraVENous, Q6H, North Hamm MD, 60 mg at 12/01/22 0915    albuterol-ipratropium (DUO-NEB) 2.5 MG-0.5 MG/3 ML, 3 mL, Nebulization, Q6H RT, Abeba Birmingham MD, 3 mL at 12/01/22 0739    busPIRone (BUSPAR) tablet 5 mg, 5 mg, Oral, BID, Margarita NICHOLS MD, 5 mg at 12/01/22 0842    amLODIPine (NORVASC) tablet 10 mg, 10 mg, Oral, DAILY, Domenic Felix MD, 10 mg at 12/01/22 9410    aspirin chewable tablet 81 mg, 81 mg, Oral, DAILY, North Hamm MD, 81 mg at 12/01/22 6154    calcium-vitamin D 600 mg-5 mcg (200 unit) per tablet 1 Tablet, 1 Tablet, Oral, DAILY, North Hamm MD, 1 Tablet at 12/01/22 0842    carvediloL (COREG) tablet 6.25 mg, 6.25 mg, Oral, BID WITH MEALS, North Hamm MD, 6.25 mg at 12/01/22 9748    docusate sodium (COLACE) capsule 100 mg, 100 mg, Oral, BID, North Hamm MD, 100 mg at 12/01/22 0842    fluticasone propionate (FLONASE) 50 mcg/actuation nasal spray 2 Spray, 2 Spray, Both Nostrils, DAILY, North Hamm MD, 2 [place] at 11/27/22 1024    furosemide (LASIX) tablet 20 mg, 20 mg, Oral, DAILY, North Hamm MD, 20 mg at 12/01/22 0842    insulin glargine (LANTUS) injection 15 Units, 15 Units, SubCUTAneous, QHS, North Hamm MD SIMONE, 15 Units at 11/30/22 2132    budesonide-formoteroL (SYMBICORT) 160-4.5 mcg/actuation HFA inhaler 2 Puff, 2 Puff, Inhalation, BID, Lorena Stubbs MD, 2 Puff at 12/01/22 0746    montelukast (SINGULAIR) tablet 10 mg, 10 mg, Oral, DAILY, Lorena Stubbs MD, 10 mg at 12/01/22 0842    azithromycin (ZITHROMAX) tablet 500 mg, 500 mg, Oral, DAILY, Sina NICHOLS MD, 500 mg at 12/01/22 0842    sodium chloride (NS) flush 5-40 mL, 5-40 mL, IntraVENous, Q8H, North Hamm MD, 10 mL at 12/01/22 0557    sodium chloride (NS) flush 5-40 mL, 5-40 mL, IntraVENous, PRN, Sina NICHOLS MD    acetaminophen (TYLENOL) tablet 650 mg, 650 mg, Oral, Q6H PRN, 650 mg at 11/29/22 1004 **OR** acetaminophen (TYLENOL) suppository 650 mg, 650 mg, Rectal, Q6H PRN, North Yao MD    polyethylene glycol (MIRALAX) packet 17 g, 17 g, Oral, DAILY PRN, North Hamm MD    ondansetron (ZOFRAN ODT) tablet 4 mg, 4 mg, Oral, Q8H PRN **OR** ondansetron (ZOFRAN) injection 4 mg, 4 mg, IntraVENous, Q6H PRN, North Hamm MD    enoxaparin (LOVENOX) injection 40 mg, 40 mg, SubCUTAneous, DAILY, North Hamm MD, 40 mg at 12/01/22 0842    pantoprazole (PROTONIX) tablet 40 mg, 40 mg, Oral, ACB&D, Lorena Stubbs MD, 40 mg at 12/01/22 0842    cetirizine (ZYRTEC) tablet 10 mg, 10 mg, Oral, DAILY, Lorena Stubbs MD, 10 mg at 12/01/22 0630

## 2022-12-01 NOTE — PROGRESS NOTES
DC Plan: SNF    PT/OT is recommending SNF. CM met with pt at the bedside. Pt is agreeable with going to a SNF for short term. Due to pt's insurance Cm will have to see if pt has skilled benefits for SNF. Cm explained to pt that if she does not have skilled benefits for SNF, she would have to commit 30 days at the facility. Pt voiced understanding. Pt will review SNF list this evening and will get back with CM tomorrow for her decision.

## 2022-12-01 NOTE — PROGRESS NOTES
Pulmonary/ CC progress note    Subjective:   Date of Consultation:  December 1, 2022  Referring Physician: Eden Almendarez MD    Patient seen and examined  Overnight events noted    Patient admits that she is feeling a little better. However she has periods of dyspnea particularly after she goes to the bathroom. It takes her about 5 minutes to recover. She is now on 4 L of oxygen. Small amount of sputum production. Prior to Admission medications    Medication Sig Start Date End Date Taking? Authorizing Provider   docusate sodium (Colace) 100 mg capsule Take 1 Capsule by mouth two (2) times a day for 90 days. 11/21/22 2/19/23  Saad Loo MD   carvediloL (COREG) 6.25 mg tablet Take 1 Tablet by mouth two (2) times daily (with meals). 8/13/21   Gene Funes MD   furosemide (Lasix) 40 mg tablet Lasix 40 mg daily 8/13/21   Yuri Bajwa MD   insulin glargine (LANTUS) 100 unit/mL injection As directed 8/14/21   Yuri Bajwa MD   pantoprazole (PROTONIX) 40 mg tablet Take 1 Tablet by mouth Daily (before breakfast). 8/14/21   Gene Funes MD   predniSONE (DELTASONE) 10 mg tablet 1 tablet daily 8/13/21   Yuri Bajwa MD   albuterol-ipratropium (DUO-NEB) 2.5 mg-0.5 mg/3 ml nebu 3 mL by Nebulization route every six (6) hours as needed for Wheezing. 12/18/20   Gene Funes MD   aspirin 81 mg chewable tablet Take 1 Tab by mouth daily. 12/19/20   Gene Funes MD   guaiFENesin ER (MUCINEX) 600 mg ER tablet Take 1 Tab by mouth two (2) times a day. 12/18/20   Gene Funes MD   amLODIPine (NORVASC) 10 mg tablet Take 10 mg by mouth daily. Provider, Historical   mometasone-formoterol (DULERA) 200-5 mcg/actuation HFA inhaler Take 2 Puffs by inhalation two (2) times a day. Provider, Historical   montelukast (SINGULAIR) 10 mg tablet Take 10 mg by mouth daily. Provider, Historical   Calcium-Cholecalciferol, D3, 500 mg(1,250mg) -400 unit tab Take  by mouth daily.     Provider, Historical   benzonatate (TESSALON) 100 mg capsule Take 100 mg by mouth once. Provider, Historical   fluticasone propionate (FLONASE) 50 mcg/actuation nasal spray 2 Sprays by Both Nostrils route daily. Provider, Historical   ergocalciferol (Vitamin D2) 1,250 mcg (50,000 unit) capsule Take 50,000 Units by mouth every seven (7) days. Q7days on Monday    Provider, Historical     Allergies   Allergen Reactions    Lisinopril Angioedema        Review of Systems:  A comprehensive review of systems was negative except for that written in the History of Present Illness. Objective:   Blood pressure (!) 147/90, pulse 87, temperature 97.9 °F (36.6 °C), resp. rate 22, height 5' 4\" (1.626 m), weight 91.2 kg (201 lb), SpO2 93 %, not currently breastfeeding. Temp (24hrs), Av.9 °F (36.6 °C), Min:97.6 °F (36.4 °C), Max:98.3 °F (36.8 °C)    XR CHEST SNGL V   Final Result   No Acute Disease. Data Review: CBC:   No results for input(s): WBC, RBC, HGB, HCT, PLT, GRANS, LYMPH, EOS, HGBEXT, HCTEXT, PLTEXT in the last 72 hours. CMP:   No results for input(s): GLU, NA, K, CL, CO2, BUN, CREA, CA, AGAP, BUCR, TBIL, AP, TP, ALB, GLOB, AGRAT in the last 72 hours. No lab exists for component: GPT    Liver Enzymes:   No results for input(s): TP, ALB, TBIL, AP in the last 72 hours. No lab exists for component: SGOT, GPT, DBIL    ABGs: No results for input(s): PH, PCO2, PO2, HCO3 in the last 72 hours.   Inflammation studies: No results found for: SR, ESRA, CRP  Current Facility-Administered Medications   Medication Dose Route Frequency    hydrOXYzine pamoate (VISTARIL) capsule 25 mg  25 mg Oral Q6H PRN    insulin lispro (HUMALOG) injection   SubCUTAneous AC&HS    glucose chewable tablet 16 g  4 Tablet Oral PRN    glucagon (GLUCAGEN) injection 1 mg  1 mg IntraMUSCular PRN    guaiFENesin-codeine (ROBITUSSIN AC) 100-10 mg/5 mL solution 5 mL  5 mL Oral Q6H PRN    guaiFENesin ER (MUCINEX) tablet 600 mg  600 mg Oral Q12H methylPREDNISolone (PF) (SOLU-MEDROL) injection 60 mg  60 mg IntraVENous Q6H    albuterol-ipratropium (DUO-NEB) 2.5 MG-0.5 MG/3 ML  3 mL Nebulization Q6H RT    busPIRone (BUSPAR) tablet 5 mg  5 mg Oral BID    amLODIPine (NORVASC) tablet 10 mg  10 mg Oral DAILY    aspirin chewable tablet 81 mg  81 mg Oral DAILY    calcium-vitamin D 600 mg-5 mcg (200 unit) per tablet 1 Tablet  1 Tablet Oral DAILY    carvediloL (COREG) tablet 6.25 mg  6.25 mg Oral BID WITH MEALS    docusate sodium (COLACE) capsule 100 mg  100 mg Oral BID    fluticasone propionate (FLONASE) 50 mcg/actuation nasal spray 2 Spray  2 Spray Both Nostrils DAILY    furosemide (LASIX) tablet 20 mg  20 mg Oral DAILY    insulin glargine (LANTUS) injection 15 Units  15 Units SubCUTAneous QHS    budesonide-formoteroL (SYMBICORT) 160-4.5 mcg/actuation HFA inhaler 2 Puff  2 Puff Inhalation BID    montelukast (SINGULAIR) tablet 10 mg  10 mg Oral DAILY    azithromycin (ZITHROMAX) tablet 500 mg  500 mg Oral DAILY    sodium chloride (NS) flush 5-40 mL  5-40 mL IntraVENous Q8H    sodium chloride (NS) flush 5-40 mL  5-40 mL IntraVENous PRN    acetaminophen (TYLENOL) tablet 650 mg  650 mg Oral Q6H PRN    Or    acetaminophen (TYLENOL) suppository 650 mg  650 mg Rectal Q6H PRN    polyethylene glycol (MIRALAX) packet 17 g  17 g Oral DAILY PRN    ondansetron (ZOFRAN ODT) tablet 4 mg  4 mg Oral Q8H PRN    Or    ondansetron (ZOFRAN) injection 4 mg  4 mg IntraVENous Q6H PRN    enoxaparin (LOVENOX) injection 40 mg  40 mg SubCUTAneous DAILY    pantoprazole (PROTONIX) tablet 40 mg  40 mg Oral ACB&D    cetirizine (ZYRTEC) tablet 10 mg  10 mg Oral DAILY        Exam:      This is middle aged female obese. Alert and oriented x3. Head normocephalic and atraumatic, pupils are round responsive to light sclera icteric and conjunctive are pink. JVD is absent. Chest: Bilateral expiratory wheezing audible.   Prolonged phase of expiration, however air entry is currently diminished than yesterday. Heart: S1-S2 normal  Abdomen: Soft, nontender, no visceromegaly  Extremities: No edema, sinus or clubbing  Neuro: No focal motor deficit. Impression:   Ms. Uma Sheikh is a 63years old female who is known to have history of COPD, hypertension, chronic hypoxia on supplemental oxygen. She additionally history of breast CVA with complete remission. She follows with Dr. Lombardo Current her office. Last seen about 3 months ago. She is admitted to hospital because of increasing symptoms of cough, shortness of breath and wheezing. She used her inhalers and nebulizer but without any improvement. X-rays unremarkable for any infiltrates    Plan:   1. Acute on chronic hypoxic respiratory failure: This is secondary to acute exacerbation of COPD. She is currently on 4 L oxygen. Wean down to keep saturation more than 90%. 2.  Acute exacerbation of COPD:  Chest x-ray is unremarkable for any infiltrates. Patient got started on nebulized albuterol and Atrovent along with systemic steroids. She is also empirically started on azithromycin and Rocephin. However Rocephin was discontinued. Continue with Symbicort 160/4.5, 2 inhalations twice daily. Additionally she is on Singulair. Continue with Mucinex around-the-clock and Robitussin with codeine as needed. 3.  Hypertension :  Continue antihypertensive medication    4. DVT prophylaxis with Lovenox subcutaneously. There appears to be element of anxiety. Agree with starting her on BuSpar. She is slowly improving. She may benefit from placement to skilled nursing facility. Questions of patient were answered at bedside in detail  Case discussed in detail with RN, RT, and care team  Thank you for involving me in the care of this patient  I will follow with you closely during hospitalization    Time spent more than 30 minutes reviewing results and records, decision making, and answering questions.     Ronita Hamman MD  Pulmonary/CC

## 2022-12-01 NOTE — PROGRESS NOTES
OCCUPATIONAL THERAPY EVALUATION  Patient: Megan David (20 y.o. female)  Date: 12/1/2022  Primary Diagnosis: COPD exacerbation ([facility] [J44.1]       Precautions: fall risk       ASSESSMENT  Pt is a 63 y.o. female presenting to [facility] with c/o cough and SOB, admitted 11/26 and currently being treated for COPD exacerbation. Pt received semi-supine in bed upon arrival w/ 4.5 L donned via NC, AXO x4, and agreeable to OT evaluation. Pt reports wearing 2 L of O2 at baseline. Based on current observations, pt presents with deficits in generalized strength/AROM, bed mobility, static/dynamic sitting balance, static/dynamic standing balance (see PT note for gait details), and functional activity tolerance currently impacting overall performance of ADLs and functional transfers/mobility (see below for objective details and assist levels). Overall, pt tolerates session fair with observed SOB during session and utilized furniture/walls for balance 2' unsteadiness. She completed OOB mobility w/ SBA, however, utilized furniture/walls for ambulation. She completed 1 standing ADL w/ SBA; however, req'd standing rest break 2' SOB. O2 dropped to 88-89% w/ activity and only recovered to 91% w/ prolonged rest break; HR elevated to high 110s w/ activity. Anticipate additional dropping of O2 w/ prolonged activity. Pt would benefit from continued skilled OT services to address current impairments and improve IND and safety with self cares and functional transfers/mobility. Current OT d/c recommendation Meng Mclean once medically appropriate to maximize functional activity tolerance prior to discharging home alone. Other factors to consider for discharge: family/social support, DME, time since onset, severity of deficits, functional baseline     Patient will benefit from skilled therapy intervention to address the above noted impairments.        PLAN :  Recommendations and Planned Interventions: self care training, functional mobility training, therapeutic exercise, balance training, therapeutic activities, endurance activities, and patient education    Recommend with staff: encourage pt to utilize RW    Recommend next session: standing tolerance and energy conservation techniques    Frequency/Duration: Patient will be followed by occupational therapy:  3-5x/week to address goals. Recommendation for discharge: (in order for the patient to meet his/her long term goals)  Meng Mclean    This discharge recommendation:  Has been made in collaboration with the attending provider and/or case management    IF patient discharges home will need the following DME: none at this time        SUBJECTIVE:   Patient stated I get tired combing my hair.     OBJECTIVE DATA SUMMARY:   HISTORY:   Past Medical History:   Diagnosis Date    Breast CA ([facility] [address]     Chronic obstructive pulmonary disease ([facility] [address]     Hypertension      Past Surgical History:   Procedure Laterality Date    COLONOSCOPY N/A 7/16/2021    . performed by Antonio Persaud MD at [facility]      HX MASTECTOMY Left        Per pt:   Home Situation  Home Environment: Shelter (rooming house)  # Steps to Enter: 0  One/Two Story Residence: Two story  # of Interior Steps: 4  Height of Each Step (in): 1 inches  Interior Rails: Both  Lift Chair Available: No  Living Alone: Yes  Support Systems: No Support Systems  Patient Expects to be Discharged to[de-identified] Skilled nursing facility  Current DME Used/Available at Home: Oxygen, portable (2 L at baseline)  Tub or Shower Type: Tub/Shower combination      EXAMINATION OF PERFORMANCE DEFICITS:  Cognitive/Behavioral Status:  Neurologic State: Alert  Orientation Level: Oriented X4  Cognition: Follows commands             Hearing:   Auditory  Auditory Impairment: None      Range of Motion:  AROM: Within functional limits                         Strength:  Strength: Generally decreased, functional                Coordination:     Fine Motor Skills-Upper: Left Intact; Right Intact    Gross Motor Skills-Upper: Left Intact; Right Intact    Tone & Sensation:     Sensation: Intact                      Balance:  Sitting: Intact  Standing: Impaired  Standing - Static: Fair  Standing - Dynamic : Fair    Functional Mobility and Transfers for ADLs:  Bed Mobility:  Supine to Sit: Stand-by assistance  Sit to Supine: Stand-by assistance  Scooting: Stand-by assistance    Transfers:  Sit to Stand: Stand-by assistance  Stand to Sit: Stand-by assistance  Bathroom Mobility: Stand-by assistance (held onto walls)  Toilet Transfer : Stand-by assistance (utilized L grab bar)      ADL Intervention and task modifications:       Grooming  Brushing/Combing Hair: Stand-by assistance (standing at sinktop)                   Lower Body Dressing Assistance  Socks: Total assistance (dependent) (declined bending 2' SOB)    Toileting  Bladder Hygiene: Independent (sittong on commode)         Therapeutic Exercise:  Pt will benefit from BUE HEP to improve participation in ADLs and mobility. Plan will be initiated at next session. Functional Measure:    Angie Fishman AM-PAC \"6 Clicks\"                                                       Daily Activity Inpatient Short Form  How much help from another person does the patient currently need. .. Total; A Lot A Little None   1. Putting on and taking off regular lower body clothing? [x]  1 []  2 []  3 []  4   2. Bathing (including washing, rinsing, drying)? []  1 [x]  2 []  3 []  4   3. Toileting, which includes using toilet, bedpan or urinal? [] 1 []  2 [x]  3 []  4   4. Putting on and taking off regular upper body clothing? []  1 []  2 [x]  3 []  4   5. Taking care of personal grooming such as brushing teeth? []  1 []  2 [x]  3 []  4   6. Eating meals? []  1 []  2 []  3 [x]  4   © 2007, Trustees of [employer], under license to [employer].  All rights reserved     Score: 16/24     Interpretation of Tool: Represents clinically-significant functional categories (i.e. Activities of daily living). Percentage of Impairment CH    0%   CI    1-19% CJ    20-39% CK    40-59% CL    60-79% CM    80-99% CN     100%   [facility]  Score 6-24 24 23 20-22 15-19 10-14 7-9 6     Occupational Therapy Evaluation Charge Determination   History Examination Decision-Making   LOW Complexity : Brief history review  LOW Complexity : 1-3 performance deficits relating to physical, cognitive , or psychosocial skils that result in activity limitations and / or participation restrictions  MEDIUM Complexity : Patient may present with comorbidities that affect occupational performnce. Miniml to moderate modification of tasks or assistance (eg, physical or verbal ) with assesment(s) is necessary to enable patient to complete evaluation       Based on the above components, the patient evaluation is determined to be of the following complexity level: LOW   Pain Rating:  No reports of pain    Activity Tolerance:   Fair and requires rest breaks    After treatment patient left in no apparent distress:    Supine in bed, Call bell within reach, and Side rails x 3, bed locked and in lowest position    COMMUNICATION/EDUCATION:   The patients plan of care was discussed with: Registered nurse. Patient/family have participated as able in goal setting and plan of care. and Patient/family agree to work toward stated goals and plan of care. This patients plan of care is appropriate for delegation to [facility]. Thank you for this referral.  Jane Menard OT  Time Calculation: 14 mins   Problem: Self Care Deficits Care Plan (Adult)  Goal: *Acute Goals and Plan of Care (Insert Text)  Description: FUNCTIONAL STATUS PRIOR TO ADMISSION: Pt reports he was IND w/ functional mobility/ADLs; 2 L of O2 at baseline. Denies falls. HOME SUPPORT: Pt lives by self. No support.     Occupational Therapy Goals  Initiated 12/1/2022    Pt stated goal \"I want to get better\"  Pt will be IND sup <> sit in prep for EOB ADLs  Pt will be IND grooming standing sink side LRAD  Pt will be IND UB dressing sitting EOB/long sit   Pt will be IND LE dressing sitting EOB/long sit  Pt will be IND sit <>  prep for toileting LRAD  Pt will be IND toileting/toilet transfer/cloth mgmt LRAD  Pt will demo'd improved activity tolerance by maintaining >90% SpO2 while completing 3-4 standing ADLs.    Pt will be IND following UE HEP in prep for self care tasks   Outcome: Not Met

## 2022-12-01 NOTE — PROGRESS NOTES
Problem: Falls - Risk of  Goal: *Absence of Falls  Description: Document Monet Garcia Fall Risk and appropriate interventions in the flowsheet. Outcome: Progressing Towards Goal  Note: Fall Risk Interventions:            Medication Interventions: Assess postural VS orthostatic hypotension                   Problem: Falls - Risk of  Goal: *Absence of Falls  Description: Document Tari Fall Risk and appropriate interventions in the flowsheet.   Outcome: Progressing Towards Goal  Note: Fall Risk Interventions:            Medication Interventions: Assess postural VS orthostatic hypotension                   Problem: Patient Education: Go to Patient Education Activity  Goal: Patient/Family Education  Outcome: Progressing Towards Goal

## 2022-12-01 NOTE — PROGRESS NOTES
Problem: Falls - Risk of  Goal: *Absence of Falls  Description: Document Katlyn Ellis Fall Risk and appropriate interventions in the flowsheet.   Outcome: Progressing Towards Goal  Note: Fall Risk Interventions:            Medication Interventions: Evaluate medications/consider consulting pharmacy                   Problem: Patient Education: Go to Patient Education Activity  Goal: Patient/Family Education  Outcome: Progressing Towards Goal

## 2022-12-01 NOTE — PROGRESS NOTES
Found patient ambulating in room on 6lpm n.c. SpO2 89% on 6 lpm n.c while ambulating. Gave nebuilzer treatment with duoneb. Reduced O2 to 4 lpm n.c. SpO2 92% on 4 lpm n.c at rest. Patient very short of breath with increased work of breathing. Patient states that she feels very week. SpO2 92% on 4 lpmnc post neb and at rest.

## 2022-12-01 NOTE — PROGRESS NOTES
General Daily Progress Note          Patient Name:   Ken Felix       YOB: 1960       Age:  63 y.o. Admit Date: 11/26/2022      Subjective:     She was transferred to my service as per patient request    Resting the bed alert awake complaining of coughing shortness of breath no fever no chills    11/30  Patient currently on 4L oxygen, reports 2L at home. Endorses productive cough. Denies headache, fever, chest pain, abdominal pain. CXR unremarkable for any infiltrates. 12/1  Patient is alert and oriented, currently on 5L. Reports an increase O2 need yesterday to 6-7L, is anxious about her lungs. Says she is worried about physical activity due to increase O2 need. Alli chest pain, headache. Objective:     Visit Vitals  BP (!) 161/87   Pulse 83   Temp 97.6 °F (36.4 °C)   Resp 20   Ht 5' 4\" (1.626 m)   Wt 91.2 kg (201 lb)   SpO2 96%   Breastfeeding No   BMI 34.50 kg/m²        Recent Results (from the past 24 hour(s))   GLUCOSE, POC    Collection Time: 11/30/22 11:15 AM   Result Value Ref Range    Glucose (POC) 198 (H) 65 - 100 mg/dL    Performed by Nitin 27, POC    Collection Time: 11/30/22  5:43 PM   Result Value Ref Range    Glucose (POC) 193 (H) 65 - 100 mg/dL    Performed by Christopher Lama      [unfilled]      Review of Systems    Constitutional: Negative for chills and fever. HENT: Negative. Eyes: Negative. Respiratory: Negative. Cardiovascular: Negative. Gastrointestinal: Negative for abdominal pain and nausea. Skin: Negative. Neurological: Negative. Physical Exam:      Constitutional: pt is oriented to person, place, and time. HENT:   Head: Normocephalic and atraumatic. Eyes: Pupils are equal, round, and reactive to light. EOM are normal.   Cardiovascular: Normal rate, regular rhythm and normal heart sounds. Pulmonary/Chest: Breath sounds normal. No wheezes. No rales. Exhibits no tenderness. Abdominal: Soft.  Bowel sounds are normal. There is no abdominal tenderness. There is no rebound and no guarding. Musculoskeletal: Normal range of motion. Neurological: pt is alert and oriented to person, place, and time. XR CHEST SNGL V   Final Result   No Acute Disease. Recent Results (from the past 24 hour(s))   GLUCOSE, POC    Collection Time: 11/30/22 11:15 AM   Result Value Ref Range    Glucose (POC) 198 (H) 65 - 100 mg/dL    Performed by Nitin 27, POC    Collection Time: 11/30/22  5:43 PM   Result Value Ref Range    Glucose (POC) 193 (H) 65 - 100 mg/dL    Performed by Ricarda Riding        Results       Procedure Component Value Units Date/Time    CULTURE, URINE [[id number]] Collected: 12/01/22 0530    Order Status: Sent Specimen: Urine Updated: 12/01/22 0544             Labs:     No results for input(s): WBC, HGB, HCT, PLT, HGBEXT, HCTEXT, PLTEXT, HGBEXT, HCTEXT, PLTEXT in the last 72 hours. No results for input(s): NA, K, CL, CO2, BUN, CREA, GLU, CA, MG, PHOS, URICA in the last 72 hours. No results for input(s): ALT, AP, TBIL, TBILI, TP, ALB, GLOB, GGT, AML, LPSE in the last 72 hours. No lab exists for component: SGOT, GPT, AMYP, HLPSE    No results for input(s): INR, PTP, APTT, INREXT, INREXT in the last 72 hours. No results for input(s): FE, TIBC, PSAT, FERR in the last 72 hours. No results found for: FOL, RBCF   No results for input(s): PH, PCO2, PO2 in the last 72 hours. No results for input(s): CPK, CKNDX, TROIQ in the last 72 hours.     No lab exists for component: CPKMB  No results found for: CHOL, CHOLX, CHLST, CHOLV, HDL, HDLP, LDL, LDLC, DLDLP, TGLX, TRIGL, TRIGP, CHHD, CHHDX  Lab Results   Component Value Date/Time    Glucose (POC) 193 (H) 11/30/2022 05:43 PM    Glucose (POC) 198 (H) 11/30/2022 11:15 AM    Glucose (POC) 143 (H) 11/30/2022 07:40 AM    Glucose (POC) 165 (H) 11/29/2022 11:04 PM    Glucose (POC) 253 (H) 11/29/2022 04:10 PM     Lab Results   Component Value Date/Time    Color Yellow/Straw 11/26/2022 02:57 PM    Appearance Clear 11/26/2022 02:57 PM    Specific gravity 1.009 11/26/2022 02:57 PM    Specific gravity 1.019 08/10/2021 03:00 PM    pH (UA) 5.0 11/26/2022 02:57 PM    Protein Negative 11/26/2022 02:57 PM    Glucose Negative 11/26/2022 02:57 PM    Ketone Negative 11/26/2022 02:57 PM    Bilirubin Negative 11/26/2022 02:57 PM    Urobilinogen 0.1 11/26/2022 02:57 PM    Nitrites Negative 11/26/2022 02:57 PM    Leukocyte Esterase Trace (A) 11/26/2022 02:57 PM    Bacteria Negative 11/26/2022 02:57 PM    WBC 0-4 11/26/2022 02:57 PM    RBC 0-5 11/26/2022 02:57 PM         Assessment:   Acute on chronic hypoxic respiratory failure  Acute exacerbation of COPD  Hypertension  Cough  Hyperglycemia due to steroids  UTI    Plan:   Pharmacy recommends d/c Rocephin  Continue DuoNeb nebulizer and Atrovent  Oxygen prn - wean as tolerated  On Empiric Zithromax  On IV Solu-Medrol  Mucinex BID  Robitussin with codeine prn for cough  DVT proph: Lovenox     PT/OT if patient oxygen saturation stable   Pulmonary and Urology following - appreciate recs    Possible discharge in next 24 hours      Current Facility-Administered Medications:     hydrOXYzine pamoate (VISTARIL) capsule 25 mg, 25 mg, Oral, Q6H PRN, Kami Funes MD, 25 mg at 12/01/22 0432    insulin lispro (HUMALOG) injection, , SubCUTAneous, AC&HS, Kami Funes MD, 2 Units at 11/30/22 2132    glucose chewable tablet 16 g, 4 Tablet, Oral, PRN, Kami Funes MD    glucagon (GLUCAGEN) injection 1 mg, 1 mg, IntraMUSCular, PRN, Kami Funes MD    guaiFENesin-codeine (ROBITUSSIN AC) 100-10 mg/5 mL solution 5 mL, 5 mL, Oral, Q6H PRN, Henok Garza MD, 5 mL at 12/01/22 0432    guaiFENesin ER (MUCINEX) tablet 600 mg, 600 mg, Oral, Q12H, Henok Garza MD, 600 mg at 11/30/22 7833    methylPREDNISolone (PF) (SOLU-MEDROL) injection 60 mg, 60 mg, IntraVENous, Q6H, North Hamm MD, 60 mg at 12/01/22 9901 albuterol-ipratropium (DUO-NEB) 2.5 MG-0.5 MG/3 ML, 3 mL, Nebulization, Q6H RT, Laurice Prader, MD, 3 mL at 12/01/22 0739    busPIRone (BUSPAR) tablet 5 mg, 5 mg, Oral, BID, Ritu Pepe MD, 5 mg at 11/30/22 2133    amLODIPine (NORVASC) tablet 10 mg, 10 mg, Oral, DAILY, Ritu Pepe MD, 10 mg at 11/30/22 3853    aspirin chewable tablet 81 mg, 81 mg, Oral, DAILY, North Hamm MD, 81 mg at 11/30/22 0947    calcium-vitamin D 600 mg-5 mcg (200 unit) per tablet 1 Tablet, 1 Tablet, Oral, DAILY, North Hamm MD, 1 Tablet at 11/30/22 0947    carvediloL (COREG) tablet 6.25 mg, 6.25 mg, Oral, BID WITH MEALS, North Hamm MD, 6.25 mg at 11/30/22 1712    docusate sodium (COLACE) capsule 100 mg, 100 mg, Oral, BID, Leslie NICHOLS MD, 100 mg at 11/30/22 0947    fluticasone propionate (FLONASE) 50 mcg/actuation nasal spray 2 Spray, 2 Spray, Both Nostrils, DAILY, North Hamm MD, 2 [place] at 11/27/22 1024    furosemide (LASIX) tablet 20 mg, 20 mg, Oral, DAILY, North Hamm MD, 20 mg at 11/30/22 0947    insulin glargine (LANTUS) injection 15 Units, 15 Units, SubCUTAneous, QHS, North Hamm MD, 15 Units at 11/30/22 2132    budesonide-formoteroL (SYMBICORT) 160-4.5 mcg/actuation HFA inhaler 2 Puff, 2 Puff, Inhalation, BID, Leslie NICHOLS MD, 2 Puff at 12/01/22 0746    montelukast (SINGULAIR) tablet 10 mg, 10 mg, Oral, DAILY, North Hamm MD, 10 mg at 11/30/22 0947    azithromycin (ZITHROMAX) tablet 500 mg, 500 mg, Oral, DAILY, North Hamm MD, 500 mg at 11/30/22 0947    sodium chloride (NS) flush 5-40 mL, 5-40 mL, IntraVENous, Q8H, North Hamm MD, 10 mL at 12/01/22 0557    sodium chloride (NS) flush 5-40 mL, 5-40 mL, IntraVENous, PRN, North Car MD    acetaminophen (TYLENOL) tablet 650 mg, 650 mg, Oral, Q6H PRN, 650 mg at 11/29/22 1004 **OR** acetaminophen (TYLENOL) suppository 650 mg, 650 mg, Rectal, Q6H PRN, Leslie NICHOLS MD    polyethylene glycol (MIRALAX) packet 17 g, 17 g, Oral, DAILY PRN, North Hamm MD    ondansetron (ZOFRAN ODT) tablet 4 mg, 4 mg, Oral, Q8H PRN **OR** ondansetron (ZOFRAN) injection 4 mg, 4 mg, IntraVENous, Q6H PRN, North Hamm MD    enoxaparin (LOVENOX) injection 40 mg, 40 mg, SubCUTAneous, DAILY, North Hamm MD, 40 mg at 11/30/22 0946    pantoprazole (PROTONIX) tablet 40 mg, 40 mg, Oral, ACB&D, Lovely NICHOLS MD, 40 mg at 11/30/22 1710    cetirizine (ZYRTEC) tablet 10 mg, 10 mg, Oral, DAILY, North Hamm MD, 10 mg at 11/30/22 4020

## 2022-12-01 NOTE — PROGRESS NOTES
Problem: Mobility Impaired (Adult and Pediatric)  Goal: *Acute Goals and Plan of Care (Insert Text)  Description: I with LE HEP x7 days  Mod I with bed mob x7 days  Mod I with all transfers x7 days  Amb 75-100ft with LRAD and SBAx1 x7 days    Pt stated goal: to get better  Outcome: Not Met    PHYSICAL THERAPY EVALUATION  Patient: Wai Lynne (62 y.o. female)  Date: 12/1/2022  Primary Diagnosis: COPD exacerbation ([facility] [J44.1]       Precautions:        ASSESSMENT    Pt is a 63 y.o. female admitted to hospital with COPD exac presents to PT with decreased bed mob, transfers, LE strength, gt, activity tolerance, and overall functional mobility. Pt supine in bed upon PT arrival, agreeable to evaluation. Pt A&O x 4. PLOF listed below. Based on the objective data described below, the patient presents with generalized weakness, impaired functional mobility, impaired amb, impaired balance, and decreased activity tolerance. (See below for objective details and assist levels). Overall pt tolerated session fair today with overall CGA with bed mob and transfers. Pt was able to amb in room on 5L O2 via NC. Pt fatigues very quickly and req inc cuing for breathing technique. Pt SpO2 95% prior to mobility, dropped down to upper 80s post amb in room and to and from bathroom. Pt will benefit from continued skilled PT to address above deficits and return to PLOF. Current PT DC recommendation Meng Mclean once medically appropriate. Pt currently lives in [facility] and states she does not have any help and it has been difficult for her to get around recently.        PLAN :  Recommendations and Planned Interventions: bed mobility training, transfer training, gait training, therapeutic exercises, patient and family training/education, and therapeutic activities      Recommend for staff: Out of bed to chair for meals, Encourage HEP in prep for ADLs/mobility, Amb to bathroom for toileting with gt belt and AD, and Amb in hallway    Frequency/Duration: Patient will be followed by physical therapy:  2-3x/week to address goals. Recommendation for discharge: (in order for the patient to meet his/her long term goals)  [address]:   Patient supine in bed upon PT arrival, agreeable to work with PT    OBJECTIVE DATA SUMMARY:   HISTORY:    Past Medical History:   Diagnosis Date    Breast CA ([facility] [address]     Chronic obstructive pulmonary disease ([facility] [address]     Hypertension      Past Surgical History:   Procedure Laterality Date    COLONOSCOPY N/A 7/16/2021    .  performed by Urban Dash MD at [address]      HX MASTECTOMY Left        Home Situation  Home Environment: Shelter (rooming house)  # Steps to Enter: 0  One/Two Story Residence: Two story  # of Interior Steps: 4  Height of Each Step (in): 1 inches  Interior Rails: Both  Lift Chair Available: No  Living Alone: Yes  Support Systems: No Support Systems  Patient Expects to be Discharged to[de-identified] Skilled nursing facility  Current DME Used/Available at Home: Oxygen, portable    Personal factors and/or comorbidities impacting plan of care:     Home Situation  Home Environment: Shelter (rooming house)  # Steps to Enter: 0  One/Two Story Residence: Two story  # of Interior Steps: 4  Height of Each Step (in): 1 inches  Interior Rails: Both  Lift Chair Available: No  Living Alone: Yes  Support Systems: No Support Systems  Patient Expects to be Discharged to[de-identified] Skilled nursing facility  Current DME Used/Available at Home: Oxygen, portable    EXAMINATION/PRESENTATION/DECISION MAKING:   Critical Behavior:  Neurologic State: Alert  Orientation Level: Oriented X4          Skin:  intact where exposed    Edema: none noted    Range Of Motion:  AROM: Within functional limits   B LE        Strength:    Strength: Generally decreased, functional  Grossly 4-/5 B LE        Tone & Sensation:     Sensation: Intact      Functional Mobility:  Bed Mobility:     Supine to Sit: Contact guard assistance  Sit to Supine: Contact guard assistance  Scooting: Contact guard assistance    Transfers:  Sit to Stand: Contact guard assistance  Stand to Sit: Contact guard assistance  Stand Pivot Transfers: Contact guard assistance          Balance:   Sitting: Intact  Standing: Impaired  Standing - Static: Fair  Standing - Dynamic : Fair    Ambulation/Gait Training:  Distance (ft): 35 Feet (ft)  Assistive Device: Gait belt  Ambulation - Level of Assistance: Contact guard assistance      Functional Measure:  [facility] 6 Clicks         Basic Mobility Inpatient Short Form  How much difficulty does the patient currently have. .. Unable A Lot A Little None   1. Turning over in bed (including adjusting bedclothes, sheets and blankets)? [] 1   [] 2   [x] 3   [] 4   2. Sitting down on and standing up from a chair with arms ( e.g., wheelchair, bedside commode, etc.)   [] 1   [] 2   [x] 3   [] 4   3. Moving from lying on back to sitting on the side of the bed? [] 1   [] 2   [x] 3   [] 4          How much help from another person does the patient currently need. .. Total A Lot A Little None   4. Moving to and from a bed to a chair (including a wheelchair)? [] 1   [] 2   [x] 3   [] 4   5. Need to walk in hospital room? [] 1   [] 2   [x] 3   [] 4   6. Climbing 3-5 steps with a railing? [] 1   [] 2   [x] 3   [] 4   © 2007, Trustees of [facility], under license to "[employer]". All rights reserved     Score:  Initial: 18 Most Recent: X (Date: -- )   Interpretation of Tool:  Represents activities that are increasingly more difficult (i.e. Bed mobility, Transfers, Gait).   Score 24 23 22-20 19-15 14-10 9-7 6   Modifier CH CI CJ CK CL CM CN           Physical Therapy Evaluation Charge Determination   History Examination Presentation Decision-Making   MEDIUM  Complexity : 1-2 comorbidities / personal factors will impact the outcome/ POC  MEDIUM Complexity : 3 Standardized tests and measures addressing body structure, function, activity limitation and / or participation in recreation  MEDIUM Complexity : Evolving with changing characteristics  Other Functional Measure AMPAC 6 MED      Based on the above components, the patient evaluation is determined to be of the following complexity level: MEDIUM    Pain Rating:  No c/o pain during session    Activity Tolerance:   Fair    After treatment patient left in no apparent distress:   Bed locked and in lowest position Supine in bed and Call bell within reach         COMMUNICATION/EDUCATION:   The patients plan of care was discussed with: Registered nurse. Fall prevention education was provided and the patient/caregiver indicated understanding. and Patient/family agree to work toward stated goals and plan of care.       Thank you for this referral.  Tata Coon   Time Calculation: 20 mins

## 2022-12-02 LAB
BACTERIA SPEC CULT: NORMAL
GLUCOSE BLD STRIP.AUTO-MCNC: 150 MG/DL (ref 65–100)
GLUCOSE BLD STRIP.AUTO-MCNC: 167 MG/DL (ref 65–100)
GLUCOSE BLD STRIP.AUTO-MCNC: 174 MG/DL (ref 65–100)
GLUCOSE BLD STRIP.AUTO-MCNC: 229 MG/DL (ref 65–100)
GLUCOSE BLD STRIP.AUTO-MCNC: 262 MG/DL (ref 65–100)
PERFORMED BY, TECHID: ABNORMAL
SPECIAL REQUESTS,SREQ: NORMAL

## 2022-12-02 PROCEDURE — 74011250636 HC RX REV CODE- 250/636: Performed by: INTERNAL MEDICINE

## 2022-12-02 PROCEDURE — 74011250637 HC RX REV CODE- 250/637: Performed by: FAMILY MEDICINE

## 2022-12-02 PROCEDURE — 74011636637 HC RX REV CODE- 636/637: Performed by: FAMILY MEDICINE

## 2022-12-02 PROCEDURE — 82962 GLUCOSE BLOOD TEST: CPT

## 2022-12-02 PROCEDURE — 74011000250 HC RX REV CODE- 250: Performed by: INTERNAL MEDICINE

## 2022-12-02 PROCEDURE — 74011250637 HC RX REV CODE- 250/637: Performed by: INTERNAL MEDICINE

## 2022-12-02 PROCEDURE — 94761 N-INVAS EAR/PLS OXIMETRY MLT: CPT

## 2022-12-02 PROCEDURE — 77010033678 HC OXYGEN DAILY

## 2022-12-02 PROCEDURE — 74011636637 HC RX REV CODE- 636/637: Performed by: INTERNAL MEDICINE

## 2022-12-02 PROCEDURE — 65270000029 HC RM PRIVATE

## 2022-12-02 PROCEDURE — 94640 AIRWAY INHALATION TREATMENT: CPT

## 2022-12-02 RX ORDER — AZITHROMYCIN 500 MG/1
500 TABLET, FILM COATED ORAL DAILY
Qty: 3 TABLET | Refills: 0 | Status: SHIPPED | OUTPATIENT
Start: 2022-12-03 | End: 2022-12-06

## 2022-12-02 RX ORDER — INSULIN GLARGINE 100 [IU]/ML
INJECTION, SOLUTION SUBCUTANEOUS
Qty: 1 ML | Refills: 2 | Status: SHIPPED | OUTPATIENT
Start: 2022-12-02

## 2022-12-02 RX ORDER — BUSPIRONE HYDROCHLORIDE 5 MG/1
5 TABLET ORAL 2 TIMES DAILY
Qty: 60 TABLET | Refills: 0 | Status: SHIPPED | OUTPATIENT
Start: 2022-12-02

## 2022-12-02 RX ORDER — CETIRIZINE HYDROCHLORIDE 10 MG/1
10 TABLET ORAL DAILY
Qty: 20 TABLET | Refills: 0 | Status: SHIPPED | OUTPATIENT
Start: 2022-12-03

## 2022-12-02 RX ADMIN — AZITHROMYCIN MONOHYDRATE 500 MG: 500 TABLET ORAL at 09:38

## 2022-12-02 RX ADMIN — Medication 1 UNITS: at 12:28

## 2022-12-02 RX ADMIN — CETIRIZINE HYDROCHLORIDE 10 MG: 10 TABLET, FILM COATED ORAL at 09:38

## 2022-12-02 RX ADMIN — HYDROXYZINE PAMOATE 25 MG: 25 CAPSULE ORAL at 21:19

## 2022-12-02 RX ADMIN — METHYLPREDNISOLONE SODIUM SUCCINATE 60 MG: 40 INJECTION, POWDER, FOR SOLUTION INTRAMUSCULAR; INTRAVENOUS at 10:53

## 2022-12-02 RX ADMIN — CARVEDILOL 6.25 MG: 3.12 TABLET, FILM COATED ORAL at 17:17

## 2022-12-02 RX ADMIN — BUSPIRONE HYDROCHLORIDE 5 MG: 5 TABLET ORAL at 20:56

## 2022-12-02 RX ADMIN — FUROSEMIDE 20 MG: 40 TABLET ORAL at 09:38

## 2022-12-02 RX ADMIN — DOCUSATE SODIUM 100 MG: 100 CAPSULE, LIQUID FILLED ORAL at 09:38

## 2022-12-02 RX ADMIN — IPRATROPIUM BROMIDE AND ALBUTEROL SULFATE 3 ML: 2.5; .5 SOLUTION RESPIRATORY (INHALATION) at 19:11

## 2022-12-02 RX ADMIN — BUDESONIDE AND FORMOTEROL FUMARATE DIHYDRATE 2 PUFF: 160; 4.5 AEROSOL RESPIRATORY (INHALATION) at 19:11

## 2022-12-02 RX ADMIN — Medication 1 UNITS: at 07:10

## 2022-12-02 RX ADMIN — METHYLPREDNISOLONE SODIUM SUCCINATE 60 MG: 40 INJECTION, POWDER, FOR SOLUTION INTRAMUSCULAR; INTRAVENOUS at 04:57

## 2022-12-02 RX ADMIN — SODIUM CHLORIDE, PRESERVATIVE FREE 10 ML: 5 INJECTION INTRAVENOUS at 07:12

## 2022-12-02 RX ADMIN — PANTOPRAZOLE SODIUM 40 MG: 40 TABLET, DELAYED RELEASE ORAL at 07:10

## 2022-12-02 RX ADMIN — IPRATROPIUM BROMIDE AND ALBUTEROL SULFATE 3 ML: 2.5; .5 SOLUTION RESPIRATORY (INHALATION) at 01:49

## 2022-12-02 RX ADMIN — Medication 3 UNITS: at 21:00

## 2022-12-02 RX ADMIN — BUSPIRONE HYDROCHLORIDE 5 MG: 5 TABLET ORAL at 09:38

## 2022-12-02 RX ADMIN — Medication 1 TABLET: at 09:37

## 2022-12-02 RX ADMIN — IPRATROPIUM BROMIDE AND ALBUTEROL SULFATE 3 ML: 2.5; .5 SOLUTION RESPIRATORY (INHALATION) at 14:02

## 2022-12-02 RX ADMIN — METHYLPREDNISOLONE SODIUM SUCCINATE 60 MG: 125 INJECTION, POWDER, FOR SOLUTION INTRAMUSCULAR; INTRAVENOUS at 21:01

## 2022-12-02 RX ADMIN — GUAIFENESIN 600 MG: 600 TABLET, EXTENDED RELEASE ORAL at 20:56

## 2022-12-02 RX ADMIN — SODIUM CHLORIDE, PRESERVATIVE FREE 10 ML: 5 INJECTION INTRAVENOUS at 17:18

## 2022-12-02 RX ADMIN — BUDESONIDE AND FORMOTEROL FUMARATE DIHYDRATE 2 PUFF: 160; 4.5 AEROSOL RESPIRATORY (INHALATION) at 07:50

## 2022-12-02 RX ADMIN — GUAIFENESIN AND CODEINE PHOSPHATE 5 ML: 10; 100 LIQUID ORAL at 21:19

## 2022-12-02 RX ADMIN — ENOXAPARIN SODIUM 40 MG: 100 INJECTION SUBCUTANEOUS at 09:37

## 2022-12-02 RX ADMIN — ASPIRIN 81 MG 81 MG: 81 TABLET ORAL at 09:37

## 2022-12-02 RX ADMIN — PANTOPRAZOLE SODIUM 40 MG: 40 TABLET, DELAYED RELEASE ORAL at 17:17

## 2022-12-02 RX ADMIN — CARVEDILOL 6.25 MG: 3.12 TABLET, FILM COATED ORAL at 09:38

## 2022-12-02 RX ADMIN — IPRATROPIUM BROMIDE AND ALBUTEROL SULFATE 3 ML: 2.5; .5 SOLUTION RESPIRATORY (INHALATION) at 07:50

## 2022-12-02 RX ADMIN — INSULIN GLARGINE 15 UNITS: 100 INJECTION, SOLUTION SUBCUTANEOUS at 21:00

## 2022-12-02 RX ADMIN — MONTELUKAST 10 MG: 10 TABLET, FILM COATED ORAL at 09:38

## 2022-12-02 RX ADMIN — Medication 2 UNITS: at 17:17

## 2022-12-02 RX ADMIN — SODIUM CHLORIDE, PRESERVATIVE FREE 10 ML: 5 INJECTION INTRAVENOUS at 21:01

## 2022-12-02 RX ADMIN — GUAIFENESIN 600 MG: 600 TABLET, EXTENDED RELEASE ORAL at 09:38

## 2022-12-02 RX ADMIN — AMLODIPINE BESYLATE 10 MG: 5 TABLET ORAL at 09:38

## 2022-12-02 NOTE — PROGRESS NOTES
Problem: Falls - Risk of  Goal: *Absence of Falls  Description: Document Mckinley Blum Fall Risk and appropriate interventions in the flowsheet.   Outcome: Progressing Towards Goal  Note: Fall Risk Interventions:            Medication Interventions: Assess postural VS orthostatic hypotension                   Problem: Patient Education: Go to Patient Education Activity  Goal: Patient/Family Education  Outcome: Progressing Towards Goal     Problem: Patient Education: Go to Patient Education Activity  Goal: Patient/Family Education  Outcome: Progressing Towards Goal     Problem: Patient Education: Go to Patient Education Activity  Goal: Patient/Family Education  Outcome: Progressing Towards Goal

## 2022-12-02 NOTE — PROGRESS NOTES
Pulmonary/ CC progress note    Subjective:   Date of Consultation:  December 2, 2022  Referring Physician: Eden Almendarez MD    Patient seen and examined  Overnight events noted    Patient admits that she is feeling a little better. However she has periods of dyspnea particularly after she goes to the bathroom. It takes her about 5 minutes to recover. She is now on 3 L of oxygen. Small amount of sputum production. The patient is currently apprehensive about her upcoming discharge. Apparently she is for discharge but [facility]. Her sister works over there as well. This is not unreasonable. Discussed with case management and she is working on authorization. Prior to Admission medications    Medication Sig Start Date End Date Taking? Authorizing Provider   insulin glargine (LANTUS) 100 unit/mL injection 15 units 12/2/22  Yes Yuri Bajwa MD   azithromycin (ZITHROMAX) 500 mg tab Take 1 Tablet by mouth daily for 3 days. 12/3/22 12/6/22 Yes Gene Funes MD   busPIRone (BUSPAR) 5 mg tablet Take 1 Tablet by mouth two (2) times a day. 12/2/22  Yes Yuri Bajwa MD   cetirizine (ZYRTEC) 10 mg tablet Take 1 Tablet by mouth daily. 12/3/22  Yes Gene Funes MD   docusate sodium (Colace) 100 mg capsule Take 1 Capsule by mouth two (2) times a day for 90 days. 11/21/22 2/19/23  Saad Loo MD   carvediloL (COREG) 6.25 mg tablet Take 1 Tablet by mouth two (2) times daily (with meals). 8/13/21   Gene Funes MD   furosemide (Lasix) 40 mg tablet Lasix 40 mg daily 8/13/21   Yuri Bajwa MD   insulin glargine (LANTUS) 100 unit/mL injection As directed 8/14/21   Yuri Bajwa MD   pantoprazole (PROTONIX) 40 mg tablet Take 1 Tablet by mouth Daily (before breakfast).  8/14/21   Gene Funes MD   predniSONE (DELTASONE) 10 mg tablet 1 tablet daily 8/13/21   Yuri Bajwa MD   albuterol-ipratropium (DUO-NEB) 2.5 mg-0.5 mg/3 ml nebu 3 mL by Nebulization route every six (6) hours as needed for Wheezing. 20   Cony Funes MD   aspirin 81 mg chewable tablet Take 1 Tab by mouth daily. 20   Cony Funes MD   guaiFENesin ER (MUCINEX) 600 mg ER tablet Take 1 Tab by mouth two (2) times a day. 20   Cony Funes MD   amLODIPine (NORVASC) 10 mg tablet Take 10 mg by mouth daily. Provider, Historical   mometasone-formoterol (DULERA) 200-5 mcg/actuation HFA inhaler Take 2 Puffs by inhalation two (2) times a day. Provider, Historical   montelukast (SINGULAIR) 10 mg tablet Take 10 mg by mouth daily. Provider, Historical   Calcium-Cholecalciferol, D3, 500 mg(1,250mg) -400 unit tab Take  by mouth daily. Provider, Historical   benzonatate (TESSALON) 100 mg capsule Take 100 mg by mouth once. Provider, Historical   fluticasone propionate (FLONASE) 50 mcg/actuation nasal spray 2 Sprays by Both Nostrils route daily. Provider, Historical   ergocalciferol (Vitamin D2) 1,250 mcg (50,000 unit) capsule Take 50,000 Units by mouth every seven (7) days. Q7days on Monday    Provider, Historical     Allergies   Allergen Reactions    Lisinopril Angioedema        Review of Systems:  A comprehensive review of systems was negative except for that written in the History of Present Illness. Objective:   Blood pressure (!) 147/88, pulse 76, temperature 97.7 °F (36.5 °C), resp. rate 19, height 5' 4\" (1.626 m), weight 91.2 kg (201 lb), SpO2 98 %, not currently breastfeeding. Temp (24hrs), Av.9 °F (36.6 °C), Min:97.7 °F (36.5 °C), Max:98.1 °F (36.7 °C)    XR CHEST SNGL V   Final Result   No Acute Disease. Data Review: CBC:   No results for input(s): WBC, RBC, HGB, HCT, PLT, GRANS, LYMPH, EOS, HGBEXT, HCTEXT, PLTEXT in the last 72 hours. CMP:   No results for input(s): GLU, NA, K, CL, CO2, BUN, CREA, CA, AGAP, BUCR, TBIL, AP, TP, ALB, GLOB, AGRAT in the last 72 hours.     No lab exists for component: GPT    Liver Enzymes:   No results for input(s): TP, ALB, TBIL, AP in the last 72 hours. No lab exists for component: SGOT, GPT, DBIL    ABGs: No results for input(s): PH, PCO2, PO2, HCO3 in the last 72 hours.   Inflammation studies: No results found for: SR, ESRA, CRP  Current Facility-Administered Medications   Medication Dose Route Frequency    hydrOXYzine pamoate (VISTARIL) capsule 25 mg  25 mg Oral Q6H PRN    insulin lispro (HUMALOG) injection   SubCUTAneous AC&HS    glucose chewable tablet 16 g  4 Tablet Oral PRN    glucagon (GLUCAGEN) injection 1 mg  1 mg IntraMUSCular PRN    guaiFENesin-codeine (ROBITUSSIN AC) 100-10 mg/5 mL solution 5 mL  5 mL Oral Q6H PRN    guaiFENesin ER (MUCINEX) tablet 600 mg  600 mg Oral Q12H    methylPREDNISolone (PF) (SOLU-MEDROL) injection 60 mg  60 mg IntraVENous Q6H    albuterol-ipratropium (DUO-NEB) 2.5 MG-0.5 MG/3 ML  3 mL Nebulization Q6H RT    busPIRone (BUSPAR) tablet 5 mg  5 mg Oral BID    amLODIPine (NORVASC) tablet 10 mg  10 mg Oral DAILY    aspirin chewable tablet 81 mg  81 mg Oral DAILY    calcium-vitamin D 600 mg-5 mcg (200 unit) per tablet 1 Tablet  1 Tablet Oral DAILY    carvediloL (COREG) tablet 6.25 mg  6.25 mg Oral BID WITH MEALS    docusate sodium (COLACE) capsule 100 mg  100 mg Oral BID    fluticasone propionate (FLONASE) 50 mcg/actuation nasal spray 2 Spray  2 Spray Both Nostrils DAILY    furosemide (LASIX) tablet 20 mg  20 mg Oral DAILY    insulin glargine (LANTUS) injection 15 Units  15 Units SubCUTAneous QHS    budesonide-formoteroL (SYMBICORT) 160-4.5 mcg/actuation HFA inhaler 2 Puff  2 Puff Inhalation BID    montelukast (SINGULAIR) tablet 10 mg  10 mg Oral DAILY    azithromycin (ZITHROMAX) tablet 500 mg  500 mg Oral DAILY    sodium chloride (NS) flush 5-40 mL  5-40 mL IntraVENous Q8H    sodium chloride (NS) flush 5-40 mL  5-40 mL IntraVENous PRN    acetaminophen (TYLENOL) tablet 650 mg  650 mg Oral Q6H PRN    Or    acetaminophen (TYLENOL) suppository 650 mg  650 mg Rectal Q6H PRN polyethylene glycol (MIRALAX) packet 17 g  17 g Oral DAILY PRN    ondansetron (ZOFRAN ODT) tablet 4 mg  4 mg Oral Q8H PRN    Or    ondansetron (ZOFRAN) injection 4 mg  4 mg IntraVENous Q6H PRN    enoxaparin (LOVENOX) injection 40 mg  40 mg SubCUTAneous DAILY    pantoprazole (PROTONIX) tablet 40 mg  40 mg Oral ACB&D    cetirizine (ZYRTEC) tablet 10 mg  10 mg Oral DAILY        Exam:      This is middle aged female obese. Alert and oriented x3. Head normocephalic and atraumatic, pupils are round responsive to light sclera icteric and conjunctive are pink. JVD is absent. Chest: Bilateral expiratory wheezing audible. Prolonged phase of expiration, however air entry is currently diminished than yesterday. Heart: S1-S2 normal  Abdomen: Soft, nontender, no visceromegaly  Extremities: No edema, sinus or clubbing  Neuro: No focal motor deficit. Impression:   Ms. Rohit Carbajal is a 63years old female who is known to have history of COPD, hypertension, chronic hypoxia on supplemental oxygen. She additionally history of breast CVA with complete remission. She follows with Dr. Ruelas Roch her office. Last seen about 3 months ago. She is admitted to hospital because of increasing symptoms of cough, shortness of breath and wheezing. She used her inhalers and nebulizer but without any improvement. X-rays unremarkable for any infiltrates    Plan:   1. Acute on chronic hypoxic respiratory failure: This is secondary to acute exacerbation of COPD. She is currently on 3 L oxygen. Wean down to keep saturation more than 90%. 2.  Acute exacerbation of COPD:  Chest x-ray is unremarkable for any infiltrates. Patient got started on nebulized albuterol and Atrovent along with systemic steroids. She is also empirically started on azithromycin and Rocephin. However Rocephin was discontinued. Continue with Symbicort 160/4.5, 2 inhalations twice daily. Additionally she is on Singulair.   Continue with Mucinex around-the-clock and Robitussin with codeine as needed. 3.  Hypertension :  Continue antihypertensive medication    4. DVT prophylaxis with Lovenox subcutaneously. There appears to be element of anxiety. Agree with starting her on BuSpar. She is slowly improving. She may benefit from placement to skilled nursing facility or rehab. The patient is currently apprehensive about her upcoming discharge. Apparently she is for discharge but would [facility] to [facility]. Her sister works over there as well. This is not unreasonable. Discussed with case management and she is working on authorization. Questions of patient were answered at bedside in detail  Case discussed in detail with RN, RT, and care team  Thank you for involving me in the care of this patient  I will follow with you closely during hospitalization    Time spent more than 30 minutes reviewing results and records, decision making, and answering questions.     Fay Reavse MD  Pulmonary/CC

## 2022-12-02 NOTE — DISCHARGE SUMMARY
Discharge Summary       PATIENT ID: Linda Estrada  MRN: [id number]   YOB: 1960    DATE OF ADMISSION: 11/26/2022  1:08 PM    DATE OF DISCHARGE:   PRIMARY CARE PROVIDER: Maximo Escobedo MD     ATTENDING PHYSICIAN: Cecilia Funes  DISCHARGING PROVIDER: Cecilia Funes      CONSULTATIONS: IP CONSULT TO PULMONOLOGY  IP CONSULT TO UROLOGY    PROCEDURES/SURGERIES: * No surgery found *    ADMITTING DIAGNOSES:    Patient Active Problem List    Diagnosis Date Noted    Respiratory failure ([facility] [address] 08/08/2021    COPD exacerbation ([facility] [address] 08/08/2021    [facility] 12/13/2020    Hypoxia 12/13/2020       DISCHARGE DIAGNOSES / PLAN:      Acute on chronic hypoxic respiratory failure  Acute exacerbation of COPD  Hypertension  Cough  Hyperglycemia due to steroids  UTI        DISCHARGE MEDICATIONS:  Current Discharge Medication List        START taking these medications    Details   azithromycin (ZITHROMAX) 500 mg tab Take 1 Tablet by mouth daily for 3 days. Qty: 3 Tablet, Refills: 0  Start date: 12/3/2022, End date: 12/6/2022      busPIRone (BUSPAR) 5 mg tablet Take 1 Tablet by mouth two (2) times a day. Qty: 60 Tablet, Refills: 0  Start date: 12/2/2022      cetirizine (ZYRTEC) 10 mg tablet Take 1 Tablet by mouth daily. Qty: 20 Tablet, Refills: 0  Start date: 12/3/2022           CONTINUE these medications which have CHANGED    Details   insulin glargine (LANTUS) 100 unit/mL injection 15 units  Qty: 1 mL, Refills: 2  Start date: 12/2/2022           CONTINUE these medications which have NOT CHANGED    Details   docusate sodium (Colace) 100 mg capsule Take 1 Capsule by mouth two (2) times a day for 90 days. Qty: 60 Capsule, Refills: 2      carvediloL (COREG) 6.25 mg tablet Take 1 Tablet by mouth two (2) times daily (with meals).   Qty: 60 Tablet, Refills: 0      furosemide (Lasix) 40 mg tablet Lasix 40 mg daily  Qty: 30 Tablet, Refills: 0      pantoprazole (PROTONIX) 40 mg tablet Take 1 Tablet by mouth Daily (before breakfast). Qty: 60 Tablet, Refills: 0      predniSONE (DELTASONE) 10 mg tablet 1 tablet daily  Qty: 15 Tablet, Refills: 0      albuterol-ipratropium (DUO-NEB) 2.5 mg-0.5 mg/3 ml nebu 3 mL by Nebulization route every six (6) hours as needed for Wheezing. Qty: 30 Nebule, Refills: 1      aspirin 81 mg chewable tablet Take 1 Tab by mouth daily. Qty: 30 Tab, Refills: 0      guaiFENesin ER (MUCINEX) 600 mg ER tablet Take 1 Tab by mouth two (2) times a day. Qty: 30 Tab, Refills: 0      amLODIPine (NORVASC) 10 mg tablet Take 10 mg by mouth daily. mometasone-formoterol (DULERA) 200-5 mcg/actuation HFA inhaler Take 2 Puffs by inhalation two (2) times a day. montelukast (SINGULAIR) 10 mg tablet Take 10 mg by mouth daily. Calcium-Cholecalciferol, D3, 500 mg(1,250mg) -400 unit tab Take  by mouth daily. fluticasone propionate (FLONASE) 50 mcg/actuation nasal spray 2 Sprays by Both Nostrils route daily. ergocalciferol (Vitamin D2) 1,250 mcg (50,000 unit) capsule Take 50,000 Units by mouth every seven (7) days. Q7days on Monday           STOP taking these medications       cephALEXin (Keflex) 500 mg capsule Comments:   Reason for Stopping:         benzonatate (TESSALON) 100 mg capsule Comments:   Reason for Stopping:                 NOTIFY YOUR PHYSICIAN FOR ANY OF THE FOLLOWING:   Fever over 101 degrees for 24 hours. Chest pain, shortness of breath, fever, chills, nausea, vomiting, diarrhea, change in mentation, falling, weakness, bleeding. Severe pain or pain not relieved by medications. Or, any other signs or symptoms that you may have questions about. DISPOSITION:  x  Home With:   OT  PT  HH  RN       Long term SNF/Inpatient Rehab    Independent/assisted living    Hospice    Other:       PATIENT CONDITION AT DISCHARGE: Stable      PHYSICAL EXAMINATION AT DISCHARGE:  General:          Alert, cooperative, no distress, appears stated age.      HEENT: Atraumatic, anicteric sclerae, pink conjunctivae                          No oral ulcers, mucosa moist, throat clear, dentition fair  Neck:               Supple, symmetrical  Lungs:             Clear to auscultation bilaterally. No Wheezing or Rhonchi. No rales. Chest wall:      No tenderness  No Accessory muscle use. Heart:              Regular  rhythm,  No  murmur   No edema  Abdomen:        Soft, non-tender. Not distended. Bowel sounds normal  Extremities:     No cyanosis. No clubbing,                            Skin turgor normal, Capillary refill normal  Skin:                Not pale. Not Jaundiced  No rashes   Psych:             Not anxious or agitated. Neurologic:      Alert, moves all extremities, answers questions appropriately and responds to commands     XR CHEST SNGL V   Final Result   No Acute Disease.               Recent Results (from the past 24 hour(s))   GLUCOSE, POC    Collection Time: 12/01/22 11:29 AM   Result Value Ref Range    Glucose (POC) 236 (H) 65 - 100 mg/dL    Performed by Loretta Dash    GLUCOSE, POC    Collection Time: 12/01/22  4:15 PM   Result Value Ref Range    Glucose (POC) 310 (H) 65 - 100 mg/dL    Performed by Yudelka Roberts    GLUCOSE, POC    Collection Time: 12/01/22 10:51 PM   Result Value Ref Range    Glucose (POC) 193 (H) 65 - 100 mg/dL    Performed by Linwood Maker    GLUCOSE, POC    Collection Time: 12/02/22  6:38 AM   Result Value Ref Range    Glucose (POC) 150 (H) 65 - 100 mg/dL    Performed by Linwood Maker    GLUCOSE, POC    Collection Time: 12/02/22  8:09 AM   Result Value Ref Range    Glucose (POC) 174 (H) 65 - 100 mg/dL    Performed by Lupe Bello    GLUCOSE, POC    Collection Time: 12/02/22 11:11 AM   Result Value Ref Range    Glucose (POC) 167 (H) 65 - 100 mg/dL    Performed by Alem Ward Dr:  She was transferred to my service as per patient request     Resting the bed alert awake complaining of coughing shortness of breath no fever no chills     11/30  Patient currently on 4L oxygen, reports 2L at home. Endorses productive cough. Denies headache, fever, chest pain, abdominal pain. CXR unremarkable for any infiltrates. 12/1  Patient is alert and oriented, currently on 5L. Reports an increase O2 need yesterday to 6-7L, is anxious about her lungs. Says she is worried about physical activity due to increase O2 need. Alli chest pain, headache. 12/2  Patient is alert and pleasant, currently on 4L O2. She reports walking for an oxygen study yesterday where she required 6L during periods of exacerbation, is comfortable on 4L when resting. She endorses a productive cough and feels lightheaded with too much exercise. Denies headache, chest pain, abdominal pain.     Patient very anxious still coughing congested oxygen saturation is stable on 3 L which is baseline    Best option to discharge patient to skilled care rehab for pulmonary rehab    Medication reconciliation done time discharge patient 35 minutes 50% time spent counseling and coordination of care      Signed:   Deuce Aquino MD  12/2/2022  11:18 AM

## 2022-12-02 NOTE — PROGRESS NOTES
DC Plan: SNF    CM met with pt at the bedside to f/up on her dc plan. Pt indicated she is going to call one of her friends that works at a SNF prior to providing SNF choices. Cm informed pt CM will f/up with her this afternoon. Pt was agreeable with plan. 1411    Cm stopped by pt's room. Pt indicated she hasn't had a chance to speak with her friend. She is agreeable with Cm following up with her at 1500 for her decision. 1520    Cm met with pt at the bedside. Pt wants [facility]. Choice letter signed. CM discussed having a back up plan. Pt is unable to provide a back up plan at this time. Referral made via [facility] 251.

## 2022-12-02 NOTE — PROGRESS NOTES
General Daily Progress Note          Patient Name:   Marcial Dickens       YOB: 1960       Age:  63 y.o. Admit Date: 11/26/2022      Subjective:     She was transferred to my service as per patient request    Resting the bed alert awake complaining of coughing shortness of breath no fever no chills    11/30  Patient currently on 4L oxygen, reports 2L at home. Endorses productive cough. Denies headache, fever, chest pain, abdominal pain. CXR unremarkable for any infiltrates. 12/1  Patient is alert and oriented, currently on 5L. Reports an increase O2 need yesterday to 6-7L, is anxious about her lungs. Says she is worried about physical activity due to increase O2 need. Alli chest pain, headache. 12/2  Patient is alert and pleasant, currently on 4L O2. She reports walking for an oxygen study yesterday where she required 6L during periods of exacerbation, is comfortable on 4L when resting. She endorses a productive cough and feels lightheaded with too much exercise. Denies headache, chest pain, abdominal pain.       Objective:     Visit Vitals  BP (!) 159/80 (BP 1 Location: Right upper arm, BP Patient Position: Sitting)   Pulse 73   Temp 98.1 °F (36.7 °C)   Resp 20   Ht 5' 4\" (1.626 m)   Wt 91.2 kg (201 lb)   SpO2 97%   Breastfeeding No   BMI 34.50 kg/m²        Recent Results (from the past 24 hour(s))   GLUCOSE, POC    Collection Time: 12/01/22 11:29 AM   Result Value Ref Range    Glucose (POC) 236 (H) 65 - 100 mg/dL    Performed by John Duvall    GLUCOSE, POC    Collection Time: 12/01/22  4:15 PM   Result Value Ref Range    Glucose (POC) 310 (H) 65 - 100 mg/dL    Performed by Caroline Flannery    GLUCOSE, POC    Collection Time: 12/01/22 10:51 PM   Result Value Ref Range    Glucose (POC) 193 (H) 65 - 100 mg/dL    Performed by Agsutín Alfaro    GLUCOSE, POC    Collection Time: 12/02/22  6:38 AM   Result Value Ref Range    Glucose (POC) 150 (H) 65 - 100 mg/dL    Performed by Agustín Alfaro GLUCOSE, POC    Collection Time: 12/02/22  8:09 AM   Result Value Ref Range    Glucose (POC) 174 (H) 65 - 100 mg/dL    Performed by Naresh Watters      [unfilled]      Review of Systems    Constitutional: Negative for chills and fever. HENT: Negative. Eyes: Negative. Respiratory: Negative. Cardiovascular: Negative. Gastrointestinal: Negative for abdominal pain and nausea. Skin: Negative. Neurological: Negative. Physical Exam:      Constitutional: pt is oriented to person, place, and time. HENT:   Head: Normocephalic and atraumatic. Eyes: Pupils are equal, round, and reactive to light. EOM are normal.   Cardiovascular: Normal rate, regular rhythm and normal heart sounds. Pulmonary/Chest: Breath sounds normal. No wheezes. No rales. Exhibits no tenderness. Abdominal: Soft. Bowel sounds are normal. There is no abdominal tenderness. There is no rebound and no guarding. Musculoskeletal: Normal range of motion. Neurological: pt is alert and oriented to person, place, and time. XR CHEST SNGL V   Final Result   No Acute Disease.               Recent Results (from the past 24 hour(s))   GLUCOSE, POC    Collection Time: 12/01/22 11:29 AM   Result Value Ref Range    Glucose (POC) 236 (H) 65 - 100 mg/dL    Performed by Luis Armando Bolaños    GLUCOSE, POC    Collection Time: 12/01/22  4:15 PM   Result Value Ref Range    Glucose (POC) 310 (H) 65 - 100 mg/dL    Performed by Alfreda Chan    GLUCOSE, POC    Collection Time: 12/01/22 10:51 PM   Result Value Ref Range    Glucose (POC) 193 (H) 65 - 100 mg/dL    Performed by Logan Mancilla    GLUCOSE, POC    Collection Time: 12/02/22  6:38 AM   Result Value Ref Range    Glucose (POC) 150 (H) 65 - 100 mg/dL    Performed by Logan Mancilla    GLUCOSE, POC    Collection Time: 12/02/22  8:09 AM   Result Value Ref Range    Glucose (POC) 174 (H) 65 - 100 mg/dL    Performed by Naresh Watters        Results       Procedure Component Value Units Date/Time    CULTURE, URINE [[id number]] Collected: 12/01/22 0530    Order Status: Sent Specimen: Urine Updated: 12/01/22 0544             Labs:     No results for input(s): WBC, HGB, HCT, PLT, HGBEXT, HCTEXT, PLTEXT, HGBEXT, HCTEXT, PLTEXT in the last 72 hours. No results for input(s): NA, K, CL, CO2, BUN, CREA, GLU, CA, MG, PHOS, URICA in the last 72 hours. No results for input(s): ALT, AP, TBIL, TBILI, TP, ALB, GLOB, GGT, AML, LPSE in the last 72 hours. No lab exists for component: SGOT, GPT, AMYP, HLPSE    No results for input(s): INR, PTP, APTT, INREXT, INREXT in the last 72 hours. No results for input(s): FE, TIBC, PSAT, FERR in the last 72 hours. No results found for: FOL, RBCF   No results for input(s): PH, PCO2, PO2 in the last 72 hours. No results for input(s): CPK, CKNDX, TROIQ in the last 72 hours.     No lab exists for component: CPKMB  No results found for: CHOL, CHOLX, CHLST, CHOLV, HDL, HDLP, LDL, LDLC, DLDLP, TGLX, TRIGL, TRIGP, CHHD, CHHDX  Lab Results   Component Value Date/Time    Glucose (POC) 174 (H) 12/02/2022 08:09 AM    Glucose (POC) 150 (H) 12/02/2022 06:38 AM    Glucose (POC) 193 (H) 12/01/2022 10:51 PM    Glucose (POC) 310 (H) 12/01/2022 04:15 PM    Glucose (POC) 236 (H) 12/01/2022 11:29 AM     Lab Results   Component Value Date/Time    Color Yellow/Straw 11/26/2022 02:57 PM    Appearance Clear 11/26/2022 02:57 PM    Specific gravity 1.009 11/26/2022 02:57 PM    Specific gravity 1.019 08/10/2021 03:00 PM    pH (UA) 5.0 11/26/2022 02:57 PM    Protein Negative 11/26/2022 02:57 PM    Glucose Negative 11/26/2022 02:57 PM    Ketone Negative 11/26/2022 02:57 PM    Bilirubin Negative 11/26/2022 02:57 PM    Urobilinogen 0.1 11/26/2022 02:57 PM    Nitrites Negative 11/26/2022 02:57 PM    Leukocyte Esterase Trace (A) 11/26/2022 02:57 PM    Bacteria Negative 11/26/2022 02:57 PM    WBC 0-4 11/26/2022 02:57 PM    RBC 0-5 11/26/2022 02:57 PM         Assessment:   Acute on chronic hypoxic respiratory failure  Acute exacerbation of COPD  Hypertension  Cough  Hyperglycemia due to steroids  UTI    Plan:     Continue DuoNeb nebulizer and Atrovent  Oxygen prn - wean as tolerated  On Empiric Zithromax  On IV Solu-Medrol  Mucinex BID  Robitussin with codeine prn for cough  DVT proph: Lovenox     Pulmonary and Urology following - appreciate recs  PT/OT consult - recommends discharge to SNF    Possible discharge in next 24 hours pending placement      Current Facility-Administered Medications:     hydrOXYzine pamoate (VISTARIL) capsule 25 mg, 25 mg, Oral, Q6H PRN, Kami Funes MD, 25 mg at 12/01/22 0432    insulin lispro (HUMALOG) injection, , SubCUTAneous, AC&HS, Kami Funes MD, 1 Units at 12/02/22 0710    glucose chewable tablet 16 g, 4 Tablet, Oral, PRN, Kami Funes MD    glucagon (GLUCAGEN) injection 1 mg, 1 mg, IntraMUSCular, PRN, Russel Garcia MD    guaiFENesin-codeine (ROBITUSSIN AC) 100-10 mg/5 mL solution 5 mL, 5 mL, Oral, Q6H PRN, Henok Garza MD, 5 mL at 12/01/22 2305    guaiFENesin ER (MUCINEX) tablet 600 mg, 600 mg, Oral, Q12H, Henok Garza MD, 600 mg at 12/01/22 2252    methylPREDNISolone (PF) (SOLU-MEDROL) injection 60 mg, 60 mg, IntraVENous, Q6H, North Hamm MD, 60 mg at 12/02/22 0457    albuterol-ipratropium (DUO-NEB) 2.5 MG-0.5 MG/3 ML, 3 mL, Nebulization, Q6H RT, Rianna Breen MD, 3 mL at 12/02/22 0750    busPIRone (BUSPAR) tablet 5 mg, 5 mg, Oral, BID, North Hamm MD, 5 mg at 12/01/22 2252    amLODIPine (NORVASC) tablet 10 mg, 10 mg, Oral, DAILY, North Hamm MD, 10 mg at 12/01/22 4477    aspirin chewable tablet 81 mg, 81 mg, Oral, DAILY, Norht Hamm MD, 81 mg at 12/01/22 0842    calcium-vitamin D 600 mg-5 mcg (200 unit) per tablet 1 Tablet, 1 Tablet, Oral, DAILY, North Hamm MD, 1 Tablet at 12/01/22 0842    carvediloL (COREG) tablet 6.25 mg, 6.25 mg, Oral, BID WITH MEALS, North Hamm MD, 6.25 mg at 12/01/22 1726    docusate sodium (COLACE) capsule 100 mg, 100 mg, Oral, BID, Dwayne NICHOLS MD, 100 mg at 12/01/22 2252    fluticasone propionate (FLONASE) 50 mcg/actuation nasal spray 2 Spray, 2 Spray, Both Nostrils, DAILY, North Hamm MD, 2 [place] at 11/27/22 1024    furosemide (LASIX) tablet 20 mg, 20 mg, Oral, DAILY, North Hamm MD, 20 mg at 12/01/22 0842    insulin glargine (LANTUS) injection 15 Units, 15 Units, SubCUTAneous, QHS, North Hamm MD, 15 Units at 12/01/22 2305    budesonide-formoteroL (SYMBICORT) 160-4.5 mcg/actuation HFA inhaler 2 Puff, 2 Puff, Inhalation, BID, Dwayne NICHOLS MD, 2 Puff at 12/02/22 0750    montelukast (SINGULAIR) tablet 10 mg, 10 mg, Oral, DAILY, Tahira Gray MD, 10 mg at 12/01/22 0842    azithromycin (ZITHROMAX) tablet 500 mg, 500 mg, Oral, DAILY, North Hamm MD, 500 mg at 12/01/22 0842    sodium chloride (NS) flush 5-40 mL, 5-40 mL, IntraVENous, Q8H, North Hamm MD, 10 mL at 12/02/22 2271    sodium chloride (NS) flush 5-40 mL, 5-40 mL, IntraVENous, PRN, Dwayne NICHOLS MD    acetaminophen (TYLENOL) tablet 650 mg, 650 mg, Oral, Q6H PRN, 650 mg at 11/29/22 1004 **OR** acetaminophen (TYLENOL) suppository 650 mg, 650 mg, Rectal, Q6H PRN, North Victor MD    polyethylene glycol (MIRALAX) packet 17 g, 17 g, Oral, DAILY PRN, North Hamm MD    ondansetron (ZOFRAN ODT) tablet 4 mg, 4 mg, Oral, Q8H PRN **OR** ondansetron (ZOFRAN) injection 4 mg, 4 mg, IntraVENous, Q6H PRN, North Hamm MD    enoxaparin (LOVENOX) injection 40 mg, 40 mg, SubCUTAneous, DAILY, North Hamm MD, 40 mg at 12/01/22 0842    pantoprazole (PROTONIX) tablet 40 mg, 40 mg, Oral, ACB&D, Dwayne Givenana NICHOLS MD, 40 mg at 12/02/22 0710    cetirizine (ZYRTEC) tablet 10 mg, 10 mg, Oral, DAILY, Dwayne Givenana NICHOLS MD, 10 mg at 12/01/22 5471

## 2022-12-02 NOTE — PROGRESS NOTES
Bedside and Verbal shift change report given to Bethany Garrett (oncoming nurse) by Carmen Sim LPN (offgoing nurse). Report included the following information SBAR, Kardex, Accordion, Recent Results, and Med Rec Status.

## 2022-12-02 NOTE — PROGRESS NOTES
Problem: Falls - Risk of  Goal: *Absence of Falls  Description: Document Katlyn Ellis Fall Risk and appropriate interventions in the flowsheet.   Outcome: Progressing Towards Goal  Note: Fall Risk Interventions:            Medication Interventions: Assess postural VS orthostatic hypotension                   Problem: Patient Education: Go to Patient Education Activity  Goal: Patient/Family Education  Outcome: Progressing Towards Goal     Problem: Patient Education: Go to Patient Education Activity  Goal: Patient/Family Education  Outcome: Progressing Towards Goal     Problem: Patient Education: Go to Patient Education Activity  Goal: Patient/Family Education  Outcome: Progressing Towards Goal

## 2022-12-03 LAB
GLUCOSE BLD STRIP.AUTO-MCNC: 167 MG/DL (ref 65–100)
GLUCOSE BLD STRIP.AUTO-MCNC: 216 MG/DL (ref 65–100)
GLUCOSE BLD STRIP.AUTO-MCNC: 221 MG/DL (ref 65–100)
GLUCOSE BLD STRIP.AUTO-MCNC: 351 MG/DL (ref 65–100)
PERFORMED BY, TECHID: ABNORMAL

## 2022-12-03 PROCEDURE — 74011636637 HC RX REV CODE- 636/637: Performed by: INTERNAL MEDICINE

## 2022-12-03 PROCEDURE — 65270000029 HC RM PRIVATE

## 2022-12-03 PROCEDURE — 82962 GLUCOSE BLOOD TEST: CPT

## 2022-12-03 PROCEDURE — 74011250637 HC RX REV CODE- 250/637: Performed by: FAMILY MEDICINE

## 2022-12-03 PROCEDURE — 74011000250 HC RX REV CODE- 250: Performed by: INTERNAL MEDICINE

## 2022-12-03 PROCEDURE — 74011250637 HC RX REV CODE- 250/637: Performed by: INTERNAL MEDICINE

## 2022-12-03 PROCEDURE — 94640 AIRWAY INHALATION TREATMENT: CPT

## 2022-12-03 PROCEDURE — 77010033678 HC OXYGEN DAILY

## 2022-12-03 PROCEDURE — 74011636637 HC RX REV CODE- 636/637: Performed by: FAMILY MEDICINE

## 2022-12-03 PROCEDURE — 94761 N-INVAS EAR/PLS OXIMETRY MLT: CPT

## 2022-12-03 PROCEDURE — 74011250636 HC RX REV CODE- 250/636: Performed by: INTERNAL MEDICINE

## 2022-12-03 RX ORDER — PREDNISONE 20 MG/1
40 TABLET ORAL
Status: DISCONTINUED | OUTPATIENT
Start: 2022-12-04 | End: 2022-12-15 | Stop reason: HOSPADM

## 2022-12-03 RX ORDER — SERTRALINE HYDROCHLORIDE 25 MG/1
25 TABLET, FILM COATED ORAL DAILY
Status: DISCONTINUED | OUTPATIENT
Start: 2022-12-04 | End: 2022-12-15 | Stop reason: HOSPADM

## 2022-12-03 RX ORDER — GUAIFENESIN 600 MG/1
1200 TABLET, EXTENDED RELEASE ORAL EVERY 12 HOURS
Status: DISCONTINUED | OUTPATIENT
Start: 2022-12-03 | End: 2022-12-15 | Stop reason: HOSPADM

## 2022-12-03 RX ADMIN — BUSPIRONE HYDROCHLORIDE 5 MG: 5 TABLET ORAL at 10:12

## 2022-12-03 RX ADMIN — GUAIFENESIN AND CODEINE PHOSPHATE 5 ML: 10; 100 LIQUID ORAL at 21:01

## 2022-12-03 RX ADMIN — HYDROXYZINE PAMOATE 25 MG: 25 CAPSULE ORAL at 17:26

## 2022-12-03 RX ADMIN — Medication 2 UNITS: at 17:25

## 2022-12-03 RX ADMIN — SODIUM CHLORIDE, PRESERVATIVE FREE 10 ML: 5 INJECTION INTRAVENOUS at 21:03

## 2022-12-03 RX ADMIN — GUAIFENESIN 600 MG: 600 TABLET, EXTENDED RELEASE ORAL at 10:12

## 2022-12-03 RX ADMIN — METHYLPREDNISOLONE SODIUM SUCCINATE 60 MG: 125 INJECTION, POWDER, FOR SOLUTION INTRAMUSCULAR; INTRAVENOUS at 14:19

## 2022-12-03 RX ADMIN — FUROSEMIDE 20 MG: 40 TABLET ORAL at 10:12

## 2022-12-03 RX ADMIN — AMLODIPINE BESYLATE 10 MG: 5 TABLET ORAL at 10:12

## 2022-12-03 RX ADMIN — Medication 5 UNITS: at 22:00

## 2022-12-03 RX ADMIN — IPRATROPIUM BROMIDE AND ALBUTEROL SULFATE 3 ML: 2.5; .5 SOLUTION RESPIRATORY (INHALATION) at 14:00

## 2022-12-03 RX ADMIN — FLUTICASONE PROPIONATE 2 SPRAY: 50 SPRAY, METERED NASAL at 10:11

## 2022-12-03 RX ADMIN — ENOXAPARIN SODIUM 40 MG: 100 INJECTION SUBCUTANEOUS at 10:11

## 2022-12-03 RX ADMIN — BUSPIRONE HYDROCHLORIDE 5 MG: 5 TABLET ORAL at 21:01

## 2022-12-03 RX ADMIN — PANTOPRAZOLE SODIUM 40 MG: 40 TABLET, DELAYED RELEASE ORAL at 17:26

## 2022-12-03 RX ADMIN — BUDESONIDE AND FORMOTEROL FUMARATE DIHYDRATE 2 PUFF: 160; 4.5 AEROSOL RESPIRATORY (INHALATION) at 09:02

## 2022-12-03 RX ADMIN — CETIRIZINE HYDROCHLORIDE 10 MG: 10 TABLET, FILM COATED ORAL at 10:12

## 2022-12-03 RX ADMIN — MONTELUKAST 10 MG: 10 TABLET, FILM COATED ORAL at 10:12

## 2022-12-03 RX ADMIN — Medication 1 UNITS: at 10:11

## 2022-12-03 RX ADMIN — CARVEDILOL 6.25 MG: 3.12 TABLET, FILM COATED ORAL at 17:26

## 2022-12-03 RX ADMIN — AZITHROMYCIN MONOHYDRATE 500 MG: 500 TABLET ORAL at 10:12

## 2022-12-03 RX ADMIN — IPRATROPIUM BROMIDE AND ALBUTEROL SULFATE 3 ML: 2.5; .5 SOLUTION RESPIRATORY (INHALATION) at 08:52

## 2022-12-03 RX ADMIN — SODIUM CHLORIDE, PRESERVATIVE FREE 10 ML: 5 INJECTION INTRAVENOUS at 05:58

## 2022-12-03 RX ADMIN — PANTOPRAZOLE SODIUM 40 MG: 40 TABLET, DELAYED RELEASE ORAL at 10:12

## 2022-12-03 RX ADMIN — METHYLPREDNISOLONE SODIUM SUCCINATE 60 MG: 125 INJECTION, POWDER, FOR SOLUTION INTRAMUSCULAR; INTRAVENOUS at 05:57

## 2022-12-03 RX ADMIN — GUAIFENESIN 1200 MG: 600 TABLET, EXTENDED RELEASE ORAL at 21:01

## 2022-12-03 RX ADMIN — CARVEDILOL 6.25 MG: 3.12 TABLET, FILM COATED ORAL at 10:12

## 2022-12-03 RX ADMIN — INSULIN GLARGINE 15 UNITS: 100 INJECTION, SOLUTION SUBCUTANEOUS at 22:00

## 2022-12-03 RX ADMIN — BUDESONIDE AND FORMOTEROL FUMARATE DIHYDRATE 2 PUFF: 160; 4.5 AEROSOL RESPIRATORY (INHALATION) at 21:08

## 2022-12-03 RX ADMIN — Medication 1 UNITS: at 12:42

## 2022-12-03 RX ADMIN — ASPIRIN 81 MG 81 MG: 81 TABLET ORAL at 10:12

## 2022-12-03 RX ADMIN — SODIUM CHLORIDE, PRESERVATIVE FREE 10 ML: 5 INJECTION INTRAVENOUS at 14:22

## 2022-12-03 RX ADMIN — IPRATROPIUM BROMIDE AND ALBUTEROL SULFATE 3 ML: 2.5; .5 SOLUTION RESPIRATORY (INHALATION) at 21:08

## 2022-12-03 NOTE — PROGRESS NOTES
Pulmonary/ CC progress note    Subjective:   Date of Consultation:  December 3, 2022  Referring Physician: Berhane Yanes MD    Patient seen and examined in her room on the floor this afternoon, no acute events overnight. Saturating well on 2 L nasal cannula, home requirements. Continue on current Symbicort and duo nebs. Wean Solu-Medrol to prednisone 40 mg daily starting tomorrow. Allergies   Allergen Reactions    Lisinopril Angioedema        Review of Systems:  A comprehensive review of systems was negative except for that written in the History of Present Illness. Objective:   Blood pressure (!) 151/79, pulse 92, temperature 98.3 °F (36.8 °C), resp. rate 20, height 5' 4\" (1.626 m), weight 91.2 kg (201 lb), SpO2 91 %, not currently breastfeeding. Temp (24hrs), Av °F (36.7 °C), Min:97.7 °F (36.5 °C), Max:98.3 °F (36.8 °C)    XR CHEST SNGL V   Final Result   No Acute Disease. Data Review: CBC:   No results for input(s): WBC, RBC, HGB, HCT, PLT, GRANS, LYMPH, EOS, HGBEXT, HCTEXT, PLTEXT in the last 72 hours. CMP:   No results for input(s): GLU, NA, K, CL, CO2, BUN, CREA, CA, AGAP, BUCR, TBIL, AP, TP, ALB, GLOB, AGRAT in the last 72 hours. No lab exists for component: GPT    Liver Enzymes:   No results for input(s): TP, ALB, TBIL, AP in the last 72 hours. No lab exists for component: SGOT, GPT, DBIL    ABGs: No results for input(s): PH, PCO2, PO2, HCO3 in the last 72 hours.   Inflammation studies: No results found for: SR, ESRA, CRP  Current Facility-Administered Medications   Medication Dose Route Frequency    [START ON 2022] sertraline (ZOLOFT) tablet 25 mg  25 mg Oral DAILY    guaiFENesin ER (MUCINEX) tablet 1,200 mg  1,200 mg Oral Q12H    methylPREDNISolone (PF) (SOLU-MEDROL) injection 60 mg  60 mg IntraVENous Q8H    hydrOXYzine pamoate (VISTARIL) capsule 25 mg  25 mg Oral Q6H PRN    insulin lispro (HUMALOG) injection   SubCUTAneous AC&HS    glucose chewable tablet 16 g  4 Tablet Oral PRN    glucagon (GLUCAGEN) injection 1 mg  1 mg IntraMUSCular PRN    guaiFENesin-codeine (ROBITUSSIN AC) 100-10 mg/5 mL solution 5 mL  5 mL Oral Q6H PRN    albuterol-ipratropium (DUO-NEB) 2.5 MG-0.5 MG/3 ML  3 mL Nebulization Q6H RT    busPIRone (BUSPAR) tablet 5 mg  5 mg Oral BID    amLODIPine (NORVASC) tablet 10 mg  10 mg Oral DAILY    aspirin chewable tablet 81 mg  81 mg Oral DAILY    calcium-vitamin D 600 mg-5 mcg (200 unit) per tablet 1 Tablet  1 Tablet Oral DAILY    carvediloL (COREG) tablet 6.25 mg  6.25 mg Oral BID WITH MEALS    docusate sodium (COLACE) capsule 100 mg  100 mg Oral BID    fluticasone propionate (FLONASE) 50 mcg/actuation nasal spray 2 Spray  2 Spray Both Nostrils DAILY    furosemide (LASIX) tablet 20 mg  20 mg Oral DAILY    insulin glargine (LANTUS) injection 15 Units  15 Units SubCUTAneous QHS    budesonide-formoteroL (SYMBICORT) 160-4.5 mcg/actuation HFA inhaler 2 Puff  2 Puff Inhalation BID    montelukast (SINGULAIR) tablet 10 mg  10 mg Oral DAILY    azithromycin (ZITHROMAX) tablet 500 mg  500 mg Oral DAILY    sodium chloride (NS) flush 5-40 mL  5-40 mL IntraVENous Q8H    sodium chloride (NS) flush 5-40 mL  5-40 mL IntraVENous PRN    acetaminophen (TYLENOL) tablet 650 mg  650 mg Oral Q6H PRN    Or    acetaminophen (TYLENOL) suppository 650 mg  650 mg Rectal Q6H PRN    polyethylene glycol (MIRALAX) packet 17 g  17 g Oral DAILY PRN    ondansetron (ZOFRAN ODT) tablet 4 mg  4 mg Oral Q8H PRN    Or    ondansetron (ZOFRAN) injection 4 mg  4 mg IntraVENous Q6H PRN    enoxaparin (LOVENOX) injection 40 mg  40 mg SubCUTAneous DAILY    pantoprazole (PROTONIX) tablet 40 mg  40 mg Oral ACB&D    cetirizine (ZYRTEC) tablet 10 mg  10 mg Oral DAILY        Exam:      This is middle aged female obese. Alert and oriented x3. Head normocephalic and atraumatic, pupils are round responsive to light sclera icteric and conjunctive are pink. JVD is absent.   Chest: Bilateral expiratory wheezing audible. Prolonged phase of expiration, however air entry is currently diminished than yesterday. Heart: S1-S2 normal  Abdomen: Soft, nontender, no visceromegaly  Extremities: No edema, sinus or clubbing  Neuro: No focal motor deficit. Impression:   Ms. Jerry Zee is a 63years old female who is known to have history of COPD, hypertension, chronic hypoxia on supplemental oxygen. She additionally history of breast CVA with complete remission. She follows with Dr. Cristopher Krishna her office. Last seen about 3 months ago. She is admitted to hospital because of increasing symptoms of cough, shortness of breath and wheezing. She used her inhalers and nebulizer but without any improvement. X-rays unremarkable for any infiltrates    Plan:   1. Acute on chronic hypoxic respiratory failure: This is secondary to acute exacerbation of COPD. She is currently on 3 L oxygen. Wean down to keep saturation more than 90%. 2.  Acute exacerbation of COPD:  Chest x-ray is unremarkable for any infiltrates. Patient got started on nebulized albuterol and Atrovent along with systemic steroids. She is also empirically started on azithromycin and Rocephin. However Rocephin was discontinued. Wean Solu-Medrol to prednisone starting tomorrow. Continue with Symbicort 160/4.5, 2 inhalations twice daily. Additionally she is on Singulair. Continue with Mucinex around-the-clock and Robitussin with codeine as needed. 3.  Hypertension :  Continue antihypertensive medication    4. DVT prophylaxis with Lovenox subcutaneously. There appears to be element of anxiety. Agree with starting her on BuSpar. She is slowly improving. She may benefit from placement to skilled nursing facility or rehab. The patient is currently apprehensive about her upcoming discharge. Apparently she is for discharge but would like to go [facility]. Her sister works over there as well. This is not unreasonable.   Discussed with case management and she is working on authorization. Questions of patient were answered at bedside in detail  Case discussed in detail with RN, RT, and care team  Thank you for involving me in the care of this patient  I will follow with you closely during hospitalization    Time spent more than 30 minutes reviewing results and records, decision making, and answering questions.       Johan Becerra DO  [facility] ([facility])

## 2022-12-03 NOTE — PROGRESS NOTES
Progress Note    Patient: Jerry Zee MRN: [id number]  SSN: [id number]    YOB: 1960  Age: 63 y.o. Sex: female      Admit Date: 11/26/2022    LOS: 5 days     Subjective:     Patient is waiting for placement  Patient is depressed crying says she is lonely    Objective:     Vitals:    12/03/22 0738 12/03/22 0853 12/03/22 1402 12/03/22 1403   BP: (!) 158/83   (!) 151/79   Pulse: 91   92   Resp: 20   20   Temp: 98.1 °F (36.7 °C)   98.3 °F (36.8 °C)   SpO2: 92% 93% 91%    Weight:       Height:            Intake and Output:  Current Shift: 12/03 0701 - 12/03 1900  In: 600 [P.O.:600]  Out: 1 [Urine:1]  Last three shifts: No intake/output data recorded. Physical Exam:   General:  Alert, cooperative, no distress, appears stated age. Eyes:  Conjunctivae/corneas clear. PERRL, EOMs intact. Fundi benign   Ears:  Normal TMs and external ear canals both ears. Nose: Nares normal. Septum midline. Mucosa normal. No drainage or sinus tenderness. Mouth/Throat: Lips, mucosa, and tongue normal. Teeth and gums normal.   Neck: Supple, symmetrical, trachea midline, no adenopathy, thyroid: no enlargment/tenderness/nodules, no carotid bruit and no JVD. Back:   Symmetric, no curvature. ROM normal. No CVA tenderness. Lungs:   Clear to auscultation bilaterally. Heart:  Regular rate and rhythm, S1, S2 normal, no murmur, click, rub or gallop. Abdomen:   Soft, non-tender. Bowel sounds normal. No masses,  No organomegaly. Extremities: Extremities normal, atraumatic, no cyanosis or edema. Pulses: 2+ and symmetric all extremities. Skin: Skin color, texture, turgor normal. No rashes or lesions   Lymph nodes: Cervical, supraclavicular, and axillary nodes normal.   Neurologic: CNII-XII intact. Normal strength, sensation and reflexes throughout. Lab/Data Review: All lab results for the last 24 hours reviewed.      Recent Results (from the past 24 hour(s))   GLUCOSE, POC    Collection Time: 12/02/22 3:06 PM   Result Value Ref Range    Glucose (POC) 229 (H) 65 - 100 mg/dL    Performed by Bassam Pa, POC    Collection Time: 12/02/22  7:57 PM   Result Value Ref Range    Glucose (POC) 262 (H) 65 - 100 mg/dL    Performed by [facility], POC    Collection Time: 12/03/22  7:32 AM   Result Value Ref Range    Glucose (POC) 167 (H) 65 - 100 mg/dL    Performed by [address] [place] [address], POC    Collection Time: 12/03/22 12:05 PM   Result Value Ref Range    Glucose (POC) 221 (H) 65 - 100 mg/dL    Performed by [facility]          Assessment:     Active Problems:    COPD exacerbation ([address] (8/8/2021)      Hypoxic respiratory failure on oxygen  Depression  Plan:       Antidepression    Signed By: Jerilynn Krabbe, MD     December 3, 2022

## 2022-12-03 NOTE — PROGRESS NOTES
Problem: Falls - Risk of  Goal: *Absence of Falls  Description: Document Tory Don Fall Risk and appropriate interventions in the flowsheet.   Outcome: Progressing Towards Goal  Note: Fall Risk Interventions:            Medication Interventions: Teach patient to arise slowly                   Problem: Patient Education: Go to Patient Education Activity  Goal: Patient/Family Education  Outcome: Progressing Towards Goal     Problem: Patient Education: Go to Patient Education Activity  Goal: Patient/Family Education  Outcome: Progressing Towards Goal     Problem: Patient Education: Go to Patient Education Activity  Goal: Patient/Family Education  Outcome: Progressing Towards Goal

## 2022-12-03 NOTE — PROGRESS NOTES
DC Plan: [facility] is waiting to hear back from [facility] to see if they are able to accept and if they started [place].

## 2022-12-04 LAB
ALBUMIN SERPL-MCNC: 2.8 G/DL (ref 3.5–5)
ANION GAP SERPL CALC-SCNC: 0 MMOL/L (ref 5–15)
BUN SERPL-MCNC: 31 MG/DL (ref 6–20)
BUN/CREAT SERPL: 30 (ref 12–20)
CA-I BLD-MCNC: 9.2 MG/DL (ref 8.5–10.1)
CHLORIDE SERPL-SCNC: 105 MMOL/L (ref 97–108)
CO2 SERPL-SCNC: 36 MMOL/L (ref 21–32)
CREAT SERPL-MCNC: 1.05 MG/DL (ref 0.55–1.02)
ERYTHROCYTE [DISTWIDTH] IN BLOOD BY AUTOMATED COUNT: 12.5 % (ref 11.5–14.5)
GLUCOSE BLD STRIP.AUTO-MCNC: 127 MG/DL (ref 65–100)
GLUCOSE BLD STRIP.AUTO-MCNC: 141 MG/DL (ref 65–100)
GLUCOSE BLD STRIP.AUTO-MCNC: 145 MG/DL (ref 65–100)
GLUCOSE BLD STRIP.AUTO-MCNC: 162 MG/DL (ref 65–100)
GLUCOSE SERPL-MCNC: 134 MG/DL (ref 65–100)
HCT VFR BLD AUTO: 38.8 % (ref 35–47)
HGB BLD-MCNC: 12.3 G/DL (ref 11.5–16)
MAGNESIUM SERPL-MCNC: 2.7 MG/DL (ref 1.6–2.4)
MCH RBC QN AUTO: 29.9 PG (ref 26–34)
MCHC RBC AUTO-ENTMCNC: 31.7 G/DL (ref 30–36.5)
MCV RBC AUTO: 94.4 FL (ref 80–99)
NRBC # BLD: 0 K/UL (ref 0–0.01)
NRBC BLD-RTO: 0 PER 100 WBC
PERFORMED BY, TECHID: ABNORMAL
PHOSPHATE SERPL-MCNC: 2.2 MG/DL (ref 2.6–4.7)
PLATELET # BLD AUTO: 261 K/UL (ref 150–400)
PMV BLD AUTO: 10.6 FL (ref 8.9–12.9)
POTASSIUM SERPL-SCNC: 4.6 MMOL/L (ref 3.5–5.1)
RBC # BLD AUTO: 4.11 M/UL (ref 3.8–5.2)
SODIUM SERPL-SCNC: 141 MMOL/L (ref 136–145)
WBC # BLD AUTO: 15 K/UL (ref 3.6–11)

## 2022-12-04 PROCEDURE — 74011250637 HC RX REV CODE- 250/637: Performed by: INTERNAL MEDICINE

## 2022-12-04 PROCEDURE — 97116 GAIT TRAINING THERAPY: CPT

## 2022-12-04 PROCEDURE — 65270000029 HC RM PRIVATE

## 2022-12-04 PROCEDURE — 74011250637 HC RX REV CODE- 250/637: Performed by: FAMILY MEDICINE

## 2022-12-04 PROCEDURE — 74011636637 HC RX REV CODE- 636/637: Performed by: INTERNAL MEDICINE

## 2022-12-04 PROCEDURE — 85027 COMPLETE CBC AUTOMATED: CPT

## 2022-12-04 PROCEDURE — 80069 RENAL FUNCTION PANEL: CPT

## 2022-12-04 PROCEDURE — 82962 GLUCOSE BLOOD TEST: CPT

## 2022-12-04 PROCEDURE — 74011250636 HC RX REV CODE- 250/636: Performed by: INTERNAL MEDICINE

## 2022-12-04 PROCEDURE — 74011000250 HC RX REV CODE- 250: Performed by: INTERNAL MEDICINE

## 2022-12-04 PROCEDURE — 36415 COLL VENOUS BLD VENIPUNCTURE: CPT

## 2022-12-04 PROCEDURE — 94640 AIRWAY INHALATION TREATMENT: CPT

## 2022-12-04 PROCEDURE — 83735 ASSAY OF MAGNESIUM: CPT

## 2022-12-04 PROCEDURE — 74011636637 HC RX REV CODE- 636/637: Performed by: FAMILY MEDICINE

## 2022-12-04 PROCEDURE — 77010033678 HC OXYGEN DAILY

## 2022-12-04 PROCEDURE — 94761 N-INVAS EAR/PLS OXIMETRY MLT: CPT

## 2022-12-04 RX ORDER — SODIUM,POTASSIUM PHOSPHATES 280-250MG
2 POWDER IN PACKET (EA) ORAL ONCE
Status: COMPLETED | OUTPATIENT
Start: 2022-12-04 | End: 2022-12-04

## 2022-12-04 RX ADMIN — SODIUM CHLORIDE, PRESERVATIVE FREE 10 ML: 5 INJECTION INTRAVENOUS at 06:11

## 2022-12-04 RX ADMIN — AMLODIPINE BESYLATE 10 MG: 5 TABLET ORAL at 09:24

## 2022-12-04 RX ADMIN — SODIUM CHLORIDE, PRESERVATIVE FREE 10 ML: 5 INJECTION INTRAVENOUS at 12:50

## 2022-12-04 RX ADMIN — BUSPIRONE HYDROCHLORIDE 5 MG: 5 TABLET ORAL at 09:26

## 2022-12-04 RX ADMIN — ASPIRIN 81 MG 81 MG: 81 TABLET ORAL at 09:24

## 2022-12-04 RX ADMIN — CETIRIZINE HYDROCHLORIDE 10 MG: 10 TABLET, FILM COATED ORAL at 09:25

## 2022-12-04 RX ADMIN — GUAIFENESIN 1200 MG: 600 TABLET, EXTENDED RELEASE ORAL at 09:24

## 2022-12-04 RX ADMIN — IPRATROPIUM BROMIDE AND ALBUTEROL SULFATE 3 ML: 2.5; .5 SOLUTION RESPIRATORY (INHALATION) at 13:54

## 2022-12-04 RX ADMIN — POTASSIUM & SODIUM PHOSPHATES POWDER PACK 280-160-250 MG 2 PACKET: 280-160-250 PACK at 17:08

## 2022-12-04 RX ADMIN — IPRATROPIUM BROMIDE AND ALBUTEROL SULFATE 3 ML: 2.5; .5 SOLUTION RESPIRATORY (INHALATION) at 02:21

## 2022-12-04 RX ADMIN — HYDROXYZINE PAMOATE 25 MG: 25 CAPSULE ORAL at 17:08

## 2022-12-04 RX ADMIN — Medication 1 TABLET: at 09:23

## 2022-12-04 RX ADMIN — BUDESONIDE AND FORMOTEROL FUMARATE DIHYDRATE 2 PUFF: 160; 4.5 AEROSOL RESPIRATORY (INHALATION) at 19:39

## 2022-12-04 RX ADMIN — FLUTICASONE PROPIONATE 2 SPRAY: 50 SPRAY, METERED NASAL at 12:07

## 2022-12-04 RX ADMIN — CARVEDILOL 6.25 MG: 3.12 TABLET, FILM COATED ORAL at 09:24

## 2022-12-04 RX ADMIN — ACETAMINOPHEN 650 MG: 325 TABLET, FILM COATED ORAL at 09:25

## 2022-12-04 RX ADMIN — MONTELUKAST 10 MG: 10 TABLET, FILM COATED ORAL at 09:25

## 2022-12-04 RX ADMIN — GUAIFENESIN AND CODEINE PHOSPHATE 5 ML: 10; 100 LIQUID ORAL at 21:29

## 2022-12-04 RX ADMIN — CARVEDILOL 6.25 MG: 3.12 TABLET, FILM COATED ORAL at 17:08

## 2022-12-04 RX ADMIN — ENOXAPARIN SODIUM 40 MG: 100 INJECTION SUBCUTANEOUS at 09:25

## 2022-12-04 RX ADMIN — SODIUM CHLORIDE, PRESERVATIVE FREE 10 ML: 5 INJECTION INTRAVENOUS at 21:30

## 2022-12-04 RX ADMIN — AZITHROMYCIN MONOHYDRATE 500 MG: 500 TABLET ORAL at 09:24

## 2022-12-04 RX ADMIN — IPRATROPIUM BROMIDE AND ALBUTEROL SULFATE 3 ML: 2.5; .5 SOLUTION RESPIRATORY (INHALATION) at 07:56

## 2022-12-04 RX ADMIN — INSULIN GLARGINE 15 UNITS: 100 INJECTION, SOLUTION SUBCUTANEOUS at 21:30

## 2022-12-04 RX ADMIN — PANTOPRAZOLE SODIUM 40 MG: 40 TABLET, DELAYED RELEASE ORAL at 17:08

## 2022-12-04 RX ADMIN — PANTOPRAZOLE SODIUM 40 MG: 40 TABLET, DELAYED RELEASE ORAL at 09:24

## 2022-12-04 RX ADMIN — SERTRALINE HYDROCHLORIDE 25 MG: 25 TABLET ORAL at 09:24

## 2022-12-04 RX ADMIN — Medication 1 UNITS: at 17:08

## 2022-12-04 RX ADMIN — GUAIFENESIN 1200 MG: 600 TABLET, EXTENDED RELEASE ORAL at 21:29

## 2022-12-04 RX ADMIN — IPRATROPIUM BROMIDE AND ALBUTEROL SULFATE 3 ML: 2.5; .5 SOLUTION RESPIRATORY (INHALATION) at 19:38

## 2022-12-04 RX ADMIN — BUSPIRONE HYDROCHLORIDE 5 MG: 5 TABLET ORAL at 21:29

## 2022-12-04 RX ADMIN — PREDNISONE 40 MG: 20 TABLET ORAL at 09:25

## 2022-12-04 NOTE — PROGRESS NOTES
Took patients tray to her and  she refused to eat. she  said how long was her tray there I said not long as soon ask the dietary lady sat tray down myself and another pct pass all trayes were hot .

## 2022-12-04 NOTE — DISCHARGE SUMMARY
Discharge Summary       PATIENT ID: Josselin Nicolas  MRN: [id number]   YOB: 1960    DATE OF ADMISSION: 11/26/2022  1:08 PM    DATE OF DISCHARGE:   PRIMARY CARE PROVIDER: Yuri Bajwa MD     ATTENDING PHYSICIAN: Gene Funes  DISCHARGING PROVIDER: Gene Funes      CONSULTATIONS: IP CONSULT TO PULMONOLOGY  IP CONSULT TO UROLOGY    PROCEDURES/SURGERIES: * No surgery found *    ADMITTING DIAGNOSES:    Patient Active Problem List    Diagnosis Date Noted    Respiratory failure ([facility] 08/08/2021    COPD exacerbation ([facility] 08/08/2021    COPD (chronic obstructive pulmonary disease) ([facility] 12/13/2020    Hypoxia 12/13/2020       DISCHARGE DIAGNOSES / PLAN:      Acute on chronic hypoxic respiratory failure  Acute exacerbation of COPD  Hypertension  Cough  Hyperglycemia due to steroids  UTI        DISCHARGE MEDICATIONS:  Current Discharge Medication List        START taking these medications    Details   azithromycin (ZITHROMAX) 500 mg tab Take 1 Tablet by mouth daily for 3 days. Qty: 3 Tablet, Refills: 0  Start date: 12/3/2022, End date: 12/6/2022      busPIRone (BUSPAR) 5 mg tablet Take 1 Tablet by mouth two (2) times a day. Qty: 60 Tablet, Refills: 0  Start date: 12/2/2022      cetirizine (ZYRTEC) 10 mg tablet Take 1 Tablet by mouth daily. Qty: 20 Tablet, Refills: 0  Start date: 12/3/2022           CONTINUE these medications which have CHANGED    Details   insulin glargine (LANTUS) 100 unit/mL injection 15 units  Qty: 1 mL, Refills: 2  Start date: 12/2/2022           CONTINUE these medications which have NOT CHANGED    Details   docusate sodium (Colace) 100 mg capsule Take 1 Capsule by mouth two (2) times a day for 90 days. Qty: 60 Capsule, Refills: 2      carvediloL (COREG) 6.25 mg tablet Take 1 Tablet by mouth two (2) times daily (with meals).   Qty: 60 Tablet, Refills: 0      furosemide (Lasix) 40 mg tablet Lasix 40 mg daily  Qty: 30 Tablet, Refills: 0      pantoprazole (PROTONIX) 40 mg tablet Take 1 Tablet by mouth Daily (before breakfast). Qty: 60 Tablet, Refills: 0      predniSONE (DELTASONE) 10 mg tablet 1 tablet daily  Qty: 15 Tablet, Refills: 0      albuterol-ipratropium (DUO-NEB) 2.5 mg-0.5 mg/3 ml nebu 3 mL by Nebulization route every six (6) hours as needed for Wheezing. Qty: 30 Nebule, Refills: 1      aspirin 81 mg chewable tablet Take 1 Tab by mouth daily. Qty: 30 Tab, Refills: 0      guaiFENesin ER (MUCINEX) 600 mg ER tablet Take 1 Tab by mouth two (2) times a day. Qty: 30 Tab, Refills: 0      amLODIPine (NORVASC) 10 mg tablet Take 10 mg by mouth daily. mometasone-formoterol (DULERA) 200-5 mcg/actuation HFA inhaler Take 2 Puffs by inhalation two (2) times a day. montelukast (SINGULAIR) 10 mg tablet Take 10 mg by mouth daily. Calcium-Cholecalciferol, D3, 500 mg(1,250mg) -400 unit tab Take  by mouth daily. fluticasone propionate (FLONASE) 50 mcg/actuation nasal spray 2 Sprays by Both Nostrils route daily. ergocalciferol (Vitamin D2) 1,250 mcg (50,000 unit) capsule Take 50,000 Units by mouth every seven (7) days. Q7days on Monday           STOP taking these medications       cephALEXin (Keflex) 500 mg capsule Comments:   Reason for Stopping:         benzonatate (TESSALON) 100 mg capsule Comments:   Reason for Stopping:                 NOTIFY YOUR PHYSICIAN FOR ANY OF THE FOLLOWING:   Fever over 101 degrees for 24 hours. Chest pain, shortness of breath, fever, chills, nausea, vomiting, diarrhea, change in mentation, falling, weakness, bleeding. Severe pain or pain not relieved by medications. Or, any other signs or symptoms that you may have questions about. DISPOSITION:  x  Home With:   OT  PT  HH  RN       Long term SNF/Inpatient Rehab    Independent/assisted living    Hospice    Other:       PATIENT CONDITION AT DISCHARGE: Stable      PHYSICAL EXAMINATION AT DISCHARGE:  General:          Alert, cooperative, no distress, appears stated age.      HEENT: Atraumatic, anicteric sclerae, pink conjunctivae                          No oral ulcers, mucosa moist, throat clear, dentition fair  Neck:               Supple, symmetrical  Lungs:             Clear to auscultation bilaterally. No Wheezing or Rhonchi. No rales. Chest wall:      No tenderness  No Accessory muscle use. Heart:              Regular  rhythm,  No  murmur   No edema  Abdomen:        Soft, non-tender. Not distended. Bowel sounds normal  Extremities:     No cyanosis. No clubbing,                            Skin turgor normal, Capillary refill normal  Skin:                Not pale. Not Jaundiced  No rashes   Psych:             Not anxious or agitated. Neurologic:      Alert, moves all extremities, answers questions appropriately and responds to commands     XR CHEST SNGL V   Final Result   No Acute Disease. Recent Results (from the past 24 hour(s))   GLUCOSE, POC    Collection Time: 12/03/22 12:05 PM   Result Value Ref Range    Glucose (POC) 221 (H) 65 - 100 mg/dL    Performed by [address] [place] [address], POC    Collection Time: 12/03/22  4:15 PM   Result Value Ref Range    Glucose (POC) 216 (H) 65 - 100 mg/dL    Performed by Allison Nam    GLUCOSE, POC    Collection Time: 12/03/22  7:46 PM   Result Value Ref Range    Glucose (POC) 351 (H) 65 - 100 mg/dL    Performed by [id number], POC    Collection Time: 12/04/22  7:40 AM   Result Value Ref Range    Glucose (POC) 141 (H) 65 - 100 mg/dL    Performed by [address] [place] [address], POC    Collection Time: 12/04/22 11:01 AM   Result Value Ref Range    Glucose (POC) 127 (H) 65 - 100 mg/dL    Performed by [address] [place] [address]:  She was transferred to my service as per patient request     Resting the bed alert awake complaining of coughing shortness of breath no fever no chills     11/30  Patient currently on 4L oxygen, reports 2L at home. Endorses productive cough. Denies headache, fever, chest pain, abdominal pain. CXR unremarkable for any infiltrates. 12/1  Patient is alert and oriented, currently on 5L. Reports an increase O2 need yesterday to 6-7L, is anxious about her lungs. Says she is worried about physical activity due to increase O2 need. Alli chest pain, headache. 12/2  Patient is alert and pleasant, currently on 4L O2. She reports walking for an oxygen study yesterday where she required 6L during periods of exacerbation, is comfortable on 4L when resting. She endorses a productive cough and feels lightheaded with too much exercise. Denies headache, chest pain, abdominal pain.     Patient very anxious still coughing congested oxygen saturation is stable on 3 L which is baseline    Best option to discharge patient to skilled care rehab for pulmonary rehab    Medication reconciliation done time discharge patient 35 minutes 50% time spent counseling and coordination of care      Signed:   Te Singh MD  12/4/2022  11:18 AM

## 2022-12-04 NOTE — PROGRESS NOTES
Bedside and Verbal shift change report given to Octavio Leonardo RN (oncoming nurse) by Сергей Khalil LPN (offgoing nurse). Report included the following information SBAR, Kardex, Intake/Output, MAR, Accordion, Recent Results, and Med Rec Status.

## 2022-12-04 NOTE — PROGRESS NOTES
Discharge Summary       PATIENT ID: Jose Gilmore  MRN: [id number]   YOB: 1960    DATE OF ADMISSION: 11/26/2022  1:08 PM    DATE OF DISCHARGE:   PRIMARY CARE PROVIDER: Darren Mcnamara MD     ATTENDING PHYSICIAN: Leonidas Funes  DISCHARGING PROVIDER: Leonidas Funes      CONSULTATIONS: IP CONSULT TO PULMONOLOGY  IP CONSULT TO UROLOGY    PROCEDURES/SURGERIES: * No surgery found *    ADMITTING DIAGNOSES:    Patient Active Problem List    Diagnosis Date Noted    Respiratory failure ([facility] 08/08/2021    COPD exacerbation ([facility] 08/08/2021    COPD (chronic obstructive pulmonary disease) ([facility] 12/13/2020    Hypoxia 12/13/2020       DISCHARGE DIAGNOSES / PLAN:      Acute on chronic hypoxic respiratory failure  Acute exacerbation of COPD  Hypertension  Cough  Hyperglycemia due to steroids  UTI        DISCHARGE MEDICATIONS:  Current Discharge Medication List        START taking these medications    Details   azithromycin (ZITHROMAX) 500 mg tab Take 1 Tablet by mouth daily for 3 days. Qty: 3 Tablet, Refills: 0  Start date: 12/3/2022, End date: 12/6/2022      busPIRone (BUSPAR) 5 mg tablet Take 1 Tablet by mouth two (2) times a day. Qty: 60 Tablet, Refills: 0  Start date: 12/2/2022      cetirizine (ZYRTEC) 10 mg tablet Take 1 Tablet by mouth daily. Qty: 20 Tablet, Refills: 0  Start date: 12/3/2022           CONTINUE these medications which have CHANGED    Details   insulin glargine (LANTUS) 100 unit/mL injection 15 units  Qty: 1 mL, Refills: 2  Start date: 12/2/2022           CONTINUE these medications which have NOT CHANGED    Details   docusate sodium (Colace) 100 mg capsule Take 1 Capsule by mouth two (2) times a day for 90 days. Qty: 60 Capsule, Refills: 2      carvediloL (COREG) 6.25 mg tablet Take 1 Tablet by mouth two (2) times daily (with meals).   Qty: 60 Tablet, Refills: 0      furosemide (Lasix) 40 mg tablet Lasix 40 mg daily  Qty: 30 Tablet, Refills: 0      pantoprazole (PROTONIX) 40 mg tablet Take 1 Tablet by mouth Daily (before breakfast). Qty: 60 Tablet, Refills: 0      predniSONE (DELTASONE) 10 mg tablet 1 tablet daily  Qty: 15 Tablet, Refills: 0      albuterol-ipratropium (DUO-NEB) 2.5 mg-0.5 mg/3 ml nebu 3 mL by Nebulization route every six (6) hours as needed for Wheezing. Qty: 30 Nebule, Refills: 1      aspirin 81 mg chewable tablet Take 1 Tab by mouth daily. Qty: 30 Tab, Refills: 0      guaiFENesin ER (MUCINEX) 600 mg ER tablet Take 1 Tab by mouth two (2) times a day. Qty: 30 Tab, Refills: 0      amLODIPine (NORVASC) 10 mg tablet Take 10 mg by mouth daily. mometasone-formoterol (DULERA) 200-5 mcg/actuation HFA inhaler Take 2 Puffs by inhalation two (2) times a day. montelukast (SINGULAIR) 10 mg tablet Take 10 mg by mouth daily. Calcium-Cholecalciferol, D3, 500 mg(1,250mg) -400 unit tab Take  by mouth daily. fluticasone propionate (FLONASE) 50 mcg/actuation nasal spray 2 Sprays by Both Nostrils route daily. ergocalciferol (Vitamin D2) 1,250 mcg (50,000 unit) capsule Take 50,000 Units by mouth every seven (7) days. Q7days on Monday           STOP taking these medications       cephALEXin (Keflex) 500 mg capsule Comments:   Reason for Stopping:         benzonatate (TESSALON) 100 mg capsule Comments:   Reason for Stopping:                 NOTIFY YOUR PHYSICIAN FOR ANY OF THE FOLLOWING:   Fever over 101 degrees for 24 hours. Chest pain, shortness of breath, fever, chills, nausea, vomiting, diarrhea, change in mentation, falling, weakness, bleeding. Severe pain or pain not relieved by medications. Or, any other signs or symptoms that you may have questions about. DISPOSITION:  x  Home With:   OT  PT  HH  RN       Long term SNF/Inpatient Rehab    Independent/assisted living    Hospice    Other:       PATIENT CONDITION AT DISCHARGE: Stable      PHYSICAL EXAMINATION AT DISCHARGE:  General:          Alert, cooperative, no distress, appears stated age.      HEENT: Atraumatic, anicteric sclerae, pink conjunctivae                          No oral ulcers, mucosa moist, throat clear, dentition fair  Neck:               Supple, symmetrical  Lungs:             Clear to auscultation bilaterally. No Wheezing or Rhonchi. No rales. Chest wall:      No tenderness  No Accessory muscle use. Heart:              Regular  rhythm,  No  murmur   No edema  Abdomen:        Soft, non-tender. Not distended. Bowel sounds normal  Extremities:     No cyanosis. No clubbing,                            Skin turgor normal, Capillary refill normal  Skin:                Not pale. Not Jaundiced  No rashes   Psych:             Not anxious or agitated. Neurologic:      Alert, moves all extremities, answers questions appropriately and responds to commands     XR CHEST SNGL V   Final Result   No Acute Disease.               Recent Results (from the past 24 hour(s))   GLUCOSE, POC    Collection Time: 12/03/22  4:15 PM   Result Value Ref Range    Glucose (POC) 216 (H) 65 - 100 mg/dL    Performed by [facility]    GLUCOSE, POC    Collection Time: 12/03/22  7:46 PM   Result Value Ref Range    Glucose (POC) 351 (H) 65 - 100 mg/dL    Performed by [address] [place] [address], POC    Collection Time: 12/04/22  7:40 AM   Result Value Ref Range    Glucose (POC) 141 (H) 65 - 100 mg/dL    Performed by [facility]    GLUCOSE, POC    Collection Time: 12/04/22 11:01 AM   Result Value Ref Range    Glucose (POC) 127 (H) 65 - 100 mg/dL    Performed by [facility] PIA    CBC W/O DIFF    Collection Time: 12/04/22 11:35 AM   Result Value Ref Range    WBC 15.0 (H) 3.6 - 11.0 K/uL    RBC 4.11 3.80 - 5.20 M/uL    HGB 12.3 11.5 - 16.0 g/dL    HCT 38.8 35.0 - 47.0 %    MCV 94.4 80.0 - 99.0 FL    MCH 29.9 26.0 - 34.0 PG    MCHC 31.7 30.0 - 36.5 g/dL    RDW 12.5 11.5 - 14.5 %    PLATELET 482 150 - 293 K/uL    MPV 10.6 8.9 - 12.9 FL    NRBC 0.0 0.0  WBC    ABSOLUTE NRBC 0.00 0.00 - 0.01 K/uL   RENAL FUNCTION PANEL    Collection Time: 12/04/22 11:35 AM   Result Value Ref Range    Sodium 141 136 - 145 mmol/L    Potassium 4.6 3.5 - 5.1 mmol/L    Chloride 105 97 - 108 mmol/L    CO2 36 (H) 21 - 32 mmol/L    Anion gap 0 (L) 5 - 15 mmol/L    Glucose 134 (H) 65 - 100 mg/dL    BUN 31 (H) 6 - 20 mg/dL    Creatinine 1.05 (H) 0.55 - 1.02 mg/dL    BUN/Creatinine ratio 30 (H) 12 - 20      eGFR >60 >60 ml/min/1.73m2    Calcium 9.2 8.5 - 10.1 mg/dL    Phosphorus 2.2 (L) 2.6 - 4.7 mg/dL    Albumin 2.8 (L) 3.5 - 5.0 g/dL   MAGNESIUM    Collection Time: 12/04/22 11:35 AM   Result Value Ref Range    Magnesium 2.7 (H) 1.6 - 2.4 mg/dL          HOSPITAL COURSE:  She was transferred to my service as per patient request     Resting the bed alert awake complaining of coughing shortness of breath no fever no chills     11/30  Patient currently on 4L oxygen, reports 2L at home. Endorses productive cough. Denies headache, fever, chest pain, abdominal pain. CXR unremarkable for any infiltrates. 12/1  Patient is alert and oriented, currently on 5L. Reports an increase O2 need yesterday to 6-7L, is anxious about her lungs. Says she is worried about physical activity due to increase O2 need. Alli chest pain, headache. 12/2  Patient is alert and pleasant, currently on 4L O2. She reports walking for an oxygen study yesterday where she required 6L during periods of exacerbation, is comfortable on 4L when resting. She endorses a productive cough and feels lightheaded with too much exercise. Denies headache, chest pain, abdominal pain.     Patient very anxious still coughing congested oxygen saturation is stable on 3 L which is baseline    Best option to discharge patient to skilled care rehab for pulmonary rehab    Medication reconciliation done time discharge patient 35 minutes 50% time spent counseling and coordination of care  Waiting for transfer    Signed:   Ariadna Beverly MD  12/4/2022  11:18 AM

## 2022-12-04 NOTE — PROGRESS NOTES
Pulmonary/ CC progress note    Subjective:   Date of Consultation:  2022  Referring Physician: Sherlyn Brewer MD    Patient seen and examined in her room on the floor this afternoon, no acute events overnight. Saturating well on 2-3 L nasal cannula, home requirements. Continue on current Symbicort and duo nebs. Weaned to 40 mg prednisone daily, recommend 5 total days. CBC demonstrates a white blood cell count up to 15 from 5.7 previously, creatinine stable at 1.03 from 1.11, phosphorus still low at 2.2. Will give 2 packets p.o. Neutra-Phos  Repeat labs in the morning. Allergies   Allergen Reactions    Lisinopril Angioedema        Review of Systems:  A comprehensive review of systems was negative except for that written in the History of Present Illness. Objective:   Blood pressure 136/74, pulse 88, temperature 98.5 °F (36.9 °C), resp. rate 20, height 5' 4\" (1.626 m), weight 94.2 kg (207 lb 10.8 oz), SpO2 95 %, not currently breastfeeding. Temp (24hrs), Av.2 °F (36.8 °C), Min:98 °F (36.7 °C), Max:98.5 °F (36.9 °C)    XR CHEST SNGL V   Final Result   No Acute Disease. Data Review: CBC:   Recent Labs     22  1135   WBC 15.0*   RBC 4.11   HGB 12.3   HCT 38.8          CMP:   Recent Labs     22  1135   *      K 4.6      CO2 36*   BUN 31*   CREA 1.05*   CA 9.2   AGAP 0*   BUCR 30*   ALB 2.8*       Liver Enzymes:   Recent Labs     22  1135   ALB 2.8*       ABGs: No results for input(s): PH, PCO2, PO2, HCO3 in the last 72 hours.   Inflammation studies: No results found for: SR, ESRA, CRP  Current Facility-Administered Medications   Medication Dose Route Frequency    potassium, sodium phosphates (NEUTRA-PHOS) packet 2 Packet  2 Packet Oral ONCE    sertraline (ZOLOFT) tablet 25 mg  25 mg Oral DAILY    guaiFENesin ER (MUCINEX) tablet 1,200 mg  1,200 mg Oral Q12H    predniSONE (DELTASONE) tablet 40 mg  40 mg Oral DAILY WITH BREAKFAST hydrOXYzine pamoate (VISTARIL) capsule 25 mg  25 mg Oral Q6H PRN    insulin lispro (HUMALOG) injection   SubCUTAneous AC&HS    glucose chewable tablet 16 g  4 Tablet Oral PRN    glucagon (GLUCAGEN) injection 1 mg  1 mg IntraMUSCular PRN    guaiFENesin-codeine (ROBITUSSIN AC) 100-10 mg/5 mL solution 5 mL  5 mL Oral Q6H PRN    albuterol-ipratropium (DUO-NEB) 2.5 MG-0.5 MG/3 ML  3 mL Nebulization Q6H RT    busPIRone (BUSPAR) tablet 5 mg  5 mg Oral BID    amLODIPine (NORVASC) tablet 10 mg  10 mg Oral DAILY    aspirin chewable tablet 81 mg  81 mg Oral DAILY    calcium-vitamin D 600 mg-5 mcg (200 unit) per tablet 1 Tablet  1 Tablet Oral DAILY    carvediloL (COREG) tablet 6.25 mg  6.25 mg Oral BID WITH MEALS    docusate sodium (COLACE) capsule 100 mg  100 mg Oral BID    fluticasone propionate (FLONASE) 50 mcg/actuation nasal spray 2 Spray  2 Spray Both Nostrils DAILY    furosemide (LASIX) tablet 20 mg  20 mg Oral DAILY    insulin glargine (LANTUS) injection 15 Units  15 Units SubCUTAneous QHS    budesonide-formoteroL (SYMBICORT) 160-4.5 mcg/actuation HFA inhaler 2 Puff  2 Puff Inhalation BID    montelukast (SINGULAIR) tablet 10 mg  10 mg Oral DAILY    sodium chloride (NS) flush 5-40 mL  5-40 mL IntraVENous Q8H    sodium chloride (NS) flush 5-40 mL  5-40 mL IntraVENous PRN    acetaminophen (TYLENOL) tablet 650 mg  650 mg Oral Q6H PRN    Or    acetaminophen (TYLENOL) suppository 650 mg  650 mg Rectal Q6H PRN    polyethylene glycol (MIRALAX) packet 17 g  17 g Oral DAILY PRN    ondansetron (ZOFRAN ODT) tablet 4 mg  4 mg Oral Q8H PRN    Or    ondansetron (ZOFRAN) injection 4 mg  4 mg IntraVENous Q6H PRN    enoxaparin (LOVENOX) injection 40 mg  40 mg SubCUTAneous DAILY    pantoprazole (PROTONIX) tablet 40 mg  40 mg Oral ACB&D    cetirizine (ZYRTEC) tablet 10 mg  10 mg Oral DAILY        Exam:      This is middle aged female obese. Alert and oriented x3.   Head normocephalic and atraumatic, pupils are round responsive to light sclera icteric and conjunctive are pink. JVD is absent. Chest: Bilateral expiratory wheezing audible. Prolonged phase of expiration, however air entry is currently diminished than yesterday. Heart: S1-S2 normal  Abdomen: Soft, nontender, no visceromegaly  Extremities: No edema, sinus or clubbing  Neuro: No focal motor deficit. Impression:   Ms. Janes Wagner is a 63years old female who is known to have history of COPD, hypertension, chronic hypoxia on supplemental oxygen. She additionally history of breast CVA with complete remission. She follows with Dr. Ki Bae her office. Last seen about 3 months ago. She is admitted to hospital because of increasing symptoms of cough, shortness of breath and wheezing. She used her inhalers and nebulizer but without any improvement. X-rays unremarkable for any infiltrates    Plan:   1. Acute on chronic hypoxic respiratory failure: This is secondary to acute exacerbation of COPD. She is currently on 3 L oxygen. Wean down to keep saturation more than 90%. Replete phosphorus today    2. Acute exacerbation of COPD:  Chest x-ray is unremarkable for any infiltrates. Patient got started on nebulized albuterol and Atrovent along with systemic steroids. She is also empirically started on azithromycin and Rocephin. However Rocephin was discontinued. Wean Solu-Medrol to prednisone starting tomorrow. Continue with Symbicort 160/4.5, 2 inhalations twice daily. Additionally she is on Singulair. Continue with Mucinex around-the-clock and Robitussin with codeine as needed. 3.  Hypertension :  Continue antihypertensive medication    4. DVT prophylaxis with Lovenox subcutaneously. There appears to be element of anxiety. Agree with starting her on BuSpar. She is slowly improving. She may benefit from placement to skilled nursing facility or rehab. The patient is currently apprehensive about her upcoming discharge.   Apparently she is for discharge but would like to go to [facility] rehab. Her sister works over there as well. This is not unreasonable. Discussed with case management and she is working on authorization. Questions of patient were answered at bedside in detail  Case discussed in detail with RN, RT, and care team  Thank you for involving me in the care of this patient  I will follow with you closely during hospitalization    Time spent more than 30 minutes reviewing results and records, decision making, and answering questions.       Kellee Garrett DO  [facility] ([facility])

## 2022-12-04 NOTE — PROGRESS NOTES
Problem: Mobility Impaired (Adult and Pediatric)  Goal: *Acute Goals and Plan of Care (Insert Text)  Description: I with LE HEP x7 days  Mod I with bed mob x7 days  Mod I with all transfers x7 days  Amb 75-100ft with LRAD and SBAx1 x7 days    Pt stated goal: to get better  Outcome: Progressing Towards Goal   PHYSICAL THERAPY TREATMENT  Patient: Michelle Oh (89 y.o. female)  Date: 12/4/2022  Diagnosis: COPD exacerbation ([address] [J44.1] <principal problem not specified>      Precautions:    Chart, physical therapy assessment, plan of care and goals were reviewed. ASSESSMENT  Patient continues with skilled PT services and is progressing towards goals. Pt semi-supine upon PT arrival, agreeable to session. RT present in room and able to assess patient's respiratory status during gait training. (See below for objective details and assist levels). Overall pt tolerated session well today with gait training performed in room in 3 bouts for 50 feet x 1, 100 feet x 1 and 150 feet x 1 without AD, supervision and seated rest breaks between. No LOB or buckling noted. Patient anxious during gait training because of SOB so last gait trial she ambulated holding the vitals machine so that she was able to see her readings and this helped with her anxiety. SaO2 ran 87%-93% throughout session and HR 83 bpm -103 bpm with activity. Patient instructed in deep breathing. Will continue to benefit from skilled PT services, and will continue to progress as tolerated. Current Level of Function Impacting Discharge (mobility/balance): decr endurance     Other factors to consider for discharge: SOB, supplemental O2, anxiety          PLAN :  Patient continues to benefit from skilled intervention to address the above impairments. Continue treatment per established plan of care to address goals.     Recommend with staff: Out of bed to chair for meals, Encourage HEP in prep for ADLs/mobility, Amb to bathroom for toileting with gt belt and AD, Use of bed/chair alarm for safety, and LE elevation for management of edema    Recommendation for discharge: (in order for the patient to meet his/her long term goals)  Meng Mclean    This discharge recommendation:  Has been made in collaboration with the attending provider and/or case management    IF patient discharges home will need the following DME: to be determined (TBD)       SUBJECTIVE:   Patient stated I do everything for myself around here.     OBJECTIVE DATA SUMMARY:   Critical Behavior:  Neurologic State: Alert  Orientation Level: Oriented X4  Cognition: Appropriate decision making, Appropriate for age attention/concentration, Follows commands     Functional Mobility Training:  Bed Mobility:     Supine to Sit: Supervision  Sit to Supine: Supervision  Scooting: Supervision        Transfers:  Sit to Stand: Supervision  Stand to Sit: Supervision    Balance:  Sitting: Intact  Standing: Intact  Standing - Static: Good  Standing - Dynamic : Good  Ambulation/Gait Training:  Distance (ft): 100 Feet (ft)  Assistive Device: Gait belt  Ambulation - Level of Assistance: Supervision  3 gait bouts around room with supplemental O2 on, trialed with 4L O2 but decr back to 3L O2 and sats WNL    Therapeutic Exercises:       EXERCISE   Sets   Reps   Active Active Assist   Passive Self ROM   Comments   Ankle Pumps   [] [] [] []    Quad Sets/Glut Sets   [] [] [] []    Hamstring Sets   [] [] [] []    Short Arc Quads   [] [] [] []    Heel Slides   [] [] [] []    Straight Leg Raises   [] [] [] []    Hip abd/add   [] [] [] []    Long Arc Quads   [] [] [] []    Marching   [] [] [] []       [] [] [] []       Pain Rating:  No pain     Activity Tolerance:   Fair, desaturates with exertion and requires oxygen, requires frequent rest breaks, and observed SOB with activity    After treatment patient left in no apparent distress:   Bed locked and returned to lowest position, Supine in bed and Call bell within reach    COMMUNICATION/COLLABORATION:   The patients plan of care was discussed with: Physical therapist, Registered nurse, and Respiratory therapist.     Chai Borrego, PT   Time Calculation: 28 mins

## 2022-12-05 LAB
ALBUMIN SERPL-MCNC: 3 G/DL (ref 3.5–5)
ANION GAP SERPL CALC-SCNC: 2 MMOL/L (ref 5–15)
BUN SERPL-MCNC: 29 MG/DL (ref 6–20)
BUN/CREAT SERPL: 32 (ref 12–20)
CA-I BLD-MCNC: 9.3 MG/DL (ref 8.5–10.1)
CHLORIDE SERPL-SCNC: 103 MMOL/L (ref 97–108)
CO2 SERPL-SCNC: 37 MMOL/L (ref 21–32)
CREAT SERPL-MCNC: 0.91 MG/DL (ref 0.55–1.02)
ERYTHROCYTE [DISTWIDTH] IN BLOOD BY AUTOMATED COUNT: 12.3 % (ref 11.5–14.5)
GLUCOSE BLD STRIP.AUTO-MCNC: 117 MG/DL (ref 65–100)
GLUCOSE BLD STRIP.AUTO-MCNC: 127 MG/DL (ref 65–100)
GLUCOSE BLD STRIP.AUTO-MCNC: 154 MG/DL (ref 65–100)
GLUCOSE BLD STRIP.AUTO-MCNC: 210 MG/DL (ref 65–100)
GLUCOSE SERPL-MCNC: 88 MG/DL (ref 65–100)
HCT VFR BLD AUTO: 42 % (ref 35–47)
HGB BLD-MCNC: 12.8 G/DL (ref 11.5–16)
MCH RBC QN AUTO: 28.8 PG (ref 26–34)
MCHC RBC AUTO-ENTMCNC: 30.5 G/DL (ref 30–36.5)
MCV RBC AUTO: 94.4 FL (ref 80–99)
NRBC # BLD: 0 K/UL (ref 0–0.01)
NRBC BLD-RTO: 0 PER 100 WBC
PERFORMED BY, TECHID: ABNORMAL
PHOSPHATE SERPL-MCNC: 3.4 MG/DL (ref 2.6–4.7)
PLATELET # BLD AUTO: 271 K/UL (ref 150–400)
PMV BLD AUTO: 10.9 FL (ref 8.9–12.9)
POTASSIUM SERPL-SCNC: 4.3 MMOL/L (ref 3.5–5.1)
RBC # BLD AUTO: 4.45 M/UL (ref 3.8–5.2)
SODIUM SERPL-SCNC: 142 MMOL/L (ref 136–145)
WBC # BLD AUTO: 12.3 K/UL (ref 3.6–11)

## 2022-12-05 PROCEDURE — 74011250637 HC RX REV CODE- 250/637: Performed by: INTERNAL MEDICINE

## 2022-12-05 PROCEDURE — 80069 RENAL FUNCTION PANEL: CPT

## 2022-12-05 PROCEDURE — 77010033678 HC OXYGEN DAILY

## 2022-12-05 PROCEDURE — 74011000250 HC RX REV CODE- 250: Performed by: INTERNAL MEDICINE

## 2022-12-05 PROCEDURE — 82962 GLUCOSE BLOOD TEST: CPT

## 2022-12-05 PROCEDURE — 74011250637 HC RX REV CODE- 250/637: Performed by: FAMILY MEDICINE

## 2022-12-05 PROCEDURE — 74011250636 HC RX REV CODE- 250/636: Performed by: INTERNAL MEDICINE

## 2022-12-05 PROCEDURE — 74011636637 HC RX REV CODE- 636/637: Performed by: FAMILY MEDICINE

## 2022-12-05 PROCEDURE — 94640 AIRWAY INHALATION TREATMENT: CPT

## 2022-12-05 PROCEDURE — 65270000029 HC RM PRIVATE

## 2022-12-05 PROCEDURE — 97530 THERAPEUTIC ACTIVITIES: CPT

## 2022-12-05 PROCEDURE — 85027 COMPLETE CBC AUTOMATED: CPT

## 2022-12-05 PROCEDURE — 36415 COLL VENOUS BLD VENIPUNCTURE: CPT

## 2022-12-05 PROCEDURE — 74011636637 HC RX REV CODE- 636/637: Performed by: INTERNAL MEDICINE

## 2022-12-05 PROCEDURE — 94761 N-INVAS EAR/PLS OXIMETRY MLT: CPT

## 2022-12-05 RX ORDER — SERTRALINE HYDROCHLORIDE 25 MG/1
25 TABLET, FILM COATED ORAL DAILY
Qty: 30 TABLET | Refills: 0 | Status: SHIPPED | OUTPATIENT
Start: 2022-12-06

## 2022-12-05 RX ORDER — PREDNISONE 20 MG/1
40 TABLET ORAL
Qty: 10 TABLET | Refills: 0 | Status: SHIPPED | OUTPATIENT
Start: 2022-12-06

## 2022-12-05 RX ADMIN — IPRATROPIUM BROMIDE AND ALBUTEROL SULFATE 3 ML: 2.5; .5 SOLUTION RESPIRATORY (INHALATION) at 19:13

## 2022-12-05 RX ADMIN — CARVEDILOL 6.25 MG: 3.12 TABLET, FILM COATED ORAL at 09:25

## 2022-12-05 RX ADMIN — PANTOPRAZOLE SODIUM 40 MG: 40 TABLET, DELAYED RELEASE ORAL at 09:24

## 2022-12-05 RX ADMIN — PANTOPRAZOLE SODIUM 40 MG: 40 TABLET, DELAYED RELEASE ORAL at 16:18

## 2022-12-05 RX ADMIN — SODIUM CHLORIDE, PRESERVATIVE FREE 10 ML: 5 INJECTION INTRAVENOUS at 21:22

## 2022-12-05 RX ADMIN — Medication 2 UNITS: at 18:45

## 2022-12-05 RX ADMIN — BUSPIRONE HYDROCHLORIDE 5 MG: 5 TABLET ORAL at 21:22

## 2022-12-05 RX ADMIN — PREDNISONE 40 MG: 20 TABLET ORAL at 09:24

## 2022-12-05 RX ADMIN — AMLODIPINE BESYLATE 10 MG: 5 TABLET ORAL at 09:24

## 2022-12-05 RX ADMIN — MONTELUKAST 10 MG: 10 TABLET, FILM COATED ORAL at 09:24

## 2022-12-05 RX ADMIN — BUDESONIDE AND FORMOTEROL FUMARATE DIHYDRATE 2 PUFF: 160; 4.5 AEROSOL RESPIRATORY (INHALATION) at 19:13

## 2022-12-05 RX ADMIN — SODIUM CHLORIDE, PRESERVATIVE FREE 10 ML: 5 INJECTION INTRAVENOUS at 05:29

## 2022-12-05 RX ADMIN — CARVEDILOL 6.25 MG: 3.12 TABLET, FILM COATED ORAL at 16:18

## 2022-12-05 RX ADMIN — IPRATROPIUM BROMIDE AND ALBUTEROL SULFATE 3 ML: 2.5; .5 SOLUTION RESPIRATORY (INHALATION) at 14:01

## 2022-12-05 RX ADMIN — GUAIFENESIN 1200 MG: 600 TABLET, EXTENDED RELEASE ORAL at 09:25

## 2022-12-05 RX ADMIN — Medication 1 TABLET: at 09:24

## 2022-12-05 RX ADMIN — INSULIN GLARGINE 15 UNITS: 100 INJECTION, SOLUTION SUBCUTANEOUS at 22:00

## 2022-12-05 RX ADMIN — HYDROXYZINE PAMOATE 25 MG: 25 CAPSULE ORAL at 21:22

## 2022-12-05 RX ADMIN — GUAIFENESIN 1200 MG: 600 TABLET, EXTENDED RELEASE ORAL at 21:22

## 2022-12-05 RX ADMIN — BUDESONIDE AND FORMOTEROL FUMARATE DIHYDRATE 2 PUFF: 160; 4.5 AEROSOL RESPIRATORY (INHALATION) at 07:20

## 2022-12-05 RX ADMIN — ASPIRIN 81 MG 81 MG: 81 TABLET ORAL at 09:24

## 2022-12-05 RX ADMIN — DOCUSATE SODIUM 100 MG: 100 CAPSULE, LIQUID FILLED ORAL at 09:24

## 2022-12-05 RX ADMIN — BUSPIRONE HYDROCHLORIDE 5 MG: 5 TABLET ORAL at 09:24

## 2022-12-05 RX ADMIN — SERTRALINE HYDROCHLORIDE 25 MG: 25 TABLET ORAL at 09:24

## 2022-12-05 RX ADMIN — SODIUM CHLORIDE, PRESERVATIVE FREE 10 ML: 5 INJECTION INTRAVENOUS at 14:00

## 2022-12-05 RX ADMIN — ENOXAPARIN SODIUM 40 MG: 100 INJECTION SUBCUTANEOUS at 09:25

## 2022-12-05 RX ADMIN — CETIRIZINE HYDROCHLORIDE 10 MG: 10 TABLET, FILM COATED ORAL at 09:25

## 2022-12-05 RX ADMIN — IPRATROPIUM BROMIDE AND ALBUTEROL SULFATE 3 ML: 2.5; .5 SOLUTION RESPIRATORY (INHALATION) at 07:20

## 2022-12-05 RX ADMIN — GUAIFENESIN AND CODEINE PHOSPHATE 5 ML: 10; 100 LIQUID ORAL at 21:22

## 2022-12-05 RX ADMIN — IPRATROPIUM BROMIDE AND ALBUTEROL SULFATE 3 ML: 2.5; .5 SOLUTION RESPIRATORY (INHALATION) at 01:58

## 2022-12-05 NOTE — PROGRESS NOTES
General Daily Progress Note          Patient Name:   Merlin Lindsey       YOB: 1960       Age:  [age] [facility] y.o. Admit Date: 11/26/2022      Subjective:     She was transferred to my service as per patient request    Resting the bed alert awake complaining of coughing shortness of breath no fever no chills    11/30  Patient currently on 4L oxygen, reports 2L at home. Endorses productive cough. Denies headache, fever, chest pain, abdominal pain. CXR unremarkable for any infiltrates. 12/1  Patient is alert and oriented, currently on 5L. Reports an increase O2 need yesterday to 6-7L, is anxious about her lungs. Says she is worried about physical activity due to increase O2 need. Alli chest pain, headache. 12/2  Patient is alert and pleasant, currently on 4L O2. She reports walking for an oxygen study yesterday where she required 6L during periods of exacerbation, is comfortable on 4L when resting. She endorses a productive cough and feels lightheaded with too much exercise. Denies headache, chest pain, abdominal pain. 12/5  Patient is sitting in the chair eating breakfast. Reports improved symptoms but is still experiencing a baseline SOB. Currently on 3L. Denies headache, chest pain. Is aware that placement is pending.     Objective:     Visit Vitals  BP (!) 146/83 (BP 1 Location: Right lower arm, BP Patient Position: Sitting)   Pulse 90   Temp 98.4 °F (36.9 °C)   Resp 18   Ht 5' 4\" (1.626 m)   Wt 94.2 kg (207 lb 10.8 oz)   SpO2 93%   Breastfeeding No   BMI 35.65 kg/m²        Recent Results (from the past 24 hour(s))   GLUCOSE, POC    Collection Time: 12/04/22 11:01 AM   Result Value Ref Range    Glucose (POC) 127 (H) 65 - 100 mg/dL    Performed by [facility] PIA    CBC W/O DIFF    Collection Time: 12/04/22 11:35 AM   Result Value Ref Range    WBC 15.0 (H) 3.6 - 11.0 K/uL    RBC 4.11 3.80 - 5.20 M/uL    HGB 12.3 11.5 - 16.0 g/dL    HCT 38.8 35.0 - 47.0 %    MCV 94.4 80.0 - 99.0 FL    MCH 29.9 26.0 - 34.0 PG    MCHC 31.7 30.0 - 36.5 g/dL    RDW 12.5 11.5 - 14.5 %    PLATELET 560 120 - 187 K/uL    MPV 10.6 8.9 - 12.9 FL    NRBC 0.0 0.0  WBC    ABSOLUTE NRBC 0.00 0.00 - 0.01 K/uL   RENAL FUNCTION PANEL    Collection Time: 12/04/22 11:35 AM   Result Value Ref Range    Sodium 141 136 - 145 mmol/L    Potassium 4.6 3.5 - 5.1 mmol/L    Chloride 105 97 - 108 mmol/L    CO2 36 (H) 21 - 32 mmol/L    Anion gap 0 (L) 5 - 15 mmol/L    Glucose 134 (H) 65 - 100 mg/dL    BUN 31 (H) 6 - 20 mg/dL    Creatinine 1.05 (H) 0.55 - 1.02 mg/dL    BUN/Creatinine ratio 30 (H) 12 - 20      eGFR >60 >60 ml/min/1.73m2    Calcium 9.2 8.5 - 10.1 mg/dL    Phosphorus 2.2 (L) 2.6 - 4.7 mg/dL    Albumin 2.8 (L) 3.5 - 5.0 g/dL   MAGNESIUM    Collection Time: 12/04/22 11:35 AM   Result Value Ref Range    Magnesium 2.7 (H) 1.6 - 2.4 mg/dL   GLUCOSE, POC    Collection Time: 12/04/22  4:41 PM   Result Value Ref Range    Glucose (POC) 162 (H) 65 - 100 mg/dL    Performed by Allison Nam    GLUCOSE, POC    Collection Time: 12/04/22  7:39 PM   Result Value Ref Range    Glucose (POC) 145 (H) 65 - 100 mg/dL    Performed by Betty Lorenz    RENAL FUNCTION PANEL    Collection Time: 12/05/22  7:45 AM   Result Value Ref Range    Sodium 142 136 - 145 mmol/L    Potassium 4.3 3.5 - 5.1 mmol/L    Chloride 103 97 - 108 mmol/L    CO2 37 (H) 21 - 32 mmol/L    Anion gap 2 (L) 5 - 15 mmol/L    Glucose 88 65 - 100 mg/dL    BUN 29 (H) 6 - 20 mg/dL    Creatinine 0.91 0.55 - 1.02 mg/dL    BUN/Creatinine ratio 32 (H) 12 - 20      eGFR >60 >60 ml/min/1.73m2    Calcium 9.3 8.5 - 10.1 mg/dL    Phosphorus 3.4 2.6 - 4.7 mg/dL    Albumin 3.0 (L) 3.5 - 5.0 g/dL   CBC W/O DIFF    Collection Time: 12/05/22  7:45 AM   Result Value Ref Range    WBC 12.3 (H) 3.6 - 11.0 K/uL    RBC 4.45 3.80 - 5.20 M/uL    HGB 12.8 11.5 - 16.0 g/dL    HCT 42.0 35.0 - 47.0 %    MCV 94.4 80.0 - 99.0 FL    MCH 28.8 26.0 - 34.0 PG    MCHC 30.5 30.0 - 36.5 g/dL    RDW 12.3 11.5 - 14.5 %    PLATELET 001 150 - 400 K/uL    MPV 10.9 8.9 - 12.9 FL    NRBC 0.0 0.0  WBC    ABSOLUTE NRBC 0.00 0.00 - 0.01 K/uL     [unfilled]      Review of Systems    Constitutional: Negative for chills and fever. HENT: Negative. Eyes: Negative. Respiratory: Negative. Cardiovascular: Negative. Gastrointestinal: Negative for abdominal pain and nausea. Skin: Negative. Neurological: Negative. Physical Exam:      Constitutional: pt is oriented to person, place, and time. HENT:   Head: Normocephalic and atraumatic. Eyes: Pupils are equal, round, and reactive to light. EOM are normal.   Cardiovascular: Normal rate, regular rhythm and normal heart sounds. Pulmonary/Chest: Breath sounds normal. No wheezes. No rales. Exhibits no tenderness. Abdominal: Soft. Bowel sounds are normal. There is no abdominal tenderness. There is no rebound and no guarding. Musculoskeletal: Normal range of motion. Neurological: pt is alert and oriented to person, place, and time. XR CHEST SNGL V   Final Result   No Acute Disease.               Recent Results (from the past 24 hour(s))   GLUCOSE, POC    Collection Time: 12/04/22 11:01 AM   Result Value Ref Range    Glucose (POC) 127 (H) 65 - 100 mg/dL    Performed by [facility]    CBC W/O DIFF    Collection Time: 12/04/22 11:35 AM   Result Value Ref Range    WBC 15.0 (H) 3.6 - 11.0 K/uL    RBC 4.11 3.80 - 5.20 M/uL    HGB 12.3 11.5 - 16.0 g/dL    HCT 38.8 35.0 - 47.0 %    MCV 94.4 80.0 - 99.0 FL    MCH 29.9 26.0 - 34.0 PG    MCHC 31.7 30.0 - 36.5 g/dL    RDW 12.5 11.5 - 14.5 %    PLATELET 446 532 - 917 K/uL    MPV 10.6 8.9 - 12.9 FL    NRBC 0.0 0.0  WBC    ABSOLUTE NRBC 0.00 0.00 - 0.01 K/uL   RENAL FUNCTION PANEL    Collection Time: 12/04/22 11:35 AM   Result Value Ref Range    Sodium 141 136 - 145 mmol/L    Potassium 4.6 3.5 - 5.1 mmol/L    Chloride 105 97 - 108 mmol/L    CO2 36 (H) 21 - 32 mmol/L    Anion gap 0 (L) 5 - 15 mmol/L    Glucose 134 (H) 65 - 100 mg/dL    BUN 31 (H) 6 - 20 mg/dL    Creatinine 1.05 (H) 0.55 - 1.02 mg/dL    BUN/Creatinine ratio 30 (H) 12 - 20      eGFR >60 >60 ml/min/1.73m2    Calcium 9.2 8.5 - 10.1 mg/dL    Phosphorus 2.2 (L) 2.6 - 4.7 mg/dL    Albumin 2.8 (L) 3.5 - 5.0 g/dL   MAGNESIUM    Collection Time: 12/04/22 11:35 AM   Result Value Ref Range    Magnesium 2.7 (H) 1.6 - 2.4 mg/dL   GLUCOSE, POC    Collection Time: 12/04/22  4:41 PM   Result Value Ref Range    Glucose (POC) 162 (H) 65 - 100 mg/dL    Performed by Adriane Chan    GLUCOSE, POC    Collection Time: 12/04/22  7:39 PM   Result Value Ref Range    Glucose (POC) 145 (H) 65 - 100 mg/dL    Performed by Vanesa Riddle    RENAL FUNCTION PANEL    Collection Time: 12/05/22  7:45 AM   Result Value Ref Range    Sodium 142 136 - 145 mmol/L    Potassium 4.3 3.5 - 5.1 mmol/L    Chloride 103 97 - 108 mmol/L    CO2 37 (H) 21 - 32 mmol/L    Anion gap 2 (L) 5 - 15 mmol/L    Glucose 88 65 - 100 mg/dL    BUN 29 (H) 6 - 20 mg/dL    Creatinine 0.91 0.55 - 1.02 mg/dL    BUN/Creatinine ratio 32 (H) 12 - 20      eGFR >60 >60 ml/min/1.73m2    Calcium 9.3 8.5 - 10.1 mg/dL    Phosphorus 3.4 2.6 - 4.7 mg/dL    Albumin 3.0 (L) 3.5 - 5.0 g/dL   CBC W/O DIFF    Collection Time: 12/05/22  7:45 AM   Result Value Ref Range    WBC 12.3 (H) 3.6 - 11.0 K/uL    RBC 4.45 3.80 - 5.20 M/uL    HGB 12.8 11.5 - 16.0 g/dL    HCT 42.0 35.0 - 47.0 %    MCV 94.4 80.0 - 99.0 FL    MCH 28.8 26.0 - 34.0 PG    MCHC 30.5 30.0 - 36.5 g/dL    RDW 12.3 11.5 - 14.5 %    PLATELET 565 463 - 168 K/uL    MPV 10.9 8.9 - 12.9 FL    NRBC 0.0 0.0  WBC    ABSOLUTE NRBC 0.00 0.00 - 0.01 K/uL       Results       Procedure Component Value Units Date/Time    CULTURE, URINE [[id number]] Collected: 12/01/22 0530    Order Status: Completed Specimen: Urine Updated: 12/02/22 1551     Special Requests: No Special Requests        Culture result: No Growth (<1000 cfu/mL)                Labs:     Recent Labs     12/05/22  0745 12/04/22  1135   WBC 12.3* 15.0*   HGB 12.8 12.3   HCT 42.0 38.8    261       Recent Labs     12/05/22  0745 12/04/22  1135    141   K 4.3 4.6    105   CO2 37* 36*   BUN 29* 31*   CREA 0.91 1.05*   GLU 88 134*   CA 9.3 9.2   MG  --  2.7*   PHOS 3.4 2.2*       Recent Labs     12/05/22  0745 12/04/22  1135   ALB 3.0* 2.8*       No results for input(s): INR, PTP, APTT, INREXT, INREXT in the last 72 hours. No results for input(s): FE, TIBC, PSAT, FERR in the last 72 hours. No results found for: FOL, RBCF   No results for input(s): PH, PCO2, PO2 in the last 72 hours. No results for input(s): CPK, CKNDX, TROIQ in the last 72 hours.     No lab exists for component: CPKMB  No results found for: CHOL, CHOLX, CHLST, CHOLV, HDL, HDLP, LDL, LDLC, DLDLP, TGLX, TRIGL, TRIGP, CHHD, CHHDX  Lab Results   Component Value Date/Time    Glucose (POC) 145 (H) 12/04/2022 07:39 PM    Glucose (POC) 162 (H) 12/04/2022 04:41 PM    Glucose (POC) 127 (H) 12/04/2022 11:01 AM    Glucose (POC) 141 (H) 12/04/2022 07:40 AM    Glucose (POC) 351 (H) 12/03/2022 07:46 PM     Lab Results   Component Value Date/Time    Color Yellow/Straw 11/26/2022 02:57 PM    Appearance Clear 11/26/2022 02:57 PM    Specific gravity 1.009 11/26/2022 02:57 PM    Specific gravity 1.019 08/10/2021 03:00 PM    pH (UA) 5.0 11/26/2022 02:57 PM    Protein Negative 11/26/2022 02:57 PM    Glucose Negative 11/26/2022 02:57 PM    Ketone Negative 11/26/2022 02:57 PM    Bilirubin Negative 11/26/2022 02:57 PM    Urobilinogen 0.1 11/26/2022 02:57 PM    Nitrites Negative 11/26/2022 02:57 PM    Leukocyte Esterase Trace (A) 11/26/2022 02:57 PM    Bacteria Negative 11/26/2022 02:57 PM    WBC 0-4 11/26/2022 02:57 PM    RBC 0-5 11/26/2022 02:57 PM         Assessment:   Acute on chronic hypoxic respiratory failure  Acute exacerbation of COPD  Hypertension  Cough  Hyperglycemia due to steroids  UTI    Plan:     Continue DuoNeb nebulizer and Atrovent  Oxygen prn - wean as tolerated  Norvasc 10mg daily  ASA 81mg daily   Symbicort 160-4.5mcg 2 puffs BID  Coreg 6.25mg BID  Zyrtec 10mg daily  Colace 100mg BID  Flonase 50mcg  Lasix 20mg daily  Mucinex BID  Montelukast 10mg daily  DVT proph: Lovenox     Pulmonary and Urology following - appreciate recs  PT recommends discharge to SNF    Possible discharge in next 24 hours pending placement authorization      Current Facility-Administered Medications:     sertraline (ZOLOFT) tablet 25 mg, 25 mg, Oral, DAILY, North Hamm MD, 25 mg at 12/05/22 0924    guaiFENesin ER (MUCINEX) tablet 1,200 mg, 1,200 mg, Oral, Q12H, Destin Elmore DO, 1,200 mg at 12/05/22 3077    predniSONE (DELTASONE) tablet 40 mg, 40 mg, Oral, DAILY WITH BREAKFAST, Destin Elmore DO, 40 mg at 12/05/22 5578    hydrOXYzine pamoate (VISTARIL) capsule 25 mg, 25 mg, Oral, Q6H PRN, Kami Funes MD, 25 mg at 12/04/22 1708    insulin lispro (HUMALOG) injection, , SubCUTAneous, AC&HS, Kami Funes MD, 1 Units at 12/04/22 1708    glucose chewable tablet 16 g, 4 Tablet, Oral, PRN, Kami Funes MD    glucagon (GLUCAGEN) injection 1 mg, 1 mg, IntraMUSCular, PRN, Kami Funes MD    guaiFENesin-codeine (ROBITUSSIN AC) 100-10 mg/5 mL solution 5 mL, 5 mL, Oral, Q6H PRN, Henok Garza MD, 5 mL at 12/04/22 2129    albuterol-ipratropium (DUO-NEB) 2.5 MG-0.5 MG/3 ML, 3 mL, Nebulization, Q6H RT, Shelton St MD, 3 mL at 12/05/22 0720    busPIRone (BUSPAR) tablet 5 mg, 5 mg, Oral, BID, North Hamm MD, 5 mg at 12/05/22 0924    amLODIPine (NORVASC) tablet 10 mg, 10 mg, Oral, DAILY, North Hamm MD, 10 mg at 12/05/22 7318    aspirin chewable tablet 81 mg, 81 mg, Oral, DAILY, North Hamm MD, 81 mg at 12/05/22 0443    calcium-vitamin D 600 mg-5 mcg (200 unit) per tablet 1 Tablet, 1 Tablet, Oral, DAILY, North Hamm MD, 1 Tablet at 12/05/22 0924    carvediloL (COREG) tablet 6.25 mg, 6.25 mg, Oral, BID WITH MEALS, North Hamm MD, 6.25 mg at 12/05/22 0925    docusate sodium (COLACE) capsule 100 mg, 100 mg, Oral, BID, Mirella NICHOLS MD, 100 mg at 12/05/22 0924    fluticasone propionate (FLONASE) 50 mcg/actuation nasal spray 2 Spray, 2 Spray, Both Nostrils, DAILY, North Hamm MD, 2 [place] at 12/04/22 1207    furosemide (LASIX) tablet 20 mg, 20 mg, Oral, DAILY, North Hamm MD, 20 mg at 12/03/22 1012    insulin glargine (LANTUS) injection 15 Units, 15 Units, SubCUTAneous, QHS, North Hamm MD, 15 Units at 12/04/22 2130    budesonide-formoteroL (SYMBICORT) 160-4.5 mcg/actuation HFA inhaler 2 Puff, 2 Puff, Inhalation, BID, Mirella NICHOLS MD, 2 Puff at 12/05/22 0720    montelukast (SINGULAIR) tablet 10 mg, 10 mg, Oral, DAILY, Cory Peters MD, 10 mg at 12/05/22 1543    sodium chloride (NS) flush 5-40 mL, 5-40 mL, IntraVENous, Q8H, North Hamm MD, 10 mL at 12/05/22 0529    sodium chloride (NS) flush 5-40 mL, 5-40 mL, IntraVENous, PRN, Mirella NICHOLS MD    acetaminophen (TYLENOL) tablet 650 mg, 650 mg, Oral, Q6H PRN, 650 mg at 12/04/22 0925 **OR** acetaminophen (TYLENOL) suppository 650 mg, 650 mg, Rectal, Q6H PRN, North Gann MD    polyethylene glycol (MIRALAX) packet 17 g, 17 g, Oral, DAILY PRN, North Hmam MD    ondansetron (ZOFRAN ODT) tablet 4 mg, 4 mg, Oral, Q8H PRN **OR** ondansetron (ZOFRAN) injection 4 mg, 4 mg, IntraVENous, Q6H PRN, North Hamm MD    enoxaparin (LOVENOX) injection 40 mg, 40 mg, SubCUTAneous, DAILY, North Hamm MD, 40 mg at 12/05/22 0925    pantoprazole (PROTONIX) tablet 40 mg, 40 mg, Oral, ACB&D, Mirella NICHOLS MD, 40 mg at 12/05/22 0924    cetirizine (ZYRTEC) tablet 10 mg, 10 mg, Oral, DAILY, Mirella NICHOLS MD, 10 mg at 12/05/22 4791

## 2022-12-05 NOTE — PROGRESS NOTES
OCCUPATIONAL THERAPY TREATMENT  Patient: John Yan (81 y.o. female)  Date: 12/5/2022  Diagnosis: COPD exacerbation ([facility] [J44.1] <principal problem not specified>      Precautions: FALL  Chart, occupational therapy assessment, plan of care, and goals were reviewed. ASSESSMENT  Pt continues with skilled OT services and is progressing towards goals. Pt received semi-supine in bed upon arrival, AXO x4 and agreeable to RAZA tx at this time. Pt anxious but cooperative and demonstrated good effort during activities. Overall, pt continues to present with deficits in generalized strength/AROM, static/dynamic standing balance, pulmonary status and functional activity tolerance during performance of ADLs/mobility (see below for objective details and assist levels). Pt tolerated therapy session fairly with no complaints of pain. Pt improved with bed mobility<>EOB using bed rail with vc's to pace self with movement as pt very quick and noted SOB. EOB, pt IND for LB dressing with good static/dynamic sitting balance. Pt declined OOB activity d/t performing earlier with nursing. EOB, pt engaged in cece UE exercises with vc's to maximize full ROM and not hold breathe. Pt's SpO2 decreased to 88% and increasing to 90-92% with activity. Pt continues need constant cueing for PLB technique as pt continues to have difficulty d/t anxiety. Pt continues to be limited by pulmonary status and anxiety. Will continue to progress. Recommend d/c to SNF once medically appropriate. Other factors to consider for discharge: Time of onset, medical prognosis/diagnosis, severity of deficits, PLOF, functional baseline, home environment, and family support          PLAN :  Patient continues to benefit from skilled intervention to address the above impairments. Continue treatment per established plan of care. to address goals.     Recommend with staff: Out of bed to chair for meals and Encourage HEP in prep for ADLs/mobility    Recommend next session: Toileting and Standing grooming    Recommendation for discharge: (in order for the patient to meet his/her long term goals)  Meng Mclean    This discharge recommendation:  Has been made in collaboration with the attending provider and/or case management       IF patient discharges home will need the following DME: TBD       SUBJECTIVE:   Patient stated I stay with anxiety.     OBJECTIVE DATA SUMMARY:   Cognitive/Behavioral Status:  Neurologic State: Alert  Orientation Level: Oriented X4  Cognition: Follows commands             Functional Mobility and Transfers for ADLs:  Bed Mobility:  Rolling: Modified independent  Supine to Sit: Modified independent  Sit to Supine: Modified independent  Scooting: Supervision    Transfers:             Balance:  Sitting: Intact    ADL Intervention:       Lower Body Dressing Assistance  Dressing Assistance: Independent  Socks: Independent  Leg Crossed Method Used: Yes  Position Performed: Seated edge of bed         Exercise Sets Reps AROM AAROM PROM Self PROM Comments   Elbow E/F 3 10 [x] [] [] [] EOB   Chest press 3 10 [x] [] [] []    Ceiling punches 3 10 [x] [] [] []         Pain:  0/10    Activity Tolerance:   Fair, desaturates with exertion and requires oxygen, and requires rest breaks    After treatment patient left in no apparent distress:   Supine in bed, Call bell within reach, Side rails x 3, and nursing , bed locked and in lowest position    COMMUNICATION/COLLABORATION:   The patients plan of care was discussed with: Registered nurse. RY Feng  Time Calculation: 26 mins     Problem: Self Care Deficits Care Plan (Adult)  Goal: *Acute Goals and Plan of Care (Insert Text)  Description: FUNCTIONAL STATUS PRIOR TO ADMISSION: Pt reports he was IND w/ functional mobility/ADLs; 2 L of O2 at baseline. Denies falls. HOME SUPPORT: Pt lives by self. No support.     Occupational Therapy Goals  Initiated 12/1/2022    Pt stated goal \"I want to get better\"  Pt will be IND sup <> sit in prep for EOB ADLs  Pt will be IND grooming standing sink side LRAD  Pt will be IND UB dressing sitting EOB/long sit   Pt will be IND LE dressing sitting EOB/long sit  Pt will be IND sit <>  prep for toileting LRAD  Pt will be IND toileting/toilet transfer/cloth mgmt LRAD  Pt will demo'd improved activity tolerance by maintaining >90% SpO2 while completing 3-4 standing ADLs.    Pt will be IND following UE HEP in prep for self care tasks   Outcome: Progressing Towards Goal

## 2022-12-05 NOTE — DISCHARGE SUMMARY
Discharge Summary       PATIENT ID: Merlin Lindsey  MRN: [id number]   YOB: 1960    DATE OF ADMISSION: 11/26/2022  1:08 PM    DATE OF DISCHARGE:   PRIMARY CARE PROVIDER: Jose Valera MD     ATTENDING PHYSICIAN: Dary Funes  DISCHARGING PROVIDER: Dary Funes      CONSULTATIONS: IP CONSULT TO PULMONOLOGY  IP CONSULT TO UROLOGY    PROCEDURES/SURGERIES: * No surgery found *    ADMITTING DIAGNOSES:    Patient Active Problem List    Diagnosis Date Noted    Respiratory failure ([facility] 08/08/2021    COPD exacerbation ([facility] 08/08/2021    COPD (chronic obstructive pulmonary disease) ([facility] 12/13/2020    Hypoxia 12/13/2020       DISCHARGE DIAGNOSES / PLAN:      Acute on chronic hypoxic respiratory failure  Acute exacerbation of COPD  Hypertension  Cough  Hyperglycemia due to steroids  UTI        DISCHARGE MEDICATIONS:  Current Discharge Medication List        START taking these medications    Details   azithromycin (ZITHROMAX) 500 mg tab Take 1 Tablet by mouth daily for 3 days. Qty: 3 Tablet, Refills: 0  Start date: 12/3/2022, End date: 12/6/2022      busPIRone (BUSPAR) 5 mg tablet Take 1 Tablet by mouth two (2) times a day. Qty: 60 Tablet, Refills: 0  Start date: 12/2/2022      cetirizine (ZYRTEC) 10 mg tablet Take 1 Tablet by mouth daily. Qty: 20 Tablet, Refills: 0  Start date: 12/3/2022           CONTINUE these medications which have CHANGED    Details   insulin glargine (LANTUS) 100 unit/mL injection 15 units  Qty: 1 mL, Refills: 2  Start date: 12/2/2022           CONTINUE these medications which have NOT CHANGED    Details   docusate sodium (Colace) 100 mg capsule Take 1 Capsule by mouth two (2) times a day for 90 days. Qty: 60 Capsule, Refills: 2      carvediloL (COREG) 6.25 mg tablet Take 1 Tablet by mouth two (2) times daily (with meals).   Qty: 60 Tablet, Refills: 0      furosemide (Lasix) 40 mg tablet Lasix 40 mg daily  Qty: 30 Tablet, Refills: 0      pantoprazole (PROTONIX) 40 mg tablet Take 1 Tablet by mouth Daily (before breakfast). Qty: 60 Tablet, Refills: 0      predniSONE (DELTASONE) 10 mg tablet 1 tablet daily  Qty: 15 Tablet, Refills: 0      albuterol-ipratropium (DUO-NEB) 2.5 mg-0.5 mg/3 ml nebu 3 mL by Nebulization route every six (6) hours as needed for Wheezing. Qty: 30 Nebule, Refills: 1      aspirin 81 mg chewable tablet Take 1 Tab by mouth daily. Qty: 30 Tab, Refills: 0      guaiFENesin ER (MUCINEX) 600 mg ER tablet Take 1 Tab by mouth two (2) times a day. Qty: 30 Tab, Refills: 0      amLODIPine (NORVASC) 10 mg tablet Take 10 mg by mouth daily. mometasone-formoterol (DULERA) 200-5 mcg/actuation HFA inhaler Take 2 Puffs by inhalation two (2) times a day. montelukast (SINGULAIR) 10 mg tablet Take 10 mg by mouth daily. Calcium-Cholecalciferol, D3, 500 mg(1,250mg) -400 unit tab Take  by mouth daily. fluticasone propionate (FLONASE) 50 mcg/actuation nasal spray 2 Sprays by Both Nostrils route daily. ergocalciferol (Vitamin D2) 1,250 mcg (50,000 unit) capsule Take 50,000 Units by mouth every seven (7) days. Q7days on Monday           STOP taking these medications       cephALEXin (Keflex) 500 mg capsule Comments:   Reason for Stopping:         benzonatate (TESSALON) 100 mg capsule Comments:   Reason for Stopping:                 NOTIFY YOUR PHYSICIAN FOR ANY OF THE FOLLOWING:   Fever over 101 degrees for 24 hours. Chest pain, shortness of breath, fever, chills, nausea, vomiting, diarrhea, change in mentation, falling, weakness, bleeding. Severe pain or pain not relieved by medications. Or, any other signs or symptoms that you may have questions about. DISPOSITION:  x  Home With:   OT  PT  HH  RN       Long term SNF/Inpatient Rehab    Independent/assisted living    Hospice    Other:       PATIENT CONDITION AT DISCHARGE: Stable      PHYSICAL EXAMINATION AT DISCHARGE:  General:          Alert, cooperative, no distress, appears stated age.      HEENT: Atraumatic, anicteric sclerae, pink conjunctivae                          No oral ulcers, mucosa moist, throat clear, dentition fair  Neck:               Supple, symmetrical  Lungs:             Clear to auscultation bilaterally. No Wheezing or Rhonchi. No rales. Chest wall:      No tenderness  No Accessory muscle use. Heart:              Regular  rhythm,  No  murmur   No edema  Abdomen:        Soft, non-tender. Not distended. Bowel sounds normal  Extremities:     No cyanosis. No clubbing,                            Skin turgor normal, Capillary refill normal  Skin:                Not pale. Not Jaundiced  No rashes   Psych:             Not anxious or agitated. Neurologic:      Alert, moves all extremities, answers questions appropriately and responds to commands     XR CHEST SNGL V   Final Result   No Acute Disease.               Recent Results (from the past 24 hour(s))   GLUCOSE, POC    Collection Time: 12/04/22 11:01 AM   Result Value Ref Range    Glucose (POC) 127 (H) 65 - 100 mg/dL    Performed by [facility] PIA    CBC W/O DIFF    Collection Time: 12/04/22 11:35 AM   Result Value Ref Range    WBC 15.0 (H) 3.6 - 11.0 K/uL    RBC 4.11 3.80 - 5.20 M/uL    HGB 12.3 11.5 - 16.0 g/dL    HCT 38.8 35.0 - 47.0 %    MCV 94.4 80.0 - 99.0 FL    MCH 29.9 26.0 - 34.0 PG    MCHC 31.7 30.0 - 36.5 g/dL    RDW 12.5 11.5 - 14.5 %    PLATELET 375 387 - 989 K/uL    MPV 10.6 8.9 - 12.9 FL    NRBC 0.0 0.0  WBC    ABSOLUTE NRBC 0.00 0.00 - 0.01 K/uL   RENAL FUNCTION PANEL    Collection Time: 12/04/22 11:35 AM   Result Value Ref Range    Sodium 141 136 - 145 mmol/L    Potassium 4.6 3.5 - 5.1 mmol/L    Chloride 105 97 - 108 mmol/L    CO2 36 (H) 21 - 32 mmol/L    Anion gap 0 (L) 5 - 15 mmol/L    Glucose 134 (H) 65 - 100 mg/dL    BUN 31 (H) 6 - 20 mg/dL    Creatinine 1.05 (H) 0.55 - 1.02 mg/dL    BUN/Creatinine ratio 30 (H) 12 - 20      eGFR >60 >60 ml/min/1.73m2    Calcium 9.2 8.5 - 10.1 mg/dL    Phosphorus 2.2 (L) 2.6 - 4.7 mg/dL    Albumin 2.8 (L) 3.5 - 5.0 g/dL   MAGNESIUM    Collection Time: 12/04/22 11:35 AM   Result Value Ref Range    Magnesium 2.7 (H) 1.6 - 2.4 mg/dL   GLUCOSE, POC    Collection Time: 12/04/22  4:41 PM   Result Value Ref Range    Glucose (POC) 162 (H) 65 - 100 mg/dL    Performed by Marck Velez    GLUCOSE, POC    Collection Time: 12/04/22  7:39 PM   Result Value Ref Range    Glucose (POC) 145 (H) 65 - 100 mg/dL    Performed by [facility]    RENAL FUNCTION PANEL    Collection Time: 12/05/22  7:45 AM   Result Value Ref Range    Sodium 142 136 - 145 mmol/L    Potassium 4.3 3.5 - 5.1 mmol/L    Chloride 103 97 - 108 mmol/L    CO2 37 (H) 21 - 32 mmol/L    Anion gap 2 (L) 5 - 15 mmol/L    Glucose 88 65 - 100 mg/dL    BUN 29 (H) 6 - 20 mg/dL    Creatinine 0.91 0.55 - 1.02 mg/dL    BUN/Creatinine ratio 32 (H) 12 - 20      eGFR >60 >60 ml/min/1.73m2    Calcium 9.3 8.5 - 10.1 mg/dL    Phosphorus 3.4 2.6 - 4.7 mg/dL    Albumin 3.0 (L) 3.5 - 5.0 g/dL   CBC W/O DIFF    Collection Time: 12/05/22  7:45 AM   Result Value Ref Range    WBC 12.3 (H) 3.6 - 11.0 K/uL    RBC 4.45 3.80 - 5.20 M/uL    HGB 12.8 11.5 - 16.0 g/dL    HCT 42.0 35.0 - 47.0 %    MCV 94.4 80.0 - 99.0 FL    MCH 28.8 26.0 - 34.0 PG    MCHC 30.5 30.0 - 36.5 g/dL    RDW 12.3 11.5 - 14.5 %    PLATELET 963 114 - 616 K/uL    MPV 10.9 8.9 - 12.9 FL    NRBC 0.0 0.0  WBC    ABSOLUTE NRBC 0.00 0.00 - 0.01 K/uL   GLUCOSE, POC    Collection Time: 12/05/22  9:37 AM   Result Value Ref Range    Glucose (POC) 117 (H) 65 - 100 mg/dL    Performed by Alem Ward Dr:  She was transferred to my service as per patient request     Resting the bed alert awake complaining of coughing shortness of breath no fever no chills     11/30  Patient currently on 4L oxygen, reports 2L at home. Endorses productive cough. Denies headache, fever, chest pain, abdominal pain. CXR unremarkable for any infiltrates.       12/1  Patient is alert and oriented, currently on 5L. Reports an increase O2 need yesterday to 6-7L, is anxious about her lungs. Says she is worried about physical activity due to increase O2 need. Alli chest pain, headache. 12/2  Patient is alert and pleasant, currently on 4L O2. She reports walking for an oxygen study yesterday where she required 6L during periods of exacerbation, is comfortable on 4L when resting. She endorses a productive cough and feels lightheaded with too much exercise. Denies headache, chest pain, abdominal pain. Patient very anxious still coughing congested oxygen saturation is stable on 3 L which is baseline    Best option to discharge patient to skilled care rehab for pulmonary rehab    Medication reconciliation done time discharge patient 35 minutes 50% time spent counseling and coordination of care    12/5  Patient is sitting in the chair eating breakfast. Reports improved symptoms but is still experiencing a baseline SOB. Currently on 3L. Denies headache, chest pain. Is aware that placement is pending.       Signed:   Zahra Sinclair MD  12/5/2022  11:18 AM

## 2022-12-05 NOTE — PROGRESS NOTES
Problem: Falls - Risk of  Goal: *Absence of Falls  Description: Document [place] Fall Risk and appropriate interventions in the flowsheet.   Outcome: Progressing Towards Goal  Note: Fall Risk Interventions:            Medication Interventions: Teach patient to arise slowly                   Problem: Patient Education: Go to Patient Education Activity  Goal: Patient/Family Education  Outcome: Progressing Towards Goal     Problem: Patient Education: Go to Patient Education Activity  Goal: Patient/Family Education  Outcome: Progressing Towards Goal     Problem: Patient Education: Go to Patient Education Activity  Goal: Patient/Family Education  Outcome: Progressing Towards Goal

## 2022-12-05 NOTE — PROGRESS NOTES
Bedside and Verbal shift change report given to Temi Beavers RN (oncoming nurse) by Joon Celeste LPN (offgoing nurse). Report included the following information SBAR, Kardex, Intake/Output, MAR, Accordion, Recent Results, and Med Rec Status.

## 2022-12-05 NOTE — PROGRESS NOTES
DC Plan: [facility] health liaison indicated she would start auth today. Cm met with pt at the bedside and updated her on her dc plan. Cm will continue to follow.

## 2022-12-05 NOTE — PROGRESS NOTES
Pulmonary/ CC progress note    Subjective:   Date of Consultation:  2022  Referring Physician: Slava Ryan MD    Patient seen and examined in her room on the floor this afternoon, no acute events overnight. Saturating well on 2-3 L nasal cannula, home requirements. Continue on current Symbicort and duo nebs. Weaned to 40 mg prednisone daily, recommend 5 total days. Allergies   Allergen Reactions    Lisinopril Angioedema        Review of Systems:  A comprehensive review of systems was negative except for that written in the History of Present Illness. Objective:   Blood pressure 128/72, pulse 76, temperature 98.1 °F (36.7 °C), resp. rate 18, height 5' 4\" (1.626 m), weight 94.2 kg (207 lb 10.8 oz), SpO2 94 %, not currently breastfeeding. Temp (24hrs), Av.2 °F (36.8 °C), Min:98 °F (36.7 °C), Max:98.4 °F (36.9 °C)    XR CHEST SNGL V   Final Result   No Acute Disease. Data Review: CBC:   Recent Labs     22  0745 22  1135   WBC 12.3* 15.0*   RBC 4.45 4.11   HGB 12.8 12.3   HCT 42.0 38.8    261     CMP:   Recent Labs     22  0745 22  1135   GLU 88 134*    141   K 4.3 4.6    105   CO2 37* 36*   BUN 29* 31*   CREA 0.91 1.05*   CA 9.3 9.2   AGAP 2* 0*   BUCR 32* 30*   ALB 3.0* 2.8*     Liver Enzymes:   Recent Labs     22  0745   ALB 3.0*     ABGs: No results for input(s): PH, PCO2, PO2, HCO3 in the last 72 hours.   Inflammation studies: No results found for: SR, ESRA, CRP  Current Facility-Administered Medications   Medication Dose Route Frequency    sertraline (ZOLOFT) tablet 25 mg  25 mg Oral DAILY    guaiFENesin ER (MUCINEX) tablet 1,200 mg  1,200 mg Oral Q12H    predniSONE (DELTASONE) tablet 40 mg  40 mg Oral DAILY WITH BREAKFAST    hydrOXYzine pamoate (VISTARIL) capsule 25 mg  25 mg Oral Q6H PRN    insulin lispro (HUMALOG) injection   SubCUTAneous AC&HS    glucose chewable tablet 16 g  4 Tablet Oral PRN    glucagon (GLUCAGEN) injection 1 mg  1 mg IntraMUSCular PRN    guaiFENesin-codeine (ROBITUSSIN AC) 100-10 mg/5 mL solution 5 mL  5 mL Oral Q6H PRN    albuterol-ipratropium (DUO-NEB) 2.5 MG-0.5 MG/3 ML  3 mL Nebulization Q6H RT    busPIRone (BUSPAR) tablet 5 mg  5 mg Oral BID    amLODIPine (NORVASC) tablet 10 mg  10 mg Oral DAILY    aspirin chewable tablet 81 mg  81 mg Oral DAILY    calcium-vitamin D 600 mg-5 mcg (200 unit) per tablet 1 Tablet  1 Tablet Oral DAILY    carvediloL (COREG) tablet 6.25 mg  6.25 mg Oral BID WITH MEALS    docusate sodium (COLACE) capsule 100 mg  100 mg Oral BID    fluticasone propionate (FLONASE) 50 mcg/actuation nasal spray 2 Spray  2 Spray Both Nostrils DAILY    furosemide (LASIX) tablet 20 mg  20 mg Oral DAILY    insulin glargine (LANTUS) injection 15 Units  15 Units SubCUTAneous QHS    budesonide-formoteroL (SYMBICORT) 160-4.5 mcg/actuation HFA inhaler 2 Puff  2 Puff Inhalation BID    montelukast (SINGULAIR) tablet 10 mg  10 mg Oral DAILY    sodium chloride (NS) flush 5-40 mL  5-40 mL IntraVENous Q8H    sodium chloride (NS) flush 5-40 mL  5-40 mL IntraVENous PRN    acetaminophen (TYLENOL) tablet 650 mg  650 mg Oral Q6H PRN    Or    acetaminophen (TYLENOL) suppository 650 mg  650 mg Rectal Q6H PRN    polyethylene glycol (MIRALAX) packet 17 g  17 g Oral DAILY PRN    ondansetron (ZOFRAN ODT) tablet 4 mg  4 mg Oral Q8H PRN    Or    ondansetron (ZOFRAN) injection 4 mg  4 mg IntraVENous Q6H PRN    enoxaparin (LOVENOX) injection 40 mg  40 mg SubCUTAneous DAILY    pantoprazole (PROTONIX) tablet 40 mg  40 mg Oral ACB&D    cetirizine (ZYRTEC) tablet 10 mg  10 mg Oral DAILY        Exam:      This is middle aged female obese. Alert and oriented x3. Head normocephalic and atraumatic, pupils are round responsive to light sclera icteric and conjunctive are pink. JVD is absent. Chest: Bilateral expiratory wheezing audible. Prolonged phase of expiration, however air entry is currently diminished than yesterday.   Heart: S1-S2 normal  Abdomen: Soft, nontender, no visceromegaly  Extremities: No edema, sinus or clubbing  Neuro: No focal motor deficit. Impression:   Ms. Eliseo De La Rosa is a 63years old female who is known to have history of COPD, hypertension, chronic hypoxia on supplemental oxygen. She additionally history of breast CVA with complete remission. She follows with Dr. Kristen Freedman her office. Last seen about 3 months ago. She is admitted to hospital because of increasing symptoms of cough, shortness of breath and wheezing. She used her inhalers and nebulizer but without any improvement. X-rays unremarkable for any infiltrates    Plan:   1. Acute on chronic hypoxic respiratory failure: This is secondary to acute exacerbation of COPD. She is currently on 3 L oxygen. Wean down to keep saturation more than 90%. Replete phosphorus today    2. Acute exacerbation of COPD:  Chest x-ray is unremarkable for any infiltrates. Patient is on nebulized albuterol and Atrovent along with systemic steroids. She is also empirically started on azithromycin and Rocephin. However Rocephin was discontinued. Wean Solu-Medrol to prednisone starting tomorrow. Continue with Symbicort 160/4.5, 2 inhalations twice daily. Additionally she is on Singulair. Continue with Mucinex around-the-clock and Robitussin with codeine as needed. 3.  Hypertension :  Continue antihypertensive medication    4. DVT prophylaxis with Lovenox subcutaneously. There appears to be element of anxiety. Agree with starting her on BuSpar. She is slowly improving. She may benefit from placement to skilled nursing facility or rehab. Awaiting discharge to [facility].     Questions of patient were answered at bedside in detail  Case discussed in detail with RN, RT, and care team  Thank you for involving me in the care of this patient  I will follow with you closely during hospitalization    Time spent more than 20 minutes reviewing results and records, decision making, and answering questions.       Raz Beltran MD  [facility] ([facility])

## 2022-12-06 LAB
GLUCOSE BLD STRIP.AUTO-MCNC: 105 MG/DL (ref 65–100)
GLUCOSE BLD STRIP.AUTO-MCNC: 157 MG/DL (ref 65–100)
GLUCOSE BLD STRIP.AUTO-MCNC: 168 MG/DL (ref 65–100)
GLUCOSE BLD STRIP.AUTO-MCNC: 177 MG/DL (ref 65–100)
PERFORMED BY, TECHID: ABNORMAL

## 2022-12-06 PROCEDURE — 82962 GLUCOSE BLOOD TEST: CPT

## 2022-12-06 PROCEDURE — 74011250637 HC RX REV CODE- 250/637: Performed by: INTERNAL MEDICINE

## 2022-12-06 PROCEDURE — 77010033678 HC OXYGEN DAILY

## 2022-12-06 PROCEDURE — 74011250636 HC RX REV CODE- 250/636: Performed by: INTERNAL MEDICINE

## 2022-12-06 PROCEDURE — 65270000029 HC RM PRIVATE

## 2022-12-06 PROCEDURE — 74011636637 HC RX REV CODE- 636/637: Performed by: FAMILY MEDICINE

## 2022-12-06 PROCEDURE — 74011250637 HC RX REV CODE- 250/637: Performed by: FAMILY MEDICINE

## 2022-12-06 PROCEDURE — 74011000250 HC RX REV CODE- 250: Performed by: INTERNAL MEDICINE

## 2022-12-06 PROCEDURE — 74011636637 HC RX REV CODE- 636/637: Performed by: INTERNAL MEDICINE

## 2022-12-06 PROCEDURE — 94761 N-INVAS EAR/PLS OXIMETRY MLT: CPT

## 2022-12-06 PROCEDURE — 94640 AIRWAY INHALATION TREATMENT: CPT

## 2022-12-06 RX ADMIN — CARVEDILOL 6.25 MG: 3.12 TABLET, FILM COATED ORAL at 17:17

## 2022-12-06 RX ADMIN — Medication 1 UNITS: at 13:11

## 2022-12-06 RX ADMIN — SERTRALINE HYDROCHLORIDE 25 MG: 25 TABLET ORAL at 09:32

## 2022-12-06 RX ADMIN — ENOXAPARIN SODIUM 40 MG: 100 INJECTION SUBCUTANEOUS at 09:30

## 2022-12-06 RX ADMIN — BUSPIRONE HYDROCHLORIDE 5 MG: 5 TABLET ORAL at 09:33

## 2022-12-06 RX ADMIN — SODIUM CHLORIDE, PRESERVATIVE FREE 10 ML: 5 INJECTION INTRAVENOUS at 13:14

## 2022-12-06 RX ADMIN — INSULIN GLARGINE 15 UNITS: 100 INJECTION, SOLUTION SUBCUTANEOUS at 22:00

## 2022-12-06 RX ADMIN — PANTOPRAZOLE SODIUM 40 MG: 40 TABLET, DELAYED RELEASE ORAL at 17:18

## 2022-12-06 RX ADMIN — HYDROXYZINE PAMOATE 25 MG: 25 CAPSULE ORAL at 17:47

## 2022-12-06 RX ADMIN — IPRATROPIUM BROMIDE AND ALBUTEROL SULFATE 3 ML: 2.5; .5 SOLUTION RESPIRATORY (INHALATION) at 01:48

## 2022-12-06 RX ADMIN — PREDNISONE 40 MG: 20 TABLET ORAL at 09:32

## 2022-12-06 RX ADMIN — IPRATROPIUM BROMIDE AND ALBUTEROL SULFATE 3 ML: 2.5; .5 SOLUTION RESPIRATORY (INHALATION) at 13:41

## 2022-12-06 RX ADMIN — SODIUM CHLORIDE, PRESERVATIVE FREE 10 ML: 5 INJECTION INTRAVENOUS at 21:40

## 2022-12-06 RX ADMIN — BUDESONIDE AND FORMOTEROL FUMARATE DIHYDRATE 2 PUFF: 160; 4.5 AEROSOL RESPIRATORY (INHALATION) at 19:46

## 2022-12-06 RX ADMIN — AMLODIPINE BESYLATE 10 MG: 5 TABLET ORAL at 09:33

## 2022-12-06 RX ADMIN — Medication 1 UNITS: at 17:18

## 2022-12-06 RX ADMIN — IPRATROPIUM BROMIDE AND ALBUTEROL SULFATE 3 ML: 2.5; .5 SOLUTION RESPIRATORY (INHALATION) at 19:45

## 2022-12-06 RX ADMIN — IPRATROPIUM BROMIDE AND ALBUTEROL SULFATE 3 ML: 2.5; .5 SOLUTION RESPIRATORY (INHALATION) at 09:30

## 2022-12-06 RX ADMIN — CARVEDILOL 6.25 MG: 3.12 TABLET, FILM COATED ORAL at 09:32

## 2022-12-06 RX ADMIN — CETIRIZINE HYDROCHLORIDE 10 MG: 10 TABLET, FILM COATED ORAL at 09:32

## 2022-12-06 RX ADMIN — GUAIFENESIN AND CODEINE PHOSPHATE 5 ML: 10; 100 LIQUID ORAL at 17:47

## 2022-12-06 RX ADMIN — ASPIRIN 81 MG 81 MG: 81 TABLET ORAL at 09:32

## 2022-12-06 RX ADMIN — GUAIFENESIN 1200 MG: 600 TABLET, EXTENDED RELEASE ORAL at 21:39

## 2022-12-06 RX ADMIN — GUAIFENESIN 1200 MG: 600 TABLET, EXTENDED RELEASE ORAL at 09:33

## 2022-12-06 RX ADMIN — MONTELUKAST 10 MG: 10 TABLET, FILM COATED ORAL at 09:32

## 2022-12-06 RX ADMIN — BUDESONIDE AND FORMOTEROL FUMARATE DIHYDRATE 2 PUFF: 160; 4.5 AEROSOL RESPIRATORY (INHALATION) at 09:30

## 2022-12-06 RX ADMIN — FUROSEMIDE 20 MG: 40 TABLET ORAL at 09:31

## 2022-12-06 RX ADMIN — FLUTICASONE PROPIONATE 2 SPRAY: 50 SPRAY, METERED NASAL at 13:11

## 2022-12-06 RX ADMIN — Medication 1 UNITS: at 22:00

## 2022-12-06 RX ADMIN — Medication 1 TABLET: at 09:33

## 2022-12-06 RX ADMIN — GUAIFENESIN AND CODEINE PHOSPHATE 5 ML: 10; 100 LIQUID ORAL at 09:54

## 2022-12-06 RX ADMIN — PANTOPRAZOLE SODIUM 40 MG: 40 TABLET, DELAYED RELEASE ORAL at 09:32

## 2022-12-06 RX ADMIN — BUSPIRONE HYDROCHLORIDE 5 MG: 5 TABLET ORAL at 21:39

## 2022-12-06 RX ADMIN — SODIUM CHLORIDE, PRESERVATIVE FREE 10 ML: 5 INJECTION INTRAVENOUS at 05:15

## 2022-12-06 NOTE — PROGRESS NOTES
Bedside and Verbal shift change report given to Arthur Cuevas RN (oncoming nurse) by Wang Randle LPN (offgoing nurse). Report included the following information SBAR, Kardex, Procedure Summary, Intake/Output, MAR, and Accordion.

## 2022-12-06 NOTE — PROGRESS NOTES
General Daily Progress Note          Patient Name:   Kera Pendleton       YOB: 1960       Age:  63 y.o. Admit Date: 11/26/2022      Subjective:     She was transferred to my service as per patient request    Resting the bed alert awake complaining of coughing shortness of breath no fever no chills    11/30  Patient currently on 4L oxygen, reports 2L at home. Endorses productive cough. Denies headache, fever, chest pain, abdominal pain. CXR unremarkable for any infiltrates. 12/1  Patient is alert and oriented, currently on 5L. Reports an increase O2 need yesterday to 6-7L, is anxious about her lungs. Says she is worried about physical activity due to increase O2 need. Alli chest pain, headache. 12/2  Patient is alert and pleasant, currently on 4L O2. She reports walking for an oxygen study yesterday where she required 6L during periods of exacerbation, is comfortable on 4L when resting. She endorses a productive cough and feels lightheaded with too much exercise. Denies headache, chest pain, abdominal pain. 12/5  Patient is sitting in the chair eating breakfast. Reports improved symptoms but is still experiencing a baseline SOB. Currently on 3L. Denies headache, chest pain. Is aware that placement is pending. 12/6  Patient is resting in the chair, on 3L nasal cannula. Denies headache, chest pain. Understands we are waiting for authorization.     Objective:     Visit Vitals  /71   Pulse 81   Temp 98.5 °F (36.9 °C)   Resp 18   Ht 5' 4\" (1.626 m)   Wt 94.2 kg (207 lb 10.8 oz)   SpO2 92%   Breastfeeding No   BMI 35.65 kg/m²        Recent Results (from the past 24 hour(s))   GLUCOSE, POC    Collection Time: 12/05/22 11:36 AM   Result Value Ref Range    Glucose (POC) 154 (H) 65 - 100 mg/dL    Performed by Pauly Perdomo    GLUCOSE, POC    Collection Time: 12/05/22  3:27 PM   Result Value Ref Range    Glucose (POC) 210 (H) 65 - 100 mg/dL    Performed by [address] [place] [address] Collection Time: 12/05/22  8:00 PM   Result Value Ref Range    Glucose (POC) 127 (H) 65 - 100 mg/dL    Performed by Trev Nam, POC    Collection Time: 12/06/22  8:26 AM   Result Value Ref Range    Glucose (POC) 105 (H) 65 - 100 mg/dL    Performed by Luis العراقي      [unfilled]      Review of Systems    Constitutional: Negative for chills and fever. HENT: Negative. Eyes: Negative. Respiratory: Negative. Cardiovascular: Negative. Gastrointestinal: Negative for abdominal pain and nausea. Skin: Negative. Neurological: Negative. Physical Exam:      Constitutional: pt is oriented to person, place, and time. HENT:   Head: Normocephalic and atraumatic. Eyes: Pupils are equal, round, and reactive to light. EOM are normal.   Cardiovascular: Normal rate, regular rhythm and normal heart sounds. Pulmonary/Chest: Breath sounds normal. No wheezes. No rales. Exhibits no tenderness. Abdominal: Soft. Bowel sounds are normal. There is no abdominal tenderness. There is no rebound and no guarding. Musculoskeletal: Normal range of motion. Neurological: pt is alert and oriented to person, place, and time. XR CHEST SNGL V   Final Result   No Acute Disease.               Recent Results (from the past 24 hour(s))   GLUCOSE, POC    Collection Time: 12/05/22 11:36 AM   Result Value Ref Range    Glucose (POC) 154 (H) 65 - 100 mg/dL    Performed by Guerda Ramos, POC    Collection Time: 12/05/22  3:27 PM   Result Value Ref Range    Glucose (POC) 210 (H) 65 - 100 mg/dL    Performed by [address] [place] [address], POC    Collection Time: 12/05/22  8:00 PM   Result Value Ref Range    Glucose (POC) 127 (H) 65 - 100 mg/dL    Performed by Trev Nam, POC    Collection Time: 12/06/22  8:26 AM   Result Value Ref Range    Glucose (POC) 105 (H) 65 - 100 mg/dL    Performed by Luis العراقي        Results       Procedure Component Value Units Date/Time    CULTURE, URINE [[id number]] Collected: 12/01/22 0530    Order Status: Completed Specimen: Urine Updated: 12/02/22 1551     Special Requests: No Special Requests        Culture result: No Growth (<1000 cfu/mL)                Labs:     Recent Labs     12/05/22  0745 12/04/22  1135   WBC 12.3* 15.0*   HGB 12.8 12.3   HCT 42.0 38.8    261       Recent Labs     12/05/22  0745 12/04/22  1135    141   K 4.3 4.6    105   CO2 37* 36*   BUN 29* 31*   CREA 0.91 1.05*   GLU 88 134*   CA 9.3 9.2   MG  --  2.7*   PHOS 3.4 2.2*       Recent Labs     12/05/22  0745 12/04/22  1135   ALB 3.0* 2.8*       No results for input(s): INR, PTP, APTT, INREXT, INREXT in the last 72 hours. No results for input(s): FE, TIBC, PSAT, FERR in the last 72 hours. No results found for: FOL, RBCF   No results for input(s): PH, PCO2, PO2 in the last 72 hours. No results for input(s): CPK, CKNDX, TROIQ in the last 72 hours.     No lab exists for component: CPKMB  No results found for: CHOL, CHOLX, CHLST, CHOLV, HDL, HDLP, LDL, LDLC, DLDLP, TGLX, TRIGL, TRIGP, CHHD, CHHDX  Lab Results   Component Value Date/Time    Glucose (POC) 105 (H) 12/06/2022 08:26 AM    Glucose (POC) 127 (H) 12/05/2022 08:00 PM    Glucose (POC) 210 (H) 12/05/2022 03:27 PM    Glucose (POC) 154 (H) 12/05/2022 11:36 AM    Glucose (POC) 117 (H) 12/05/2022 09:37 AM     Lab Results   Component Value Date/Time    Color Yellow/Straw 11/26/2022 02:57 PM    Appearance Clear 11/26/2022 02:57 PM    Specific gravity 1.009 11/26/2022 02:57 PM    Specific gravity 1.019 08/10/2021 03:00 PM    pH (UA) 5.0 11/26/2022 02:57 PM    Protein Negative 11/26/2022 02:57 PM    Glucose Negative 11/26/2022 02:57 PM    Ketone Negative 11/26/2022 02:57 PM    Bilirubin Negative 11/26/2022 02:57 PM    Urobilinogen 0.1 11/26/2022 02:57 PM    Nitrites Negative 11/26/2022 02:57 PM    Leukocyte Esterase Trace (A) 11/26/2022 02:57 PM    Bacteria Negative 11/26/2022 02:57 PM    WBC 0-4 11/26/2022 02:57 PM RBC 0-5 11/26/2022 02:57 PM         Assessment:   Acute on chronic hypoxic respiratory failure  Acute exacerbation of COPD  Hypertension  Cough  Hyperglycemia due to steroids  UTI    Plan:     Continue DuoNeb nebulizer and Atrovent  Oxygen prn - wean as tolerated  Norvasc 10mg daily  ASA 81mg daily   Symbicort 160-4.5mcg 2 puffs BID  Coreg 6.25mg BID  Zyrtec 10mg daily  Colace 100mg BID  Flonase 50mcg  Lasix 20mg daily  Mucinex BID  Montelukast 10mg daily  DVT proph: Lovenox     Pulmonary and Urology following - appreciate recs  PT recommends discharge to SNF    Possible discharge in next 24 hours pending placement authorization      Current Facility-Administered Medications:     sertraline (ZOLOFT) tablet 25 mg, 25 mg, Oral, DAILY, North Hamm MD, 25 mg at 12/06/22 0932    guaiFENesin ER (MUCINEX) tablet 1,200 mg, 1,200 mg, Oral, Q12H, Destin Elmore DO, 1,200 mg at 12/06/22 0933    predniSONE (DELTASONE) tablet 40 mg, 40 mg, Oral, DAILY WITH BREAKFAST, Destin Elmore DO, 40 mg at 12/06/22 0932    hydrOXYzine pamoate (VISTARIL) capsule 25 mg, 25 mg, Oral, Q6H PRN, Kami Funes MD, 25 mg at 12/05/22 2122    insulin lispro (HUMALOG) injection, , SubCUTAneous, AC&HS, Kami Funes MD, 2 Units at 12/05/22 1845    glucose chewable tablet 16 g, 4 Tablet, Oral, PRN, Kami Funes MD    glucagon (GLUCAGEN) injection 1 mg, 1 mg, IntraMUSCular, PRN, Kami Funes MD    guaiFENesin-codeine (ROBITUSSIN AC) 100-10 mg/5 mL solution 5 mL, 5 mL, Oral, Q6H PRN, Henok Garza MD, 5 mL at 12/06/22 0954    albuterol-ipratropium (DUO-NEB) 2.5 MG-0.5 MG/3 ML, 3 mL, Nebulization, Q6H RT, Zion Gordon MD, 3 mL at 12/06/22 0930    busPIRone (BUSPAR) tablet 5 mg, 5 mg, Oral, BID, North Hamm MD, 5 mg at 12/06/22 0933    amLODIPine (NORVASC) tablet 10 mg, 10 mg, Oral, DAILY, North Hamm MD, 10 mg at 12/06/22 5762    aspirin chewable tablet 81 mg, 81 mg, Oral, DAILY, Deborah Nieves MD, 81 mg at 12/06/22 0932    calcium-vitamin D 600 mg-5 mcg (200 unit) per tablet 1 Tablet, 1 Tablet, Oral, DAILY, North Hamm MD, 1 Tablet at 12/06/22 0933    carvediloL (COREG) tablet 6.25 mg, 6.25 mg, Oral, BID WITH MEALS, North Hamm MD, 6.25 mg at 12/06/22 0932    docusate sodium (COLACE) capsule 100 mg, 100 mg, Oral, BID, Margarita NICHOLS MD, 100 mg at 12/05/22 0924    fluticasone propionate (FLONASE) 50 mcg/actuation nasal spray 2 Spray, 2 Spray, Both Nostrils, DAILY, North Hamm MD, 2 [place] at 12/04/22 1207    furosemide (LASIX) tablet 20 mg, 20 mg, Oral, DAILY, Margarita NICHOLS MD, 20 mg at 12/06/22 0931    insulin glargine (LANTUS) injection 15 Units, 15 Units, SubCUTAneous, QHS, North Hamm MD, 15 Units at 12/05/22 2200    budesonide-formoteroL (SYMBICORT) 160-4.5 mcg/actuation HFA inhaler 2 Puff, 2 Puff, Inhalation, BID, Margarita NICHOLS MD, 2 Puff at 12/06/22 0930    montelukast (SINGULAIR) tablet 10 mg, 10 mg, Oral, DAILY, Domenic Felix MD, 10 mg at 12/06/22 0932    sodium chloride (NS) flush 5-40 mL, 5-40 mL, IntraVENous, Q8H, North Hamm MD, 10 mL at 12/06/22 0515    sodium chloride (NS) flush 5-40 mL, 5-40 mL, IntraVENous, PRN, North Gan MD    acetaminophen (TYLENOL) tablet 650 mg, 650 mg, Oral, Q6H PRN, 650 mg at 12/04/22 0925 **OR** acetaminophen (TYLENOL) suppository 650 mg, 650 mg, Rectal, Q6H PRN, North Gan MD    polyethylene glycol (MIRALAX) packet 17 g, 17 g, Oral, DAILY PRN, North Hamm MD    ondansetron (ZOFRAN ODT) tablet 4 mg, 4 mg, Oral, Q8H PRN **OR** ondansetron (ZOFRAN) injection 4 mg, 4 mg, IntraVENous, Q6H PRN, North Hamm MD    enoxaparin (LOVENOX) injection 40 mg, 40 mg, SubCUTAneous, DAILY, North Hamm MD, 40 mg at 12/06/22 0930    pantoprazole (PROTONIX) tablet 40 mg, 40 mg, Oral, ACB&D, Margarita NICHOLS MD, 40 mg at 12/06/22 0932    cetirizine (ZYRTEC) tablet 10 mg, 10 mg, Oral, DAILY, North Hamm MD, 10 mg at 12/06/22 1279

## 2022-12-06 NOTE — DISCHARGE SUMMARY
Discharge Summary       PATIENT ID: Fer Quintero  MRN: [id number]   YOB: 1960    DATE OF ADMISSION: 11/26/2022  1:08 PM    DATE OF DISCHARGE:   PRIMARY CARE PROVIDER: Mao Jaramillo MD     ATTENDING PHYSICIAN: Jc Funes  DISCHARGING PROVIDER: Jc Funes      CONSULTATIONS: IP CONSULT TO PULMONOLOGY  IP CONSULT TO UROLOGY    PROCEDURES/SURGERIES: * No surgery found *    ADMITTING DIAGNOSES:    Patient Active Problem List    Diagnosis Date Noted    Respiratory failure ([facility] [place] [facility] 08/08/2021    COPD exacerbation ([facility] [place] [facility] 08/08/2021    COPD (chronic obstructive pulmonary disease) ([facility] [place] [facility] 12/13/2020    Hypoxia 12/13/2020       DISCHARGE DIAGNOSES / PLAN:      Acute on chronic hypoxic respiratory failure  Acute exacerbation of COPD  Hypertension  Cough  Hyperglycemia due to steroids  UTI        DISCHARGE MEDICATIONS:  Current Discharge Medication List        START taking these medications    Details   ! ! predniSONE (DELTASONE) 20 mg tablet Take 2 Tablets by mouth daily (with breakfast). Qty: 10 Tablet, Refills: 0  Start date: 12/6/2022      sertraline (ZOLOFT) 25 mg tablet Take 1 Tablet by mouth daily. Qty: 30 Tablet, Refills: 0  Start date: 12/6/2022      !! guaiFENesin ER (MUCINEX) 1,200 mg Ta12 ER tablet Take 1 Tablet by mouth every twelve (12) hours. Qty: 30 Tablet, Refills: 0  Start date: 12/5/2022      azithromycin (ZITHROMAX) 500 mg tab Take 1 Tablet by mouth daily for 3 days. Qty: 3 Tablet, Refills: 0  Start date: 12/3/2022, End date: 12/6/2022      busPIRone (BUSPAR) 5 mg tablet Take 1 Tablet by mouth two (2) times a day. Qty: 60 Tablet, Refills: 0  Start date: 12/2/2022      cetirizine (ZYRTEC) 10 mg tablet Take 1 Tablet by mouth daily. Qty: 20 Tablet, Refills: 0  Start date: 12/3/2022       !! - Potential duplicate medications found. Please discuss with provider.         CONTINUE these medications which have CHANGED    Details   insulin glargine (LANTUS) 100 unit/mL injection 15 units  Qty: 1 mL, Refills: 2  Start date: 12/2/2022           CONTINUE these medications which have NOT CHANGED    Details   docusate sodium (Colace) 100 mg capsule Take 1 Capsule by mouth two (2) times a day for 90 days. Qty: 60 Capsule, Refills: 2      carvediloL (COREG) 6.25 mg tablet Take 1 Tablet by mouth two (2) times daily (with meals). Qty: 60 Tablet, Refills: 0      furosemide (Lasix) 40 mg tablet Lasix 40 mg daily  Qty: 30 Tablet, Refills: 0      pantoprazole (PROTONIX) 40 mg tablet Take 1 Tablet by mouth Daily (before breakfast). Qty: 60 Tablet, Refills: 0      !! predniSONE (DELTASONE) 10 mg tablet 1 tablet daily  Qty: 15 Tablet, Refills: 0      albuterol-ipratropium (DUO-NEB) 2.5 mg-0.5 mg/3 ml nebu 3 mL by Nebulization route every six (6) hours as needed for Wheezing. Qty: 30 Nebule, Refills: 1      aspirin 81 mg chewable tablet Take 1 Tab by mouth daily. Qty: 30 Tab, Refills: 0      !! guaiFENesin ER (MUCINEX) 600 mg ER tablet Take 1 Tab by mouth two (2) times a day. Qty: 30 Tab, Refills: 0      amLODIPine (NORVASC) 10 mg tablet Take 10 mg by mouth daily. mometasone-formoterol (DULERA) 200-5 mcg/actuation HFA inhaler Take 2 Puffs by inhalation two (2) times a day. montelukast (SINGULAIR) 10 mg tablet Take 10 mg by mouth daily. Calcium-Cholecalciferol, D3, 500 mg(1,250mg) -400 unit tab Take  by mouth daily. fluticasone propionate (FLONASE) 50 mcg/actuation nasal spray 2 Sprays by Both Nostrils route daily. ergocalciferol (Vitamin D2) 1,250 mcg (50,000 unit) capsule Take 50,000 Units by mouth every seven (7) days. Q7days on Monday       !! - Potential duplicate medications found. Please discuss with provider.         STOP taking these medications       cephALEXin (Keflex) 500 mg capsule Comments:   Reason for Stopping:         benzonatate (TESSALON) 100 mg capsule Comments:   Reason for Stopping:                 NOTIFY YOUR PHYSICIAN FOR ANY OF THE FOLLOWING:   Fever over 101 degrees for 24 hours. Chest pain, shortness of breath, fever, chills, nausea, vomiting, diarrhea, change in mentation, falling, weakness, bleeding. Severe pain or pain not relieved by medications. Or, any other signs or symptoms that you may have questions about. DISPOSITION:  x  Home With:   OT  PT  HH  RN       Long term SNF/Inpatient Rehab    Independent/assisted living    Hospice    Other:       PATIENT CONDITION AT DISCHARGE: Stable      PHYSICAL EXAMINATION AT DISCHARGE:  General:          Alert, cooperative, no distress, appears stated age. HEENT:           Atraumatic, anicteric sclerae, pink conjunctivae                          No oral ulcers, mucosa moist, throat clear, dentition fair  Neck:               Supple, symmetrical  Lungs:             Clear to auscultation bilaterally. No Wheezing or Rhonchi. No rales. Chest wall:      No tenderness  No Accessory muscle use. Heart:              Regular  rhythm,  No  murmur   No edema  Abdomen:        Soft, non-tender. Not distended. Bowel sounds normal  Extremities:     No cyanosis. No clubbing,                            Skin turgor normal, Capillary refill normal  Skin:                Not pale. Not Jaundiced  No rashes   Psych:             Not anxious or agitated. Neurologic:      Alert, moves all extremities, answers questions appropriately and responds to commands     XR CHEST SNGL V   Final Result   No Acute Disease.               Recent Results (from the past 24 hour(s))   GLUCOSE, POC    Collection Time: 12/05/22  3:27 PM   Result Value Ref Range    Glucose (POC) 210 (H) 65 - 100 mg/dL    Performed by [address] [place] [address], POC    Collection Time: 12/05/22  8:00 PM   Result Value Ref Range    Glucose (POC) 127 (H) 65 - 100 mg/dL    Performed by Paola Person, POC    Collection Time: 12/06/22  8:26 AM   Result Value Ref Range    Glucose (POC) 105 (H) 65 - 100 mg/dL    Performed by Tayla Ruffin, POC    Collection Time: 12/06/22 11:44 AM   Result Value Ref Range    Glucose (POC) 168 (H) 65 - 100 mg/dL    Performed by Josh 62:  She was transferred to my service as per patient request     Resting the bed alert awake complaining of coughing shortness of breath no fever no chills     11/30  Patient currently on 4L oxygen, reports 2L at home. Endorses productive cough. Denies headache, fever, chest pain, abdominal pain. CXR unremarkable for any infiltrates. 12/1  Patient is alert and oriented, currently on 5L. Reports an increase O2 need yesterday to 6-7L, is anxious about her lungs. Says she is worried about physical activity due to increase O2 need. Alli chest pain, headache. 12/2  Patient is alert and pleasant, currently on 4L O2. She reports walking for an oxygen study yesterday where she required 6L during periods of exacerbation, is comfortable on 4L when resting. She endorses a productive cough and feels lightheaded with too much exercise. Denies headache, chest pain, abdominal pain. Patient very anxious still coughing congested oxygen saturation is stable on 3 L which is baseline    Best option to discharge patient to skilled care rehab for pulmonary rehab    Medication reconciliation done time discharge patient 35 minutes 50% time spent counseling and coordination of care    12/5  Patient is sitting in the chair eating breakfast. Reports improved symptoms but is still experiencing a baseline SOB. Currently on 3L. Denies headache, chest pain. Is aware that placement is pending. 12/6  Patient is resting in the chair, on 3L nasal cannula. Denies headache, chest pain. Understands we are waiting for authorization.       Carmen Kirkland MD  12/6/2022  11:18 AM

## 2022-12-06 NOTE — PROGRESS NOTES
Nutrition Assessment     Type and Reason for Visit: (P) RD nutrition re-screen/LOS    Nutrition Recommendations/Plan:   Continue current diet  Monitor and record PO intakes, supplement acceptance, and Bms in I/Os     Nutrition Assessment:  (P) Admitted for COPD exacerbation. Discharge pending IRF auth. PO intake fair, ~50%. Stable weights. Labs: Na 142, K 4.3, BUN 29, Creat 0.91, Gluc 88, Alb 3.0. Meds: calcium-vitamin D, docusate sodium, furosemide, insulin glargine, insulin lispro, pantoprazole, prednisone. Malnutrition Assessment:  Malnutrition Status: No malnutrition     Nutrition Related Findings:  (P) No findings per NFPE. No n/v, d/c, or problems chewing/swallowing. No edema. BM 12/5.     Current Nutrition Therapies:  ADULT DIET Regular; 3 carb choices (45 gm/meal)    Anthropometric Measures:  Height:  (P) 5' 4.02\" (162.6 cm)  Current Body Wt:  (P) 94.2 kg (207 lb 10.8 oz)  BMI: (P) 35.6    Nutrition Diagnosis:   (P) No nutrition diagnosis at this time     Nutrition Interventions:   Food and/or Nutrient Delivery: (P) Continue current diet  Nutrition Education/Counseling: (P) Education not indicated  Coordination of Nutrition Care: (P) Continue to monitor while inpatient    Nutrition Monitoring and Evaluation:   Behavioral-Environmental Outcomes: (P) None identified  Food/Nutrient Intake Outcomes: (P) Food and nutrient intake  Physical Signs/Symptoms Outcomes: (P) Meal time behavior, Weight    Discharge Planning:    (P) Too soon to determine    Hawa Mosqueda RD  Contact: [phone]

## 2022-12-06 NOTE — PROGRESS NOTES
Problem: Falls - Risk of  Goal: *Absence of Falls  Description: Document Cynthia Nap Fall Risk and appropriate interventions in the flowsheet.   Outcome: Progressing Towards Goal  Note: Fall Risk Interventions:            Medication Interventions: Teach patient to arise slowly                   Problem: Patient Education: Go to Patient Education Activity  Goal: Patient/Family Education  Outcome: Progressing Towards Goal     Problem: Patient Education: Go to Patient Education Activity  Goal: Patient/Family Education  Outcome: Progressing Towards Goal     Problem: Patient Education: Go to Patient Education Activity  Goal: Patient/Family Education  Outcome: Progressing Towards Goal

## 2022-12-06 NOTE — PROGRESS NOTES
Pulmonary/ CC progress note    Subjective:   Date of Consultation:  2022  Referring Physician: Ryan Valera MD    Patient seen and examined in her room on the floor this afternoon, no acute events overnight. Saturating well on 2-3 L nasal cannula, home requirements. Continue on current Symbicort and duo nebs. Allergies   Allergen Reactions    Lisinopril Angioedema        Review of Systems:  A comprehensive review of systems was negative except for that written in the History of Present Illness. Objective:   Blood pressure 132/65, pulse 78, temperature 98.7 °F (37.1 °C), resp. rate 18, height 5' 4.02\" (1.626 m), weight 94.2 kg (207 lb 10.8 oz), SpO2 92 %, not currently breastfeeding. Temp (24hrs), Av.3 °F (36.8 °C), Min:97.8 °F (36.6 °C), Max:98.7 °F (37.1 °C)    XR CHEST SNGL V   Final Result   No Acute Disease. Data Review: CBC:   Recent Labs     22  0745 22  1135   WBC 12.3* 15.0*   RBC 4.45 4.11   HGB 12.8 12.3   HCT 42.0 38.8    261     CMP:   Recent Labs     22  0745 22  1135   GLU 88 134*    141   K 4.3 4.6    105   CO2 37* 36*   BUN 29* 31*   CREA 0.91 1.05*   CA 9.3 9.2   AGAP 2* 0*   BUCR 32* 30*   ALB 3.0* 2.8*     Liver Enzymes:   Recent Labs     22  0745   ALB 3.0*     ABGs: No results for input(s): PH, PCO2, PO2, HCO3 in the last 72 hours.   Inflammation studies: No results found for: SR, ESRA, CRP  Current Facility-Administered Medications   Medication Dose Route Frequency    sertraline (ZOLOFT) tablet 25 mg  25 mg Oral DAILY    guaiFENesin ER (MUCINEX) tablet 1,200 mg  1,200 mg Oral Q12H    predniSONE (DELTASONE) tablet 40 mg  40 mg Oral DAILY WITH BREAKFAST    hydrOXYzine pamoate (VISTARIL) capsule 25 mg  25 mg Oral Q6H PRN    insulin lispro (HUMALOG) injection   SubCUTAneous AC&HS    glucose chewable tablet 16 g  4 Tablet Oral PRN    glucagon (GLUCAGEN) injection 1 mg  1 mg IntraMUSCular PRN guaiFENesin-codeine (ROBITUSSIN AC) 100-10 mg/5 mL solution 5 mL  5 mL Oral Q6H PRN    albuterol-ipratropium (DUO-NEB) 2.5 MG-0.5 MG/3 ML  3 mL Nebulization Q6H RT    busPIRone (BUSPAR) tablet 5 mg  5 mg Oral BID    amLODIPine (NORVASC) tablet 10 mg  10 mg Oral DAILY    aspirin chewable tablet 81 mg  81 mg Oral DAILY    calcium-vitamin D 600 mg-5 mcg (200 unit) per tablet 1 Tablet  1 Tablet Oral DAILY    carvediloL (COREG) tablet 6.25 mg  6.25 mg Oral BID WITH MEALS    docusate sodium (COLACE) capsule 100 mg  100 mg Oral BID    fluticasone propionate (FLONASE) 50 mcg/actuation nasal spray 2 Spray  2 Spray Both Nostrils DAILY    furosemide (LASIX) tablet 20 mg  20 mg Oral DAILY    insulin glargine (LANTUS) injection 15 Units  15 Units SubCUTAneous QHS    budesonide-formoteroL (SYMBICORT) 160-4.5 mcg/actuation HFA inhaler 2 Puff  2 Puff Inhalation BID    montelukast (SINGULAIR) tablet 10 mg  10 mg Oral DAILY    sodium chloride (NS) flush 5-40 mL  5-40 mL IntraVENous Q8H    sodium chloride (NS) flush 5-40 mL  5-40 mL IntraVENous PRN    acetaminophen (TYLENOL) tablet 650 mg  650 mg Oral Q6H PRN    Or    acetaminophen (TYLENOL) suppository 650 mg  650 mg Rectal Q6H PRN    polyethylene glycol (MIRALAX) packet 17 g  17 g Oral DAILY PRN    ondansetron (ZOFRAN ODT) tablet 4 mg  4 mg Oral Q8H PRN    Or    ondansetron (ZOFRAN) injection 4 mg  4 mg IntraVENous Q6H PRN    enoxaparin (LOVENOX) injection 40 mg  40 mg SubCUTAneous DAILY    pantoprazole (PROTONIX) tablet 40 mg  40 mg Oral ACB&D    cetirizine (ZYRTEC) tablet 10 mg  10 mg Oral DAILY        Exam:      This is middle aged female obese. Alert and oriented x3. Head normocephalic and atraumatic, pupils are round responsive to light sclera icteric and conjunctive are pink. JVD is absent. Chest: Bilateral expiratory wheezing audible. Prolonged phase of expiration, however air entry is currently diminished than yesterday.   Heart: S1-S2 normal  Abdomen: Soft, nontender, no visceromegaly  Extremities: No edema, sinus or clubbing  Neuro: No focal motor deficit. Impression:   Ms. Christina Shannon is a 63years old female who is known to have history of COPD, hypertension, chronic hypoxia on supplemental oxygen. She additionally history of breast CVA with complete remission. She follows with Dr. Maryellen Russell her office. Last seen about 3 months ago. She is admitted to hospital because of increasing symptoms of cough, shortness of breath and wheezing. She used her inhalers and nebulizer but without any improvement. X-rays unremarkable for any infiltrates    Plan:   1. Acute on chronic hypoxic respiratory failure: This is secondary to acute exacerbation of COPD. She is currently on 3 L oxygen. Wean down to keep saturation more than 90%. 2.  Acute exacerbation of COPD:  Chest x-ray is unremarkable for any infiltrates. Patient is on nebulized albuterol and Atrovent along with systemic steroids. She is also empirically started on azithromycin and Rocephin. However Rocephin was discontinued. Switched to p.o. prednisone   Continue with Symbicort 160/4.5, 2 inhalations twice daily. Additionally she is on Singulair. Continue with Mucinex around-the-clock and Robitussin with codeine as needed. 3.  Hypertension :  Continue antihypertensive medication    4. DVT prophylaxis with Lovenox subcutaneously. There appears to be element of anxiety. Agree with starting her on BuSpar. She is slowly improving. She may benefit from placement to skilled nursing facility or rehab. May get transferred to [facility]. Time spent more than 20 minutes reviewing results and records, decision making, and answering questions. Thanks for involving us in the management of your patient during hospital stay      CAREN Childers MD  [facility]

## 2022-12-06 NOTE — PROGRESS NOTES
Problem: Falls - Risk of  Goal: *Absence of Falls  Description: Document Sofia Fothergill Fall Risk and appropriate interventions in the flowsheet.   Outcome: Progressing Towards Goal  Note: Fall Risk Interventions:            Medication Interventions: Teach patient to arise slowly, Patient to call before getting OOB, Evaluate medications/consider consulting pharmacy, Assess postural VS orthostatic hypotension                   Problem: Patient Education: Go to Patient Education Activity  Goal: Patient/Family Education  Outcome: Progressing Towards Goal     Problem: Patient Education: Go to Patient Education Activity  Goal: Patient/Family Education  Outcome: Progressing Towards Goal

## 2022-12-06 NOTE — PROGRESS NOTES
1525 Bedside report given to Corrie Castellanos RN by Yolanda Urbina RN. Report included SBAR, ED Report, Labs, and Cardiac Rhythm NSR. Patient in bed resting well. Vitals are stable.

## 2022-12-07 LAB
GLUCOSE BLD STRIP.AUTO-MCNC: 104 MG/DL (ref 65–100)
GLUCOSE BLD STRIP.AUTO-MCNC: 128 MG/DL (ref 65–100)
GLUCOSE BLD STRIP.AUTO-MCNC: 194 MG/DL (ref 65–100)
PERFORMED BY, TECHID: ABNORMAL

## 2022-12-07 PROCEDURE — 77010033678 HC OXYGEN DAILY

## 2022-12-07 PROCEDURE — 82962 GLUCOSE BLOOD TEST: CPT

## 2022-12-07 PROCEDURE — 74011250637 HC RX REV CODE- 250/637: Performed by: INTERNAL MEDICINE

## 2022-12-07 PROCEDURE — 74011636637 HC RX REV CODE- 636/637: Performed by: INTERNAL MEDICINE

## 2022-12-07 PROCEDURE — 74011000250 HC RX REV CODE- 250: Performed by: INTERNAL MEDICINE

## 2022-12-07 PROCEDURE — 74011250636 HC RX REV CODE- 250/636: Performed by: INTERNAL MEDICINE

## 2022-12-07 PROCEDURE — 97530 THERAPEUTIC ACTIVITIES: CPT

## 2022-12-07 PROCEDURE — 94640 AIRWAY INHALATION TREATMENT: CPT

## 2022-12-07 PROCEDURE — 74011250637 HC RX REV CODE- 250/637: Performed by: FAMILY MEDICINE

## 2022-12-07 PROCEDURE — 65270000029 HC RM PRIVATE

## 2022-12-07 PROCEDURE — 94761 N-INVAS EAR/PLS OXIMETRY MLT: CPT

## 2022-12-07 PROCEDURE — 74011636637 HC RX REV CODE- 636/637: Performed by: FAMILY MEDICINE

## 2022-12-07 RX ADMIN — SODIUM CHLORIDE, PRESERVATIVE FREE 10 ML: 5 INJECTION INTRAVENOUS at 06:00

## 2022-12-07 RX ADMIN — FLUTICASONE PROPIONATE 2 SPRAY: 50 SPRAY, METERED NASAL at 10:41

## 2022-12-07 RX ADMIN — ASPIRIN 81 MG 81 MG: 81 TABLET ORAL at 10:36

## 2022-12-07 RX ADMIN — ENOXAPARIN SODIUM 40 MG: 100 INJECTION SUBCUTANEOUS at 10:36

## 2022-12-07 RX ADMIN — HYDROXYZINE PAMOATE 25 MG: 25 CAPSULE ORAL at 21:44

## 2022-12-07 RX ADMIN — ACETAMINOPHEN 650 MG: 325 TABLET, FILM COATED ORAL at 21:48

## 2022-12-07 RX ADMIN — INSULIN GLARGINE 15 UNITS: 100 INJECTION, SOLUTION SUBCUTANEOUS at 22:00

## 2022-12-07 RX ADMIN — BUDESONIDE AND FORMOTEROL FUMARATE DIHYDRATE 2 PUFF: 160; 4.5 AEROSOL RESPIRATORY (INHALATION) at 21:23

## 2022-12-07 RX ADMIN — IPRATROPIUM BROMIDE AND ALBUTEROL SULFATE 3 ML: 2.5; .5 SOLUTION RESPIRATORY (INHALATION) at 13:45

## 2022-12-07 RX ADMIN — CARVEDILOL 6.25 MG: 3.12 TABLET, FILM COATED ORAL at 10:36

## 2022-12-07 RX ADMIN — Medication 1 UNITS: at 22:00

## 2022-12-07 RX ADMIN — PANTOPRAZOLE SODIUM 40 MG: 40 TABLET, DELAYED RELEASE ORAL at 10:36

## 2022-12-07 RX ADMIN — PANTOPRAZOLE SODIUM 40 MG: 40 TABLET, DELAYED RELEASE ORAL at 17:19

## 2022-12-07 RX ADMIN — SERTRALINE HYDROCHLORIDE 25 MG: 25 TABLET ORAL at 10:37

## 2022-12-07 RX ADMIN — GUAIFENESIN 1200 MG: 600 TABLET, EXTENDED RELEASE ORAL at 10:36

## 2022-12-07 RX ADMIN — AMLODIPINE BESYLATE 10 MG: 5 TABLET ORAL at 10:36

## 2022-12-07 RX ADMIN — CARVEDILOL 6.25 MG: 3.12 TABLET, FILM COATED ORAL at 17:19

## 2022-12-07 RX ADMIN — FUROSEMIDE 20 MG: 40 TABLET ORAL at 10:36

## 2022-12-07 RX ADMIN — SODIUM CHLORIDE, PRESERVATIVE FREE 10 ML: 5 INJECTION INTRAVENOUS at 17:03

## 2022-12-07 RX ADMIN — PREDNISONE 40 MG: 20 TABLET ORAL at 10:36

## 2022-12-07 RX ADMIN — BUSPIRONE HYDROCHLORIDE 5 MG: 5 TABLET ORAL at 21:33

## 2022-12-07 RX ADMIN — SODIUM CHLORIDE, PRESERVATIVE FREE 10 ML: 5 INJECTION INTRAVENOUS at 21:45

## 2022-12-07 RX ADMIN — IPRATROPIUM BROMIDE AND ALBUTEROL SULFATE 3 ML: 2.5; .5 SOLUTION RESPIRATORY (INHALATION) at 08:39

## 2022-12-07 RX ADMIN — MONTELUKAST 10 MG: 10 TABLET, FILM COATED ORAL at 10:37

## 2022-12-07 RX ADMIN — CETIRIZINE HYDROCHLORIDE 10 MG: 10 TABLET, FILM COATED ORAL at 10:37

## 2022-12-07 RX ADMIN — IPRATROPIUM BROMIDE AND ALBUTEROL SULFATE 3 ML: 2.5; .5 SOLUTION RESPIRATORY (INHALATION) at 00:16

## 2022-12-07 RX ADMIN — IPRATROPIUM BROMIDE AND ALBUTEROL SULFATE 3 ML: 2.5; .5 SOLUTION RESPIRATORY (INHALATION) at 21:23

## 2022-12-07 RX ADMIN — Medication 1 TABLET: at 10:36

## 2022-12-07 RX ADMIN — BUSPIRONE HYDROCHLORIDE 5 MG: 5 TABLET ORAL at 10:37

## 2022-12-07 RX ADMIN — GUAIFENESIN 1200 MG: 600 TABLET, EXTENDED RELEASE ORAL at 21:33

## 2022-12-07 NOTE — PROGRESS NOTES
Problem: Falls - Risk of  Goal: *Absence of Falls  Description: Document [place] Fail Fall Risk and appropriate interventions in the flowsheet.   12/7/2022 1156 by Edna Osborn LPN  Outcome: Progressing Towards Goal  Note: Fall Risk Interventions:            Medication Interventions: Assess postural VS orthostatic hypotension    Elimination Interventions: Call light in reach           12/7/2022 1155 by Edna Osborn LPN  Outcome: Progressing Towards Goal  Note: Fall Risk Interventions:            Medication Interventions: Assess postural VS orthostatic hypotension    Elimination Interventions: Call light in reach              Problem: Patient Education: Go to Patient Education Activity  Goal: Patient/Family Education  12/7/2022 1156 by Edna Osborn LPN  Outcome: Progressing Towards Goal  12/7/2022 1155 by Edna Osborn LPN  Outcome: Progressing Towards Goal     Problem: Patient Education: Go to Patient Education Activity  Goal: Patient/Family Education  12/7/2022 1156 by Edna Osborn LPN  Outcome: Progressing Towards Goal  12/7/2022 1155 by Edna Osborn LPN  Outcome: Progressing Towards Goal     Problem: Patient Education: Go to Patient Education Activity  Goal: Patient/Family Education  12/7/2022 1156 by Enda Osborn LPN  Outcome: Progressing Towards Goal  12/7/2022 1155 by Edna Osborn LPN  Outcome: Progressing Towards Goal

## 2022-12-07 NOTE — DISCHARGE SUMMARY
Discharge Summary       PATIENT ID: Db Starr  MRN: [id number]   YOB: 1960    DATE OF ADMISSION: 11/26/2022  1:08 PM    DATE OF DISCHARGE:   PRIMARY CARE PROVIDER: Riya Guy MD     ATTENDING PHYSICIAN: Cony Funes  DISCHARGING PROVIDER: Cony Funes      CONSULTATIONS: IP CONSULT TO PULMONOLOGY  IP CONSULT TO UROLOGY    PROCEDURES/SURGERIES: * No surgery found *    ADMITTING DIAGNOSES:    Patient Active Problem List    Diagnosis Date Noted    Respiratory failure ([facility] 08/08/2021    [facility] 08/08/2021    COPD (chronic obstructive pulmonary disease) ([facility] 12/13/2020    Hypoxia 12/13/2020       DISCHARGE DIAGNOSES / PLAN:      Acute on chronic hypoxic respiratory failure  Acute exacerbation of COPD  Hypertension  Cough  Hyperglycemia due to steroids  UTI        DISCHARGE MEDICATIONS:  Current Discharge Medication List        START taking these medications    Details   ! ! predniSONE (DELTASONE) 20 mg tablet Take 2 Tablets by mouth daily (with breakfast). Qty: 10 Tablet, Refills: 0  Start date: 12/6/2022      sertraline (ZOLOFT) 25 mg tablet Take 1 Tablet by mouth daily. Qty: 30 Tablet, Refills: 0  Start date: 12/6/2022      !! guaiFENesin ER (MUCINEX) 1,200 mg Ta12 ER tablet Take 1 Tablet by mouth every twelve (12) hours. Qty: 30 Tablet, Refills: 0  Start date: 12/5/2022      azithromycin (ZITHROMAX) 500 mg tab Take 1 Tablet by mouth daily for 3 days. Qty: 3 Tablet, Refills: 0  Start date: 12/3/2022, End date: 12/6/2022      busPIRone (BUSPAR) 5 mg tablet Take 1 Tablet by mouth two (2) times a day. Qty: 60 Tablet, Refills: 0  Start date: 12/2/2022      cetirizine (ZYRTEC) 10 mg tablet Take 1 Tablet by mouth daily. Qty: 20 Tablet, Refills: 0  Start date: 12/3/2022       !! - Potential duplicate medications found. Please discuss with provider.         CONTINUE these medications which have CHANGED    Details   insulin glargine (LANTUS) 100 unit/mL injection 15 units  Qty: 1 mL, Refills: 2  Start date: 12/2/2022           CONTINUE these medications which have NOT CHANGED    Details   docusate sodium (Colace) 100 mg capsule Take 1 Capsule by mouth two (2) times a day for 90 days. Qty: 60 Capsule, Refills: 2      carvediloL (COREG) 6.25 mg tablet Take 1 Tablet by mouth two (2) times daily (with meals). Qty: 60 Tablet, Refills: 0      furosemide (Lasix) 40 mg tablet Lasix 40 mg daily  Qty: 30 Tablet, Refills: 0      pantoprazole (PROTONIX) 40 mg tablet Take 1 Tablet by mouth Daily (before breakfast). Qty: 60 Tablet, Refills: 0      !! predniSONE (DELTASONE) 10 mg tablet 1 tablet daily  Qty: 15 Tablet, Refills: 0      albuterol-ipratropium (DUO-NEB) 2.5 mg-0.5 mg/3 ml nebu 3 mL by Nebulization route every six (6) hours as needed for Wheezing. Qty: 30 Nebule, Refills: 1      aspirin 81 mg chewable tablet Take 1 Tab by mouth daily. Qty: 30 Tab, Refills: 0      !! guaiFENesin ER (MUCINEX) 600 mg ER tablet Take 1 Tab by mouth two (2) times a day. Qty: 30 Tab, Refills: 0      amLODIPine (NORVASC) 10 mg tablet Take 10 mg by mouth daily. mometasone-formoterol (DULERA) 200-5 mcg/actuation HFA inhaler Take 2 Puffs by inhalation two (2) times a day. montelukast (SINGULAIR) 10 mg tablet Take 10 mg by mouth daily. Calcium-Cholecalciferol, D3, 500 mg(1,250mg) -400 unit tab Take  by mouth daily. fluticasone propionate (FLONASE) 50 mcg/actuation nasal spray 2 Sprays by Both Nostrils route daily. ergocalciferol (Vitamin D2) 1,250 mcg (50,000 unit) capsule Take 50,000 Units by mouth every seven (7) days. Q7days on Monday       !! - Potential duplicate medications found. Please discuss with provider.         STOP taking these medications       cephALEXin (Keflex) 500 mg capsule Comments:   Reason for Stopping:         benzonatate (TESSALON) 100 mg capsule Comments:   Reason for Stopping:                 NOTIFY YOUR PHYSICIAN FOR ANY OF THE FOLLOWING:   Fever over 101 degrees for 24 hours. Chest pain, shortness of breath, fever, chills, nausea, vomiting, diarrhea, change in mentation, falling, weakness, bleeding. Severe pain or pain not relieved by medications. Or, any other signs or symptoms that you may have questions about. DISPOSITION:  x  Home With:   OT  PT  HH  RN       Long term SNF/Inpatient Rehab    Independent/assisted living    Hospice    Other:       PATIENT CONDITION AT DISCHARGE: Stable      PHYSICAL EXAMINATION AT DISCHARGE:  General:          Alert, cooperative, no distress, appears stated age. HEENT:           Atraumatic, anicteric sclerae, pink conjunctivae                          No oral ulcers, mucosa moist, throat clear, dentition fair  Neck:               Supple, symmetrical  Lungs:             Clear to auscultation bilaterally. No Wheezing or Rhonchi. No rales. Chest wall:      No tenderness  No Accessory muscle use. Heart:              Regular  rhythm,  No  murmur   No edema  Abdomen:        Soft, non-tender. Not distended. Bowel sounds normal  Extremities:     No cyanosis. No clubbing,                            Skin turgor normal, Capillary refill normal  Skin:                Not pale. Not Jaundiced  No rashes   Psych:             Not anxious or agitated. Neurologic:      Alert, moves all extremities, answers questions appropriately and responds to commands     XR CHEST SNGL V   Final Result   No Acute Disease.               Recent Results (from the past 24 hour(s))   GLUCOSE, POC    Collection Time: 12/06/22 11:44 AM   Result Value Ref Range    Glucose (POC) 168 (H) 65 - 100 mg/dL    Performed by Filomena Joy, POC    Collection Time: 12/06/22  5:16 PM   Result Value Ref Range    Glucose (POC) 177 (H) 65 - 100 mg/dL    Performed by Vitor Dietrich (RN)    GLUCOSE, POC    Collection Time: 12/06/22  7:42 PM   Result Value Ref Range    Glucose (POC) 157 (H) 65 - 100 mg/dL    Performed by Darian Gould POC Collection Time: 12/07/22  8:09 AM   Result Value Ref Range    Glucose (POC) 128 (H) 65 - 100 mg/dL    Performed by Neptali Mott    GLUCOSE, POC    Collection Time: 12/07/22 11:22 AM   Result Value Ref Range    Glucose (POC) 104 (H) 65 - 100 mg/dL    Performed by Steph Villalobosvd:  She was transferred to my service as per patient request     Resting the bed alert awake complaining of coughing shortness of breath no fever no chills     11/30  Patient currently on 4L oxygen, reports 2L at home. Endorses productive cough. Denies headache, fever, chest pain, abdominal pain. CXR unremarkable for any infiltrates. 12/1  Patient is alert and oriented, currently on 5L. Reports an increase O2 need yesterday to 6-7L, is anxious about her lungs. Says she is worried about physical activity due to increase O2 need. Alli chest pain, headache. 12/2  Patient is alert and pleasant, currently on 4L O2. She reports walking for an oxygen study yesterday where she required 6L during periods of exacerbation, is comfortable on 4L when resting. She endorses a productive cough and feels lightheaded with too much exercise. Denies headache, chest pain, abdominal pain. Patient very anxious still coughing congested oxygen saturation is stable on 3 L which is baseline    Best option to discharge patient to skilled care rehab for pulmonary rehab    Medication reconciliation done time discharge patient 35 minutes 50% time spent counseling and coordination of care    12/5  Patient is sitting in the chair eating breakfast. Reports improved symptoms but is still experiencing a baseline SOB. Currently on 3L. Denies headache, chest pain. Is aware that placement is pending. 12/6  Patient is resting in the chair, on 3L nasal cannula. Denies headache, chest pain. Understands we are waiting for authorization.       Signed:   Benigno Miranda MD  12/7/2022  11:18 AM

## 2022-12-07 NOTE — PROGRESS NOTES
Spiritual Care Assessment/Progress Note  [facility]      NAME: Fortino Michaels      MRN: [id number]  AGE: 63 y.o.  SEX: female  Baptism Affiliation: Alevism   Language: English     12/7/2022     Total Time (in minutes): 30     Spiritual Assessment begun in [address] through conversation with:         [x]Patient        [] Family    [] Friend(s)        Reason for Consult: Initial/Spiritual assessment, patient floor     Spiritual beliefs: (Please include comment if needed)     [x] Identifies with a meredith tradition:         [] Supported by a meredith community:            [] Claims no spiritual orientation:           [] Seeking spiritual identity:                [] Adheres to an individual form of spirituality:           [] Not able to assess:                           Identified resources for coping:      [x] Prayer                               [] Music                  [] Guided Imagery     [x] Family/friends                 [] Pet visits     [] Devotional reading                         [] Unknown     [] Other:                                               Interventions offered during this visit: (See comments for more details)    Patient Interventions: Affirmation of emotions/emotional suffering, Affirmation of meredith, Catharsis/review of pertinent events in supportive environment, Coping skills reviewed/reinforced, Iconic (affirming the presence of God/Higher Power), Initial/Spiritual assessment, patient floor, Prayer (assurance of)           Plan of Care:     [] Support spiritual and/or cultural needs    [] Support AMD and/or advance care planning process      [] Support grieving process   [] Coordinate Rites and/or Rituals    [] Coordination with community clergy   [] No spiritual needs identified at this time   [] Detailed Plan of Care below (See Comments)  [] Make referral to Music Therapy  [] Make referral to Pet Therapy     [] Make referral to Addiction services  [] Make referral to [facility]  [] Make referral to Spiritual Care Partner  [] No future visits requested        [x] Contact Spiritual Care for further referrals     Comments: The purpose of the visit was to do a spiritual assessment on the patient. The patient shared that she was thankful for the support of her loved ones and family. She mentioned that she was thankful for the hope of going home soon. She mentioned that she was resting peacefully and that she that she appreciate prayer. The  listened empathically, encouraged ongoing self-reflection, and provided ministry of spiritual care. [facility] D.Min, M.Div.  [address] can be reached by calling the  at [facility]  [phone]

## 2022-12-07 NOTE — PROGRESS NOTES
OCCUPATIONAL THERAPY TREATMENT  Patient: Janes Wagner (14 y.o. female)  Date: 12/7/2022  Diagnosis: COPD exacerbation ([facility] [J44.1] <principal problem not specified>      Precautions: FALL  Chart, occupational therapy assessment, plan of care, and goals were reviewed. ASSESSMENT  Pt continues with skilled OT services and is progressing towards goals. Pt received semi-supine in bed upon arrival, AXO x4 and agreeable to RAZA tx at this time. Pt cooperative and demonstrated good effort during activities. Pt tolerated therapy session fairly with no pain complaints. Pt declined bathroom task d/t performing earlier with nursing. Pt continues to improve with bed mobility>EOB using bed rail. EOB, pt engaged in cece UE exercises with added 1/2 lb wt and added exercises with vc's to maximized full ROM with rest breaks in between sets d/t decreased activity tolerance. Pt ambulated to/form bed to window to increased activity tolerance with RW/gait belt with CGA for unsteadiness and vc's for proper technique as pt can be impulsive at times. Pt improving with activity tolerance however still requires 3L O2 and needs rest breaks throughout therapy. Unable check O2 status d/t vital machine unavailable. Overall, pt continues to present with deficits in generalized strength/AROM, dynamic standing balance, pulmonary status and functional activity tolerance during performance of ADLs/mobility (see below for objective details and assist levels). Will continue to progress. Recommend d/c to SNF decreased activity tolerance once medically appropriate. Other factors to consider for discharge: Time of onset, medical prognosis/diagnosis, severity of deficits, PLOF, functional baseline, home environment, and family support          PLAN :  Patient continues to benefit from skilled intervention to address the above impairments. Continue treatment per established plan of care. to address goals.     Recommend with staff: Out of bed to chair for meals and Encourage HEP in prep for ADLs/mobility    Recommend next session: Toileting    Recommendation for discharge: (in order for the patient to meet his/her long term goals)  Meng Young discharge recommendation:  Has been made in collaboration with the attending provider and/or case management       IF patient discharges home will need the following DME: TBD       SUBJECTIVE:   Patient stated I'm so congested.     OBJECTIVE DATA SUMMARY:   Cognitive/Behavioral Status:  Neurologic State: Alert  Orientation Level: Oriented X4  Cognition: Follows commands             Functional Mobility and Transfers for ADLs:  Bed Mobility:  Rolling: Modified independent  Supine to Sit: Modified independent  Sit to Supine: Modified independent  Scooting: Independent    Transfers:  Sit to Stand: Contact guard assistance          Balance:  Sitting: Intact; Without support  Standing: Impaired; With support  Standing - Static: Constant support;Good  Standing - Dynamic : Constant support; Fair    ADL Intervention:   Lower Body Dressing Assistance  Dressing Assistance: Independent  Socks: Independent  Position Performed: Seated edge of bed         Exercise Sets Reps AROM AAROM PROM Self PROM Comments   Elbow E/F 3 12 [x] [] [] [] EOB, 1/2 lb wt   Shoulder flex/ext 3 10 X       Lateral raises 3 10 X       Arm circles 3 10 X            Pain:  0/10    Activity Tolerance:   Fair and requires rest breaks    After treatment patient left in no apparent distress:   Supine in bed, Call bell within reach, and Side rails x 3, bed locked and in lowest position    COMMUNICATION/COLLABORATION:   The patients plan of care was discussed with: Registered nurse.      RY Dumont  Time Calculation: 26 mins     Problem: Self Care Deficits Care Plan (Adult)  Goal: *Acute Goals and Plan of Care (Insert Text)  Description: FUNCTIONAL STATUS PRIOR TO ADMISSION: Pt reports he was IND w/ functional mobility/ADLs; 2 L of O2 at baseline. Denies falls. HOME SUPPORT: Pt lives by self. No support. Occupational Therapy Goals  Initiated 12/1/2022    Pt stated goal \"I want to get better\"  Pt will be IND sup <> sit in prep for EOB ADLs  Pt will be IND grooming standing sink side LRAD  Pt will be IND UB dressing sitting EOB/long sit   Pt will be IND LE dressing sitting EOB/long sit  Pt will be IND sit <>  prep for toileting LRAD  Pt will be IND toileting/toilet transfer/cloth mgmt LRAD  Pt will demo'd improved activity tolerance by maintaining >90% SpO2 while completing 3-4 standing ADLs.    Pt will be IND following UE HEP in prep for self care tasks   Outcome: Progressing Towards Goal

## 2022-12-07 NOTE — PROGRESS NOTES
Problem: Falls - Risk of  Goal: *Absence of Falls  Description: Document Vernia Colder Fall Risk and appropriate interventions in the flowsheet.   Outcome: Progressing Towards Goal  Note: Fall Risk Interventions:            Medication Interventions: Assess postural VS orthostatic hypotension    Elimination Interventions: Call light in reach, Patient to call for help with toileting needs              Problem: Patient Education: Go to Patient Education Activity  Goal: Patient/Family Education  Outcome: Progressing Towards Goal     Problem: Patient Education: Go to Patient Education Activity  Goal: Patient/Family Education  Outcome: Progressing Towards Goal     Problem: Patient Education: Go to Patient Education Activity  Goal: Patient/Family Education  Outcome: Progressing Towards Goal

## 2022-12-07 NOTE — PROGRESS NOTES
Pulmonary/ CC progress note    Subjective:   Date of Consultation:  2022  Referring Physician: Slava Ryan MD    Patient seen and examined in her room on the floor this morning, no acute events overnight. Saturating well on 2-3 L nasal cannula, home requirements. Continue on current Symbicort and duo nebs. Allergies   Allergen Reactions    Lisinopril Angioedema        Review of Systems:  A comprehensive review of systems was negative except for that written in the History of Present Illness. Objective:   Blood pressure 132/79, pulse 75, temperature 99.1 °F (37.3 °C), resp. rate 18, height 5' 4.02\" (1.626 m), weight 94.4 kg (208 lb 1.8 oz), SpO2 97 %, not currently breastfeeding. Temp (24hrs), Av.5 °F (36.9 °C), Min:98 °F (36.7 °C), Max:99.1 °F (37.3 °C)    XR CHEST SNGL V   Final Result   No Acute Disease. Data Review: CBC:   Recent Labs     22  0745   WBC 12.3*   RBC 4.45   HGB 12.8   HCT 42.0        CMP:   Recent Labs     22  0745   GLU 88      K 4.3      CO2 37*   BUN 29*   CREA 0.91   CA 9.3   AGAP 2*   BUCR 32*   ALB 3.0*     Liver Enzymes:   Recent Labs     22  0745   ALB 3.0*     ABGs: No results for input(s): PH, PCO2, PO2, HCO3 in the last 72 hours.   Inflammation studies: No results found for: SR, ESRA, CRP  Current Facility-Administered Medications   Medication Dose Route Frequency    sertraline (ZOLOFT) tablet 25 mg  25 mg Oral DAILY    guaiFENesin ER (MUCINEX) tablet 1,200 mg  1,200 mg Oral Q12H    predniSONE (DELTASONE) tablet 40 mg  40 mg Oral DAILY WITH BREAKFAST    hydrOXYzine pamoate (VISTARIL) capsule 25 mg  25 mg Oral Q6H PRN    insulin lispro (HUMALOG) injection   SubCUTAneous AC&HS    glucose chewable tablet 16 g  4 Tablet Oral PRN    glucagon (GLUCAGEN) injection 1 mg  1 mg IntraMUSCular PRN    guaiFENesin-codeine (ROBITUSSIN AC) 100-10 mg/5 mL solution 5 mL  5 mL Oral Q6H PRN    albuterol-ipratropium (DUO-NEB) 2.5 MG-0.5 MG/3 ML  3 mL Nebulization Q6H RT    busPIRone (BUSPAR) tablet 5 mg  5 mg Oral BID    amLODIPine (NORVASC) tablet 10 mg  10 mg Oral DAILY    aspirin chewable tablet 81 mg  81 mg Oral DAILY    calcium-vitamin D 600 mg-5 mcg (200 unit) per tablet 1 Tablet  1 Tablet Oral DAILY    carvediloL (COREG) tablet 6.25 mg  6.25 mg Oral BID WITH MEALS    docusate sodium (COLACE) capsule 100 mg  100 mg Oral BID    fluticasone propionate (FLONASE) 50 mcg/actuation nasal spray 2 Spray  2 Spray Both Nostrils DAILY    furosemide (LASIX) tablet 20 mg  20 mg Oral DAILY    insulin glargine (LANTUS) injection 15 Units  15 Units SubCUTAneous QHS    budesonide-formoteroL (SYMBICORT) 160-4.5 mcg/actuation HFA inhaler 2 Puff  2 Puff Inhalation BID    montelukast (SINGULAIR) tablet 10 mg  10 mg Oral DAILY    sodium chloride (NS) flush 5-40 mL  5-40 mL IntraVENous Q8H    sodium chloride (NS) flush 5-40 mL  5-40 mL IntraVENous PRN    acetaminophen (TYLENOL) tablet 650 mg  650 mg Oral Q6H PRN    Or    acetaminophen (TYLENOL) suppository 650 mg  650 mg Rectal Q6H PRN    polyethylene glycol (MIRALAX) packet 17 g  17 g Oral DAILY PRN    ondansetron (ZOFRAN ODT) tablet 4 mg  4 mg Oral Q8H PRN    Or    ondansetron (ZOFRAN) injection 4 mg  4 mg IntraVENous Q6H PRN    enoxaparin (LOVENOX) injection 40 mg  40 mg SubCUTAneous DAILY    pantoprazole (PROTONIX) tablet 40 mg  40 mg Oral ACB&D    cetirizine (ZYRTEC) tablet 10 mg  10 mg Oral DAILY        Exam:      This is middle aged female obese. Alert and oriented x3. Head normocephalic and atraumatic, pupils are round responsive to light sclera icteric and conjunctive are pink. JVD is absent. Chest: Bilateral expiratory wheezing audible. Prolonged phase of expiration, however air entry is currently diminished than yesterday. Heart: S1-S2 normal  Abdomen: Soft, nontender, no visceromegaly  Extremities: No edema, sinus or clubbing  Neuro: No focal motor deficit.     Impression:   Ms. Tamez Maurice Nelly Tellez is a 63years old female who is known to have history of COPD, hypertension, chronic hypoxia on supplemental oxygen. She additionally history of breast CVA with complete remission. She follows with Dr. Hannah Benavides her office. Last seen about 3 months ago. She is admitted to hospital because of increasing symptoms of cough, shortness of breath and wheezing. She used her inhalers and nebulizer but without any improvement. X-rays unremarkable for any infiltrates    Plan:   1. Acute on chronic hypoxic respiratory failure: This is secondary to acute exacerbation of COPD. She is currently on 3 L oxygen. Wean down to keep saturation more than 90%. 2.  Acute exacerbation of COPD:  Resolved. Chest x-ray is unremarkable for any infiltrates. Patient is on nebulized albuterol and Atrovent along with systemic steroids. She is also empirically started on azithromycin and Rocephin. However Rocephin was discontinued. Switched to p.o. prednisone   Continue with Symbicort 160/4.5, 2 inhalations twice daily. Additionally she is on Singulair. Continue with Mucinex around-the-clock and Robitussin with codeine as needed. She will follow-up with Dr. Hannah Benavides in our office  3. Hypertension :  Continue antihypertensive medication    4. DVT prophylaxis with Lovenox subcutaneously. There appears to be element of anxiety. Agree with starting her on BuSpar. She is slowly improving. She may benefit from placement to skilled nursing facility or rehab. May get transferred to [facility]. Time spent more than 20 minutes reviewing results and records, decision making, and answering questions. Thanks for involving us in the management of your patient during hospital stay      CAREN Bowen MD  [facility] ([facility])

## 2022-12-08 LAB
GLUCOSE BLD STRIP.AUTO-MCNC: 124 MG/DL (ref 65–100)
GLUCOSE BLD STRIP.AUTO-MCNC: 204 MG/DL (ref 65–100)
GLUCOSE BLD STRIP.AUTO-MCNC: 222 MG/DL (ref 65–100)
PERFORMED BY, TECHID: ABNORMAL

## 2022-12-08 PROCEDURE — 74011250636 HC RX REV CODE- 250/636: Performed by: INTERNAL MEDICINE

## 2022-12-08 PROCEDURE — 65270000029 HC RM PRIVATE

## 2022-12-08 PROCEDURE — 94761 N-INVAS EAR/PLS OXIMETRY MLT: CPT

## 2022-12-08 PROCEDURE — 74011636637 HC RX REV CODE- 636/637: Performed by: INTERNAL MEDICINE

## 2022-12-08 PROCEDURE — 74011000250 HC RX REV CODE- 250: Performed by: INTERNAL MEDICINE

## 2022-12-08 PROCEDURE — 82962 GLUCOSE BLOOD TEST: CPT

## 2022-12-08 PROCEDURE — 94640 AIRWAY INHALATION TREATMENT: CPT

## 2022-12-08 PROCEDURE — 97116 GAIT TRAINING THERAPY: CPT

## 2022-12-08 PROCEDURE — 74011250637 HC RX REV CODE- 250/637: Performed by: INTERNAL MEDICINE

## 2022-12-08 PROCEDURE — 74011250637 HC RX REV CODE- 250/637: Performed by: FAMILY MEDICINE

## 2022-12-08 PROCEDURE — 74011636637 HC RX REV CODE- 636/637: Performed by: FAMILY MEDICINE

## 2022-12-08 PROCEDURE — 97110 THERAPEUTIC EXERCISES: CPT

## 2022-12-08 PROCEDURE — 77010033678 HC OXYGEN DAILY

## 2022-12-08 RX ADMIN — SODIUM CHLORIDE, PRESERVATIVE FREE 10 ML: 5 INJECTION INTRAVENOUS at 14:23

## 2022-12-08 RX ADMIN — AMLODIPINE BESYLATE 10 MG: 5 TABLET ORAL at 09:19

## 2022-12-08 RX ADMIN — Medication 1 TABLET: at 09:20

## 2022-12-08 RX ADMIN — IPRATROPIUM BROMIDE AND ALBUTEROL SULFATE 3 ML: 2.5; .5 SOLUTION RESPIRATORY (INHALATION) at 14:00

## 2022-12-08 RX ADMIN — GUAIFENESIN AND CODEINE PHOSPHATE 5 ML: 10; 100 LIQUID ORAL at 20:10

## 2022-12-08 RX ADMIN — DOCUSATE SODIUM 100 MG: 100 CAPSULE, LIQUID FILLED ORAL at 20:34

## 2022-12-08 RX ADMIN — SODIUM CHLORIDE, PRESERVATIVE FREE 10 ML: 5 INJECTION INTRAVENOUS at 20:35

## 2022-12-08 RX ADMIN — GUAIFENESIN 1200 MG: 600 TABLET, EXTENDED RELEASE ORAL at 20:34

## 2022-12-08 RX ADMIN — INSULIN GLARGINE 15 UNITS: 100 INJECTION, SOLUTION SUBCUTANEOUS at 22:07

## 2022-12-08 RX ADMIN — Medication 2 UNITS: at 17:37

## 2022-12-08 RX ADMIN — Medication 2 UNITS: at 22:07

## 2022-12-08 RX ADMIN — CARVEDILOL 6.25 MG: 3.12 TABLET, FILM COATED ORAL at 17:37

## 2022-12-08 RX ADMIN — FLUTICASONE PROPIONATE 2 SPRAY: 50 SPRAY, METERED NASAL at 09:26

## 2022-12-08 RX ADMIN — PANTOPRAZOLE SODIUM 40 MG: 40 TABLET, DELAYED RELEASE ORAL at 17:37

## 2022-12-08 RX ADMIN — BUDESONIDE AND FORMOTEROL FUMARATE DIHYDRATE 2 PUFF: 160; 4.5 AEROSOL RESPIRATORY (INHALATION) at 08:21

## 2022-12-08 RX ADMIN — CETIRIZINE HYDROCHLORIDE 10 MG: 10 TABLET, FILM COATED ORAL at 09:20

## 2022-12-08 RX ADMIN — BUSPIRONE HYDROCHLORIDE 5 MG: 5 TABLET ORAL at 09:20

## 2022-12-08 RX ADMIN — BUDESONIDE AND FORMOTEROL FUMARATE DIHYDRATE 2 PUFF: 160; 4.5 AEROSOL RESPIRATORY (INHALATION) at 20:24

## 2022-12-08 RX ADMIN — HYDROXYZINE PAMOATE 25 MG: 25 CAPSULE ORAL at 20:34

## 2022-12-08 RX ADMIN — PANTOPRAZOLE SODIUM 40 MG: 40 TABLET, DELAYED RELEASE ORAL at 09:20

## 2022-12-08 RX ADMIN — CARVEDILOL 6.25 MG: 3.12 TABLET, FILM COATED ORAL at 09:20

## 2022-12-08 RX ADMIN — GUAIFENESIN 1200 MG: 600 TABLET, EXTENDED RELEASE ORAL at 09:20

## 2022-12-08 RX ADMIN — FUROSEMIDE 20 MG: 40 TABLET ORAL at 09:20

## 2022-12-08 RX ADMIN — PREDNISONE 40 MG: 20 TABLET ORAL at 09:20

## 2022-12-08 RX ADMIN — ENOXAPARIN SODIUM 40 MG: 100 INJECTION SUBCUTANEOUS at 09:26

## 2022-12-08 RX ADMIN — GUAIFENESIN AND CODEINE PHOSPHATE 5 ML: 10; 100 LIQUID ORAL at 09:33

## 2022-12-08 RX ADMIN — IPRATROPIUM BROMIDE AND ALBUTEROL SULFATE 3 ML: 2.5; .5 SOLUTION RESPIRATORY (INHALATION) at 08:21

## 2022-12-08 RX ADMIN — IPRATROPIUM BROMIDE AND ALBUTEROL SULFATE 3 ML: 2.5; .5 SOLUTION RESPIRATORY (INHALATION) at 20:24

## 2022-12-08 RX ADMIN — DOCUSATE SODIUM 100 MG: 100 CAPSULE, LIQUID FILLED ORAL at 09:20

## 2022-12-08 RX ADMIN — ASPIRIN 81 MG 81 MG: 81 TABLET ORAL at 09:20

## 2022-12-08 RX ADMIN — SERTRALINE HYDROCHLORIDE 25 MG: 25 TABLET ORAL at 09:21

## 2022-12-08 RX ADMIN — BUSPIRONE HYDROCHLORIDE 5 MG: 5 TABLET ORAL at 20:34

## 2022-12-08 RX ADMIN — MONTELUKAST 10 MG: 10 TABLET, FILM COATED ORAL at 09:21

## 2022-12-08 RX ADMIN — IPRATROPIUM BROMIDE AND ALBUTEROL SULFATE 3 ML: 2.5; .5 SOLUTION RESPIRATORY (INHALATION) at 02:06

## 2022-12-08 NOTE — PROGRESS NOTES
Problem: Falls - Risk of  Goal: *Absence of Falls  Description: Document Cynthia Nap Fall Risk and appropriate interventions in the flowsheet.   Outcome: Progressing Towards Goal  Note: Fall Risk Interventions:            Medication Interventions: Bed/chair exit alarm    Elimination Interventions: Bed/chair exit alarm              Problem: Patient Education: Go to Patient Education Activity  Goal: Patient/Family Education  Outcome: Progressing Towards Goal     Problem: Patient Education: Go to Patient Education Activity  Goal: Patient/Family Education  Outcome: Progressing Towards Goal     Problem: Patient Education: Go to Patient Education Activity  Goal: Patient/Family Education  Outcome: Progressing Towards Goal

## 2022-12-08 NOTE — PROGRESS NOTES
[facility] received auth, however they may not have a Medicaid bed until this weekend. THOMAS attempted to meet with pt to present alternate plans, however she was talking on her cell phone.     ___________________________________________________    CM stopped by pt's room. Cm informed pt she was approved to go to [facility], however they may not have a bed available until this weekend. Cm presented the following alternate options to pt: discharge home and be admitted from home to [facility] when they have a bed available or Cm reach out to other inpatient rehab facilities. Pt declined alternate options. Pt plans to reside in the hospital then transition to [facility] this weekend when they have a bed available. Thomas will f/up with [facility] tomorrow to see if a bed may open up before then.

## 2022-12-08 NOTE — PROGRESS NOTES
Pulmonary/ CC progress note    Subjective:   Date of Consultation:  2022  Referring Physician: Clint Holloway MD    Patient seen and examined in her room on the floor this morning, no acute events overnight. Saturating well on 2-3 L nasal cannula, home requirements. Denies any chest pains or palpitations      Allergies   Allergen Reactions    Lisinopril Angioedema        Review of Systems:  A comprehensive review of systems was negative except for that written in the History of Present Illness. Objective:   Blood pressure (!) 146/77, pulse 85, temperature 98 °F (36.7 °C), resp. rate 19, height 5' 4.02\" (1.626 m), weight 94.5 kg (208 lb 5.4 oz), SpO2 95 %, not currently breastfeeding. Temp (24hrs), Av.1 °F (36.7 °C), Min:97.8 °F (36.6 °C), Max:98.4 °F (36.9 °C)    XR CHEST SNGL V   Final Result   No Acute Disease. Data Review: CBC:   No results for input(s): WBC, RBC, HGB, HCT, PLT, GRANS, LYMPH, EOS, HGBEXT, HCTEXT, PLTEXT in the last 72 hours. CMP:   No results for input(s): GLU, NA, K, CL, CO2, BUN, CREA, CA, AGAP, BUCR, TBIL, AP, TP, ALB, GLOB, AGRAT in the last 72 hours. No lab exists for component: GPT    Liver Enzymes:   No results for input(s): TP, ALB, TBIL, AP in the last 72 hours. No lab exists for component: SGOT, GPT, DBIL    ABGs: No results for input(s): PH, PCO2, PO2, HCO3 in the last 72 hours.   Inflammation studies: No results found for: SR, ESRA, CRP  Current Facility-Administered Medications   Medication Dose Route Frequency    sertraline (ZOLOFT) tablet 25 mg  25 mg Oral DAILY    guaiFENesin ER (MUCINEX) tablet 1,200 mg  1,200 mg Oral Q12H    predniSONE (DELTASONE) tablet 40 mg  40 mg Oral DAILY WITH BREAKFAST    hydrOXYzine pamoate (VISTARIL) capsule 25 mg  25 mg Oral Q6H PRN    insulin lispro (HUMALOG) injection   SubCUTAneous AC&HS    glucose chewable tablet 16 g  4 Tablet Oral PRN    glucagon (GLUCAGEN) injection 1 mg  1 mg IntraMUSCular PRN guaiFENesin-codeine (ROBITUSSIN AC) 100-10 mg/5 mL solution 5 mL  5 mL Oral Q6H PRN    albuterol-ipratropium (DUO-NEB) 2.5 MG-0.5 MG/3 ML  3 mL Nebulization Q6H RT    busPIRone (BUSPAR) tablet 5 mg  5 mg Oral BID    amLODIPine (NORVASC) tablet 10 mg  10 mg Oral DAILY    aspirin chewable tablet 81 mg  81 mg Oral DAILY    calcium-vitamin D 600 mg-5 mcg (200 unit) per tablet 1 Tablet  1 Tablet Oral DAILY    carvediloL (COREG) tablet 6.25 mg  6.25 mg Oral BID WITH MEALS    docusate sodium (COLACE) capsule 100 mg  100 mg Oral BID    fluticasone propionate (FLONASE) 50 mcg/actuation nasal spray 2 Spray  2 Spray Both Nostrils DAILY    furosemide (LASIX) tablet 20 mg  20 mg Oral DAILY    insulin glargine (LANTUS) injection 15 Units  15 Units SubCUTAneous QHS    budesonide-formoteroL (SYMBICORT) 160-4.5 mcg/actuation HFA inhaler 2 Puff  2 Puff Inhalation BID    montelukast (SINGULAIR) tablet 10 mg  10 mg Oral DAILY    sodium chloride (NS) flush 5-40 mL  5-40 mL IntraVENous Q8H    sodium chloride (NS) flush 5-40 mL  5-40 mL IntraVENous PRN    acetaminophen (TYLENOL) tablet 650 mg  650 mg Oral Q6H PRN    Or    acetaminophen (TYLENOL) suppository 650 mg  650 mg Rectal Q6H PRN    polyethylene glycol (MIRALAX) packet 17 g  17 g Oral DAILY PRN    ondansetron (ZOFRAN ODT) tablet 4 mg  4 mg Oral Q8H PRN    Or    ondansetron (ZOFRAN) injection 4 mg  4 mg IntraVENous Q6H PRN    enoxaparin (LOVENOX) injection 40 mg  40 mg SubCUTAneous DAILY    pantoprazole (PROTONIX) tablet 40 mg  40 mg Oral ACB&D    cetirizine (ZYRTEC) tablet 10 mg  10 mg Oral DAILY        Exam:      This is middle aged female obese. Alert and oriented x3. Head normocephalic and atraumatic, pupils are round responsive to light sclera icteric and conjunctive are pink. JVD is absent. Chest: Bilateral expiratory wheezing audible. Prolonged phase of expiration, however air entry is currently diminished than yesterday.   Heart: S1-S2 normal  Abdomen: Soft, nontender, no visceromegaly  Extremities: No edema, sinus or clubbing  Neuro: No focal motor deficit. Impression:   Ms. Marcial Dickens is a 63years old female who is known to have history of COPD, hypertension, chronic hypoxia on supplemental oxygen. She additionally history of breast CVA with complete remission. She follows with Dr. Karine Costa her office. Last seen about 3 months ago. She is admitted to hospital because of increasing symptoms of cough, shortness of breath and wheezing. She used her inhalers and nebulizer but without any improvement. X-rays unremarkable for any infiltrates    Plan:   1. Acute on chronic hypoxic respiratory failure: This is secondary to acute exacerbation of COPD. She is currently on 3 L oxygen. Wean down to keep saturation more than 90%. 2.  Acute exacerbation of COPD:  Resolved. Chest x-ray is unremarkable for any infiltrates. Patient is on nebulized albuterol and Atrovent along with systemic steroids. She is also empirically started on azithromycin Switched to p.o. prednisone   Continue with Symbicort 160/4.5, 2 inhalations twice daily. Additionally she is on Singulair. She will follow-up with Dr. Karine Costa in our office  3. Hypertension :  Continue antihypertensive medication    4. DVT prophylaxis with Lovenox subcutaneously. There appears to be element of anxiety. Agree with starting her on BuSpar. She is slowly improving. She may benefit from placement to skilled nursing facility or rehab. May get transferred to [facility]. Time spent more than 20 minutes reviewing results and records, decision making, and answering questions. Thanks for involving us in the management of your patient during hospital stay      CAREN Ceja MD  [facility] ([facility])

## 2022-12-08 NOTE — PROGRESS NOTES
PHYSICAL THERAPY TREATMENT  Patient: Abby Serrano (89 y.o. female)  Date: 12/8/2022  Diagnosis: COPD exacerbation ([facility] [address] [J44.1] <principal problem not specified>      Precautions:    Chart, physical therapy assessment, plan of care and goals were reviewed. ASSESSMENT  Patient continues with skilled PT services and is progressing towards goals. Pt seated in chair upon PT arrival, agreeable to session. (See below for objective details and assist levels). Overall pt tolerated session fairly today. Patient on 3L/min SPO2 via NC throughout entire session today. Patient ambulated with RW to rest room demonstrating steady but slow step through gait pattern with no LOB or knee buckling. Patient then able to use rest room at wash hands at sink at [facility]. Patient then ambulated back to EOB for short rest where O2 was 93%. Patient then ambulated another 75 feet with RW demonstrating same gait pattern with patient requiring several short standing rest breaks and VC for PLB. Patient then returned to seated EOB where O2 was 91% post gait. Patient then performed seated TE ( see details below). Patients O2 remained between 90-93 with TE. Patient then left seated EOB with call bell within reach, all needs meet and respiratory therapy entering to start respiratory treatment. Nursing given report on how patient performed  during session today. Current Level of Function Impacting Discharge (mobility/balance): need for SPO2, poor activity tolerance    Other factors to consider for discharge: PLOF, assistance at home, level of deficits, acute medical state         PLAN :  Patient continues to benefit from skilled intervention to address the above impairments. Continue treatment per established plan of care to address goals.     Recommendation for discharge: (in order for the patient to meet his/her long term goals)  Meng Mclean    This discharge recommendation:  Has been made in collaboration with the attending provider and/or case management    IF patient discharges home will need the following DME: to be determined (TBD)       SUBJECTIVE:   Patient stated it a little harder to breath today.     OBJECTIVE DATA SUMMARY:   Critical Behavior:  Neurologic State: Alert  Orientation Level: Oriented X4  Cognition: Follows commands     Functional Mobility Training:  Transfers:  Sit to Stand: Stand-by assistance  Stand to Sit: Stand-by assistance  Balance:  Sitting: Intact; Without support  Standing: Impaired; With support  Standing - Static: Good;Constant support  Standing - Dynamic : Fair;Constant support  Ambulation/Gait Training:  Distance (ft): 75 Feet (ft)  Assistive Device: Gait belt;Walker, rolling  Ambulation - Level of Assistance: Stand-by assistance    Therapeutic Exercises:   1x15 LAQ  1x15 AP  1x15 seated marches  1x15 hip ADD/ABD     Pain Rating:  No pain reported during session    Activity Tolerance:   Fair, requires rest breaks, and observed SOB with activity    After treatment patient left in no apparent distress:   Bed locked and returned to lowest position, Sitting in chair and Call bell within reach      COMMUNICATION/COLLABORATION:   The patients plan of care was discussed with: Registered nurse.      GOALS:    Problem: Mobility Impaired (Adult and Pediatric)  Goal: *Acute Goals and Plan of Care (Insert Text)  Description: I with LE HEP x7 days  Mod I with bed mob x7 days  Mod I with all transfers x7 days  Amb 75-100ft with LRAD and SBAx1 x7 days    Pt stated goal: to get better  Outcome: Progressing Towards Goal       Paz Kwok PTA, PT   Time Calculation: 32 mins

## 2022-12-08 NOTE — DISCHARGE SUMMARY
Discharge Summary       PATIENT ID: Jerry Zee  MRN: [id number]   YOB: 1960    DATE OF ADMISSION: 11/26/2022  1:08 PM    DATE OF DISCHARGE:   PRIMARY CARE PROVIDER: Pepe Lara MD     ATTENDING PHYSICIAN: Maite Funes  DISCHARGING PROVIDER: Maite Funes      CONSULTATIONS: IP CONSULT TO PULMONOLOGY  IP CONSULT TO UROLOGY    PROCEDURES/SURGERIES: * No surgery found *    ADMITTING DIAGNOSES:    Patient Active Problem List    Diagnosis Date Noted    Respiratory failure ([facility] [place] [facility] 08/08/2021    [facility]    COPD (chronic obstructive pulmonary disease) ([facility] [place] [facility] 12/13/2020    Hypoxia 12/13/2020       DISCHARGE DIAGNOSES / PLAN:      Acute on chronic hypoxic respiratory failure  Acute exacerbation of COPD  Hypertension  Cough  Hyperglycemia due to steroids  UTI        DISCHARGE MEDICATIONS:  Current Discharge Medication List        START taking these medications    Details   ! ! predniSONE (DELTASONE) 20 mg tablet Take 2 Tablets by mouth daily (with breakfast). Qty: 10 Tablet, Refills: 0  Start date: 12/6/2022      sertraline (ZOLOFT) 25 mg tablet Take 1 Tablet by mouth daily. Qty: 30 Tablet, Refills: 0  Start date: 12/6/2022      !! guaiFENesin ER (MUCINEX) 1,200 mg Ta12 ER tablet Take 1 Tablet by mouth every twelve (12) hours. Qty: 30 Tablet, Refills: 0  Start date: 12/5/2022      azithromycin (ZITHROMAX) 500 mg tab Take 1 Tablet by mouth daily for 3 days. Qty: 3 Tablet, Refills: 0  Start date: 12/3/2022, End date: 12/6/2022      busPIRone (BUSPAR) 5 mg tablet Take 1 Tablet by mouth two (2) times a day. Qty: 60 Tablet, Refills: 0  Start date: 12/2/2022      cetirizine (ZYRTEC) 10 mg tablet Take 1 Tablet by mouth daily. Qty: 20 Tablet, Refills: 0  Start date: 12/3/2022       !! - Potential duplicate medications found. Please discuss with provider.         CONTINUE these medications which have CHANGED    Details   insulin glargine (LANTUS) 100 unit/mL injection 15 units  Qty: 1 mL, Refills: 2  Start date: 12/2/2022           CONTINUE these medications which have NOT CHANGED    Details   docusate sodium (Colace) 100 mg capsule Take 1 Capsule by mouth two (2) times a day for 90 days. Qty: 60 Capsule, Refills: 2      carvediloL (COREG) 6.25 mg tablet Take 1 Tablet by mouth two (2) times daily (with meals). Qty: 60 Tablet, Refills: 0      furosemide (Lasix) 40 mg tablet Lasix 40 mg daily  Qty: 30 Tablet, Refills: 0      pantoprazole (PROTONIX) 40 mg tablet Take 1 Tablet by mouth Daily (before breakfast). Qty: 60 Tablet, Refills: 0      !! predniSONE (DELTASONE) 10 mg tablet 1 tablet daily  Qty: 15 Tablet, Refills: 0      albuterol-ipratropium (DUO-NEB) 2.5 mg-0.5 mg/3 ml nebu 3 mL by Nebulization route every six (6) hours as needed for Wheezing. Qty: 30 Nebule, Refills: 1      aspirin 81 mg chewable tablet Take 1 Tab by mouth daily. Qty: 30 Tab, Refills: 0      !! guaiFENesin ER (MUCINEX) 600 mg ER tablet Take 1 Tab by mouth two (2) times a day. Qty: 30 Tab, Refills: 0      amLODIPine (NORVASC) 10 mg tablet Take 10 mg by mouth daily. mometasone-formoterol (DULERA) 200-5 mcg/actuation HFA inhaler Take 2 Puffs by inhalation two (2) times a day. montelukast (SINGULAIR) 10 mg tablet Take 10 mg by mouth daily. Calcium-Cholecalciferol, D3, 500 mg(1,250mg) -400 unit tab Take  by mouth daily. fluticasone propionate (FLONASE) 50 mcg/actuation nasal spray 2 Sprays by Both Nostrils route daily. ergocalciferol (Vitamin D2) 1,250 mcg (50,000 unit) capsule Take 50,000 Units by mouth every seven (7) days. Q7days on Monday       !! - Potential duplicate medications found. Please discuss with provider.         STOP taking these medications       cephALEXin (Keflex) 500 mg capsule Comments:   Reason for Stopping:         benzonatate (TESSALON) 100 mg capsule Comments:   Reason for Stopping:                 NOTIFY YOUR PHYSICIAN FOR ANY OF THE FOLLOWING:   Fever over 101 degrees for 24 hours. Chest pain, shortness of breath, fever, chills, nausea, vomiting, diarrhea, change in mentation, falling, weakness, bleeding. Severe pain or pain not relieved by medications. Or, any other signs or symptoms that you may have questions about. DISPOSITION:  x  Home With:   OT  PT  HH  RN       Long term SNF/Inpatient Rehab    Independent/assisted living    Hospice    Other:       PATIENT CONDITION AT DISCHARGE: Stable      PHYSICAL EXAMINATION AT DISCHARGE:  General:          Alert, cooperative, no distress, appears stated age. HEENT:           Atraumatic, anicteric sclerae, pink conjunctivae                          No oral ulcers, mucosa moist, throat clear, dentition fair  Neck:               Supple, symmetrical  Lungs:             Clear to auscultation bilaterally. No Wheezing or Rhonchi. No rales. Chest wall:      No tenderness  No Accessory muscle use. Heart:              Regular  rhythm,  No  murmur   No edema  Abdomen:        Soft, non-tender. Not distended. Bowel sounds normal  Extremities:     No cyanosis. No clubbing,                            Skin turgor normal, Capillary refill normal  Skin:                Not pale. Not Jaundiced  No rashes   Psych:             Not anxious or agitated. Neurologic:      Alert, moves all extremities, answers questions appropriately and responds to commands     XR CHEST SNGL V   Final Result   No Acute Disease.               Recent Results (from the past 24 hour(s))   GLUCOSE, POC    Collection Time: 12/07/22 11:22 AM   Result Value Ref Range    Glucose (POC) 104 (H) 65 - 100 mg/dL    Performed by Johnnie Viveros    GLUCOSE, POC    Collection Time: 12/07/22  7:39 PM   Result Value Ref Range    Glucose (POC) 194 (H) 65 - 100 mg/dL    Performed by [address] [place] [address], POC    Collection Time: 12/08/22  9:55 AM   Result Value Ref Range    Glucose (POC) 124 (H) 65 - 100 mg/dL    Performed by [facility] COURSE:  She was transferred to my service as per patient request     Resting the bed alert awake complaining of coughing shortness of breath no fever no chills     11/30  Patient currently on 4L oxygen, reports 2L at home. Endorses productive cough. Denies headache, fever, chest pain, abdominal pain. CXR unremarkable for any infiltrates. 12/1  Patient is alert and oriented, currently on 5L. Reports an increase O2 need yesterday to 6-7L, is anxious about her lungs. Says she is worried about physical activity due to increase O2 need. Alli chest pain, headache. 12/2  Patient is alert and pleasant, currently on 4L O2. She reports walking for an oxygen study yesterday where she required 6L during periods of exacerbation, is comfortable on 4L when resting. She endorses a productive cough and feels lightheaded with too much exercise. Denies headache, chest pain, abdominal pain. Patient very anxious still coughing congested oxygen saturation is stable on 3 L which is baseline    Best option to discharge patient to skilled care rehab for pulmonary rehab    Medication reconciliation done time discharge patient 35 minutes 50% time spent counseling and coordination of care    12/5  Patient is sitting in the chair eating breakfast. Reports improved symptoms but is still experiencing a baseline SOB. Currently on 3L. Denies headache, chest pain. Is aware that placement is pending. 12/6  Patient is resting in the chair, on 3L nasal cannula. Denies headache, chest pain. Understands we are waiting for authorization.   Pending discharge to [facility] no complaints just waiting for placement      Signed:   Monico Corona MD  12/8/2022  11:18 AM

## 2022-12-08 NOTE — PROGRESS NOTES
@0830 patient stated to primary nurse that when she blows her nose mucous mixed with blood comes out. Patient has nasal spray give twice daily.  Attending notified @[id number]

## 2022-12-08 NOTE — ROUTINE PROCESS
Bedside and Verbal shift change report given to [facility] (oncoming nurse) by Yanelis Osborn (offgoing nurse). Report included the following information SBAR and MAR.

## 2022-12-09 LAB
GLUCOSE BLD STRIP.AUTO-MCNC: 100 MG/DL (ref 65–100)
GLUCOSE BLD STRIP.AUTO-MCNC: 137 MG/DL (ref 65–100)
GLUCOSE BLD STRIP.AUTO-MCNC: 176 MG/DL (ref 65–100)
GLUCOSE BLD STRIP.AUTO-MCNC: 182 MG/DL (ref 65–100)
PERFORMED BY, TECHID: ABNORMAL
PERFORMED BY, TECHID: NORMAL

## 2022-12-09 PROCEDURE — 74011000250 HC RX REV CODE- 250: Performed by: INTERNAL MEDICINE

## 2022-12-09 PROCEDURE — 74011250637 HC RX REV CODE- 250/637: Performed by: FAMILY MEDICINE

## 2022-12-09 PROCEDURE — 74011636637 HC RX REV CODE- 636/637: Performed by: INTERNAL MEDICINE

## 2022-12-09 PROCEDURE — 82962 GLUCOSE BLOOD TEST: CPT

## 2022-12-09 PROCEDURE — 74011250636 HC RX REV CODE- 250/636: Performed by: INTERNAL MEDICINE

## 2022-12-09 PROCEDURE — 74011250637 HC RX REV CODE- 250/637: Performed by: INTERNAL MEDICINE

## 2022-12-09 PROCEDURE — 94761 N-INVAS EAR/PLS OXIMETRY MLT: CPT

## 2022-12-09 PROCEDURE — 77010033678 HC OXYGEN DAILY

## 2022-12-09 PROCEDURE — 97530 THERAPEUTIC ACTIVITIES: CPT

## 2022-12-09 PROCEDURE — 74011636637 HC RX REV CODE- 636/637: Performed by: FAMILY MEDICINE

## 2022-12-09 PROCEDURE — 94640 AIRWAY INHALATION TREATMENT: CPT

## 2022-12-09 PROCEDURE — 65270000029 HC RM PRIVATE

## 2022-12-09 RX ORDER — IPRATROPIUM BROMIDE AND ALBUTEROL SULFATE 2.5; .5 MG/3ML; MG/3ML
3 SOLUTION RESPIRATORY (INHALATION)
Status: DISCONTINUED | OUTPATIENT
Start: 2022-12-10 | End: 2022-12-10

## 2022-12-09 RX ADMIN — BUDESONIDE AND FORMOTEROL FUMARATE DIHYDRATE 2 PUFF: 160; 4.5 AEROSOL RESPIRATORY (INHALATION) at 08:59

## 2022-12-09 RX ADMIN — CARVEDILOL 6.25 MG: 3.12 TABLET, FILM COATED ORAL at 07:59

## 2022-12-09 RX ADMIN — Medication 1 UNITS: at 13:03

## 2022-12-09 RX ADMIN — BUSPIRONE HYDROCHLORIDE 5 MG: 5 TABLET ORAL at 21:24

## 2022-12-09 RX ADMIN — CETIRIZINE HYDROCHLORIDE 10 MG: 10 TABLET, FILM COATED ORAL at 08:00

## 2022-12-09 RX ADMIN — HYDROXYZINE PAMOATE 25 MG: 25 CAPSULE ORAL at 18:20

## 2022-12-09 RX ADMIN — CARVEDILOL 6.25 MG: 3.12 TABLET, FILM COATED ORAL at 18:12

## 2022-12-09 RX ADMIN — PANTOPRAZOLE SODIUM 40 MG: 40 TABLET, DELAYED RELEASE ORAL at 18:12

## 2022-12-09 RX ADMIN — MONTELUKAST 10 MG: 10 TABLET, FILM COATED ORAL at 08:00

## 2022-12-09 RX ADMIN — Medication 1 UNITS: at 18:12

## 2022-12-09 RX ADMIN — BUSPIRONE HYDROCHLORIDE 5 MG: 5 TABLET ORAL at 08:00

## 2022-12-09 RX ADMIN — GUAIFENESIN 1200 MG: 600 TABLET, EXTENDED RELEASE ORAL at 21:24

## 2022-12-09 RX ADMIN — DOCUSATE SODIUM 100 MG: 100 CAPSULE, LIQUID FILLED ORAL at 08:00

## 2022-12-09 RX ADMIN — ASPIRIN 81 MG 81 MG: 81 TABLET ORAL at 08:01

## 2022-12-09 RX ADMIN — PREDNISONE 40 MG: 20 TABLET ORAL at 08:07

## 2022-12-09 RX ADMIN — GUAIFENESIN AND CODEINE PHOSPHATE 5 ML: 10; 100 LIQUID ORAL at 18:20

## 2022-12-09 RX ADMIN — IPRATROPIUM BROMIDE AND ALBUTEROL SULFATE 3 ML: 2.5; .5 SOLUTION RESPIRATORY (INHALATION) at 08:59

## 2022-12-09 RX ADMIN — FLUTICASONE PROPIONATE 2 SPRAY: 50 SPRAY, METERED NASAL at 08:04

## 2022-12-09 RX ADMIN — SODIUM CHLORIDE, PRESERVATIVE FREE 10 ML: 5 INJECTION INTRAVENOUS at 21:26

## 2022-12-09 RX ADMIN — SODIUM CHLORIDE, PRESERVATIVE FREE 10 ML: 5 INJECTION INTRAVENOUS at 05:36

## 2022-12-09 RX ADMIN — INSULIN GLARGINE 15 UNITS: 100 INJECTION, SOLUTION SUBCUTANEOUS at 21:24

## 2022-12-09 RX ADMIN — IPRATROPIUM BROMIDE AND ALBUTEROL SULFATE 3 ML: 2.5; .5 SOLUTION RESPIRATORY (INHALATION) at 19:23

## 2022-12-09 RX ADMIN — FUROSEMIDE 20 MG: 40 TABLET ORAL at 08:01

## 2022-12-09 RX ADMIN — SODIUM CHLORIDE, PRESERVATIVE FREE 10 ML: 5 INJECTION INTRAVENOUS at 13:04

## 2022-12-09 RX ADMIN — IPRATROPIUM BROMIDE AND ALBUTEROL SULFATE 3 ML: 2.5; .5 SOLUTION RESPIRATORY (INHALATION) at 13:50

## 2022-12-09 RX ADMIN — ENOXAPARIN SODIUM 40 MG: 100 INJECTION SUBCUTANEOUS at 08:01

## 2022-12-09 RX ADMIN — SERTRALINE HYDROCHLORIDE 25 MG: 25 TABLET ORAL at 08:07

## 2022-12-09 RX ADMIN — IPRATROPIUM BROMIDE AND ALBUTEROL SULFATE 3 ML: 2.5; .5 SOLUTION RESPIRATORY (INHALATION) at 01:01

## 2022-12-09 RX ADMIN — DOCUSATE SODIUM 100 MG: 100 CAPSULE, LIQUID FILLED ORAL at 21:24

## 2022-12-09 RX ADMIN — AMLODIPINE BESYLATE 10 MG: 5 TABLET ORAL at 08:01

## 2022-12-09 RX ADMIN — GUAIFENESIN 1200 MG: 600 TABLET, EXTENDED RELEASE ORAL at 08:00

## 2022-12-09 RX ADMIN — BUDESONIDE AND FORMOTEROL FUMARATE DIHYDRATE 2 PUFF: 160; 4.5 AEROSOL RESPIRATORY (INHALATION) at 19:25

## 2022-12-09 RX ADMIN — PANTOPRAZOLE SODIUM 40 MG: 40 TABLET, DELAYED RELEASE ORAL at 07:58

## 2022-12-09 NOTE — PROGRESS NOTES
OCCUPATIONAL THERAPY TREATMENT  Patient: Radha Montoya (86 y.o. female)  Date: 12/9/2022  Diagnosis: [facility] [J44.1] <principal problem not specified>      Precautions:    Chart, occupational therapy assessment, plan of care, and goals were reviewed. ASSESSMENT  Pt continues with skilled OT services and is progressing towards goals. Upon RAZA arrival, pt sitting at EOB with RT completing breathing treatment and agreeable to tx session at this time. Overall, pt continues to present with deficits in generalized strength/AROM, coordination, bed mobility, static/dynamic sitting and standing balance and functional activity tolerance during performance of ADLs/mobility (see below for objective details and assist levels). Pt noted with continued improvement in overall mobility this date. Pt completed transfers with SBA and able to complete toilet transfer this date with SBA. Pt completed standing grooming with SBA and no LOB noted. Pt completed ambulation in room with SBA in preparation for household mobility, RT present. SpO2 at EOB while on 3L of O2 noted to be 91%, and SpO2 while ambulating noted to be 92-94%. PLB reviewed throughout session and pt verbalized/demonstrated understanding. Pt returned to EOB and reviewed UE HEP and demonstrated understanding. Recommend d/c to [facility] once medically appropriate. Other factors to consider for discharge: family/social support, DME, time since onset, severity of deficits, decline from functional baseline         PLAN :  Patient continues to benefit from skilled intervention to address the above impairments. Continue treatment per established plan of care to address goals.     Recommendation for discharge: (in order for the patient to meet his/her long term goals)  [facility]    This discharge recommendation:  Has been made in collaboration with the attending provider and/or case management    IF patient discharges home will need the following DME: walker: rolling       SUBJECTIVE:   Patient stated I am going to [facility] when they have a bed and going to get better.     OBJECTIVE DATA SUMMARY:   Cognitive/Behavioral Status:  Neurologic State: Alert  Orientation Level: Oriented X4  Cognition: Follows commands    Functional Mobility and Transfers for ADLs:    Transfers:  Sit to Stand: Stand-by assistance  Functional Transfers  Bathroom Mobility: Stand-by assistance  Toilet Transfer : Stand-by assistance    Balance:  Sitting: Intact; Without support  Standing: Impaired; With support  Standing - Static: Constant support;Good  Standing - Dynamic : Constant support; Fair    ADL Intervention:  Grooming  Position Performed: Standing  Washing Hands: Stand-by assistance    Toileting  Bladder Hygiene: Stand-by assistance    Therapeutic Exercises:   Exercise Sets Reps AROM AAROM PROM Self PROM Comments   Shoulder flex/ext 1 5 [x] [] [] []    Elbow flex/ext 1 2 [x] [] [] []    Scapular protraction/retraction 1 3 [x] [] [] []      Pain:  0/10    Activity Tolerance:   Fair  Please refer to the flowsheet for vital signs taken during this treatment. After treatment patient left in no apparent distress:   Bed locked and in lowest position  Call bell within reach and sitting EOB    COMMUNICATION/COLLABORATION:   The patients plan of care was discussed with: Registered nurse and Respiratory therapist.     RY Weaver  Time Calculation: 24 mins    Problem: Self Care Deficits Care Plan (Adult)  Goal: *Acute Goals and Plan of Care (Insert Text)  Description: FUNCTIONAL STATUS PRIOR TO ADMISSION: Pt reports he was IND w/ functional mobility/ADLs; 2 L of O2 at baseline. Denies falls. HOME SUPPORT: Pt lives by self. No support.     Occupational Therapy Goals  Initiated 12/1/2022    Pt stated goal \"I want to get better\"  Pt will be IND sup <> sit in prep for EOB ADLs  Pt will be IND grooming standing sink side LRAD  Pt will be IND UB dressing sitting EOB/long sit   Pt will be IND LE dressing sitting EOB/long sit  Pt will be IND sit <>  prep for toileting LRAD  Pt will be IND toileting/toilet transfer/cloth mgmt LRAD  Pt will demo'd improved activity tolerance by maintaining >90% SpO2 while completing 3-4 standing ADLs.    Pt will be IND following UE HEP in prep for self care tasks   Outcome: Progressing Towards Goal

## 2022-12-09 NOTE — DISCHARGE SUMMARY
Discharge Summary       PATIENT ID: Malathi Solo  MRN: [id number]   YOB: 1960    DATE OF ADMISSION: 11/26/2022  1:08 PM    DATE OF DISCHARGE:   PRIMARY CARE PROVIDER: Gisela Guerrero MD     ATTENDING PHYSICIAN: Jr Funes  DISCHARGING PROVIDER: Jr Funes      CONSULTATIONS: IP CONSULT TO PULMONOLOGY  IP CONSULT TO UROLOGY    PROCEDURES/SURGERIES: * No surgery found *    ADMITTING DIAGNOSES:    Patient Active Problem List    Diagnosis Date Noted    Respiratory failure ([facility] 08/08/2021    COPD exacerbation ([facility] 08/08/2021    COPD (chronic obstructive pulmonary disease) ([facility] 12/13/2020    Hypoxia 12/13/2020       DISCHARGE DIAGNOSES / PLAN:      Acute on chronic hypoxic respiratory failure  Acute exacerbation of COPD  Hypertension  Cough  Hyperglycemia due to steroids  UTI        DISCHARGE MEDICATIONS:  Current Discharge Medication List        START taking these medications    Details   ! ! predniSONE (DELTASONE) 20 mg tablet Take 2 Tablets by mouth daily (with breakfast). Qty: 10 Tablet, Refills: 0  Start date: 12/6/2022      sertraline (ZOLOFT) 25 mg tablet Take 1 Tablet by mouth daily. Qty: 30 Tablet, Refills: 0  Start date: 12/6/2022      !! guaiFENesin ER (MUCINEX) 1,200 mg Ta12 ER tablet Take 1 Tablet by mouth every twelve (12) hours. Qty: 30 Tablet, Refills: 0  Start date: 12/5/2022      azithromycin (ZITHROMAX) 500 mg tab Take 1 Tablet by mouth daily for 3 days. Qty: 3 Tablet, Refills: 0  Start date: 12/3/2022, End date: 12/6/2022      busPIRone (BUSPAR) 5 mg tablet Take 1 Tablet by mouth two (2) times a day. Qty: 60 Tablet, Refills: 0  Start date: 12/2/2022      cetirizine (ZYRTEC) 10 mg tablet Take 1 Tablet by mouth daily. Qty: 20 Tablet, Refills: 0  Start date: 12/3/2022       !! - Potential duplicate medications found. Please discuss with provider.         CONTINUE these medications which have CHANGED    Details   insulin glargine (LANTUS) 100 unit/mL injection 15 units  Qty: 1 mL, Refills: 2  Start date: 12/2/2022           CONTINUE these medications which have NOT CHANGED    Details   docusate sodium (Colace) 100 mg capsule Take 1 Capsule by mouth two (2) times a day for 90 days. Qty: 60 Capsule, Refills: 2      carvediloL (COREG) 6.25 mg tablet Take 1 Tablet by mouth two (2) times daily (with meals). Qty: 60 Tablet, Refills: 0      furosemide (Lasix) 40 mg tablet Lasix 40 mg daily  Qty: 30 Tablet, Refills: 0      pantoprazole (PROTONIX) 40 mg tablet Take 1 Tablet by mouth Daily (before breakfast). Qty: 60 Tablet, Refills: 0      !! predniSONE (DELTASONE) 10 mg tablet 1 tablet daily  Qty: 15 Tablet, Refills: 0      albuterol-ipratropium (DUO-NEB) 2.5 mg-0.5 mg/3 ml nebu 3 mL by Nebulization route every six (6) hours as needed for Wheezing. Qty: 30 Nebule, Refills: 1      aspirin 81 mg chewable tablet Take 1 Tab by mouth daily. Qty: 30 Tab, Refills: 0      !! guaiFENesin ER (MUCINEX) 600 mg ER tablet Take 1 Tab by mouth two (2) times a day. Qty: 30 Tab, Refills: 0      amLODIPine (NORVASC) 10 mg tablet Take 10 mg by mouth daily. mometasone-formoterol (DULERA) 200-5 mcg/actuation HFA inhaler Take 2 Puffs by inhalation two (2) times a day. montelukast (SINGULAIR) 10 mg tablet Take 10 mg by mouth daily. Calcium-Cholecalciferol, D3, 500 mg(1,250mg) -400 unit tab Take  by mouth daily. fluticasone propionate (FLONASE) 50 mcg/actuation nasal spray 2 Sprays by Both Nostrils route daily. ergocalciferol (Vitamin D2) 1,250 mcg (50,000 unit) capsule Take 50,000 Units by mouth every seven (7) days. Q7days on Monday       !! - Potential duplicate medications found. Please discuss with provider.         STOP taking these medications       cephALEXin (Keflex) 500 mg capsule Comments:   Reason for Stopping:         benzonatate (TESSALON) 100 mg capsule Comments:   Reason for Stopping:                 NOTIFY YOUR PHYSICIAN FOR ANY OF THE FOLLOWING:   Fever over 101 degrees for 24 hours. Chest pain, shortness of breath, fever, chills, nausea, vomiting, diarrhea, change in mentation, falling, weakness, bleeding. Severe pain or pain not relieved by medications. Or, any other signs or symptoms that you may have questions about. DISPOSITION:  x  Home With:   OT  PT  HH  RN       Long term SNF/Inpatient Rehab    Independent/assisted living    Hospice    Other:       PATIENT CONDITION AT DISCHARGE: Stable      PHYSICAL EXAMINATION AT DISCHARGE:  General:          Alert, cooperative, no distress, appears stated age. HEENT:           Atraumatic, anicteric sclerae, pink conjunctivae                          No oral ulcers, mucosa moist, throat clear, dentition fair  Neck:               Supple, symmetrical  Lungs:             Clear to auscultation bilaterally. No Wheezing or Rhonchi. No rales. Chest wall:      No tenderness  No Accessory muscle use. Heart:              Regular  rhythm,  No  murmur   No edema  Abdomen:        Soft, non-tender. Not distended. Bowel sounds normal  Extremities:     No cyanosis. No clubbing,                            Skin turgor normal, Capillary refill normal  Skin:                Not pale. Not Jaundiced  No rashes   Psych:             Not anxious or agitated. Neurologic:      Alert, moves all extremities, answers questions appropriately and responds to commands     XR CHEST SNGL V   Final Result   No Acute Disease.               Recent Results (from the past 24 hour(s))   GLUCOSE, POC    Collection Time: 12/08/22  5:30 PM   Result Value Ref Range    Glucose (POC) 222 (H) 65 - 100 mg/dL    Performed by Darryl Keating    GLUCOSE, POC    Collection Time: 12/08/22 10:00 PM   Result Value Ref Range    Glucose (POC) 204 (H) 65 - 100 mg/dL    Performed by [address], POC    Collection Time: 12/09/22  6:32 AM   Result Value Ref Range    Glucose (POC) 100 65 - 100 mg/dL    Performed by [facility] - RETREAT COURSE:  She was transferred to my service as per patient request     Resting the bed alert awake complaining of coughing shortness of breath no fever no chills     11/30  Patient currently on 4L oxygen, reports 2L at home. Endorses productive cough. Denies headache, fever, chest pain, abdominal pain. CXR unremarkable for any infiltrates. 12/1  Patient is alert and oriented, currently on 5L. Reports an increase O2 need yesterday to 6-7L, is anxious about her lungs. Says she is worried about physical activity due to increase O2 need. Alli chest pain, headache. 12/2  Patient is alert and pleasant, currently on 4L O2. She reports walking for an oxygen study yesterday where she required 6L during periods of exacerbation, is comfortable on 4L when resting. She endorses a productive cough and feels lightheaded with too much exercise. Denies headache, chest pain, abdominal pain. Patient very anxious still coughing congested oxygen saturation is stable on 3 L which is baseline    Best option to discharge patient to skilled care rehab for pulmonary rehab    Medication reconciliation done time discharge patient 35 minutes 50% time spent counseling and coordination of care    12/5  Patient is sitting in the chair eating breakfast. Reports improved symptoms but is still experiencing a baseline SOB. Currently on 3L. Denies headache, chest pain. Is aware that placement is pending. 12/6  Patient is resting in the chair, on 3L nasal cannula. Denies headache, chest pain. Understands we are waiting for authorization.   Pending discharge to [facility] no complaints just waiting for placement      Signed:   George Rodas MD  12/9/2022  11:18 AM

## 2022-12-09 NOTE — PROGRESS NOTES
Bedside and Verbal shift change report given to Manny Rojas RN (oncoming nurse) by Boo Lane (offgoing nurse). Report included the following information medication, patient condition, and shift plan and discharge plan of action.

## 2022-12-09 NOTE — PROGRESS NOTES
DC Plan: [facility] indicated they may not have a Medicaid bed now until sometime next week. CM met with pt at the bedside and provided her with an update. At this time, pt does not feel safe returning home. Pt indicated if we discharge her home, she may end up coming back to the hospital. Cm will f/up with pt on Monday to see if she might be able to return home and admit from home to rehab.

## 2022-12-09 NOTE — ROUTINE PROCESS
Bedside shift change report given to Danese-Lucia Squibb   (oncoming nurse) by  Linh Sheikh RN (offgoing nurse).  Report included the following information SBAR, Intake/Output, MAR, and Recent Results

## 2022-12-09 NOTE — PROGRESS NOTES
Pulmonary/ CC progress note    Subjective:   Date of Consultation:  2022  Referring Physician: Stanislaw Kohli MD    Patient seen and examined in her room on the floor this morning, no acute events overnight. Saturating well on 2-3 L nasal cannula, home requirements. Denies any chest pains or palpitations      Allergies   Allergen Reactions    Lisinopril Angioedema        Review of Systems:  A comprehensive review of systems was negative except for that written in the History of Present Illness. Objective:   Blood pressure 134/75, pulse 78, temperature 98.6 °F (37 °C), resp. rate 18, height 5' 4.02\" (1.626 m), weight 95.4 kg (210 lb 5.1 oz), SpO2 96 %, not currently breastfeeding. Temp (24hrs), Av.2 °F (36.8 °C), Min:98 °F (36.7 °C), Max:98.6 °F (37 °C)    XR CHEST SNGL V   Final Result   No Acute Disease. Data Review: CBC:   No results for input(s): WBC, RBC, HGB, HCT, PLT, GRANS, LYMPH, EOS, HGBEXT, HCTEXT, PLTEXT in the last 72 hours. CMP:   No results for input(s): GLU, NA, K, CL, CO2, BUN, CREA, CA, AGAP, BUCR, TBIL, AP, TP, ALB, GLOB, AGRAT in the last 72 hours. No lab exists for component: GPT    Liver Enzymes:   No results for input(s): TP, ALB, TBIL, AP in the last 72 hours. No lab exists for component: SGOT, GPT, DBIL    ABGs: No results for input(s): PH, PCO2, PO2, HCO3 in the last 72 hours.   Inflammation studies: No results found for: SR, ESRA, CRP  Current Facility-Administered Medications   Medication Dose Route Frequency    sertraline (ZOLOFT) tablet 25 mg  25 mg Oral DAILY    guaiFENesin ER (MUCINEX) tablet 1,200 mg  1,200 mg Oral Q12H    predniSONE (DELTASONE) tablet 40 mg  40 mg Oral DAILY WITH BREAKFAST    hydrOXYzine pamoate (VISTARIL) capsule 25 mg  25 mg Oral Q6H PRN    insulin lispro (HUMALOG) injection   SubCUTAneous AC&HS    glucose chewable tablet 16 g  4 Tablet Oral PRN    glucagon (GLUCAGEN) injection 1 mg  1 mg IntraMUSCular PRN guaiFENesin-codeine (ROBITUSSIN AC) 100-10 mg/5 mL solution 5 mL  5 mL Oral Q6H PRN    albuterol-ipratropium (DUO-NEB) 2.5 MG-0.5 MG/3 ML  3 mL Nebulization Q6H RT    busPIRone (BUSPAR) tablet 5 mg  5 mg Oral BID    amLODIPine (NORVASC) tablet 10 mg  10 mg Oral DAILY    aspirin chewable tablet 81 mg  81 mg Oral DAILY    calcium-vitamin D 600 mg-5 mcg (200 unit) per tablet 1 Tablet  1 Tablet Oral DAILY    carvediloL (COREG) tablet 6.25 mg  6.25 mg Oral BID WITH MEALS    docusate sodium (COLACE) capsule 100 mg  100 mg Oral BID    fluticasone propionate (FLONASE) 50 mcg/actuation nasal spray 2 Spray  2 Spray Both Nostrils DAILY    furosemide (LASIX) tablet 20 mg  20 mg Oral DAILY    insulin glargine (LANTUS) injection 15 Units  15 Units SubCUTAneous QHS    budesonide-formoteroL (SYMBICORT) 160-4.5 mcg/actuation HFA inhaler 2 Puff  2 Puff Inhalation BID    montelukast (SINGULAIR) tablet 10 mg  10 mg Oral DAILY    sodium chloride (NS) flush 5-40 mL  5-40 mL IntraVENous Q8H    sodium chloride (NS) flush 5-40 mL  5-40 mL IntraVENous PRN    acetaminophen (TYLENOL) tablet 650 mg  650 mg Oral Q6H PRN    Or    acetaminophen (TYLENOL) suppository 650 mg  650 mg Rectal Q6H PRN    polyethylene glycol (MIRALAX) packet 17 g  17 g Oral DAILY PRN    ondansetron (ZOFRAN ODT) tablet 4 mg  4 mg Oral Q8H PRN    Or    ondansetron (ZOFRAN) injection 4 mg  4 mg IntraVENous Q6H PRN    enoxaparin (LOVENOX) injection 40 mg  40 mg SubCUTAneous DAILY    pantoprazole (PROTONIX) tablet 40 mg  40 mg Oral ACB&D    cetirizine (ZYRTEC) tablet 10 mg  10 mg Oral DAILY        Exam:      This is middle aged female obese. Alert and oriented x3. Head normocephalic and atraumatic, pupils are round responsive to light sclera icteric and conjunctive are pink. JVD is absent. Chest: Bilateral expiratory wheezing audible. Prolonged phase of expiration, however air entry is currently diminished than yesterday.   Heart: S1-S2 normal  Abdomen: Soft, nontender, no visceromegaly  Extremities: No edema, sinus or clubbing  Neuro: No focal motor deficit. Impression:   Ms. Malathi Solo is a 63years old female who is known to have history of COPD, hypertension, chronic hypoxia on supplemental oxygen. She additionally history of breast CVA with complete remission. She follows with Dr. Ashvin Alicea her office. Last seen about 3 months ago. She is admitted to hospital because of increasing symptoms of cough, shortness of breath and wheezing. She used her inhalers and nebulizer but without any improvement. X-rays unremarkable for any infiltrates    Plan:   1. Acute on chronic hypoxic respiratory failure: This is secondary to acute exacerbation of COPD. She is currently on 3 L oxygen. Wean down to keep saturation more than 90%. 2.  Acute exacerbation of COPD:  Resolved. Chest x-ray is unremarkable for any infiltrates. Continue with Symbicort 160/4.5, 2 inhalations twice daily. Additionally she is on Singulair. She will follow-up with Dr. Ashvin Alicea in our office    3. Hypertension :  Continue antihypertensive medication    4. DVT prophylaxis with Lovenox subcutaneously. There appears to be element of anxiety. Agree with starting her on BuSpar. She is slowly improving. She may benefit from placement to skilled nursing facility or rehab. May get transferred to [facility]. Will sign off. Thanks for involving us in the management of your patient during hospital stay      CAREN Adams MD  [facility] ([facility])

## 2022-12-10 LAB
GLUCOSE BLD STRIP.AUTO-MCNC: 143 MG/DL (ref 65–100)
GLUCOSE BLD STRIP.AUTO-MCNC: 151 MG/DL (ref 65–100)
GLUCOSE BLD STRIP.AUTO-MCNC: 162 MG/DL (ref 65–100)
GLUCOSE BLD STRIP.AUTO-MCNC: 80 MG/DL (ref 65–100)
PERFORMED BY, TECHID: ABNORMAL
PERFORMED BY, TECHID: NORMAL

## 2022-12-10 PROCEDURE — 65270000029 HC RM PRIVATE

## 2022-12-10 PROCEDURE — 74011000250 HC RX REV CODE- 250: Performed by: INTERNAL MEDICINE

## 2022-12-10 PROCEDURE — 97110 THERAPEUTIC EXERCISES: CPT

## 2022-12-10 PROCEDURE — 74011250637 HC RX REV CODE- 250/637: Performed by: INTERNAL MEDICINE

## 2022-12-10 PROCEDURE — 94640 AIRWAY INHALATION TREATMENT: CPT

## 2022-12-10 PROCEDURE — 74011636637 HC RX REV CODE- 636/637: Performed by: FAMILY MEDICINE

## 2022-12-10 PROCEDURE — 74011636637 HC RX REV CODE- 636/637: Performed by: INTERNAL MEDICINE

## 2022-12-10 PROCEDURE — 77010033678 HC OXYGEN DAILY

## 2022-12-10 PROCEDURE — 74011250637 HC RX REV CODE- 250/637: Performed by: FAMILY MEDICINE

## 2022-12-10 PROCEDURE — 74011250636 HC RX REV CODE- 250/636: Performed by: INTERNAL MEDICINE

## 2022-12-10 PROCEDURE — 94761 N-INVAS EAR/PLS OXIMETRY MLT: CPT

## 2022-12-10 PROCEDURE — 82962 GLUCOSE BLOOD TEST: CPT

## 2022-12-10 RX ORDER — IPRATROPIUM BROMIDE AND ALBUTEROL SULFATE 2.5; .5 MG/3ML; MG/3ML
3 SOLUTION RESPIRATORY (INHALATION)
Status: DISCONTINUED | OUTPATIENT
Start: 2022-12-10 | End: 2022-12-15 | Stop reason: HOSPADM

## 2022-12-10 RX ADMIN — ASPIRIN 81 MG 81 MG: 81 TABLET ORAL at 08:42

## 2022-12-10 RX ADMIN — HYDROXYZINE PAMOATE 25 MG: 25 CAPSULE ORAL at 20:35

## 2022-12-10 RX ADMIN — Medication 1 UNITS: at 12:36

## 2022-12-10 RX ADMIN — CETIRIZINE HYDROCHLORIDE 10 MG: 10 TABLET, FILM COATED ORAL at 08:43

## 2022-12-10 RX ADMIN — IPRATROPIUM BROMIDE AND ALBUTEROL SULFATE 3 ML: .5; 3 SOLUTION RESPIRATORY (INHALATION) at 13:22

## 2022-12-10 RX ADMIN — SODIUM CHLORIDE, PRESERVATIVE FREE 10 ML: 5 INJECTION INTRAVENOUS at 20:35

## 2022-12-10 RX ADMIN — BUDESONIDE AND FORMOTEROL FUMARATE DIHYDRATE 2 PUFF: 160; 4.5 AEROSOL RESPIRATORY (INHALATION) at 07:50

## 2022-12-10 RX ADMIN — PREDNISONE 40 MG: 20 TABLET ORAL at 08:42

## 2022-12-10 RX ADMIN — GUAIFENESIN 1200 MG: 600 TABLET, EXTENDED RELEASE ORAL at 08:43

## 2022-12-10 RX ADMIN — SODIUM CHLORIDE, PRESERVATIVE FREE 10 ML: 5 INJECTION INTRAVENOUS at 04:58

## 2022-12-10 RX ADMIN — SERTRALINE HYDROCHLORIDE 25 MG: 25 TABLET ORAL at 08:43

## 2022-12-10 RX ADMIN — BUSPIRONE HYDROCHLORIDE 5 MG: 5 TABLET ORAL at 08:43

## 2022-12-10 RX ADMIN — FUROSEMIDE 20 MG: 40 TABLET ORAL at 08:42

## 2022-12-10 RX ADMIN — PANTOPRAZOLE SODIUM 40 MG: 40 TABLET, DELAYED RELEASE ORAL at 17:49

## 2022-12-10 RX ADMIN — CARVEDILOL 6.25 MG: 3.12 TABLET, FILM COATED ORAL at 17:49

## 2022-12-10 RX ADMIN — CARVEDILOL 6.25 MG: 3.12 TABLET, FILM COATED ORAL at 08:43

## 2022-12-10 RX ADMIN — INSULIN GLARGINE 15 UNITS: 100 INJECTION, SOLUTION SUBCUTANEOUS at 20:34

## 2022-12-10 RX ADMIN — Medication 1 UNITS: at 22:00

## 2022-12-10 RX ADMIN — PANTOPRAZOLE SODIUM 40 MG: 40 TABLET, DELAYED RELEASE ORAL at 08:43

## 2022-12-10 RX ADMIN — BUDESONIDE AND FORMOTEROL FUMARATE DIHYDRATE 2 PUFF: 160; 4.5 AEROSOL RESPIRATORY (INHALATION) at 20:53

## 2022-12-10 RX ADMIN — ENOXAPARIN SODIUM 40 MG: 100 INJECTION SUBCUTANEOUS at 08:42

## 2022-12-10 RX ADMIN — DOCUSATE SODIUM 100 MG: 100 CAPSULE, LIQUID FILLED ORAL at 08:42

## 2022-12-10 RX ADMIN — MONTELUKAST 10 MG: 10 TABLET, FILM COATED ORAL at 08:43

## 2022-12-10 RX ADMIN — Medication 1 TABLET: at 08:43

## 2022-12-10 RX ADMIN — DOCUSATE SODIUM 100 MG: 100 CAPSULE, LIQUID FILLED ORAL at 20:34

## 2022-12-10 RX ADMIN — SODIUM CHLORIDE, PRESERVATIVE FREE 10 ML: 5 INJECTION INTRAVENOUS at 16:21

## 2022-12-10 RX ADMIN — BUSPIRONE HYDROCHLORIDE 5 MG: 5 TABLET ORAL at 20:35

## 2022-12-10 RX ADMIN — AMLODIPINE BESYLATE 10 MG: 5 TABLET ORAL at 08:42

## 2022-12-10 RX ADMIN — IPRATROPIUM BROMIDE AND ALBUTEROL SULFATE 3 ML: .5; 3 SOLUTION RESPIRATORY (INHALATION) at 07:50

## 2022-12-10 RX ADMIN — GUAIFENESIN 1200 MG: 600 TABLET, EXTENDED RELEASE ORAL at 20:34

## 2022-12-10 NOTE — PROGRESS NOTES
Problem: Falls - Risk of  Goal: *Absence of Falls  Description: Document Denice Angelina Fall Risk and appropriate interventions in the flowsheet.   Outcome: Progressing Towards Goal  Note: Fall Risk Interventions:            Medication Interventions: Teach patient to arise slowly    Elimination Interventions: Call light in reach              Problem: Patient Education: Go to Patient Education Activity  Goal: Patient/Family Education  Outcome: Progressing Towards Goal     Problem: Patient Education: Go to Patient Education Activity  Goal: Patient/Family Education  Outcome: Progressing Towards Goal     Problem: Patient Education: Go to Patient Education Activity  Goal: Patient/Family Education  Outcome: Progressing Towards Goal

## 2022-12-10 NOTE — DISCHARGE SUMMARY
Discharge Summary       PATIENT ID: Linda Estrada  MRN: [id number]   YOB: 1960    DATE OF ADMISSION: 11/26/2022  1:08 PM    DATE OF DISCHARGE:   PRIMARY CARE PROVIDER: Maximo Escobedo MD     ATTENDING PHYSICIAN: Cecilia Funes  DISCHARGING PROVIDER: Cecilia Funes      CONSULTATIONS: IP CONSULT TO PULMONOLOGY  IP CONSULT TO UROLOGY    PROCEDURES/SURGERIES: * No surgery found *    ADMITTING DIAGNOSES:    Patient Active Problem List    Diagnosis Date Noted    Respiratory failure ([facility] 08/08/2021    COPD exacerbation ([facility] 08/08/2021    COPD (chronic obstructive pulmonary disease) ([facility] 12/13/2020    Hypoxia 12/13/2020       DISCHARGE DIAGNOSES / PLAN:      Acute on chronic hypoxic respiratory failure  Acute exacerbation of COPD  Hypertension  Cough  Hyperglycemia due to steroids  UTI        DISCHARGE MEDICATIONS:  Current Discharge Medication List        START taking these medications    Details   ! ! predniSONE (DELTASONE) 20 mg tablet Take 2 Tablets by mouth daily (with breakfast). Qty: 10 Tablet, Refills: 0  Start date: 12/6/2022      sertraline (ZOLOFT) 25 mg tablet Take 1 Tablet by mouth daily. Qty: 30 Tablet, Refills: 0  Start date: 12/6/2022      !! guaiFENesin ER (MUCINEX) 1,200 mg Ta12 ER tablet Take 1 Tablet by mouth every twelve (12) hours. Qty: 30 Tablet, Refills: 0  Start date: 12/5/2022      azithromycin (ZITHROMAX) 500 mg tab Take 1 Tablet by mouth daily for 3 days. Qty: 3 Tablet, Refills: 0  Start date: 12/3/2022, End date: 12/6/2022      busPIRone (BUSPAR) 5 mg tablet Take 1 Tablet by mouth two (2) times a day. Qty: 60 Tablet, Refills: 0  Start date: 12/2/2022      cetirizine (ZYRTEC) 10 mg tablet Take 1 Tablet by mouth daily. Qty: 20 Tablet, Refills: 0  Start date: 12/3/2022       !! - Potential duplicate medications found. Please discuss with provider.         CONTINUE these medications which have CHANGED    Details   insulin glargine (LANTUS) 100 unit/mL injection 15 units  Qty: 1 mL, Refills: 2  Start date: 12/2/2022           CONTINUE these medications which have NOT CHANGED    Details   docusate sodium (Colace) 100 mg capsule Take 1 Capsule by mouth two (2) times a day for 90 days. Qty: 60 Capsule, Refills: 2      carvediloL (COREG) 6.25 mg tablet Take 1 Tablet by mouth two (2) times daily (with meals). Qty: 60 Tablet, Refills: 0      furosemide (Lasix) 40 mg tablet Lasix 40 mg daily  Qty: 30 Tablet, Refills: 0      pantoprazole (PROTONIX) 40 mg tablet Take 1 Tablet by mouth Daily (before breakfast). Qty: 60 Tablet, Refills: 0      !! predniSONE (DELTASONE) 10 mg tablet 1 tablet daily  Qty: 15 Tablet, Refills: 0      albuterol-ipratropium (DUO-NEB) 2.5 mg-0.5 mg/3 ml nebu 3 mL by Nebulization route every six (6) hours as needed for Wheezing. Qty: 30 Nebule, Refills: 1      aspirin 81 mg chewable tablet Take 1 Tab by mouth daily. Qty: 30 Tab, Refills: 0      !! guaiFENesin ER (MUCINEX) 600 mg ER tablet Take 1 Tab by mouth two (2) times a day. Qty: 30 Tab, Refills: 0      amLODIPine (NORVASC) 10 mg tablet Take 10 mg by mouth daily. mometasone-formoterol (DULERA) 200-5 mcg/actuation HFA inhaler Take 2 Puffs by inhalation two (2) times a day. montelukast (SINGULAIR) 10 mg tablet Take 10 mg by mouth daily. Calcium-Cholecalciferol, D3, 500 mg(1,250mg) -400 unit tab Take  by mouth daily. fluticasone propionate (FLONASE) 50 mcg/actuation nasal spray 2 Sprays by Both Nostrils route daily. ergocalciferol (Vitamin D2) 1,250 mcg (50,000 unit) capsule Take 50,000 Units by mouth every seven (7) days. Q7days on Monday       !! - Potential duplicate medications found. Please discuss with provider.         STOP taking these medications       cephALEXin (Keflex) 500 mg capsule Comments:   Reason for Stopping:         benzonatate (TESSALON) 100 mg capsule Comments:   Reason for Stopping:                 NOTIFY YOUR PHYSICIAN FOR ANY OF THE FOLLOWING:   Fever over 101 degrees for 24 hours. Chest pain, shortness of breath, fever, chills, nausea, vomiting, diarrhea, change in mentation, falling, weakness, bleeding. Severe pain or pain not relieved by medications. Or, any other signs or symptoms that you may have questions about. DISPOSITION:  x  Home With:   OT  PT  HH  RN       Long term SNF/Inpatient Rehab    Independent/assisted living    Hospice    Other:       PATIENT CONDITION AT DISCHARGE: Stable      PHYSICAL EXAMINATION AT DISCHARGE:  General:          Alert, cooperative, no distress, appears stated age. HEENT:           Atraumatic, anicteric sclerae, pink conjunctivae                          No oral ulcers, mucosa moist, throat clear, dentition fair  Neck:               Supple, symmetrical  Lungs:             Clear to auscultation bilaterally. No Wheezing or Rhonchi. No rales. Chest wall:      No tenderness  No Accessory muscle use. Heart:              Regular  rhythm,  No  murmur   No edema  Abdomen:        Soft, non-tender. Not distended. Bowel sounds normal  Extremities:     No cyanosis. No clubbing,                            Skin turgor normal, Capillary refill normal  Skin:                Not pale. Not Jaundiced  No rashes   Psych:             Not anxious or agitated. Neurologic:      Alert, moves all extremities, answers questions appropriately and responds to commands     XR CHEST SNGL V   Final Result   No Acute Disease.               Recent Results (from the past 24 hour(s))   GLUCOSE, POC    Collection Time: 12/09/22  4:06 PM   Result Value Ref Range    Glucose (POC) 176 (H) 65 - 100 mg/dL    Performed by Jesus Manuel Adair    GLUCOSE, POC    Collection Time: 12/09/22  9:23 PM   Result Value Ref Range    Glucose (POC) 137 (H) 65 - 100 mg/dL    Performed by Daniel Parker    GLUCOSE, POC    Collection Time: 12/10/22  7:49 AM   Result Value Ref Range    Glucose (POC) 80 65 - 100 mg/dL    Performed by Tony Hutton, POC    Collection Time: 12/10/22 12:00 PM   Result Value Ref Range    Glucose (POC) 151 (H) 65 - 100 mg/dL    Performed by Kirti Hannon 17:  She was transferred to my service as per patient request     Resting the bed alert awake complaining of coughing shortness of breath no fever no chills     11/30  Patient currently on 4L oxygen, reports 2L at home. Endorses productive cough. Denies headache, fever, chest pain, abdominal pain. CXR unremarkable for any infiltrates. 12/1  Patient is alert and oriented, currently on 5L. Reports an increase O2 need yesterday to 6-7L, is anxious about her lungs. Says she is worried about physical activity due to increase O2 need. Alli chest pain, headache. 12/2  Patient is alert and pleasant, currently on 4L O2. She reports walking for an oxygen study yesterday where she required 6L during periods of exacerbation, is comfortable on 4L when resting. She endorses a productive cough and feels lightheaded with too much exercise. Denies headache, chest pain, abdominal pain. Patient very anxious still coughing congested oxygen saturation is stable on 3 L which is baseline    Best option to discharge patient to skilled care rehab for pulmonary rehab    Medication reconciliation done time discharge patient 35 minutes 50% time spent counseling and coordination of care    12/5  Patient is sitting in the chair eating breakfast. Reports improved symptoms but is still experiencing a baseline SOB. Currently on 3L. Denies headache, chest pain. Is aware that placement is pending. 12/6  Patient is resting in the chair, on 3L nasal cannula. Denies headache, chest pain. Understands we are waiting for authorization.   Pending discharge to [facility] no complaints just waiting for placement      Signed:   Zahra Sinclair MD  12/10/2022  11:18 AM

## 2022-12-10 NOTE — PROGRESS NOTES
Problem: Falls - Risk of  Goal: *Absence of Falls  Description: Document Pedro Oris Fall Risk and appropriate interventions in the flowsheet.   Outcome: Progressing Towards Goal  Note: Fall Risk Interventions:            Medication Interventions: Teach patient to arise slowly    Elimination Interventions: Call light in reach

## 2022-12-10 NOTE — PROGRESS NOTES
PHYSICAL THERAPY TREATMENT  Patient: John Yan (82 y.o. female)  Date: 12/10/2022  Diagnosis: COPD exacerbation ([facility] [J44.1] <principal problem not specified>      Precautions:    Chart, physical therapy assessment, plan of care and goals were reviewed. ASSESSMENT  Patient continues with skilled PT services and is progressing towards goals. Pt received seated in chair and agreeable to begin session. Pt remained in chair and completed therex (see chart below) before completing gait. Pt came to stand with SBA and ambulated within her room with SBA and a RW. After 50ft pt requested to sit back down. She remained in the chair with all needs in reach. Current Level of Function Impacting Discharge (mobility/balance): SBA    Other factors to consider for discharge: PLOF, Home setup, family support         PLAN :  Patient continues to benefit from skilled intervention to address the above impairments. Continue treatment per established plan of care. to address goals. Recommendation for discharge: (in order for the patient to meet his/her long term goals)  Meng Mclean    This discharge recommendation:  Has been made in collaboration with the attending provider and/or case management    IF patient discharges home will need the following DME: to be determined (TBD)       SUBJECTIVE:   Patient stated lets get moving.     OBJECTIVE DATA SUMMARY:   Critical Behavior:  Neurologic State: Alert  Orientation Level: Oriented X4  Cognition: Follows commands     Functional Mobility Training:  Bed Mobility:                    Transfers:  Sit to Stand: Stand-by assistance  Stand to Sit: Stand-by assistance                             Balance:  Sitting: Intact; Without support  Standing: Impaired; With support  Standing - Static: Constant support;Good  Standing - Dynamic : Constant support; Fair  Ambulation/Gait Training:  Distance (ft): 50 Feet (ft)  Assistive Device: Gait belt;Walker, rolling  Ambulation - Level of Assistance: Stand-by assistance                                              Therapeutic Exercises:       EXERCISE   Sets   Reps   Active Active Assist   Passive Self ROM   Comments   Ankle Pumps 1 10 [x] [] [] []    Short Arc Quads 1 10 [x] [] [] []    Hip add 1 10 [x] [] [] []    Marching 1 10 [x] [] [] []       [] [] [] []        Pain Ratin/10    Activity Tolerance:   Fair and requires rest breaks  Please refer to the flowsheet for vital signs taken during this treatment. After treatment patient left in no apparent distress:   Sitting in chair and Call bell within reach    COMMUNICATION/COLLABORATION:   The patients plan of care was discussed with: Registered nurse.      Problem: Mobility Impaired (Adult and Pediatric)  Goal: *Acute Goals and Plan of Care (Insert Text)  Description: I with LE HEP x7 days  Mod I with bed mob x7 days  Mod I with all transfers x7 days  Amb 75-100ft with LRAD and SBAx1 x7 days    Pt stated goal: to get better  Outcome: Progressing Towards Goal       Uyen Greco, COLTEN   Time Calculation: 25 mins

## 2022-12-11 LAB
GLUCOSE BLD STRIP.AUTO-MCNC: 111 MG/DL (ref 65–100)
GLUCOSE BLD STRIP.AUTO-MCNC: 131 MG/DL (ref 65–100)
GLUCOSE BLD STRIP.AUTO-MCNC: 136 MG/DL (ref 65–100)
GLUCOSE BLD STRIP.AUTO-MCNC: 147 MG/DL (ref 65–100)
PERFORMED BY, TECHID: ABNORMAL

## 2022-12-11 PROCEDURE — 74011250637 HC RX REV CODE- 250/637: Performed by: FAMILY MEDICINE

## 2022-12-11 PROCEDURE — 94761 N-INVAS EAR/PLS OXIMETRY MLT: CPT

## 2022-12-11 PROCEDURE — 74011000250 HC RX REV CODE- 250: Performed by: INTERNAL MEDICINE

## 2022-12-11 PROCEDURE — 74011636637 HC RX REV CODE- 636/637: Performed by: INTERNAL MEDICINE

## 2022-12-11 PROCEDURE — 74011250637 HC RX REV CODE- 250/637: Performed by: INTERNAL MEDICINE

## 2022-12-11 PROCEDURE — 94640 AIRWAY INHALATION TREATMENT: CPT

## 2022-12-11 PROCEDURE — 74011250636 HC RX REV CODE- 250/636: Performed by: INTERNAL MEDICINE

## 2022-12-11 PROCEDURE — 82962 GLUCOSE BLOOD TEST: CPT

## 2022-12-11 PROCEDURE — 65270000029 HC RM PRIVATE

## 2022-12-11 PROCEDURE — 77010033678 HC OXYGEN DAILY

## 2022-12-11 RX ADMIN — GUAIFENESIN 1200 MG: 600 TABLET, EXTENDED RELEASE ORAL at 22:23

## 2022-12-11 RX ADMIN — SODIUM CHLORIDE, PRESERVATIVE FREE 10 ML: 5 INJECTION INTRAVENOUS at 22:23

## 2022-12-11 RX ADMIN — BUSPIRONE HYDROCHLORIDE 5 MG: 5 TABLET ORAL at 09:31

## 2022-12-11 RX ADMIN — PREDNISONE 40 MG: 20 TABLET ORAL at 09:32

## 2022-12-11 RX ADMIN — Medication 1 TABLET: at 09:31

## 2022-12-11 RX ADMIN — CETIRIZINE HYDROCHLORIDE 10 MG: 10 TABLET, FILM COATED ORAL at 09:32

## 2022-12-11 RX ADMIN — SODIUM CHLORIDE, PRESERVATIVE FREE 10 ML: 5 INJECTION INTRAVENOUS at 05:14

## 2022-12-11 RX ADMIN — FUROSEMIDE 20 MG: 40 TABLET ORAL at 09:31

## 2022-12-11 RX ADMIN — GUAIFENESIN 1200 MG: 600 TABLET, EXTENDED RELEASE ORAL at 09:31

## 2022-12-11 RX ADMIN — DOCUSATE SODIUM 100 MG: 100 CAPSULE, LIQUID FILLED ORAL at 22:23

## 2022-12-11 RX ADMIN — HYDROXYZINE PAMOATE 25 MG: 25 CAPSULE ORAL at 22:56

## 2022-12-11 RX ADMIN — AMLODIPINE BESYLATE 10 MG: 5 TABLET ORAL at 09:31

## 2022-12-11 RX ADMIN — SODIUM CHLORIDE, PRESERVATIVE FREE 10 ML: 5 INJECTION INTRAVENOUS at 17:17

## 2022-12-11 RX ADMIN — PANTOPRAZOLE SODIUM 40 MG: 40 TABLET, DELAYED RELEASE ORAL at 17:15

## 2022-12-11 RX ADMIN — CARVEDILOL 6.25 MG: 3.12 TABLET, FILM COATED ORAL at 09:31

## 2022-12-11 RX ADMIN — BUDESONIDE AND FORMOTEROL FUMARATE DIHYDRATE 2 PUFF: 160; 4.5 AEROSOL RESPIRATORY (INHALATION) at 08:51

## 2022-12-11 RX ADMIN — ASPIRIN 81 MG 81 MG: 81 TABLET ORAL at 09:31

## 2022-12-11 RX ADMIN — ENOXAPARIN SODIUM 40 MG: 100 INJECTION SUBCUTANEOUS at 09:31

## 2022-12-11 RX ADMIN — SERTRALINE HYDROCHLORIDE 25 MG: 25 TABLET ORAL at 09:31

## 2022-12-11 RX ADMIN — DOCUSATE SODIUM 100 MG: 100 CAPSULE, LIQUID FILLED ORAL at 09:32

## 2022-12-11 RX ADMIN — BUDESONIDE AND FORMOTEROL FUMARATE DIHYDRATE 2 PUFF: 160; 4.5 AEROSOL RESPIRATORY (INHALATION) at 20:50

## 2022-12-11 RX ADMIN — PANTOPRAZOLE SODIUM 40 MG: 40 TABLET, DELAYED RELEASE ORAL at 09:31

## 2022-12-11 RX ADMIN — GUAIFENESIN AND CODEINE PHOSPHATE 5 ML: 10; 100 LIQUID ORAL at 17:15

## 2022-12-11 RX ADMIN — BUSPIRONE HYDROCHLORIDE 5 MG: 5 TABLET ORAL at 22:23

## 2022-12-11 RX ADMIN — INSULIN GLARGINE 15 UNITS: 100 INJECTION, SOLUTION SUBCUTANEOUS at 22:00

## 2022-12-11 RX ADMIN — MONTELUKAST 10 MG: 10 TABLET, FILM COATED ORAL at 09:32

## 2022-12-11 RX ADMIN — CARVEDILOL 6.25 MG: 3.12 TABLET, FILM COATED ORAL at 17:15

## 2022-12-11 NOTE — DISCHARGE SUMMARY
Discharge Summary       PATIENT ID: Alexandra Dior  MRN: [id number]   YOB: 1960    DATE OF ADMISSION: 11/26/2022  1:08 PM    DATE OF DISCHARGE:   PRIMARY CARE PROVIDER: Rc Anthony MD     ATTENDING PHYSICIAN: Rip Funes  DISCHARGING PROVIDER: Rip Funes      CONSULTATIONS: IP CONSULT TO PULMONOLOGY  IP CONSULT TO UROLOGY    PROCEDURES/SURGERIES: * No surgery found *    ADMITTING DIAGNOSES:    Patient Active Problem List    Diagnosis Date Noted    Respiratory failure ([facility] 08/08/2021    COPD exacerbation ([facility] 08/08/2021    [facility] 12[facility]13/2020    Hypoxia 12/13/2020       DISCHARGE DIAGNOSES / PLAN:      Acute on chronic hypoxic respiratory failure  Acute exacerbation of COPD  Hypertension  Cough  Hyperglycemia due to steroids  UTI        DISCHARGE MEDICATIONS:  Current Discharge Medication List        START taking these medications    Details   ! ! predniSONE (DELTASONE) 20 mg tablet Take 2 Tablets by mouth daily (with breakfast). Qty: 10 Tablet, Refills: 0  Start date: 12/6/2022      sertraline (ZOLOFT) 25 mg tablet Take 1 Tablet by mouth daily. Qty: 30 Tablet, Refills: 0  Start date: 12/6/2022      !! guaiFENesin ER (MUCINEX) 1,200 mg Ta12 ER tablet Take 1 Tablet by mouth every twelve (12) hours. Qty: 30 Tablet, Refills: 0  Start date: 12/5/2022      azithromycin (ZITHROMAX) 500 mg tab Take 1 Tablet by mouth daily for 3 days. Qty: 3 Tablet, Refills: 0  Start date: 12/3/2022, End date: 12/6/2022      busPIRone (BUSPAR) 5 mg tablet Take 1 Tablet by mouth two (2) times a day. Qty: 60 Tablet, Refills: 0  Start date: 12/2/2022      cetirizine (ZYRTEC) 10 mg tablet Take 1 Tablet by mouth daily. Qty: 20 Tablet, Refills: 0  Start date: 12/3/2022       !! - Potential duplicate medications found. Please discuss with provider.         CONTINUE these medications which have CHANGED    Details   insulin glargine (LANTUS) 100 unit/mL injection 15 units  Qty: 1 mL, Refills: 2  Start date: 12/2/2022           CONTINUE these medications which have NOT CHANGED    Details   docusate sodium (Colace) 100 mg capsule Take 1 Capsule by mouth two (2) times a day for 90 days. Qty: 60 Capsule, Refills: 2      carvediloL (COREG) 6.25 mg tablet Take 1 Tablet by mouth two (2) times daily (with meals). Qty: 60 Tablet, Refills: 0      furosemide (Lasix) 40 mg tablet Lasix 40 mg daily  Qty: 30 Tablet, Refills: 0      pantoprazole (PROTONIX) 40 mg tablet Take 1 Tablet by mouth Daily (before breakfast). Qty: 60 Tablet, Refills: 0      !! predniSONE (DELTASONE) 10 mg tablet 1 tablet daily  Qty: 15 Tablet, Refills: 0      albuterol-ipratropium (DUO-NEB) 2.5 mg-0.5 mg/3 ml nebu 3 mL by Nebulization route every six (6) hours as needed for Wheezing. Qty: 30 Nebule, Refills: 1      aspirin 81 mg chewable tablet Take 1 Tab by mouth daily. Qty: 30 Tab, Refills: 0      !! guaiFENesin ER (MUCINEX) 600 mg ER tablet Take 1 Tab by mouth two (2) times a day. Qty: 30 Tab, Refills: 0      amLODIPine (NORVASC) 10 mg tablet Take 10 mg by mouth daily. mometasone-formoterol (DULERA) 200-5 mcg/actuation HFA inhaler Take 2 Puffs by inhalation two (2) times a day. montelukast (SINGULAIR) 10 mg tablet Take 10 mg by mouth daily. Calcium-Cholecalciferol, D3, 500 mg(1,250mg) -400 unit tab Take  by mouth daily. fluticasone propionate (FLONASE) 50 mcg/actuation nasal spray 2 Sprays by Both Nostrils route daily. ergocalciferol (Vitamin D2) 1,250 mcg (50,000 unit) capsule Take 50,000 Units by mouth every seven (7) days. Q7days on Monday       !! - Potential duplicate medications found. Please discuss with provider.         STOP taking these medications       cephALEXin (Keflex) 500 mg capsule Comments:   Reason for Stopping:         benzonatate (TESSALON) 100 mg capsule Comments:   Reason for Stopping:                 NOTIFY YOUR PHYSICIAN FOR ANY OF THE FOLLOWING:   Fever over 101 degrees for 24 hours. Chest pain, shortness of breath, fever, chills, nausea, vomiting, diarrhea, change in mentation, falling, weakness, bleeding. Severe pain or pain not relieved by medications. Or, any other signs or symptoms that you may have questions about. DISPOSITION:  x  Home With:   OT  PT  HH  RN       Long term SNF/Inpatient Rehab    Independent/assisted living    Hospice    Other:       PATIENT CONDITION AT DISCHARGE: Stable      PHYSICAL EXAMINATION AT DISCHARGE:  General:          Alert, cooperative, no distress, appears stated age. HEENT:           Atraumatic, anicteric sclerae, pink conjunctivae                          No oral ulcers, mucosa moist, throat clear, dentition fair  Neck:               Supple, symmetrical  Lungs:             Clear to auscultation bilaterally. No Wheezing or Rhonchi. No rales. Chest wall:      No tenderness  No Accessory muscle use. Heart:              Regular  rhythm,  No  murmur   No edema  Abdomen:        Soft, non-tender. Not distended. Bowel sounds normal  Extremities:     No cyanosis. No clubbing,                            Skin turgor normal, Capillary refill normal  Skin:                Not pale. Not Jaundiced  No rashes   Psych:             Not anxious or agitated. Neurologic:      Alert, moves all extremities, answers questions appropriately and responds to commands     XR CHEST SNGL V   Final Result   No Acute Disease.               Recent Results (from the past 24 hour(s))   GLUCOSE, POC    Collection Time: 12/10/22  5:27 PM   Result Value Ref Range    Glucose (POC) 143 (H) 65 - 100 mg/dL    Performed by Wellington Solo    GLUCOSE, POC    Collection Time: 12/10/22  8:34 PM   Result Value Ref Range    Glucose (POC) 162 (H) 65 - 100 mg/dL    Performed by Luz Kearney    GLUCOSE, POC    Collection Time: 12/11/22  8:08 AM   Result Value Ref Range    Glucose (POC) 111 (H) 65 - 100 mg/dL    Performed by Wellington Solo    GLUCOSE, POC Collection Time: 12/11/22 11:44 AM   Result Value Ref Range    Glucose (POC) 147 (H) 65 - 100 mg/dL    Performed by Alem Ward Dr:  She was transferred to my service as per patient request     Resting the bed alert awake complaining of coughing shortness of breath no fever no chills     11/30  Patient currently on 4L oxygen, reports 2L at home. Endorses productive cough. Denies headache, fever, chest pain, abdominal pain. CXR unremarkable for any infiltrates. 12/1  Patient is alert and oriented, currently on 5L. Reports an increase O2 need yesterday to 6-7L, is anxious about her lungs. Says she is worried about physical activity due to increase O2 need. Alli chest pain, headache. 12/2  Patient is alert and pleasant, currently on 4L O2. She reports walking for an oxygen study yesterday where she required 6L during periods of exacerbation, is comfortable on 4L when resting. She endorses a productive cough and feels lightheaded with too much exercise. Denies headache, chest pain, abdominal pain. Patient very anxious still coughing congested oxygen saturation is stable on 3 L which is baseline    Best option to discharge patient to skilled care rehab for pulmonary rehab    Medication reconciliation done time discharge patient 35 minutes 50% time spent counseling and coordination of care    12/5  Patient is sitting in the chair eating breakfast. Reports improved symptoms but is still experiencing a baseline SOB. Currently on 3L. Denies headache, chest pain. Is aware that placement is pending. 12/6  Patient is resting in the chair, on 3L nasal cannula. Denies headache, chest pain. Understands we are waiting for authorization.   Pending discharge to [facility] no complaints just waiting for placement      Signed:   Kellee Luke MD  12/11/2022  11:18 AM

## 2022-12-11 NOTE — PROGRESS NOTES
Problem: Falls - Risk of  Goal: *Absence of Falls  Description: Document Rose Embs Fall Risk and appropriate interventions in the flowsheet.   Outcome: Progressing Towards Goal  Note: Fall Risk Interventions:            Medication Interventions: Teach patient to arise slowly    Elimination Interventions: Call light in reach              Problem: Patient Education: Go to Patient Education Activity  Goal: Patient/Family Education  Outcome: Progressing Towards Goal     Problem: Patient Education: Go to Patient Education Activity  Goal: Patient/Family Education  Outcome: Progressing Towards Goal     Problem: Patient Education: Go to Patient Education Activity  Goal: Patient/Family Education  Outcome: Progressing Towards Goal

## 2022-12-11 NOTE — PROGRESS NOTES
Chart Reviewed: Patient has discharge orders. Patient awaits a medicaid bed from [facility] who may have one next week sometime. CM will follow up on Monday to see if patient is able to return home and admit from home to rehab.

## 2022-12-12 LAB
GLUCOSE BLD STRIP.AUTO-MCNC: 133 MG/DL (ref 65–100)
GLUCOSE BLD STRIP.AUTO-MCNC: 142 MG/DL (ref 65–100)
GLUCOSE BLD STRIP.AUTO-MCNC: 156 MG/DL (ref 65–100)
GLUCOSE BLD STRIP.AUTO-MCNC: 79 MG/DL (ref 65–100)
PERFORMED BY, TECHID: ABNORMAL
PERFORMED BY, TECHID: NORMAL

## 2022-12-12 PROCEDURE — 65270000029 HC RM PRIVATE

## 2022-12-12 PROCEDURE — 74011250637 HC RX REV CODE- 250/637: Performed by: INTERNAL MEDICINE

## 2022-12-12 PROCEDURE — 74011636637 HC RX REV CODE- 636/637: Performed by: INTERNAL MEDICINE

## 2022-12-12 PROCEDURE — 82962 GLUCOSE BLOOD TEST: CPT

## 2022-12-12 PROCEDURE — 97530 THERAPEUTIC ACTIVITIES: CPT

## 2022-12-12 PROCEDURE — 74011000250 HC RX REV CODE- 250: Performed by: INTERNAL MEDICINE

## 2022-12-12 PROCEDURE — 94640 AIRWAY INHALATION TREATMENT: CPT

## 2022-12-12 PROCEDURE — 94761 N-INVAS EAR/PLS OXIMETRY MLT: CPT

## 2022-12-12 PROCEDURE — 74011250636 HC RX REV CODE- 250/636: Performed by: INTERNAL MEDICINE

## 2022-12-12 PROCEDURE — 77010033678 HC OXYGEN DAILY

## 2022-12-12 PROCEDURE — 74011250637 HC RX REV CODE- 250/637: Performed by: FAMILY MEDICINE

## 2022-12-12 PROCEDURE — 74011636637 HC RX REV CODE- 636/637: Performed by: FAMILY MEDICINE

## 2022-12-12 RX ADMIN — FUROSEMIDE 20 MG: 40 TABLET ORAL at 08:56

## 2022-12-12 RX ADMIN — BUDESONIDE AND FORMOTEROL FUMARATE DIHYDRATE 2 PUFF: 160; 4.5 AEROSOL RESPIRATORY (INHALATION) at 07:38

## 2022-12-12 RX ADMIN — GUAIFENESIN 1200 MG: 600 TABLET, EXTENDED RELEASE ORAL at 08:55

## 2022-12-12 RX ADMIN — BUDESONIDE AND FORMOTEROL FUMARATE DIHYDRATE 2 PUFF: 160; 4.5 AEROSOL RESPIRATORY (INHALATION) at 21:08

## 2022-12-12 RX ADMIN — PREDNISONE 40 MG: 20 TABLET ORAL at 08:56

## 2022-12-12 RX ADMIN — BUSPIRONE HYDROCHLORIDE 5 MG: 5 TABLET ORAL at 08:56

## 2022-12-12 RX ADMIN — CARVEDILOL 6.25 MG: 3.12 TABLET, FILM COATED ORAL at 16:20

## 2022-12-12 RX ADMIN — ASPIRIN 81 MG 81 MG: 81 TABLET ORAL at 08:55

## 2022-12-12 RX ADMIN — PANTOPRAZOLE SODIUM 40 MG: 40 TABLET, DELAYED RELEASE ORAL at 16:20

## 2022-12-12 RX ADMIN — SODIUM CHLORIDE, PRESERVATIVE FREE 10 ML: 5 INJECTION INTRAVENOUS at 06:42

## 2022-12-12 RX ADMIN — CARVEDILOL 6.25 MG: 3.12 TABLET, FILM COATED ORAL at 08:55

## 2022-12-12 RX ADMIN — FLUTICASONE PROPIONATE 2 SPRAY: 50 SPRAY, METERED NASAL at 08:56

## 2022-12-12 RX ADMIN — AMLODIPINE BESYLATE 10 MG: 5 TABLET ORAL at 08:56

## 2022-12-12 RX ADMIN — BUSPIRONE HYDROCHLORIDE 5 MG: 5 TABLET ORAL at 21:37

## 2022-12-12 RX ADMIN — ENOXAPARIN SODIUM 40 MG: 100 INJECTION SUBCUTANEOUS at 08:55

## 2022-12-12 RX ADMIN — MONTELUKAST 10 MG: 10 TABLET, FILM COATED ORAL at 08:56

## 2022-12-12 RX ADMIN — HYDROXYZINE PAMOATE 25 MG: 25 CAPSULE ORAL at 16:20

## 2022-12-12 RX ADMIN — Medication 1 UNITS: at 16:21

## 2022-12-12 RX ADMIN — PANTOPRAZOLE SODIUM 40 MG: 40 TABLET, DELAYED RELEASE ORAL at 08:56

## 2022-12-12 RX ADMIN — GUAIFENESIN 1200 MG: 600 TABLET, EXTENDED RELEASE ORAL at 21:37

## 2022-12-12 RX ADMIN — CETIRIZINE HYDROCHLORIDE 10 MG: 10 TABLET, FILM COATED ORAL at 08:56

## 2022-12-12 RX ADMIN — Medication 1 TABLET: at 08:55

## 2022-12-12 RX ADMIN — DOCUSATE SODIUM 100 MG: 100 CAPSULE, LIQUID FILLED ORAL at 08:55

## 2022-12-12 RX ADMIN — SERTRALINE HYDROCHLORIDE 25 MG: 25 TABLET ORAL at 08:56

## 2022-12-12 RX ADMIN — INSULIN GLARGINE 15 UNITS: 100 INJECTION, SOLUTION SUBCUTANEOUS at 21:38

## 2022-12-12 NOTE — PROGRESS NOTES
I responded to page for pt requesting a Bible. I provided a Bible to pt and pt had no further spiritual care needs at this time.     Please [place]  in order to get in touch with  for any Spiritual Care Needs   [phone]    Signed by: Chaplain Juan Antonio

## 2022-12-12 NOTE — DISCHARGE SUMMARY
Discharge Summary       PATIENT ID: Janet Cool  MRN: [id number]   YOB: 1960    DATE OF ADMISSION: 11/26/2022  1:08 PM    DATE OF DISCHARGE:   PRIMARY CARE PROVIDER: Bakari Hager MD     ATTENDING PHYSICIAN: Tequila Funes  DISCHARGING PROVIDER: Tequila Funes      CONSULTATIONS: IP CONSULT TO PULMONOLOGY  IP CONSULT TO UROLOGY    PROCEDURES/SURGERIES: * No surgery found *    ADMITTING DIAGNOSES:    Patient Active Problem List    Diagnosis Date Noted    Respiratory failure ([facility] 08/08/2021    COPD exacerbation ([facility] 08/08/2021    COPD (chronic obstructive pulmonary disease) ([facility] 12/13/2020    Hypoxia 12/13/2020       DISCHARGE DIAGNOSES / PLAN:      Acute on chronic hypoxic respiratory failure  Acute exacerbation of COPD  Hypertension  Cough  Hyperglycemia due to steroids  UTI        DISCHARGE MEDICATIONS:  Current Discharge Medication List        START taking these medications    Details   ! ! predniSONE (DELTASONE) 20 mg tablet Take 2 Tablets by mouth daily (with breakfast). Qty: 10 Tablet, Refills: 0  Start date: 12/6/2022      sertraline (ZOLOFT) 25 mg tablet Take 1 Tablet by mouth daily. Qty: 30 Tablet, Refills: 0  Start date: 12/6/2022      !! guaiFENesin ER (MUCINEX) 1,200 mg Ta12 ER tablet Take 1 Tablet by mouth every twelve (12) hours. Qty: 30 Tablet, Refills: 0  Start date: 12/5/2022      azithromycin (ZITHROMAX) 500 mg tab Take 1 Tablet by mouth daily for 3 days. Qty: 3 Tablet, Refills: 0  Start date: 12/3/2022, End date: 12/6/2022      busPIRone (BUSPAR) 5 mg tablet Take 1 Tablet by mouth two (2) times a day. Qty: 60 Tablet, Refills: 0  Start date: 12/2/2022      cetirizine (ZYRTEC) 10 mg tablet Take 1 Tablet by mouth daily. Qty: 20 Tablet, Refills: 0  Start date: 12/3/2022       !! - Potential duplicate medications found. Please discuss with provider.         CONTINUE these medications which have CHANGED    Details   insulin glargine (LANTUS) 100 unit/mL injection 15 units  Qty: 1 mL, Refills: 2  Start date: 12/2/2022           CONTINUE these medications which have NOT CHANGED    Details   docusate sodium (Colace) 100 mg capsule Take 1 Capsule by mouth two (2) times a day for 90 days. Qty: 60 Capsule, Refills: 2      carvediloL (COREG) 6.25 mg tablet Take 1 Tablet by mouth two (2) times daily (with meals). Qty: 60 Tablet, Refills: 0      furosemide (Lasix) 40 mg tablet Lasix 40 mg daily  Qty: 30 Tablet, Refills: 0      pantoprazole (PROTONIX) 40 mg tablet Take 1 Tablet by mouth Daily (before breakfast). Qty: 60 Tablet, Refills: 0      !! predniSONE (DELTASONE) 10 mg tablet 1 tablet daily  Qty: 15 Tablet, Refills: 0      albuterol-ipratropium (DUO-NEB) 2.5 mg-0.5 mg/3 ml nebu 3 mL by Nebulization route every six (6) hours as needed for Wheezing. Qty: 30 Nebule, Refills: 1      aspirin 81 mg chewable tablet Take 1 Tab by mouth daily. Qty: 30 Tab, Refills: 0      !! guaiFENesin ER (MUCINEX) 600 mg ER tablet Take 1 Tab by mouth two (2) times a day. Qty: 30 Tab, Refills: 0      amLODIPine (NORVASC) 10 mg tablet Take 10 mg by mouth daily. mometasone-formoterol (DULERA) 200-5 mcg/actuation HFA inhaler Take 2 Puffs by inhalation two (2) times a day. montelukast (SINGULAIR) 10 mg tablet Take 10 mg by mouth daily. Calcium-Cholecalciferol, D3, 500 mg(1,250mg) -400 unit tab Take  by mouth daily. fluticasone propionate (FLONASE) 50 mcg/actuation nasal spray 2 Sprays by Both Nostrils route daily. ergocalciferol (Vitamin D2) 1,250 mcg (50,000 unit) capsule Take 50,000 Units by mouth every seven (7) days. Q7days on Monday       !! - Potential duplicate medications found. Please discuss with provider.         STOP taking these medications       cephALEXin (Keflex) 500 mg capsule Comments:   Reason for Stopping:         benzonatate (TESSALON) 100 mg capsule Comments:   Reason for Stopping:                 NOTIFY YOUR PHYSICIAN FOR ANY OF THE FOLLOWING:   Fever over 101 degrees for 24 hours. Chest pain, shortness of breath, fever, chills, nausea, vomiting, diarrhea, change in mentation, falling, weakness, bleeding. Severe pain or pain not relieved by medications. Or, any other signs or symptoms that you may have questions about. DISPOSITION:  x  Home With:   OT  PT  HH  RN       Long term SNF/Inpatient Rehab    Independent/assisted living    Hospice    Other:       PATIENT CONDITION AT DISCHARGE: Stable      PHYSICAL EXAMINATION AT DISCHARGE:  General:          Alert, cooperative, no distress, appears stated age. HEENT:           Atraumatic, anicteric sclerae, pink conjunctivae                          No oral ulcers, mucosa moist, throat clear, dentition fair  Neck:               Supple, symmetrical  Lungs:             Clear to auscultation bilaterally. No Wheezing or Rhonchi. No rales. Chest wall:      No tenderness  No Accessory muscle use. Heart:              Regular  rhythm,  No  murmur   No edema  Abdomen:        Soft, non-tender. Not distended. Bowel sounds normal  Extremities:     No cyanosis. No clubbing,                            Skin turgor normal, Capillary refill normal  Skin:                Not pale. Not Jaundiced  No rashes   Psych:             Not anxious or agitated. Neurologic:      Alert, moves all extremities, answers questions appropriately and responds to commands     XR CHEST SNGL V   Final Result   No Acute Disease.               Recent Results (from the past 24 hour(s))   GLUCOSE, POC    Collection Time: 12/11/22 11:44 AM   Result Value Ref Range    Glucose (POC) 147 (H) 65 - 100 mg/dL    Performed by Essence Michel    GLUCOSE, POC    Collection Time: 12/11/22  5:03 PM   Result Value Ref Range    Glucose (POC) 136 (H) 65 - 100 mg/dL    Performed by Essence Michel    GLUCOSE, POC    Collection Time: 12/11/22  9:59 PM   Result Value Ref Range    Glucose (POC) 131 (H) 65 - 100 mg/dL    Performed by Nany Culver POC Collection Time: 12/12/22  8:22 AM   Result Value Ref Range    Glucose (POC) 79 65 - 100 mg/dL    Performed by Clari Celaya:  She was transferred to my service as per patient request     Resting the bed alert awake complaining of coughing shortness of breath no fever no chills     11/30  Patient currently on 4L oxygen, reports 2L at home. Endorses productive cough. Denies headache, fever, chest pain, abdominal pain. CXR unremarkable for any infiltrates. 12/1  Patient is alert and oriented, currently on 5L. Reports an increase O2 need yesterday to 6-7L, is anxious about her lungs. Says she is worried about physical activity due to increase O2 need. Alli chest pain, headache. 12/2  Patient is alert and pleasant, currently on 4L O2. She reports walking for an oxygen study yesterday where she required 6L during periods of exacerbation, is comfortable on 4L when resting. She endorses a productive cough and feels lightheaded with too much exercise. Denies headache, chest pain, abdominal pain. Patient very anxious still coughing congested oxygen saturation is stable on 3 L which is baseline    Best option to discharge patient to skilled care rehab for pulmonary rehab    Medication reconciliation done time discharge patient 35 minutes 50% time spent counseling and coordination of care    12/5  Patient is sitting in the chair eating breakfast. Reports improved symptoms but is still experiencing a baseline SOB. Currently on 3L. Denies headache, chest pain. Is aware that placement is pending. 12/6  Patient is resting in the chair, on 3L nasal cannula. Denies headache, chest pain. Understands we are waiting for authorization.   Pending discharge to [facility] no complaints just waiting for placement      Patient sitting on the chair not in distress alert awake no new complaints  Waiting for placement  The case failure regarding placement plan just waiting for the bed      Signed: Nicolas Nair MD  12/12/2022  11:18 AM

## 2022-12-12 NOTE — PROGRESS NOTES
OCCUPATIONAL THERAPY TREATMENT  Patient: Cyndi Turner (74 y.o. female)  Date: 12/12/2022  Diagnosis: [facility] [J44.1] <principal problem not specified>      Precautions:    Chart, occupational therapy assessment, plan of care, and goals were reviewed. ASSESSMENT  Pt continues with skilled OT services and is progressing towards goals. Upon RAZA arrival, pt sitting in chair and agreeable to tx session at this time. Overall, pt continues to present with deficits in generalized strength/AROM, coordination, bed mobility, static/dynamic sitting and standing balance and functional activity tolerance during performance of ADLs/mobility (see below for objective details and assist levels). Pt noted with continued progress with mobility at this time. Pt able to demonstrate transfers with SBA and ambulation in room with SBA in preparation for household mobility. While on 3L of O2, SpO2 in standing noted to be 91%, with ambulation 92%, with standing and seated therex 93%, and while completing seated rest break SpO2 noted to be 95%. Pt noted with SOB after completion of ambulation and therex and able to demonstrate PLB. Pt required increased time for rest breaks due to being SOB despite SpO2 reading WFL. Pt educated on importance of completion of therex and sitting in chair throughout day, pt verbalized understanding. Pt became tearful at end of session post therex, therapist provided emotional support and encouragement. Pt requesting a Bible, therapist notified [address] [place] [address] Road. Recommend d/c to [facility] once medically appropriate. Other factors to consider for discharge: family/social support, DME, time since onset, severity of deficits, decline from functional baseline         PLAN :  Patient continues to benefit from skilled intervention to address the above impairments. Continue treatment per established plan of care to address goals.     Recommendation for discharge: (in order for the patient to meet his/her long term goalsJessica Mclean    This discharge recommendation:  Has been made in collaboration with the attending provider and/or case management    IF patient discharges home will need the following DME: TBD       SUBJECTIVE:   Patient stated I do not feel safe going home, I need to go to rehab first.    OBJECTIVE DATA SUMMARY:   Cognitive/Behavioral Status:  Neurologic State: Alert  Orientation Level: Oriented X4  Cognition: Follows commands; Appropriate decision making; Appropriate for age attention/concentration    Functional Mobility and Transfers for ADLs:    Transfers:  Sit to Stand: Stand-by assistance    Balance:  Sitting: Intact; Without support  Standing: Without support; Impaired  Standing - Static: Constant support;Good  Standing - Dynamic : Constant support; Fair    Therapeutic Exercises:   Exercise Sets Reps AROM AAROM PROM Self PROM Comments   Shoulder abduction/adduction 1 12 [x] [] [] [] Standing   Scapular protraction/retraction 1 15 [x] [] [] [] Seated in chair   Horizontal shoulder adduction/abduction 1 15 [x] [] [] [] Seated in chair     Pain:  1-2/10 back pain    Activity Tolerance:   Fair, requires rest breaks, and observed SOB with activity  Please refer to the flowsheet for vital signs taken during this treatment. After treatment patient left in no apparent distress:   Bed locked and in lowest position  Sitting in chair and Call bell within reach    RY Pimentel  Time Calculation: 32 mins    Problem: Self Care Deficits Care Plan (Adult)  Goal: *Acute Goals and Plan of Care (Insert Text)  Description: FUNCTIONAL STATUS PRIOR TO ADMISSION: Pt reports he was IND w/ functional mobility/ADLs; 2 L of O2 at baseline. Denies falls. HOME SUPPORT: Pt lives by self. No support.     Occupational Therapy Goals  Initiated 12/1/2022    Pt stated goal \"I want to get better\"  Pt will be IND sup <> sit in prep for EOB ADLs  Pt will be IND grooming standing sink side LRAD  Pt will be IND UB dressing sitting EOB/long sit   Pt will be IND LE dressing sitting EOB/long sit  Pt will be IND sit <>  prep for toileting LRAD  Pt will be IND toileting/toilet transfer/cloth mgmt LRAD  Pt will demo'd improved activity tolerance by maintaining >90% SpO2 while completing 3-4 standing ADLs.    Pt will be IND following UE HEP in prep for self care tasks   Outcome: Progressing Towards Goal

## 2022-12-12 NOTE — PROGRESS NOTES
Bedside and Verbal shift change report given to Winifred Hawk (oncoming nurse) by Charli Connolly (offgoing nurse). Report included the following information SBAR, Kardex, ED Summary, OR Summary, Procedure Summary, Intake/Output, MAR, Recent Results, and Med Rec Status.

## 2022-12-12 NOTE — PROGRESS NOTES
DC Plan: [facility] anticipates a bed tomorrow. CM met with pt at the bedside and provided her with an update.

## 2022-12-13 LAB
GLUCOSE BLD STRIP.AUTO-MCNC: 115 MG/DL (ref 65–100)
GLUCOSE BLD STRIP.AUTO-MCNC: 144 MG/DL (ref 65–100)
GLUCOSE BLD STRIP.AUTO-MCNC: 166 MG/DL (ref 65–100)
GLUCOSE BLD STRIP.AUTO-MCNC: 91 MG/DL (ref 65–100)
PERFORMED BY, TECHID: ABNORMAL
PERFORMED BY, TECHID: NORMAL

## 2022-12-13 PROCEDURE — 97530 THERAPEUTIC ACTIVITIES: CPT

## 2022-12-13 PROCEDURE — 94640 AIRWAY INHALATION TREATMENT: CPT

## 2022-12-13 PROCEDURE — 74011636637 HC RX REV CODE- 636/637: Performed by: INTERNAL MEDICINE

## 2022-12-13 PROCEDURE — 65270000029 HC RM PRIVATE

## 2022-12-13 PROCEDURE — 74011250637 HC RX REV CODE- 250/637: Performed by: FAMILY MEDICINE

## 2022-12-13 PROCEDURE — 74011250636 HC RX REV CODE- 250/636: Performed by: INTERNAL MEDICINE

## 2022-12-13 PROCEDURE — 74011250637 HC RX REV CODE- 250/637: Performed by: INTERNAL MEDICINE

## 2022-12-13 PROCEDURE — 94761 N-INVAS EAR/PLS OXIMETRY MLT: CPT

## 2022-12-13 PROCEDURE — 82962 GLUCOSE BLOOD TEST: CPT

## 2022-12-13 PROCEDURE — 77010033678 HC OXYGEN DAILY

## 2022-12-13 RX ADMIN — MONTELUKAST 10 MG: 10 TABLET, FILM COATED ORAL at 09:34

## 2022-12-13 RX ADMIN — PANTOPRAZOLE SODIUM 40 MG: 40 TABLET, DELAYED RELEASE ORAL at 17:07

## 2022-12-13 RX ADMIN — DOCUSATE SODIUM 100 MG: 100 CAPSULE, LIQUID FILLED ORAL at 09:34

## 2022-12-13 RX ADMIN — AMLODIPINE BESYLATE 10 MG: 5 TABLET ORAL at 09:34

## 2022-12-13 RX ADMIN — PANTOPRAZOLE SODIUM 40 MG: 40 TABLET, DELAYED RELEASE ORAL at 09:34

## 2022-12-13 RX ADMIN — BUDESONIDE AND FORMOTEROL FUMARATE DIHYDRATE 2 PUFF: 160; 4.5 AEROSOL RESPIRATORY (INHALATION) at 20:44

## 2022-12-13 RX ADMIN — HYDROXYZINE PAMOATE 25 MG: 25 CAPSULE ORAL at 03:50

## 2022-12-13 RX ADMIN — HYDROXYZINE PAMOATE 25 MG: 25 CAPSULE ORAL at 21:46

## 2022-12-13 RX ADMIN — BUSPIRONE HYDROCHLORIDE 5 MG: 5 TABLET ORAL at 21:40

## 2022-12-13 RX ADMIN — ENOXAPARIN SODIUM 40 MG: 100 INJECTION SUBCUTANEOUS at 09:35

## 2022-12-13 RX ADMIN — HYDROXYZINE PAMOATE 25 MG: 25 CAPSULE ORAL at 17:07

## 2022-12-13 RX ADMIN — FUROSEMIDE 20 MG: 40 TABLET ORAL at 09:34

## 2022-12-13 RX ADMIN — ASPIRIN 81 MG 81 MG: 81 TABLET ORAL at 09:34

## 2022-12-13 RX ADMIN — SERTRALINE HYDROCHLORIDE 25 MG: 25 TABLET ORAL at 09:34

## 2022-12-13 RX ADMIN — CARVEDILOL 6.25 MG: 3.12 TABLET, FILM COATED ORAL at 09:34

## 2022-12-13 RX ADMIN — CARVEDILOL 6.25 MG: 3.12 TABLET, FILM COATED ORAL at 17:07

## 2022-12-13 RX ADMIN — Medication 1 TABLET: at 09:34

## 2022-12-13 RX ADMIN — GUAIFENESIN 1200 MG: 600 TABLET, EXTENDED RELEASE ORAL at 09:34

## 2022-12-13 RX ADMIN — BUSPIRONE HYDROCHLORIDE 5 MG: 5 TABLET ORAL at 09:34

## 2022-12-13 RX ADMIN — PREDNISONE 40 MG: 20 TABLET ORAL at 09:34

## 2022-12-13 RX ADMIN — CETIRIZINE HYDROCHLORIDE 10 MG: 10 TABLET, FILM COATED ORAL at 09:34

## 2022-12-13 RX ADMIN — GUAIFENESIN 1200 MG: 600 TABLET, EXTENDED RELEASE ORAL at 21:40

## 2022-12-13 RX ADMIN — DOCUSATE SODIUM 100 MG: 100 CAPSULE, LIQUID FILLED ORAL at 21:40

## 2022-12-13 RX ADMIN — FLUTICASONE PROPIONATE 2 SPRAY: 50 SPRAY, METERED NASAL at 09:35

## 2022-12-13 RX ADMIN — INSULIN GLARGINE 15 UNITS: 100 INJECTION, SOLUTION SUBCUTANEOUS at 21:47

## 2022-12-13 RX ADMIN — BUDESONIDE AND FORMOTEROL FUMARATE DIHYDRATE 2 PUFF: 160; 4.5 AEROSOL RESPIRATORY (INHALATION) at 10:04

## 2022-12-13 NOTE — DISCHARGE SUMMARY
Carlyn Mcgregor was seen and treated in our emergency department on 11/29/2022. She may return to work on 12/05/2022. Due to Covid infection, please excuse Dania Rios from work for the next 5 days. If you have any questions or concerns, please don't hesitate to call.       Ashwini Murdock PA-C Discharge Summary       PATIENT ID: Kera Pendleton  MRN: [id number]   YOB: 1960    DATE OF ADMISSION: 11/26/2022  1:08 PM    DATE OF DISCHARGE:   PRIMARY CARE PROVIDER: Jay Yates MD     ATTENDING PHYSICIAN: Florin Funes  DISCHARGING PROVIDER: Florin Funes      CONSULTATIONS: IP CONSULT TO PULMONOLOGY  IP CONSULT TO UROLOGY    PROCEDURES/SURGERIES: * No surgery found *    ADMITTING DIAGNOSES:    Patient Active Problem List    Diagnosis Date Noted    Respiratory failure ([facility] 08/08/2021    COPD exacerbation ([facility] 08/08/2021    COPD (chronic obstructive pulmonary disease) ([facility] 12/13/2020    Hypoxia 12/13/2020       DISCHARGE DIAGNOSES / PLAN:      Acute on chronic hypoxic respiratory failure  Acute exacerbation of COPD  Hypertension  Cough  Hyperglycemia due to steroids  UTI        DISCHARGE MEDICATIONS:  Current Discharge Medication List        START taking these medications    Details   ! ! predniSONE (DELTASONE) 20 mg tablet Take 2 Tablets by mouth daily (with breakfast). Qty: 10 Tablet, Refills: 0  Start date: 12/6/2022      sertraline (ZOLOFT) 25 mg tablet Take 1 Tablet by mouth daily. Qty: 30 Tablet, Refills: 0  Start date: 12/6/2022      !! guaiFENesin ER (MUCINEX) 1,200 mg Ta12 ER tablet Take 1 Tablet by mouth every twelve (12) hours. Qty: 30 Tablet, Refills: 0  Start date: 12/5/2022      azithromycin (ZITHROMAX) 500 mg tab Take 1 Tablet by mouth daily for 3 days. Qty: 3 Tablet, Refills: 0  Start date: 12/3/2022, End date: 12/6/2022      busPIRone (BUSPAR) 5 mg tablet Take 1 Tablet by mouth two (2) times a day. Qty: 60 Tablet, Refills: 0  Start date: 12/2/2022      cetirizine (ZYRTEC) 10 mg tablet Take 1 Tablet by mouth daily. Qty: 20 Tablet, Refills: 0  Start date: 12/3/2022       !! - Potential duplicate medications found. Please discuss with provider.         CONTINUE these medications which have CHANGED    Details   insulin glargine (LANTUS) 100 unit/mL injection 15 units  Qty: 1 mL, Refills: 2  Start date: 12/2/2022           CONTINUE these medications which have NOT CHANGED    Details   docusate sodium (Colace) 100 mg capsule Take 1 Capsule by mouth two (2) times a day for 90 days. Qty: 60 Capsule, Refills: 2      carvediloL (COREG) 6.25 mg tablet Take 1 Tablet by mouth two (2) times daily (with meals). Qty: 60 Tablet, Refills: 0      furosemide (Lasix) 40 mg tablet Lasix 40 mg daily  Qty: 30 Tablet, Refills: 0      pantoprazole (PROTONIX) 40 mg tablet Take 1 Tablet by mouth Daily (before breakfast). Qty: 60 Tablet, Refills: 0      !! predniSONE (DELTASONE) 10 mg tablet 1 tablet daily  Qty: 15 Tablet, Refills: 0      albuterol-ipratropium (DUO-NEB) 2.5 mg-0.5 mg/3 ml nebu 3 mL by Nebulization route every six (6) hours as needed for Wheezing. Qty: 30 Nebule, Refills: 1      aspirin 81 mg chewable tablet Take 1 Tab by mouth daily. Qty: 30 Tab, Refills: 0      !! guaiFENesin ER (MUCINEX) 600 mg ER tablet Take 1 Tab by mouth two (2) times a day. Qty: 30 Tab, Refills: 0      amLODIPine (NORVASC) 10 mg tablet Take 10 mg by mouth daily. mometasone-formoterol (DULERA) 200-5 mcg/actuation HFA inhaler Take 2 Puffs by inhalation two (2) times a day. montelukast (SINGULAIR) 10 mg tablet Take 10 mg by mouth daily. Calcium-Cholecalciferol, D3, 500 mg(1,250mg) -400 unit tab Take  by mouth daily. fluticasone propionate (FLONASE) 50 mcg/actuation nasal spray 2 Sprays by Both Nostrils route daily. ergocalciferol (Vitamin D2) 1,250 mcg (50,000 unit) capsule Take 50,000 Units by mouth every seven (7) days. Q7days on Monday       !! - Potential duplicate medications found. Please discuss with provider.         STOP taking these medications       cephALEXin (Keflex) 500 mg capsule Comments:   Reason for Stopping:         benzonatate (TESSALON) 100 mg capsule Comments:   Reason for Stopping:                 NOTIFY YOUR PHYSICIAN FOR ANY OF THE FOLLOWING:   Fever over 101 degrees for 24 hours. Chest pain, shortness of breath, fever, chills, nausea, vomiting, diarrhea, change in mentation, falling, weakness, bleeding. Severe pain or pain not relieved by medications. Or, any other signs or symptoms that you may have questions about. DISPOSITION:  x  Home With:   OT  PT  HH  RN       Long term SNF/Inpatient Rehab    Independent/assisted living    Hospice    Other:       PATIENT CONDITION AT DISCHARGE: Stable      PHYSICAL EXAMINATION AT DISCHARGE:  General:          Alert, cooperative, no distress, appears stated age. HEENT:           Atraumatic, anicteric sclerae, pink conjunctivae                          No oral ulcers, mucosa moist, throat clear, dentition fair  Neck:               Supple, symmetrical  Lungs:             Clear to auscultation bilaterally. No Wheezing or Rhonchi. No rales. Chest wall:      No tenderness  No Accessory muscle use. Heart:              Regular  rhythm,  No  murmur   No edema  Abdomen:        Soft, non-tender. Not distended. Bowel sounds normal  Extremities:     No cyanosis. No clubbing,                            Skin turgor normal, Capillary refill normal  Skin:                Not pale. Not Jaundiced  No rashes   Psych:             Not anxious or agitated. Neurologic:      Alert, moves all extremities, answers questions appropriately and responds to commands     XR CHEST SNGL V   Final Result   No Acute Disease.               Recent Results (from the past 24 hour(s))   GLUCOSE, POC    Collection Time: 12/12/22 11:44 AM   Result Value Ref Range    Glucose (POC) 142 (H) 65 - 100 mg/dL    Performed by Lashell Kuhn, POC    Collection Time: 12/12/22  4:12 PM   Result Value Ref Range    Glucose (POC) 156 (H) 65 - 100 mg/dL    Performed by Prachi Pang (Float)    GLUCOSE, POC    Collection Time: 12/12/22  8:16 PM   Result Value Ref Range    Glucose (POC) 133 (H) 65 - 100 mg/dL    Performed by [address] [place] [address], POC Collection Time: 12/13/22  8:19 AM   Result Value Ref Range    Glucose (POC) 91 65 - 100 mg/dL    Performed by Raheel:  She was transferred to my service as per patient request     Resting the bed alert awake complaining of coughing shortness of breath no fever no chills     11/30  Patient currently on 4L oxygen, reports 2L at home. Endorses productive cough. Denies headache, fever, chest pain, abdominal pain. CXR unremarkable for any infiltrates. 12/1  Patient is alert and oriented, currently on 5L. Reports an increase O2 need yesterday to 6-7L, is anxious about her lungs. Says she is worried about physical activity due to increase O2 need. Alli chest pain, headache. 12/2  Patient is alert and pleasant, currently on 4L O2. She reports walking for an oxygen study yesterday where she required 6L during periods of exacerbation, is comfortable on 4L when resting. She endorses a productive cough and feels lightheaded with too much exercise. Denies headache, chest pain, abdominal pain. Patient very anxious still coughing congested oxygen saturation is stable on 3 L which is baseline    Best option to discharge patient to skilled care rehab for pulmonary rehab    Medication reconciliation done time discharge patient 35 minutes 50% time spent counseling and coordination of care    12/5  Patient is sitting in the chair eating breakfast. Reports improved symptoms but is still experiencing a baseline SOB. Currently on 3L. Denies headache, chest pain. Is aware that placement is pending. 12/6  Patient is resting in the chair, on 3L nasal cannula. Denies headache, chest pain. Understands we are waiting for authorization.   Pending discharge to [facility] no complaints just waiting for placement      Patient sitting on the chair not in distress alert awake no new complaints  Waiting for placement  The case failure regarding placement plan just waiting for the bed      Signed: Jd Azul MD  12/13/2022  11:18 AM

## 2022-12-13 NOTE — PROGRESS NOTES
Problem: Falls - Risk of  Goal: *Absence of Falls  Description: Document Cherylle Cockayne Fall Risk and appropriate interventions in the flowsheet.   Outcome: Progressing Towards Goal  Note: Fall Risk Interventions:            Medication Interventions: Patient to call before getting OOB, Bed/chair exit alarm    Elimination Interventions: Bed/chair exit alarm, Call light in reach              Problem: Patient Education: Go to Patient Education Activity  Goal: Patient/Family Education  Outcome: Progressing Towards Goal     Problem: Patient Education: Go to Patient Education Activity  Goal: Patient/Family Education  Outcome: Progressing Towards Goal     Problem: Patient Education: Go to Patient Education Activity  Goal: Patient/Family Education  Outcome: Progressing Towards Goal

## 2022-12-13 NOTE — PROGRESS NOTES
Nutrition Assessment     Type and Reason for Visit: (P) Reassess (goal)    Nutrition Recommendations/Plan:   Continue current diet  Monitor and record PO intakes and Bms in I/Os     Nutrition Assessment:  (P) Admitted for COPD exacerbation. Discharge pending IRF auth. PO intake fair, ~50%. Stable weights. (12/13) Min med updates, discharge continues pending IRF. Intakes improved to >76%. No nutrition concerns at this time. Labs: none recent Meds: calcium-vitamin D, docusate sodium, furosemide, insulin glargine, insulin lispro, pantoprazole, prednisone    Malnutrition Assessment:  Malnutrition Status: No malnutrition     Nutrition Related Findings:  (P) No findings per NFPE. No n/v, d/c, or problems chewing/swallowing. No edema. BM 12/11.     Current Nutrition Therapies:  ADULT DIET Regular; 3 carb choices (45 gm/meal)    Anthropometric Measures:  Height:  (P) 5' 4.02\" (162.6 cm)  Current Body Wt:  (P) 91.7 kg (202 lb 2.6 oz)  BMI: (P) 34.7    Nutrition Diagnosis:   (P) No nutrition diagnosis at this time     Nutrition Interventions:   Food and/or Nutrient Delivery: (P) Continue current diet  Nutrition Education/Counseling: (P) No recommendations at this time  Coordination of Nutrition Care: (P) Continue to monitor while inpatient    Nutrition Monitoring and Evaluation:   Behavioral-Environmental Outcomes: (P) None identified  Food/Nutrient Intake Outcomes: (P) Food and nutrient intake  Physical Signs/Symptoms Outcomes: (P) Meal time behavior, Weight    Discharge Planning:    (P) Too soon to determine    Kingston Ortez RD  Contact: [phone]

## 2022-12-13 NOTE — PROGRESS NOTES
DC Plan: [facility] [address] [place] [address] via [employer] to see if pt can come to them today. Shiva has yet to receive a response. Cm called [email or web address] [phone] and call was transferred to admission. Shiva was not able to get a hold of anyone. Shiva called Vasu Nicole, liaison for [employer]. Vasu Nicole is going to call Rosalinda Parnell and get back with writer. Shiva spoke with Vasu Nicole. Pt's auth . Vasu Nicole will see if they can extend auth or if they will need to re-submit auth. Shiva met with pt at the bedside and provided her with an update. Shiva explained to pt that auth  and [facility] is going to see if they can extend auth, if not they will have to re-submit auth. Pt voiced understanding. Pt indicated she has family that is coming down this weekend. Pt might be able to return home Thursday. Shiva discussed arranging home health for PT/OT and ordering a rolling walker if she ends up going home.

## 2022-12-13 NOTE — PROGRESS NOTES
Problem: Mobility Impaired (Adult and Pediatric)  Goal: *Acute Goals and Plan of Care (Insert Text)  Description: I with LE HEP x7 days  Mod I with bed mob x7 days  Mod I with all transfers x7 days  Amb 150ft with LRAD and SBAx1 x7 days    Pt stated goal: to get better  Outcome: Progressing Towards Goal  PHYSICAL THERAPY REEVALUATION  Patient: Josselin Nicolas (63 y.o. female)  Date: 12/13/2022  Primary Diagnosis: COPD exacerbation ([facility] [J44.1]       Precautions: fall         ASSESSMENT  Patient initially seen for PT evaluation 12/1/22 and 3 skilled PT sessions since evalution. Patient seen today for PT reevaluation s/t LOS. Patient A&O x4. Pt semi-supine upon PT/OT arrival, agreeable to session. Based on the objective data described, the patient presents with generalized weakness, impaired functional mobility, impaired amb, impaired balance, and decreased activity tolerance. (See below for objective details and assist levels). Pt stating she sat up in the chair most of the day and was now hurting in her mid back and needing to rest, but pleasant and cooperative. Pt requiring less A for bed mobility and transfers and able to progress ambulation distance today with O2 sats staying above 90% during ambulation on 3L O2 (94% at rest, 92% during ambulation); however, pt becomes more SOB when sitting down on the toilet or sitting down in the bed. Pt starts to panic at these times as she feels like she cannot catch her breath and requires cues for pursed lip breathing technique. Pt's O2 sats drop  to 87% during these times and pt requires about 1-2 min for O2 sats to rise to 93%. While amb, pt tends to keep walker too far out in front of her and requires cues to stay inside the walker for safety. Pt reported dizziness when first sitting EOB but this quickly resolved within 20-30 seconds. Overall pt tolerated session fair today, currently with 5/10 pain in mid back.  Pt will benefit from continued skilled PT to address above deficits and return to PLOF, PT goals and POC reviewed on this date and updated to reflect current progress. Current PT DC recommendation Inpatient Rehabilitation Facility  once medically appropriate. Current Level of Function Impacting Discharge (mobility/balance): impaired mobility, level of assist         Patient will benefit from skilled therapy intervention to address the above noted impairments. PLAN :  Recommendations and Planned Interventions: bed mobility training, transfer training, gait training, therapeutic exercises, patient and family training/education, and therapeutic activities      Recommend for staff: Out of bed to chair for meals and Amb to bathroom for toileting with gt belt and AD    Frequency/Duration: Patient will be followed by physical therapy: 2-3x/week to address goals. Recommendation for discharge: (in order for the patient to meet his/her long term goals)  [facility]     This discharge recommendation:  Has been made in collaboration with the attending provider and/or case management    Equipment recommendations for successful discharge (if) home: walker: rollator         SUBJECTIVE:   Patient stated I don't feel well today.     OBJECTIVE DATA SUMMARY:   HISTORY:    Past Medical History:   Diagnosis Date    Breast CA ([facility]     Chronic obstructive pulmonary disease ([facility]     Hypertension      Past Surgical History:   Procedure Laterality Date    COLONOSCOPY N/A 7/16/2021    .  performed by Carmen Malave MD at [address]      HX MASTECTOMY Left        Home Situation  Home Environment: Shelter (rooming house)  # Steps to Enter: 0  One/Two Story Residence: Two story  # of Interior Steps: 4  Height of Each Step (in): 1 inches  Interior Rails: Both  Lift Chair Available: No  Living Alone: Yes  Support Systems: No Support Systems  Patient Expects to be Discharged to[de-identified] Rehab hospital/unit acute  Current DME Used/Available at Home: Oxygen, portable (2 L at baseline)  Tub or Shower Type: Tub/Shower combination    EXAMINATION/PRESENTATION/DECISION MAKING:   Critical Behavior:  Neurologic State: Alert  Orientation Level: Oriented X4  Cognition: Appropriate decision making, Appropriate for age attention/concentration, Appropriate safety awareness, Follows commands     Hearing: Auditory  Auditory Impairment: None     Functional Mobility:  Bed Mobility:  Rolling: Stand-by assistance  Supine to Sit: Stand-by assistance  Sit to Supine: Stand-by assistance  Scooting: Stand-by assistance  Transfers:  Sit to Stand: Stand-by assistance  Stand to Sit: Stand-by assistance                       Balance:   Sitting: Intact  Standing: Impaired  Standing - Static: Good;Constant support  Standing - Dynamic : Fair;Constant support  Ambulation/Gait Training:  Distance (ft): 90 Feet (ft)  Assistive Device: Gait belt;Walker, rolling  Ambulation - Level of Assistance: Stand-by assistance                                               Therapeutic Exercises:   Deferred today due to fatigue and back pain from sitting up all day    Functional Measure:  [employer] AM-PAC 6 Clicks         Basic Mobility Inpatient Short Form  How much difficulty does the patient currently have. .. Unable A Lot A Little None   1. Turning over in bed (including adjusting bedclothes, sheets and blankets)? [] 1   [] 2   [] 3   [x] 4   2. Sitting down on and standing up from a chair with arms ( e.g., wheelchair, bedside commode, etc.)   [] 1   [] 2   [] 3   [x] 4   3. Moving from lying on back to sitting on the side of the bed? [] 1   [] 2   [] 3   [x] 4          How much help from another person does the patient currently need. .. Total A Lot A Little None   4. Moving to and from a bed to a chair (including a wheelchair)? [] 1   [] 2   [x] 3   [] 4   5. Need to walk in hospital room? [] 1   [] 2   [x] 3   [] 4   6. Climbing 3-5 steps with a railing?    [] 1   [] 2 [x] 3   [] 4   © , Trustees of [address] [place] [address] [address] [address], under license to [employer]. All rights reserved     Score:  Initial: 21 Most Recent: X (Date: 22 )   Interpretation of Tool:  Represents activities that are increasingly more difficult (i.e. Bed mobility, Transfers, Gait). Score 24 23 22-20 19-15 14-10 9-7 6   Modifier CH CI CJ CK CL CM CN       Pain Ratin/10 in back    Activity Tolerance:   Fair, desaturates with exertion and requires oxygen, requires rest breaks, and observed SOB with activity    After treatment patient left in no apparent distress:   Bed locked and in lowest position Supine in bed, Call bell within reach, and Side rails x 3 and nsg updated. COMMUNICATION/EDUCATION:   The patients plan of care was discussed with: Registered nurse and Case management. Patient/family agree to work toward stated goals and plan of care.     Thank you for this referral.  Malcolm Richmond, PT, DPT   Time Calculation: 23 mins

## 2022-12-14 LAB
GLUCOSE BLD STRIP.AUTO-MCNC: 112 MG/DL (ref 65–100)
GLUCOSE BLD STRIP.AUTO-MCNC: 117 MG/DL (ref 65–100)
GLUCOSE BLD STRIP.AUTO-MCNC: 133 MG/DL (ref 65–100)
GLUCOSE BLD STRIP.AUTO-MCNC: 160 MG/DL (ref 65–100)
GLUCOSE BLD STRIP.AUTO-MCNC: 165 MG/DL (ref 65–100)
PERFORMED BY, TECHID: ABNORMAL

## 2022-12-14 PROCEDURE — 65270000029 HC RM PRIVATE

## 2022-12-14 PROCEDURE — 94640 AIRWAY INHALATION TREATMENT: CPT

## 2022-12-14 PROCEDURE — 74011250636 HC RX REV CODE- 250/636: Performed by: INTERNAL MEDICINE

## 2022-12-14 PROCEDURE — 74011636637 HC RX REV CODE- 636/637: Performed by: INTERNAL MEDICINE

## 2022-12-14 PROCEDURE — 74011636637 HC RX REV CODE- 636/637: Performed by: FAMILY MEDICINE

## 2022-12-14 PROCEDURE — 77010033678 HC OXYGEN DAILY

## 2022-12-14 PROCEDURE — 97530 THERAPEUTIC ACTIVITIES: CPT

## 2022-12-14 PROCEDURE — 74011250637 HC RX REV CODE- 250/637: Performed by: FAMILY MEDICINE

## 2022-12-14 PROCEDURE — 74011250637 HC RX REV CODE- 250/637: Performed by: INTERNAL MEDICINE

## 2022-12-14 PROCEDURE — 94761 N-INVAS EAR/PLS OXIMETRY MLT: CPT

## 2022-12-14 RX ADMIN — ENOXAPARIN SODIUM 40 MG: 100 INJECTION SUBCUTANEOUS at 09:12

## 2022-12-14 RX ADMIN — Medication 15 UNITS: at 22:52

## 2022-12-14 RX ADMIN — BUDESONIDE AND FORMOTEROL FUMARATE DIHYDRATE 2 PUFF: 160; 4.5 AEROSOL RESPIRATORY (INHALATION) at 20:49

## 2022-12-14 RX ADMIN — PANTOPRAZOLE SODIUM 40 MG: 40 TABLET, DELAYED RELEASE ORAL at 09:13

## 2022-12-14 RX ADMIN — FUROSEMIDE 20 MG: 40 TABLET ORAL at 09:13

## 2022-12-14 RX ADMIN — BUDESONIDE AND FORMOTEROL FUMARATE DIHYDRATE 2 PUFF: 160; 4.5 AEROSOL RESPIRATORY (INHALATION) at 07:51

## 2022-12-14 RX ADMIN — PREDNISONE 40 MG: 20 TABLET ORAL at 09:12

## 2022-12-14 RX ADMIN — HYDROXYZINE PAMOATE 25 MG: 25 CAPSULE ORAL at 18:19

## 2022-12-14 RX ADMIN — ACETAMINOPHEN 650 MG: 325 TABLET, FILM COATED ORAL at 09:13

## 2022-12-14 RX ADMIN — DOCUSATE SODIUM 100 MG: 100 CAPSULE, LIQUID FILLED ORAL at 22:42

## 2022-12-14 RX ADMIN — INSULIN GLARGINE 15 UNITS: 100 INJECTION, SOLUTION SUBCUTANEOUS at 22:00

## 2022-12-14 RX ADMIN — BUSPIRONE HYDROCHLORIDE 5 MG: 5 TABLET ORAL at 22:42

## 2022-12-14 RX ADMIN — DOCUSATE SODIUM 100 MG: 100 CAPSULE, LIQUID FILLED ORAL at 09:13

## 2022-12-14 RX ADMIN — Medication 1 TABLET: at 09:13

## 2022-12-14 RX ADMIN — AMLODIPINE BESYLATE 10 MG: 5 TABLET ORAL at 09:13

## 2022-12-14 RX ADMIN — GUAIFENESIN 1200 MG: 600 TABLET, EXTENDED RELEASE ORAL at 09:13

## 2022-12-14 RX ADMIN — SERTRALINE HYDROCHLORIDE 25 MG: 25 TABLET ORAL at 09:12

## 2022-12-14 RX ADMIN — HYDROXYZINE PAMOATE 25 MG: 25 CAPSULE ORAL at 22:52

## 2022-12-14 RX ADMIN — PANTOPRAZOLE SODIUM 40 MG: 40 TABLET, DELAYED RELEASE ORAL at 16:35

## 2022-12-14 RX ADMIN — GUAIFENESIN 1200 MG: 600 TABLET, EXTENDED RELEASE ORAL at 22:42

## 2022-12-14 RX ADMIN — BUSPIRONE HYDROCHLORIDE 5 MG: 5 TABLET ORAL at 09:12

## 2022-12-14 RX ADMIN — Medication 1 UNITS: at 16:35

## 2022-12-14 RX ADMIN — FLUTICASONE PROPIONATE 2 SPRAY: 50 SPRAY, METERED NASAL at 09:14

## 2022-12-14 RX ADMIN — CARVEDILOL 6.25 MG: 3.12 TABLET, FILM COATED ORAL at 16:35

## 2022-12-14 RX ADMIN — CARVEDILOL 6.25 MG: 3.12 TABLET, FILM COATED ORAL at 09:13

## 2022-12-14 RX ADMIN — ASPIRIN 81 MG 81 MG: 81 TABLET ORAL at 09:13

## 2022-12-14 RX ADMIN — MONTELUKAST 10 MG: 10 TABLET, FILM COATED ORAL at 09:12

## 2022-12-14 RX ADMIN — CETIRIZINE HYDROCHLORIDE 10 MG: 10 TABLET, FILM COATED ORAL at 09:13

## 2022-12-14 NOTE — PROGRESS NOTES
OCCUPATIONAL THERAPY TREATMENT  Patient: Jose Gilmore (11 y.o. female)  Date: 12/14/2022  Diagnosis: COPD exacerbation ([facility] [J44.1] <principal problem not specified>      Precautions:    Chart, occupational therapy assessment, plan of care, and goals were reviewed. ASSESSMENT  Pt continues with skilled OT services and is progressing towards goals. Upon [facility] arrival, pt semi supine in bed and agreeable to tx session at this time. Overall, pt continues to present with deficits in generalized strength/AROM, coordination, bed mobility, static/dynamic sitting and standing balance and functional activity tolerance during performance of ADLs/mobility (see below for objective details and assist levels). Pt noted with continued progress with overall mobility and endurance. Pt completed bed mobility with supervision and transfers with SBA. Pt educated on PLB, positioning and opening diaphragm, taking rest breaks sitting and standing, and proper body mechanics/posture. Pt educated on completion of UE HEP throughout day and verbalized understanding. Pt declining ADLs at this time due to already completing. Pt SpO2 remained 91-95% entire session with mobility and at rest.  Recommend d/c to [facility] once medically appropriate. Other factors to consider for discharge: family/social support, DME, time since onset, severity of deficits, decline from functional baseline         PLAN :  Patient continues to benefit from skilled intervention to address the above impairments. Continue treatment per established plan of care to address goals.     Recommendation for discharge: (in order for the patient to meet his/her long term goals)  [facility]    This discharge recommendation:  Has been made in collaboration with the attending provider and/or case management    IF patient discharges home will need the following DME: TBD       SUBJECTIVE:   Patient stated I was waiting on you to come, I knew you were coming.     OBJECTIVE DATA SUMMARY:   Cognitive/Behavioral Status:  Neurologic State: Alert  Orientation Level: Oriented X4  Cognition: Follows commands    Functional Mobility and Transfers for ADLs:  Bed Mobility:  Rolling: Supervision  Supine to Sit: Supervision  Scooting: Supervision    Transfers:  Sit to Stand: Stand-by assistance     Bed to Chair: Stand-by assistance    Balance:  Sitting: Intact; Without support  Standing: Impaired; With support  Standing - Static: Constant support;Good  Standing - Dynamic : Constant support; Fair    Pain:  0/10    Activity Tolerance:   Fair and observed SOB with activity  Please refer to the flowsheet for vital signs taken during this treatment. After treatment patient left in no apparent distress:   Bed locked and in lowest position  Sitting in chair and Call bell within reach    RY Pimentel  Time Calculation: 23 mins    Problem: Self Care Deficits Care Plan (Adult)  Goal: *Acute Goals and Plan of Care (Insert Text)  Description: FUNCTIONAL STATUS PRIOR TO ADMISSION: Pt reports he was IND w/ functional mobility/ADLs; 2 L of O2 at baseline. Denies falls. HOME SUPPORT: Pt lives by self. No support. Occupational Therapy Goals  Initiated 12/1/2022    Pt stated goal \"I want to get better\"  Pt will be IND sup <> sit in prep for EOB ADLs  Pt will be IND grooming standing sink side LRAD  Pt will be IND UB dressing sitting EOB/long sit   Pt will be IND LE dressing sitting EOB/long sit  Pt will be IND sit <>  prep for toileting LRAD  Pt will be IND toileting/toilet transfer/cloth mgmt LRAD  Pt will demo'd improved activity tolerance by maintaining >90% SpO2 while completing 3-4 standing ADLs.    Pt will be IND following UE HEP in prep for self care tasks   Outcome: Progressing Towards Goal

## 2022-12-14 NOTE — PROGRESS NOTES
Problem: Falls - Risk of  Goal: *Absence of Falls  Description: Document Cherylle Cockayne Fall Risk and appropriate interventions in the flowsheet.   Outcome: Progressing Towards Goal  Note: Fall Risk Interventions:            Medication Interventions: Teach patient to arise slowly    Elimination Interventions: Call light in reach              Problem: Patient Education: Go to Patient Education Activity  Goal: Patient/Family Education  Outcome: Progressing Towards Goal     Problem: Patient Education: Go to Patient Education Activity  Goal: Patient/Family Education  Outcome: Progressing Towards Goal     Problem: Patient Education: Go to Patient Education Activity  Goal: Patient/Family Education  Outcome: Progressing Towards Goal

## 2022-12-14 NOTE — DISCHARGE SUMMARY
Discharge Summary       PATIENT ID: Janet Cool  MRN: [id number]   YOB: 1960    DATE OF ADMISSION: 11/26/2022  1:08 PM    DATE OF DISCHARGE:   PRIMARY CARE PROVIDER: Bakari Hager MD     ATTENDING PHYSICIAN: Tequila Funes  DISCHARGING PROVIDER: Tequlia Funes      CONSULTATIONS: IP CONSULT TO PULMONOLOGY  IP CONSULT TO UROLOGY    PROCEDURES/SURGERIES: * No surgery found *    ADMITTING DIAGNOSES:    Patient Active Problem List    Diagnosis Date Noted    Respiratory failure ([facility] 08/08/2021    COPD exacerbation ([facility] 08/08/2021    COPD (chronic obstructive pulmonary disease) ([facility] 12/13/2020    Hypoxia 12/13/2020       DISCHARGE DIAGNOSES / PLAN:      Acute on chronic hypoxic respiratory failure  Acute exacerbation of COPD  Hypertension  Cough  Hyperglycemia due to steroids  UTI        DISCHARGE MEDICATIONS:  Current Discharge Medication List        START taking these medications    Details   ! ! predniSONE (DELTASONE) 20 mg tablet Take 2 Tablets by mouth daily (with breakfast). Qty: 10 Tablet, Refills: 0  Start date: 12/6/2022      sertraline (ZOLOFT) 25 mg tablet Take 1 Tablet by mouth daily. Qty: 30 Tablet, Refills: 0  Start date: 12/6/2022      !! guaiFENesin ER (MUCINEX) 1,200 mg Ta12 ER tablet Take 1 Tablet by mouth every twelve (12) hours. Qty: 30 Tablet, Refills: 0  Start date: 12/5/2022      azithromycin (ZITHROMAX) 500 mg tab Take 1 Tablet by mouth daily for 3 days. Qty: 3 Tablet, Refills: 0  Start date: 12/3/2022, End date: 12/6/2022      busPIRone (BUSPAR) 5 mg tablet Take 1 Tablet by mouth two (2) times a day. Qty: 60 Tablet, Refills: 0  Start date: 12/2/2022      cetirizine (ZYRTEC) 10 mg tablet Take 1 Tablet by mouth daily. Qty: 20 Tablet, Refills: 0  Start date: 12/3/2022       !! - Potential duplicate medications found. Please discuss with provider.         CONTINUE these medications which have CHANGED    Details   insulin glargine (LANTUS) 100 unit/mL injection 15 units  Qty: 1 mL, Refills: 2  Start date: 12/2/2022           CONTINUE these medications which have NOT CHANGED    Details   docusate sodium (Colace) 100 mg capsule Take 1 Capsule by mouth two (2) times a day for 90 days. Qty: 60 Capsule, Refills: 2      carvediloL (COREG) 6.25 mg tablet Take 1 Tablet by mouth two (2) times daily (with meals). Qty: 60 Tablet, Refills: 0      furosemide (Lasix) 40 mg tablet Lasix 40 mg daily  Qty: 30 Tablet, Refills: 0      pantoprazole (PROTONIX) 40 mg tablet Take 1 Tablet by mouth Daily (before breakfast). Qty: 60 Tablet, Refills: 0      !! predniSONE (DELTASONE) 10 mg tablet 1 tablet daily  Qty: 15 Tablet, Refills: 0      albuterol-ipratropium (DUO-NEB) 2.5 mg-0.5 mg/3 ml nebu 3 mL by Nebulization route every six (6) hours as needed for Wheezing. Qty: 30 Nebule, Refills: 1      aspirin 81 mg chewable tablet Take 1 Tab by mouth daily. Qty: 30 Tab, Refills: 0      !! guaiFENesin ER (MUCINEX) 600 mg ER tablet Take 1 Tab by mouth two (2) times a day. Qty: 30 Tab, Refills: 0      amLODIPine (NORVASC) 10 mg tablet Take 10 mg by mouth daily. mometasone-formoterol (DULERA) 200-5 mcg/actuation HFA inhaler Take 2 Puffs by inhalation two (2) times a day. montelukast (SINGULAIR) 10 mg tablet Take 10 mg by mouth daily. Calcium-Cholecalciferol, D3, 500 mg(1,250mg) -400 unit tab Take  by mouth daily. fluticasone propionate (FLONASE) 50 mcg/actuation nasal spray 2 Sprays by Both Nostrils route daily. ergocalciferol (Vitamin D2) 1,250 mcg (50,000 unit) capsule Take 50,000 Units by mouth every seven (7) days. Q7days on Monday       !! - Potential duplicate medications found. Please discuss with provider.         STOP taking these medications       cephALEXin (Keflex) 500 mg capsule Comments:   Reason for Stopping:         benzonatate (TESSALON) 100 mg capsule Comments:   Reason for Stopping:                 NOTIFY YOUR PHYSICIAN FOR ANY OF THE FOLLOWING:   Fever over 101 degrees for 24 hours. Chest pain, shortness of breath, fever, chills, nausea, vomiting, diarrhea, change in mentation, falling, weakness, bleeding. Severe pain or pain not relieved by medications. Or, any other signs or symptoms that you may have questions about. DISPOSITION:  x  Home With:   OT  PT  HH  RN       Long term SNF/Inpatient Rehab    Independent/assisted living    Hospice    Other:       PATIENT CONDITION AT DISCHARGE: Stable      PHYSICAL EXAMINATION AT DISCHARGE:  General:          Alert, cooperative, no distress, appears stated age. HEENT:           Atraumatic, anicteric sclerae, pink conjunctivae                          No oral ulcers, mucosa moist, throat clear, dentition fair  Neck:               Supple, symmetrical  Lungs:             Clear to auscultation bilaterally. No Wheezing or Rhonchi. No rales. Chest wall:      No tenderness  No Accessory muscle use. Heart:              Regular  rhythm,  No  murmur   No edema  Abdomen:        Soft, non-tender. Not distended. Bowel sounds normal  Extremities:     No cyanosis. No clubbing,                            Skin turgor normal, Capillary refill normal  Skin:                Not pale. Not Jaundiced  No rashes   Psych:             Not anxious or agitated. Neurologic:      Alert, moves all extremities, answers questions appropriately and responds to commands     XR CHEST SNGL V   Final Result   No Acute Disease.               Recent Results (from the past 24 hour(s))   GLUCOSE, POC    Collection Time: 12/13/22 11:11 AM   Result Value Ref Range    Glucose (POC) 115 (H) 65 - 100 mg/dL    Performed by John Duvall    GLUCOSE, POC    Collection Time: 12/13/22  4:08 PM   Result Value Ref Range    Glucose (POC) 144 (H) 65 - 100 mg/dL    Performed by John Duvall    GLUCOSE, POC    Collection Time: 12/13/22  7:44 PM   Result Value Ref Range    Glucose (POC) 160 (H) 65 - 100 mg/dL    Performed by Aspen Elizondo    GLUCOSE, POC Collection Time: 12/13/22  9:48 PM   Result Value Ref Range    Glucose (POC) 166 (H) 65 - 100 mg/dL    Performed by Yohannes Rod    GLUCOSE, POC    Collection Time: 12/14/22  8:37 AM   Result Value Ref Range    Glucose (POC) 112 (H) 65 - 100 mg/dL    Performed by Sathya ARDON [address]:  She was transferred to my service as per patient request     Resting the bed alert awake complaining of coughing shortness of breath no fever no chills     11/30  Patient currently on 4L oxygen, reports 2L at home. Endorses productive cough. Denies headache, fever, chest pain, abdominal pain. CXR unremarkable for any infiltrates. 12/1  Patient is alert and oriented, currently on 5L. Reports an increase O2 need yesterday to 6-7L, is anxious about her lungs. Says she is worried about physical activity due to increase O2 need. Alli chest pain, headache. 12/2  Patient is alert and pleasant, currently on 4L O2. She reports walking for an oxygen study yesterday where she required 6L during periods of exacerbation, is comfortable on 4L when resting. She endorses a productive cough and feels lightheaded with too much exercise. Denies headache, chest pain, abdominal pain. Patient very anxious still coughing congested oxygen saturation is stable on 3 L which is baseline    Best option to discharge patient to skilled care rehab for pulmonary rehab    Medication reconciliation done time discharge patient 35 minutes 50% time spent counseling and coordination of care    12/5  Patient is sitting in the chair eating breakfast. Reports improved symptoms but is still experiencing a baseline SOB. Currently on 3L. Denies headache, chest pain. Is aware that placement is pending. 12/6  Patient is resting in the chair, on 3L nasal cannula. Denies headache, chest pain. Understands we are waiting for authorization.   Pending discharge to [facility] no complaints just waiting for placement      Patient sitting on the chair not in distress alert awake no new complaints  Waiting for placement  The case failure regarding placement plan just waiting for the bed      Signed:   Rebeca Rdz MD  12/14/2022  11:18 AM

## 2022-12-15 VITALS
HEART RATE: 80 BPM | RESPIRATION RATE: 18 BRPM | WEIGHT: 210.98 LBS | DIASTOLIC BLOOD PRESSURE: 83 MMHG | BODY MASS INDEX: 36.02 KG/M2 | OXYGEN SATURATION: 98 % | SYSTOLIC BLOOD PRESSURE: 132 MMHG | TEMPERATURE: 98 F | HEIGHT: 64 IN

## 2022-12-15 LAB
GLUCOSE BLD STRIP.AUTO-MCNC: 115 MG/DL (ref 65–100)
GLUCOSE BLD STRIP.AUTO-MCNC: 123 MG/DL (ref 65–100)
PERFORMED BY, TECHID: ABNORMAL
PERFORMED BY, TECHID: ABNORMAL

## 2022-12-15 PROCEDURE — 77010033678 HC OXYGEN DAILY

## 2022-12-15 PROCEDURE — 74011250636 HC RX REV CODE- 250/636: Performed by: INTERNAL MEDICINE

## 2022-12-15 PROCEDURE — 82962 GLUCOSE BLOOD TEST: CPT

## 2022-12-15 PROCEDURE — 74011250637 HC RX REV CODE- 250/637: Performed by: INTERNAL MEDICINE

## 2022-12-15 PROCEDURE — 74011636637 HC RX REV CODE- 636/637: Performed by: INTERNAL MEDICINE

## 2022-12-15 PROCEDURE — 94640 AIRWAY INHALATION TREATMENT: CPT

## 2022-12-15 PROCEDURE — 94761 N-INVAS EAR/PLS OXIMETRY MLT: CPT

## 2022-12-15 RX ADMIN — BUSPIRONE HYDROCHLORIDE 5 MG: 5 TABLET ORAL at 09:55

## 2022-12-15 RX ADMIN — FUROSEMIDE 20 MG: 40 TABLET ORAL at 09:55

## 2022-12-15 RX ADMIN — AMLODIPINE BESYLATE 10 MG: 5 TABLET ORAL at 09:56

## 2022-12-15 RX ADMIN — ASPIRIN 81 MG 81 MG: 81 TABLET ORAL at 09:55

## 2022-12-15 RX ADMIN — DOCUSATE SODIUM 100 MG: 100 CAPSULE, LIQUID FILLED ORAL at 09:00

## 2022-12-15 RX ADMIN — ACETAMINOPHEN 650 MG: 325 TABLET, FILM COATED ORAL at 03:53

## 2022-12-15 RX ADMIN — GUAIFENESIN 1200 MG: 600 TABLET, EXTENDED RELEASE ORAL at 09:55

## 2022-12-15 RX ADMIN — Medication 1 TABLET: at 09:56

## 2022-12-15 RX ADMIN — MONTELUKAST 10 MG: 10 TABLET, FILM COATED ORAL at 09:55

## 2022-12-15 RX ADMIN — CETIRIZINE HYDROCHLORIDE 10 MG: 10 TABLET, FILM COATED ORAL at 09:56

## 2022-12-15 RX ADMIN — FLUTICASONE PROPIONATE 2 SPRAY: 50 SPRAY, METERED NASAL at 10:03

## 2022-12-15 RX ADMIN — ENOXAPARIN SODIUM 40 MG: 100 INJECTION SUBCUTANEOUS at 09:56

## 2022-12-15 RX ADMIN — BUDESONIDE AND FORMOTEROL FUMARATE DIHYDRATE 2 PUFF: 160; 4.5 AEROSOL RESPIRATORY (INHALATION) at 08:38

## 2022-12-15 RX ADMIN — SERTRALINE HYDROCHLORIDE 25 MG: 25 TABLET ORAL at 09:56

## 2022-12-15 RX ADMIN — CARVEDILOL 6.25 MG: 3.12 TABLET, FILM COATED ORAL at 09:55

## 2022-12-15 RX ADMIN — PREDNISONE 40 MG: 20 TABLET ORAL at 09:55

## 2022-12-15 RX ADMIN — PANTOPRAZOLE SODIUM 40 MG: 40 TABLET, DELAYED RELEASE ORAL at 09:55

## 2022-12-15 NOTE — ROUTINE PROCESS
Verbal, bedside shift change report given to Rosie Acosta LPN (oncoming nurse) by Scotty Landau, RN (offgoing nurse). Report included the following information from the night shift events and SBAR.

## 2022-12-15 NOTE — PROGRESS NOTES
Attempted to call report to [facility]. Number noted in CM note is for [place]. They stated that the patient is not coming to their facility. Called [phone] for Shanice Mccallum, transferred to 3 different people and the last person I spoke with took my name and number, stated that they would have someone call back. Patient other wise ready for transport. [facility] to transport patient between [phone]. IV removed. Discharge plan of care/case management plan validated with provider discharge order. **[facility] picked up patient at 13:40.

## 2022-12-15 NOTE — DISCHARGE SUMMARY
Discharge Summary       PATIENT ID: Kera Pendleton  MRN: [id number]   YOB: 1960    DATE OF ADMISSION: 11/26/2022  1:08 PM    DATE OF DISCHARGE:   PRIMARY CARE PROVIDER: Jay Yates MD     ATTENDING PHYSICIAN: Florin Funes  DISCHARGING PROVIDER: Florin Funes      CONSULTATIONS: IP CONSULT TO PULMONOLOGY  IP CONSULT TO UROLOGY    PROCEDURES/SURGERIES: * No surgery found *    ADMITTING DIAGNOSES:    Patient Active Problem List    Diagnosis Date Noted    Respiratory failure ([facility] 08/08/2021    COPD exacerbation ([facility] 08/08/2021    COPD (chronic obstructive pulmonary disease) ([facility] 12/13/2020    Hypoxia 12/13/2020       DISCHARGE DIAGNOSES / PLAN:      Acute on chronic hypoxic respiratory failure  Acute exacerbation of COPD  Hypertension  Cough  Hyperglycemia due to steroids  UTI        DISCHARGE MEDICATIONS:  Current Discharge Medication List        START taking these medications    Details   ! ! predniSONE (DELTASONE) 20 mg tablet Take 2 Tablets by mouth daily (with breakfast). Qty: 10 Tablet, Refills: 0  Start date: 12/6/2022      sertraline (ZOLOFT) 25 mg tablet Take 1 Tablet by mouth daily. Qty: 30 Tablet, Refills: 0  Start date: 12/6/2022      !! guaiFENesin ER (MUCINEX) 1,200 mg Ta12 ER tablet Take 1 Tablet by mouth every twelve (12) hours. Qty: 30 Tablet, Refills: 0  Start date: 12/5/2022      azithromycin (ZITHROMAX) 500 mg tab Take 1 Tablet by mouth daily for 3 days. Qty: 3 Tablet, Refills: 0  Start date: 12/3/2022, End date: 12/6/2022      busPIRone (BUSPAR) 5 mg tablet Take 1 Tablet by mouth two (2) times a day. Qty: 60 Tablet, Refills: 0  Start date: 12/2/2022      cetirizine (ZYRTEC) 10 mg tablet Take 1 Tablet by mouth daily. Qty: 20 Tablet, Refills: 0  Start date: 12/3/2022       !! - Potential duplicate medications found. Please discuss with provider.         CONTINUE these medications which have CHANGED    Details   insulin glargine (LANTUS) 100 unit/mL injection 15 units  Qty: 1 mL, Refills: 2  Start date: 12/2/2022           CONTINUE these medications which have NOT CHANGED    Details   docusate sodium (Colace) 100 mg capsule Take 1 Capsule by mouth two (2) times a day for 90 days. Qty: 60 Capsule, Refills: 2      carvediloL (COREG) 6.25 mg tablet Take 1 Tablet by mouth two (2) times daily (with meals). Qty: 60 Tablet, Refills: 0      furosemide (Lasix) 40 mg tablet Lasix 40 mg daily  Qty: 30 Tablet, Refills: 0      pantoprazole (PROTONIX) 40 mg tablet Take 1 Tablet by mouth Daily (before breakfast). Qty: 60 Tablet, Refills: 0      !! predniSONE (DELTASONE) 10 mg tablet 1 tablet daily  Qty: 15 Tablet, Refills: 0      albuterol-ipratropium (DUO-NEB) 2.5 mg-0.5 mg/3 ml nebu 3 mL by Nebulization route every six (6) hours as needed for Wheezing. Qty: 30 Nebule, Refills: 1      aspirin 81 mg chewable tablet Take 1 Tab by mouth daily. Qty: 30 Tab, Refills: 0      !! guaiFENesin ER (MUCINEX) 600 mg ER tablet Take 1 Tab by mouth two (2) times a day. Qty: 30 Tab, Refills: 0      amLODIPine (NORVASC) 10 mg tablet Take 10 mg by mouth daily. mometasone-formoterol (DULERA) 200-5 mcg/actuation HFA inhaler Take 2 Puffs by inhalation two (2) times a day. montelukast (SINGULAIR) 10 mg tablet Take 10 mg by mouth daily. Calcium-Cholecalciferol, D3, 500 mg(1,250mg) -400 unit tab Take  by mouth daily. fluticasone propionate (FLONASE) 50 mcg/actuation nasal spray 2 Sprays by Both Nostrils route daily. ergocalciferol (Vitamin D2) 1,250 mcg (50,000 unit) capsule Take 50,000 Units by mouth every seven (7) days. Q7days on Monday       !! - Potential duplicate medications found. Please discuss with provider.         STOP taking these medications       cephALEXin (Keflex) 500 mg capsule Comments:   Reason for Stopping:         benzonatate (TESSALON) 100 mg capsule Comments:   Reason for Stopping:                 NOTIFY YOUR PHYSICIAN FOR ANY OF THE FOLLOWING:   Fever over 101 degrees for 24 hours. Chest pain, shortness of breath, fever, chills, nausea, vomiting, diarrhea, change in mentation, falling, weakness, bleeding. Severe pain or pain not relieved by medications. Or, any other signs or symptoms that you may have questions about. DISPOSITION:  x  Home With:   OT  PT  HH  RN       Long term SNF/Inpatient Rehab    Independent/assisted living    Hospice    Other:       PATIENT CONDITION AT DISCHARGE: Stable      PHYSICAL EXAMINATION AT DISCHARGE:  General:          Alert, cooperative, no distress, appears stated age. HEENT:           Atraumatic, anicteric sclerae, pink conjunctivae                          No oral ulcers, mucosa moist, throat clear, dentition fair  Neck:               Supple, symmetrical  Lungs:             Clear to auscultation bilaterally. No Wheezing or Rhonchi. No rales. Chest wall:      No tenderness  No Accessory muscle use. Heart:              Regular  rhythm,  No  murmur   No edema  Abdomen:        Soft, non-tender. Not distended. Bowel sounds normal  Extremities:     No cyanosis. No clubbing,                            Skin turgor normal, Capillary refill normal  Skin:                Not pale. Not Jaundiced  No rashes   Psych:             Not anxious or agitated. Neurologic:      Alert, moves all extremities, answers questions appropriately and responds to commands     XR CHEST SNGL V   Final Result   No Acute Disease.               Recent Results (from the past 24 hour(s))   GLUCOSE, POC    Collection Time: 12/14/22 11:30 AM   Result Value Ref Range    Glucose (POC) 117 (H) 65 - 100 mg/dL    Performed by Joyce Guzman Dr, POC    Collection Time: 12/14/22  4:12 PM   Result Value Ref Range    Glucose (POC) 165 (H) 65 - 100 mg/dL    Performed by Lizbeth Somers    GLUCOSE, POC    Collection Time: 12/14/22  7:19 PM   Result Value Ref Range    Glucose (POC) 133 (H) 65 - 100 mg/dL    Performed by Edie Mackay    GLUCOSE, POC Collection Time: 12/15/22  7:24 AM   Result Value Ref Range    Glucose (POC) 123 (H) 65 - 100 mg/dL    Performed by Raheel:  She was transferred to my service as per patient request     Resting the bed alert awake complaining of coughing shortness of breath no fever no chills     11/30  Patient currently on 4L oxygen, reports 2L at home. Endorses productive cough. Denies headache, fever, chest pain, abdominal pain. CXR unremarkable for any infiltrates. 12/1  Patient is alert and oriented, currently on 5L. Reports an increase O2 need yesterday to 6-7L, is anxious about her lungs. Says she is worried about physical activity due to increase O2 need. Alli chest pain, headache. 12/2  Patient is alert and pleasant, currently on 4L O2. She reports walking for an oxygen study yesterday where she required 6L during periods of exacerbation, is comfortable on 4L when resting. She endorses a productive cough and feels lightheaded with too much exercise. Denies headache, chest pain, abdominal pain. Patient very anxious still coughing congested oxygen saturation is stable on 3 L which is baseline    Best option to discharge patient to skilled care rehab for pulmonary rehab    Medication reconciliation done time discharge patient 35 minutes 50% time spent counseling and coordination of care    12/5  Patient is sitting in the chair eating breakfast. Reports improved symptoms but is still experiencing a baseline SOB. Currently on 3L. Denies headache, chest pain. Is aware that placement is pending. 12/6  Patient is resting in the chair, on 3L nasal cannula. Denies headache, chest pain. Understands we are waiting for authorization.   Pending discharge to [facility] no complaints just waiting for placement      Patient sitting on the chair not in distress alert awake no new complaints  Waiting for placement  The case failure regarding placement plan just waiting for the bed      Signed: Bianca Jasso MD  12/15/2022  11:18 AM

## 2022-12-15 NOTE — PROGRESS NOTES
DC Plan: [facility]       Report: [phone]       Transportation: stretcher 1-2pm     [facility] received [facility] and has a bed for pt today. [facility] can receive pt at 1pm today. Cm met with pt at the bedside. Cm notified her [facility] received auth and that [facility] has a bed today. At first pt indicated she wanted to go home, however after much discussion with pt, pt is agreeable with going to [facility] for short term rehab. Cm spoke with unit secretary regarding arranging stretcher transportation. Cm updated pt on transportation  time. Pt asked about her sleeping arrangement at [facility]. Cm informed her Cm will have the liaison at [facility] give her a call. Cm messaged [facility] liaison via [employer] to contact pt. Discharge plan of care/case management plan validated with provider's discharge order.

## 2022-12-15 NOTE — PROGRESS NOTES
Bedside and Verbal shift change report given to Craig Sanchez RN (oncoming nurse) by Kusum Holt LPN (offgoing nurse). Report included the following information SBAR, Kardex, Intake/Output, MAR, Accordion, Recent Results, and Med Rec Status.

## 2023-03-21 ENCOUNTER — TRANSCRIBE ORDER (OUTPATIENT)
Dept: SCHEDULING | Age: 63
End: 2023-03-21

## 2023-03-21 DIAGNOSIS — R10.9 ABDOMINAL PAIN: Primary | ICD-10-CM

## 2023-04-03 ENCOUNTER — HOSPITAL ENCOUNTER (OUTPATIENT)
Dept: CT IMAGING | Age: 63
End: 2023-04-03
Attending: SURGERY
Payer: MEDICAID

## 2023-04-03 DIAGNOSIS — R10.9 ABDOMINAL PAIN: ICD-10-CM

## 2023-04-03 LAB — CREAT BLD-MCNC: 1.4 MG/DL (ref 0.6–1.3)

## 2023-04-03 PROCEDURE — 74011000636 HC RX REV CODE- 636: Performed by: SURGERY

## 2023-04-03 PROCEDURE — 82565 ASSAY OF CREATININE: CPT

## 2023-04-03 PROCEDURE — 74177 CT ABD & PELVIS W/CONTRAST: CPT

## 2023-04-03 RX ADMIN — IOPAMIDOL 100 ML: 755 INJECTION, SOLUTION INTRAVENOUS at 14:33

## 2023-04-23 DIAGNOSIS — R06.02 SOB (SHORTNESS OF BREATH): Primary | ICD-10-CM

## 2023-05-04 ENCOUNTER — APPOINTMENT (OUTPATIENT)
Dept: GENERAL RADIOLOGY | Age: 63
End: 2023-05-04
Attending: STUDENT IN AN ORGANIZED HEALTH CARE EDUCATION/TRAINING PROGRAM
Payer: MEDICAID

## 2023-05-04 ENCOUNTER — HOSPITAL ENCOUNTER (EMERGENCY)
Age: 63
Discharge: HOME | End: 2023-05-04
Attending: STUDENT IN AN ORGANIZED HEALTH CARE EDUCATION/TRAINING PROGRAM
Payer: MEDICAID

## 2023-05-04 VITALS
DIASTOLIC BLOOD PRESSURE: 80 MMHG | TEMPERATURE: 98.4 F | HEART RATE: 98 BPM | WEIGHT: 194.8 LBS | SYSTOLIC BLOOD PRESSURE: 138 MMHG | RESPIRATION RATE: 15 BRPM | OXYGEN SATURATION: 98 % | HEIGHT: 64 IN | BODY MASS INDEX: 33.26 KG/M2

## 2023-05-04 DIAGNOSIS — A59.03 TRICHOMONAL URETHRITIS: Primary | ICD-10-CM

## 2023-05-04 DIAGNOSIS — R19.7 DIARRHEA, UNSPECIFIED TYPE: ICD-10-CM

## 2023-05-04 LAB
ALBUMIN SERPL-MCNC: 3.8 G/DL (ref 3.5–5)
ALBUMIN/GLOB SERPL: 1.2 (ref 1.1–2.2)
ALP SERPL-CCNC: 83 U/L (ref 45–117)
ALT SERPL-CCNC: 27 U/L (ref 12–78)
ANION GAP SERPL CALC-SCNC: 5 MMOL/L (ref 5–15)
APPEARANCE UR: ABNORMAL
AST SERPL W P-5'-P-CCNC: 14 U/L (ref 15–37)
ATRIAL RATE: 108 BPM
BACTERIA URNS QL MICRO: NEGATIVE /HPF
BASOPHILS # BLD: 0 K/UL (ref 0–0.1)
BASOPHILS NFR BLD: 1 % (ref 0–1)
BILIRUB SERPL-MCNC: 0.4 MG/DL (ref 0.2–1)
BILIRUB UR QL: NEGATIVE
BUN SERPL-MCNC: 12 MG/DL (ref 6–20)
BUN/CREAT SERPL: 13 (ref 12–20)
CA-I BLD-MCNC: 9.8 MG/DL (ref 8.5–10.1)
CALCULATED P AXIS, ECG09: 62 DEGREES
CALCULATED R AXIS, ECG10: -23 DEGREES
CALCULATED T AXIS, ECG11: 50 DEGREES
CHLORIDE SERPL-SCNC: 105 MMOL/L (ref 97–108)
CO2 SERPL-SCNC: 28 MMOL/L (ref 21–32)
COLOR UR: ABNORMAL
CREAT SERPL-MCNC: 0.9 MG/DL (ref 0.55–1.02)
DIAGNOSIS, 93000: NORMAL
DIFFERENTIAL METHOD BLD: ABNORMAL
EOSINOPHIL # BLD: 0.1 K/UL (ref 0–0.4)
EOSINOPHIL NFR BLD: 1 % (ref 0–7)
EPITH CASTS URNS QL MICRO: ABNORMAL /LPF
ERYTHROCYTE [DISTWIDTH] IN BLOOD BY AUTOMATED COUNT: 12.9 % (ref 11.5–14.5)
GLOBULIN SER CALC-MCNC: 3.3 G/DL (ref 2–4)
GLUCOSE SERPL-MCNC: 106 MG/DL (ref 65–100)
GLUCOSE UR STRIP.AUTO-MCNC: NEGATIVE MG/DL
HCT VFR BLD AUTO: 37 % (ref 35–47)
HGB BLD-MCNC: 11.8 G/DL (ref 11.5–16)
HGB UR QL STRIP: NEGATIVE
HYALINE CASTS URNS QL MICRO: ABNORMAL /LPF (ref 0–5)
IMM GRANULOCYTES # BLD AUTO: 0 K/UL (ref 0–0.04)
IMM GRANULOCYTES NFR BLD AUTO: 0 % (ref 0–0.5)
KETONES UR QL STRIP.AUTO: NEGATIVE MG/DL
LEUKOCYTE ESTERASE UR QL STRIP.AUTO: ABNORMAL
LIPASE SERPL-CCNC: 83 U/L (ref 73–393)
LYMPHOCYTES # BLD: 1.2 K/UL (ref 0.8–3.5)
LYMPHOCYTES NFR BLD: 16 % (ref 12–49)
MAGNESIUM SERPL-MCNC: 1.8 MG/DL (ref 1.6–2.4)
MCH RBC QN AUTO: 28.9 PG (ref 26–34)
MCHC RBC AUTO-ENTMCNC: 31.9 G/DL (ref 30–36.5)
MCV RBC AUTO: 90.7 FL (ref 80–99)
MONOCYTES # BLD: 0.5 K/UL (ref 0–1)
MONOCYTES NFR BLD: 6 % (ref 5–13)
MUCOUS THREADS URNS QL MICRO: ABNORMAL /LPF
NEUTS SEG # BLD: 5.8 K/UL (ref 1.8–8)
NEUTS SEG NFR BLD: 76 % (ref 32–75)
NITRITE UR QL STRIP.AUTO: NEGATIVE
NRBC # BLD: 0 K/UL (ref 0–0.01)
NRBC BLD-RTO: 0 PER 100 WBC
P-R INTERVAL, ECG05: 156 MS
PH UR STRIP: 5 (ref 5–8)
PLATELET # BLD AUTO: 286 K/UL (ref 150–400)
PMV BLD AUTO: 10.5 FL (ref 8.9–12.9)
POTASSIUM SERPL-SCNC: 3.6 MMOL/L (ref 3.5–5.1)
PROT SERPL-MCNC: 7.1 G/DL (ref 6.4–8.2)
PROT UR STRIP-MCNC: 100 MG/DL
Q-T INTERVAL, ECG07: 332 MS
QRS DURATION, ECG06: 80 MS
QTC CALCULATION (BEZET), ECG08: 444 MS
RBC # BLD AUTO: 4.08 M/UL (ref 3.8–5.2)
RBC #/AREA URNS HPF: ABNORMAL /HPF (ref 0–5)
SODIUM SERPL-SCNC: 138 MMOL/L (ref 136–145)
SP GR UR REFRACTOMETRY: 1.02 (ref 1–1.03)
TRICHOMONAS UR QL MICRO: PRESENT
TROPONIN I SERPL HS-MCNC: 7 NG/L (ref 0–51)
UROBILINOGEN UR QL STRIP.AUTO: 0.1 EU/DL (ref 0.1–1)
VENTRICULAR RATE, ECG03: 108 BPM
WBC # BLD AUTO: 7.7 K/UL (ref 3.6–11)
WBC URNS QL MICRO: ABNORMAL /HPF (ref 0–4)

## 2023-05-04 PROCEDURE — 99285 EMERGENCY DEPT VISIT HI MDM: CPT

## 2023-05-04 PROCEDURE — 74011250637 HC RX REV CODE- 250/637: Performed by: STUDENT IN AN ORGANIZED HEALTH CARE EDUCATION/TRAINING PROGRAM

## 2023-05-04 PROCEDURE — 83735 ASSAY OF MAGNESIUM: CPT

## 2023-05-04 PROCEDURE — 36415 COLL VENOUS BLD VENIPUNCTURE: CPT

## 2023-05-04 PROCEDURE — 74022 RADEX COMPL AQT ABD SERIES: CPT

## 2023-05-04 PROCEDURE — 80053 COMPREHEN METABOLIC PANEL: CPT

## 2023-05-04 PROCEDURE — 81001 URINALYSIS AUTO W/SCOPE: CPT

## 2023-05-04 PROCEDURE — 83690 ASSAY OF LIPASE: CPT

## 2023-05-04 PROCEDURE — 85025 COMPLETE CBC W/AUTO DIFF WBC: CPT

## 2023-05-04 PROCEDURE — 93005 ELECTROCARDIOGRAM TRACING: CPT

## 2023-05-04 PROCEDURE — 84484 ASSAY OF TROPONIN QUANT: CPT

## 2023-05-04 RX ORDER — ACETAMINOPHEN AND CODEINE PHOSPHATE 300; 30 MG/1; MG/1
1 TABLET ORAL
Qty: 10 TABLET | Refills: 0 | Status: SHIPPED | OUTPATIENT
Start: 2023-05-04 | End: 2023-05-07

## 2023-05-04 RX ORDER — METRONIDAZOLE 500 MG/1
500 TABLET ORAL 2 TIMES DAILY
Qty: 14 TABLET | Refills: 0 | Status: SHIPPED | OUTPATIENT
Start: 2023-05-04 | End: 2023-05-11

## 2023-05-04 RX ORDER — IBUPROFEN 600 MG/1
600 TABLET ORAL
Status: COMPLETED | OUTPATIENT
Start: 2023-05-04 | End: 2023-05-04

## 2023-05-04 RX ADMIN — IBUPROFEN 600 MG: 600 TABLET, FILM COATED ORAL at 10:25

## 2023-05-24 NOTE — PERIOP NOTE
Pt states she has not taken aspirin since Sunday 5/21/23 per pre op instructions. Dr. Melissa Juarez office called to obtain OK to hold aspirin note.

## 2023-05-26 ENCOUNTER — ANESTHESIA EVENT (OUTPATIENT)
Facility: HOSPITAL | Age: 63
End: 2023-05-26
Payer: MEDICAID

## 2023-05-26 ENCOUNTER — ANESTHESIA (OUTPATIENT)
Facility: HOSPITAL | Age: 63
End: 2023-05-26
Payer: MEDICAID

## 2023-05-26 ENCOUNTER — HOSPITAL ENCOUNTER (OUTPATIENT)
Facility: HOSPITAL | Age: 63
Discharge: HOME | End: 2023-05-26
Attending: SURGERY | Admitting: SURGERY
Payer: MEDICAID

## 2023-05-26 VITALS
TEMPERATURE: 98.2 F | HEART RATE: 74 BPM | WEIGHT: 202 LBS | SYSTOLIC BLOOD PRESSURE: 119 MMHG | BODY MASS INDEX: 34.49 KG/M2 | RESPIRATION RATE: 20 BRPM | OXYGEN SATURATION: 92 % | DIASTOLIC BLOOD PRESSURE: 67 MMHG | HEIGHT: 64 IN

## 2023-05-26 DIAGNOSIS — R22.2 CHEST WALL MASS: ICD-10-CM

## 2023-05-26 LAB
ANION GAP BLD CALC-SCNC: 6
CA-I BLD-MCNC: 1.17 MMOL/L (ref 1.12–1.32)
CHLORIDE BLD-SCNC: 107 MMOL/L (ref 98–107)
CO2 BLD-SCNC: 29 MMOL/L
CREAT UR-MCNC: 1.01 MG/DL (ref 0.6–1.3)
GLUCOSE BLD STRIP.AUTO-MCNC: 100 MG/DL (ref 65–100)
POTASSIUM BLD-SCNC: 4.3 MMOL/L (ref 3.5–5.5)
SODIUM BLD-SCNC: 141 MMOL/L (ref 136–145)

## 2023-05-26 PROCEDURE — 3700000001 HC ADD 15 MINUTES (ANESTHESIA): Performed by: SURGERY

## 2023-05-26 PROCEDURE — 7100000011 HC PHASE II RECOVERY - ADDTL 15 MIN: Performed by: SURGERY

## 2023-05-26 PROCEDURE — 2580000003 HC RX 258: Performed by: SURGERY

## 2023-05-26 PROCEDURE — 3600000012 HC SURGERY LEVEL 2 ADDTL 15MIN: Performed by: SURGERY

## 2023-05-26 PROCEDURE — 2709999900 HC NON-CHARGEABLE SUPPLY: Performed by: SURGERY

## 2023-05-26 PROCEDURE — 6360000002 HC RX W HCPCS: Performed by: SURGERY

## 2023-05-26 PROCEDURE — 80047 BASIC METABLC PNL IONIZED CA: CPT

## 2023-05-26 PROCEDURE — 7100000010 HC PHASE II RECOVERY - FIRST 15 MIN: Performed by: SURGERY

## 2023-05-26 PROCEDURE — 2500000003 HC RX 250 WO HCPCS: Performed by: SURGERY

## 2023-05-26 PROCEDURE — 7100000000 HC PACU RECOVERY - FIRST 15 MIN: Performed by: SURGERY

## 2023-05-26 PROCEDURE — 7100000001 HC PACU RECOVERY - ADDTL 15 MIN: Performed by: SURGERY

## 2023-05-26 PROCEDURE — 3600000002 HC SURGERY LEVEL 2 BASE: Performed by: SURGERY

## 2023-05-26 PROCEDURE — 3700000000 HC ANESTHESIA ATTENDED CARE: Performed by: SURGERY

## 2023-05-26 PROCEDURE — 88305 TISSUE EXAM BY PATHOLOGIST: CPT

## 2023-05-26 RX ORDER — LIDOCAINE HYDROCHLORIDE AND EPINEPHRINE BITARTRATE 20; .01 MG/ML; MG/ML
INJECTION, SOLUTION SUBCUTANEOUS PRN
Status: DISCONTINUED | OUTPATIENT
Start: 2023-05-26 | End: 2023-05-26 | Stop reason: HOSPADM

## 2023-05-26 RX ORDER — ONDANSETRON 2 MG/ML
INJECTION INTRAMUSCULAR; INTRAVENOUS PRN
Status: DISCONTINUED | OUTPATIENT
Start: 2023-05-26 | End: 2023-05-26 | Stop reason: SDUPTHER

## 2023-05-26 RX ORDER — PROPOFOL 10 MG/ML
INJECTION, EMULSION INTRAVENOUS CONTINUOUS PRN
Status: DISCONTINUED | OUTPATIENT
Start: 2023-05-26 | End: 2023-05-26 | Stop reason: SDUPTHER

## 2023-05-26 RX ORDER — LABETALOL HYDROCHLORIDE 5 MG/ML
10 INJECTION, SOLUTION INTRAVENOUS
Status: DISCONTINUED | OUTPATIENT
Start: 2023-05-26 | End: 2023-05-26 | Stop reason: HOSPADM

## 2023-05-26 RX ORDER — HYDROMORPHONE HYDROCHLORIDE 1 MG/ML
0.5 INJECTION, SOLUTION INTRAMUSCULAR; INTRAVENOUS; SUBCUTANEOUS EVERY 5 MIN PRN
Status: DISCONTINUED | OUTPATIENT
Start: 2023-05-26 | End: 2023-05-26 | Stop reason: HOSPADM

## 2023-05-26 RX ORDER — SODIUM CHLORIDE, SODIUM LACTATE, POTASSIUM CHLORIDE, CALCIUM CHLORIDE 600; 310; 30; 20 MG/100ML; MG/100ML; MG/100ML; MG/100ML
INJECTION, SOLUTION INTRAVENOUS CONTINUOUS
Status: DISCONTINUED | OUTPATIENT
Start: 2023-05-26 | End: 2023-05-26 | Stop reason: HOSPADM

## 2023-05-26 RX ORDER — ONDANSETRON 2 MG/ML
4 INJECTION INTRAMUSCULAR; INTRAVENOUS
Status: DISCONTINUED | OUTPATIENT
Start: 2023-05-26 | End: 2023-05-26 | Stop reason: HOSPADM

## 2023-05-26 RX ORDER — SODIUM CHLORIDE 9 MG/ML
INJECTION, SOLUTION INTRAVENOUS PRN
Status: DISCONTINUED | OUTPATIENT
Start: 2023-05-26 | End: 2023-05-26 | Stop reason: HOSPADM

## 2023-05-26 RX ORDER — SODIUM CHLORIDE, SODIUM LACTATE, POTASSIUM CHLORIDE, CALCIUM CHLORIDE 600; 310; 30; 20 MG/100ML; MG/100ML; MG/100ML; MG/100ML
INJECTION, SOLUTION INTRAVENOUS CONTINUOUS PRN
Status: DISCONTINUED | OUTPATIENT
Start: 2023-05-26 | End: 2023-05-26 | Stop reason: SDUPTHER

## 2023-05-26 RX ORDER — FENTANYL CITRATE 50 UG/ML
INJECTION, SOLUTION INTRAMUSCULAR; INTRAVENOUS PRN
Status: DISCONTINUED | OUTPATIENT
Start: 2023-05-26 | End: 2023-05-26 | Stop reason: SDUPTHER

## 2023-05-26 RX ORDER — FENTANYL CITRATE 50 UG/ML
50 INJECTION, SOLUTION INTRAMUSCULAR; INTRAVENOUS EVERY 5 MIN PRN
Status: DISCONTINUED | OUTPATIENT
Start: 2023-05-26 | End: 2023-05-26 | Stop reason: HOSPADM

## 2023-05-26 RX ORDER — LIDOCAINE HYDROCHLORIDE 20 MG/ML
INJECTION, SOLUTION EPIDURAL; INFILTRATION; INTRACAUDAL; PERINEURAL PRN
Status: DISCONTINUED | OUTPATIENT
Start: 2023-05-26 | End: 2023-05-26 | Stop reason: SDUPTHER

## 2023-05-26 RX ORDER — DIPHENHYDRAMINE HYDROCHLORIDE 50 MG/ML
12.5 INJECTION INTRAMUSCULAR; INTRAVENOUS
Status: DISCONTINUED | OUTPATIENT
Start: 2023-05-26 | End: 2023-05-26 | Stop reason: HOSPADM

## 2023-05-26 RX ORDER — MIDAZOLAM HYDROCHLORIDE 1 MG/ML
INJECTION INTRAMUSCULAR; INTRAVENOUS PRN
Status: DISCONTINUED | OUTPATIENT
Start: 2023-05-26 | End: 2023-05-26 | Stop reason: SDUPTHER

## 2023-05-26 RX ORDER — SODIUM CHLORIDE 0.9 % (FLUSH) 0.9 %
5-40 SYRINGE (ML) INJECTION EVERY 12 HOURS SCHEDULED
Status: DISCONTINUED | OUTPATIENT
Start: 2023-05-26 | End: 2023-05-26 | Stop reason: HOSPADM

## 2023-05-26 RX ORDER — OXYCODONE HYDROCHLORIDE 5 MG/1
10 TABLET ORAL PRN
Status: DISCONTINUED | OUTPATIENT
Start: 2023-05-26 | End: 2023-05-26 | Stop reason: HOSPADM

## 2023-05-26 RX ORDER — DEXAMETHASONE SODIUM PHOSPHATE 4 MG/ML
INJECTION, SOLUTION INTRA-ARTICULAR; INTRALESIONAL; INTRAMUSCULAR; INTRAVENOUS; SOFT TISSUE PRN
Status: DISCONTINUED | OUTPATIENT
Start: 2023-05-26 | End: 2023-05-26 | Stop reason: SDUPTHER

## 2023-05-26 RX ORDER — BUPIVACAINE HYDROCHLORIDE 2.5 MG/ML
INJECTION, SOLUTION EPIDURAL; INFILTRATION; INTRACAUDAL PRN
Status: DISCONTINUED | OUTPATIENT
Start: 2023-05-26 | End: 2023-05-26 | Stop reason: HOSPADM

## 2023-05-26 RX ORDER — OXYCODONE HYDROCHLORIDE 5 MG/1
5 TABLET ORAL PRN
Status: DISCONTINUED | OUTPATIENT
Start: 2023-05-26 | End: 2023-05-26 | Stop reason: HOSPADM

## 2023-05-26 RX ORDER — IPRATROPIUM BROMIDE AND ALBUTEROL SULFATE 2.5; .5 MG/3ML; MG/3ML
1 SOLUTION RESPIRATORY (INHALATION)
Status: DISCONTINUED | OUTPATIENT
Start: 2023-05-26 | End: 2023-05-26 | Stop reason: HOSPADM

## 2023-05-26 RX ORDER — SODIUM CHLORIDE 0.9 % (FLUSH) 0.9 %
5-40 SYRINGE (ML) INJECTION PRN
Status: DISCONTINUED | OUTPATIENT
Start: 2023-05-26 | End: 2023-05-26 | Stop reason: HOSPADM

## 2023-05-26 RX ORDER — HYDRALAZINE HYDROCHLORIDE 20 MG/ML
10 INJECTION INTRAMUSCULAR; INTRAVENOUS
Status: DISCONTINUED | OUTPATIENT
Start: 2023-05-26 | End: 2023-05-26 | Stop reason: HOSPADM

## 2023-05-26 RX ORDER — PROPOFOL 10 MG/ML
INJECTION, EMULSION INTRAVENOUS PRN
Status: DISCONTINUED | OUTPATIENT
Start: 2023-05-26 | End: 2023-05-26 | Stop reason: SDUPTHER

## 2023-05-26 RX ADMIN — MIDAZOLAM HYDROCHLORIDE 2 MG: 1 INJECTION INTRAMUSCULAR; INTRAVENOUS at 09:58

## 2023-05-26 RX ADMIN — CEFAZOLIN SODIUM 2000 MG: 1 INJECTION, POWDER, FOR SOLUTION INTRAMUSCULAR; INTRAVENOUS at 10:00

## 2023-05-26 RX ADMIN — SODIUM CHLORIDE, POTASSIUM CHLORIDE, SODIUM LACTATE AND CALCIUM CHLORIDE: 600; 310; 30; 20 INJECTION, SOLUTION INTRAVENOUS at 07:55

## 2023-05-26 RX ADMIN — PROPOFOL 100 MG: 10 INJECTION, EMULSION INTRAVENOUS at 10:20

## 2023-05-26 RX ADMIN — PROPOFOL 30 MG: 10 INJECTION, EMULSION INTRAVENOUS at 10:00

## 2023-05-26 RX ADMIN — DEXAMETHASONE SODIUM PHOSPHATE 4 MG: 4 INJECTION, SOLUTION INTRA-ARTICULAR; INTRALESIONAL; INTRAMUSCULAR; INTRAVENOUS; SOFT TISSUE at 10:08

## 2023-05-26 RX ADMIN — FENTANYL CITRATE 50 MCG: 50 INJECTION, SOLUTION INTRAMUSCULAR; INTRAVENOUS at 10:05

## 2023-05-26 RX ADMIN — ONDANSETRON 4 MG: 2 INJECTION INTRAMUSCULAR; INTRAVENOUS at 10:08

## 2023-05-26 RX ADMIN — PROPOFOL 40 MCG/KG/MIN: 10 INJECTION, EMULSION INTRAVENOUS at 10:15

## 2023-05-26 RX ADMIN — PROPOFOL 30 MG: 10 INJECTION, EMULSION INTRAVENOUS at 10:05

## 2023-05-26 RX ADMIN — SODIUM CHLORIDE, SODIUM LACTATE, POTASSIUM CHLORIDE, CALCIUM CHLORIDE: 600; 310; 30; 20 INJECTION, SOLUTION INTRAVENOUS at 10:00

## 2023-05-26 RX ADMIN — LIDOCAINE HYDROCHLORIDE 100 MG: 20 INJECTION, SOLUTION EPIDURAL; INFILTRATION; INTRACAUDAL; PERINEURAL at 10:05

## 2023-05-26 ASSESSMENT — PAIN SCALES - GENERAL
PAINLEVEL_OUTOF10: 0
PAINLEVEL_OUTOF10: 0

## 2023-05-26 ASSESSMENT — PAIN - FUNCTIONAL ASSESSMENT: PAIN_FUNCTIONAL_ASSESSMENT: 0-10

## 2023-05-26 NOTE — ANESTHESIA PRE PROCEDURE
HYSTERECTOMY (CERVIX STATUS UNKNOWN)      IR ANITA ILIAC W STENT      MASTECTOMY Left        Social History:    Social History     Tobacco Use    Smoking status: Former    Smokeless tobacco: Never   Substance Use Topics    Alcohol use: Yes     Comment: occasionally                                Counseling given: Not Answered      Vital Signs (Current):   Vitals:    05/24/23 1145 05/26/23 0729   BP:  135/69   Pulse:  79   Resp:  18   Temp:  98.2 °F (36.8 °C)   TempSrc:  Oral   SpO2:  96%   Weight: 87.1 kg (192 lb) 91.6 kg (202 lb)   Height: 1.626 m (5' 4\")                                               BP Readings from Last 3 Encounters:   05/26/23 135/69   03/30/21 (!) 160/100       NPO Status: Time of last liquid consumption: 2200                        Time of last solid consumption: 2200                        Date of last liquid consumption: 05/25/23                        Date of last solid food consumption: 05/25/23    BMI:   Wt Readings from Last 3 Encounters:   05/26/23 91.6 kg (202 lb)   03/30/21 96.9 kg (213 lb 9.6 oz)     Body mass index is 34.67 kg/m².     CBC:   Lab Results   Component Value Date/Time    WBC 7.7 05/04/2023 07:55 AM    RBC 4.08 05/04/2023 07:55 AM    HGB 11.8 05/04/2023 07:55 AM    HCT 37.0 05/04/2023 07:55 AM    MCV 90.7 05/04/2023 07:55 AM    RDW 12.9 05/04/2023 07:55 AM     05/04/2023 07:55 AM       CMP:   Lab Results   Component Value Date/Time     05/04/2023 07:55 AM    K 3.6 05/04/2023 07:55 AM     05/04/2023 07:55 AM    CO2 28 05/04/2023 07:55 AM    BUN 12 05/04/2023 07:55 AM    CREATININE 1.01 05/26/2023 07:48 AM    CREATININE 0.90 05/04/2023 07:55 AM    GFRAA >60 04/13/2022 08:58 AM    AGRATIO 1.2 05/04/2023 07:55 AM    GLUCOSE 106 05/04/2023 07:55 AM    PROT 7.1 05/04/2023 07:55 AM    CALCIUM 9.8 05/04/2023 07:55 AM    BILITOT 0.4 05/04/2023 07:55 AM    ALKPHOS 83 05/04/2023 07:55 AM    AST 14 05/04/2023 07:55 AM    ALT 27 05/04/2023 07:55 AM       POC

## 2023-05-26 NOTE — DISCHARGE INSTRUCTIONS
No strenuous activity for 2 weeks. Shower after48 hours and keep the site covered with dry gauze dressing.

## 2023-05-26 NOTE — OP NOTE
OPERATIVE NOTE      Patient: Dolores Esqueda  YOB: 1960  MRN: [id number]    Date of Procedure: 5/26/2023    Pre-Op Diagnosis Codes:     * Chest wall mass [R22.2] Left     Post-Op Diagnosis: Same       Procedure(s):  EXCISION OF LEFT CHEST WALL MASS (3cm)    Surgeon(s):  Em Avila MD    Assistant:  Surgical Assistant: Avis Pyle/Ms. Chakraborty    Anesthesia: MAC/Local    Anesthesiologist: Dr. Raj Stringer    Indication: Left breast mass   S/p Mastectomy for left breast cancer    Procedure:  Patient was taken to the operative room. Patient was laid supine. Patient received prophylactic dose of antibiotic. Patient had SCD applied to both lower extremities. After IV sedation was administered the left chest wall site was prepped and draped usual sterile fashion. Timeout was completed. Local anesthesia was inflated. An elliptical incision was placed around the site of the 3cm mass incision was deepened to subtenons tissue the dissection continued all the way up to through the pectoralis fascia the pathologist patient was also excised complete hemostasis achieved. The whole mass along with the surrounding tissue of at least 5 mm on either side was excised completely which is achieved. Then the subcu tissue was approximated with 3-0 Vicryl simple stitches. The skin was approximate using 3-0 nylon simple interrupted vertical mattress stitches. 4 x 4 dressing and tape were applied. Patient tolerated procedure very well. There were no complication. Estimated loss was minimal.  Patient was transferred to PACU in stable condition. All counts were correct.       Estimated Blood Loss (mL): Minimal    Complications: None    Specimens:   ID Type Source Tests Collected by Time Destination   A : LEFT CHEST MASS Tissue Chest SURGICAL PATHOLOGY Em Avila MD 5/26/2023 1026        Implants:  * No implants in log *      Drains: * No LDAs found *    Findings: as above      Electronically

## 2023-06-12 NOTE — ANESTHESIA POSTPROCEDURE EVALUATION
Department of Anesthesiology  Postprocedure Note    Patient: Di Subramanian  MRN: [id number]  YOB: 1960  Date of evaluation: 6/12/2023      Procedure Summary     Date: 05/26/23 Room / Location: [facility] MAIN OR 02 / [facility] MAIN OR    Anesthesia Start: 1000 Anesthesia Stop: 1106    Procedure: EXCISION OF LEFT CHEST WALL MASS (Left: Chest) Diagnosis:       Chest wall mass      (Chest wall mass [R22.2])    Surgeons: Jerica Norman MD Responsible Provider: Mark Mackenzie MD    Anesthesia Type: MAC ASA Status: 3          Anesthesia Type: MAC    Royal Phase I: Royal Score: 9    Royal Phase II: Royal Score: 9      Anesthesia Post Evaluation    Patient location during evaluation: PACU  Patient participation: complete - patient cannot participate  Level of consciousness: awake  Pain score: 0  Airway patency: patent  Nausea & Vomiting: no nausea and no vomiting  Complications: no  Cardiovascular status: hemodynamically stable  Respiratory status: acceptable  Hydration status: stable  Multimodal analgesia pain management approach

## 2023-06-20 NOTE — PROGRESS NOTES
Form mailed with address on envelope provided. Copy sent to scan.    Rhina JUÁREZ   shift change report given to [employer] (oncoming nurse) by Rebekah Parnell (offgoing nurse).

## 2023-07-20 NOTE — PRE-PROCEDURE INSTRUCTIONS
Attempted to complete PAT with the patient. Left a detailed message ([phone]) with pre-procedure instructions, important documents to bring the morning of the procedure, prep instructions, and the need for a ride post procedure. Orders placed.       Arrival time: 0600

## 2023-07-21 ENCOUNTER — HOSPITAL ENCOUNTER (OUTPATIENT)
Facility: HOSPITAL | Age: 63
Setting detail: OUTPATIENT SURGERY
Discharge: HOME | End: 2023-07-21
Attending: SURGERY | Admitting: SURGERY
Payer: MEDICAID

## 2023-07-21 ENCOUNTER — APPOINTMENT (OUTPATIENT)
Facility: HOSPITAL | Age: 63
End: 2023-07-21
Attending: SURGERY
Payer: MEDICAID

## 2023-07-21 ENCOUNTER — ANESTHESIA (OUTPATIENT)
Facility: HOSPITAL | Age: 63
End: 2023-07-21
Payer: MEDICAID

## 2023-07-21 ENCOUNTER — ANESTHESIA EVENT (OUTPATIENT)
Facility: HOSPITAL | Age: 63
End: 2023-07-21
Payer: MEDICAID

## 2023-07-21 VITALS
DIASTOLIC BLOOD PRESSURE: 82 MMHG | WEIGHT: 198 LBS | RESPIRATION RATE: 18 BRPM | HEART RATE: 72 BPM | HEIGHT: 64 IN | TEMPERATURE: 97.2 F | BODY MASS INDEX: 33.8 KG/M2 | SYSTOLIC BLOOD PRESSURE: 142 MMHG | OXYGEN SATURATION: 95 %

## 2023-07-21 PROCEDURE — 71045 X-RAY EXAM CHEST 1 VIEW: CPT

## 2023-07-21 PROCEDURE — 3600007502: Performed by: SURGERY

## 2023-07-21 PROCEDURE — 2709999900 HC NON-CHARGEABLE SUPPLY: Performed by: SURGERY

## 2023-07-21 PROCEDURE — 3700000000 HC ANESTHESIA ATTENDED CARE: Performed by: SURGERY

## 2023-07-21 PROCEDURE — 6360000002 HC RX W HCPCS: Performed by: NURSE ANESTHETIST, CERTIFIED REGISTERED

## 2023-07-21 PROCEDURE — 2580000003 HC RX 258: Performed by: NURSE ANESTHETIST, CERTIFIED REGISTERED

## 2023-07-21 PROCEDURE — 3700000001 HC ADD 15 MINUTES (ANESTHESIA): Performed by: SURGERY

## 2023-07-21 PROCEDURE — 74018 RADEX ABDOMEN 1 VIEW: CPT

## 2023-07-21 PROCEDURE — 3600007512: Performed by: SURGERY

## 2023-07-21 PROCEDURE — 2500000003 HC RX 250 WO HCPCS: Performed by: NURSE ANESTHETIST, CERTIFIED REGISTERED

## 2023-07-21 PROCEDURE — 6360000002 HC RX W HCPCS: Performed by: ANESTHESIOLOGY

## 2023-07-21 PROCEDURE — 6370000000 HC RX 637 (ALT 250 FOR IP): Performed by: SURGERY

## 2023-07-21 PROCEDURE — 7100000011 HC PHASE II RECOVERY - ADDTL 15 MIN: Performed by: SURGERY

## 2023-07-21 PROCEDURE — 7100000010 HC PHASE II RECOVERY - FIRST 15 MIN: Performed by: SURGERY

## 2023-07-21 PROCEDURE — 2580000003 HC RX 258: Performed by: SURGERY

## 2023-07-21 RX ORDER — LIDOCAINE HYDROCHLORIDE 20 MG/ML
INJECTION, SOLUTION EPIDURAL; INFILTRATION; INTRACAUDAL; PERINEURAL PRN
Status: DISCONTINUED | OUTPATIENT
Start: 2023-07-21 | End: 2023-07-21 | Stop reason: SDUPTHER

## 2023-07-21 RX ORDER — SODIUM CHLORIDE, SODIUM LACTATE, POTASSIUM CHLORIDE, CALCIUM CHLORIDE 600; 310; 30; 20 MG/100ML; MG/100ML; MG/100ML; MG/100ML
INJECTION, SOLUTION INTRAVENOUS CONTINUOUS
Status: DISCONTINUED | OUTPATIENT
Start: 2023-07-21 | End: 2023-07-21 | Stop reason: HOSPADM

## 2023-07-21 RX ORDER — ACETAMINOPHEN 650 MG/1
650 SUPPOSITORY RECTAL ONCE
Status: COMPLETED | OUTPATIENT
Start: 2023-07-21 | End: 2023-07-21

## 2023-07-21 RX ORDER — SODIUM CHLORIDE 9 MG/ML
INJECTION, SOLUTION INTRAVENOUS CONTINUOUS
Status: DISCONTINUED | OUTPATIENT
Start: 2023-07-21 | End: 2023-07-21 | Stop reason: HOSPADM

## 2023-07-21 RX ORDER — HYDROMORPHONE HYDROCHLORIDE 1 MG/ML
1 INJECTION, SOLUTION INTRAMUSCULAR; INTRAVENOUS; SUBCUTANEOUS ONCE
Status: COMPLETED | OUTPATIENT
Start: 2023-07-21 | End: 2023-07-21

## 2023-07-21 RX ORDER — SODIUM CHLORIDE, SODIUM LACTATE, POTASSIUM CHLORIDE, CALCIUM CHLORIDE 600; 310; 30; 20 MG/100ML; MG/100ML; MG/100ML; MG/100ML
INJECTION, SOLUTION INTRAVENOUS CONTINUOUS PRN
Status: DISCONTINUED | OUTPATIENT
Start: 2023-07-21 | End: 2023-07-21 | Stop reason: SDUPTHER

## 2023-07-21 RX ADMIN — PROPOFOL 50 MG: 10 INJECTION, EMULSION INTRAVENOUS at 07:45

## 2023-07-21 RX ADMIN — HYDROMORPHONE HYDROCHLORIDE 1 MG: 1 INJECTION, SOLUTION INTRAMUSCULAR; INTRAVENOUS; SUBCUTANEOUS at 09:31

## 2023-07-21 RX ADMIN — SODIUM CHLORIDE, POTASSIUM CHLORIDE, SODIUM LACTATE AND CALCIUM CHLORIDE: 600; 310; 30; 20 INJECTION, SOLUTION INTRAVENOUS at 07:35

## 2023-07-21 RX ADMIN — PROPOFOL 50 MG: 10 INJECTION, EMULSION INTRAVENOUS at 08:10

## 2023-07-21 RX ADMIN — SODIUM CHLORIDE, POTASSIUM CHLORIDE, SODIUM LACTATE AND CALCIUM CHLORIDE: 600; 310; 30; 20 INJECTION, SOLUTION INTRAVENOUS at 07:31

## 2023-07-21 RX ADMIN — LIDOCAINE HYDROCHLORIDE 50 MG: 20 INJECTION, SOLUTION EPIDURAL; INFILTRATION; INTRACAUDAL; PERINEURAL at 07:40

## 2023-07-21 RX ADMIN — PROPOFOL 100 MG: 10 INJECTION, EMULSION INTRAVENOUS at 07:40

## 2023-07-21 RX ADMIN — ACETAMINOPHEN 650 MG: 650 SUPPOSITORY RECTAL at 10:38

## 2023-07-21 RX ADMIN — PROPOFOL 50 MG: 10 INJECTION, EMULSION INTRAVENOUS at 08:07

## 2023-07-21 RX ADMIN — PROPOFOL 50 MG: 10 INJECTION, EMULSION INTRAVENOUS at 08:01

## 2023-07-21 RX ADMIN — PROPOFOL 50 MG: 10 INJECTION, EMULSION INTRAVENOUS at 07:55

## 2023-07-21 RX ADMIN — PROPOFOL 50 MG: 10 INJECTION, EMULSION INTRAVENOUS at 07:50

## 2023-07-21 ASSESSMENT — PAIN SCALES - GENERAL
PAINLEVEL_OUTOF10: 10
PAINLEVEL_OUTOF10: 10
PAINLEVEL_OUTOF10: 5
PAINLEVEL_OUTOF10: 10
PAINLEVEL_OUTOF10: 5

## 2023-07-21 ASSESSMENT — PAIN DESCRIPTION - LOCATION
LOCATION: ABDOMEN

## 2023-07-21 ASSESSMENT — PAIN DESCRIPTION - ORIENTATION
ORIENTATION: RIGHT

## 2023-07-21 ASSESSMENT — PAIN DESCRIPTION - DESCRIPTORS
DESCRIPTORS: SHARP
DESCRIPTORS: CRAMPING;SHARP
DESCRIPTORS: CRAMPING;SHARP

## 2023-07-21 ASSESSMENT — PAIN - FUNCTIONAL ASSESSMENT: PAIN_FUNCTIONAL_ASSESSMENT: 0-10

## 2023-07-21 NOTE — PROGRESS NOTES
Patient vomited medium amount of emesis. States she feels better afterwards. Dr. Leann Sexton made aware and states patient can still go home. Waiting for patients brother to come and pick her up.

## 2023-07-21 NOTE — PROGRESS NOTES
Patient c/o abd pain, right quad. Has been passing gas but she states it doesn't feel like gas. Charge nurse Griffin Smyth RN aware.  paged. (4) no limitation

## 2023-07-21 NOTE — PERIOP NOTE
Dr. Gwendolyn Rowell and Dr. Isai Fallon in to see patient while awaiting for Dr. Monty Roland-- Dr. Monty Roland called discussed pain and abdominal tenderness with distension with him. Orders received for stat xrays. Patient tearful.

## 2023-07-21 NOTE — PERIOP NOTE
9033- Dr. Cuong Ramirez in to see patient assessed, awaiting results of stat x-rays, Tylenol 650mg suppository ordered. Will continue to monitor.

## 2023-07-21 NOTE — PERIOP NOTE
12- Dr. Cruz Fearing back in to assess patient stated she is ok to be discharged instructed to return to the ER if any symptoms develop or worsen, follow up in the office in one week. Patient verbalized understanding.

## 2023-07-21 NOTE — ANESTHESIA POSTPROCEDURE EVALUATION
Department of Anesthesiology  Postprocedure Note    Patient: Hanna Mauricio  MRN: [id number]  YOB: 1960  Date of evaluation: 7/21/2023      Procedure Summary     Date: 07/21/23 Room / Location: [facility] ENDO 01 / [facility] ENDOSCOPY    Anesthesia Start: 0737 Anesthesia Stop:     Procedures:       EGD ESOPHAGOGASTRODUODENOSCOPY (Upper GI Region)      COLONOSCOPY WITH BIOPSY (Lower GI Region) Diagnosis:       Gastritis, presence of bleeding unspecified, unspecified chronicity, unspecified gastritis type      Abdominal pain, unspecified abdominal location      (Gastritis, presence of bleeding unspecified, unspecified chronicity, unspecified gastritis type [K29.70])      (Abdominal pain, unspecified abdominal location [R10.9])    Surgeons: Mauri Ortiz MD Responsible Provider: Sarah Jacobsen MD    Anesthesia Type: MAC ASA Status: 3          Anesthesia Type: MAC    Royal Phase I: Royal Score: 9    Royal Phase II:        Anesthesia Post Evaluation    Patient location during evaluation: bedside  Patient participation: waiting for patient participation  Level of consciousness: sleepy but conscious  Pain score: 0  Airway patency: patent  Nausea & Vomiting: no vomiting and no nausea  Complications: no  Cardiovascular status: blood pressure returned to baseline and hemodynamically stable  Respiratory status: acceptable and face mask  Hydration status: euvolemic

## 2023-07-21 NOTE — ANESTHESIA PRE PROCEDURE
Department of Anesthesiology  Preprocedure Note       Name:  Linda Juarez   Age:  66 y.o.  :  1960                                          MRN:  [id number]         Date:  2023      Surgeon: Hayley Sorensen):  Tejinder Smyth MD    Procedure: Procedure(s):  EGD ESOPHAGOGASTRODUODENOSCOPY  COLONOSCOPY WITH BIOPSY    Medications prior to admission:   Prior to Admission medications    Medication Sig Start Date End Date Taking? Authorizing Provider   ASPIRIN 81 PO Take 81 mg by mouth daily    Historical Provider, MD   amLODIPine (NORVASC) 10 MG tablet Take 1 tablet by mouth daily    Ar Automatic Reconciliation   busPIRone (BUSPAR) 5 MG tablet Take 1 tablet by mouth 2 times daily 22   Ar Automatic Reconciliation   Calcium Carb-Cholecalciferol 500-10 MG-MCG TABS Take by mouth daily    Ar Automatic Reconciliation   carvedilol (COREG) 6.25 MG tablet Take 1 tablet by mouth 2 times daily (with meals) 21   Ar Automatic Reconciliation   cetirizine (ZYRTEC) 10 MG tablet Take 1 tablet by mouth daily 12/3/22   Ar Automatic Reconciliation   ergocalciferol (ERGOCALCIFEROL) 1.25 MG (24406 UT) capsule Take 1 capsule by mouth every 7 days    Ar Automatic Reconciliation   fluticasone (FLONASE) 50 MCG/ACT nasal spray 2 sprays by Nasal route daily    Ar Automatic Reconciliation   furosemide (LASIX) 40 MG tablet Lasix 40 mg daily 21   Ar Automatic Reconciliation   guaiFENesin 1200 MG TB12 Take 1,200 mg by mouth in the morning and 1,200 mg in the evening.  22   Ar Automatic Reconciliation   guaiFENesin (MUCINEX) 600 MG extended release tablet Take 1 tablet by mouth 2 times daily 20   Ar Automatic Reconciliation   ipratropium-albuterol (DUONEB) 0.5-2.5 (3) MG/3ML SOLN nebulizer solution Inhale 3 mLs into the lungs every 6 hours as needed 20   Ar Automatic Reconciliation   mometasone-formoterol (DULERA) 200-5 MCG/ACT inhaler Inhale 2 puffs into the lungs 2 times daily    Ar Automatic Reconciliation

## 2023-07-21 NOTE — PROCEDURES
PROCEDURE NOTE      Patient: Pratibha Mary  YOB: 1960  MRN: [id number]    Date of Procedure: 7/21/2023    Pre-Op Diagnosis Codes:     * Gastritis, presence of bleeding unspecified, unspecified chronicity, unspecified gastritis type [K29.70]     * Abdominal pain, unspecified abdominal location [R10.9]  Colon Cancer Screening    Post-Op Diagnosis: Same    Procedure:  EGD  Colonoscopy       Surgeon(s):  Kristin Au MD    Assistant:  * No surgical staff found *    Anesthesia: Monitor Anesthesia Care    Anesthesiologist: Dr. Paul Guzmán    CRNA:  Rosie Pastrana    Indication:    Procedure:    Estimated Blood Loss (mL): Minimal    Complications: None    Specimens:   * No specimens in log *    Implants:  * No implants in log *      Drains: * No LDAs found *    Findings: Single diverticulum in sigmoid, Hemorrhoids      Electronically signed by Kristin Au MD on 7/21/2023 at 8:21 AM

## 2023-12-18 ENCOUNTER — HOSPITAL ENCOUNTER (INPATIENT)
Facility: HOSPITAL | Age: 63
LOS: 8 days | Discharge: HOME | DRG: 140 | End: 2023-12-27
Attending: STUDENT IN AN ORGANIZED HEALTH CARE EDUCATION/TRAINING PROGRAM | Admitting: FAMILY MEDICINE
Payer: MEDICAID

## 2023-12-18 ENCOUNTER — APPOINTMENT (OUTPATIENT)
Facility: HOSPITAL | Age: 63
DRG: 140 | End: 2023-12-18
Payer: MEDICAID

## 2023-12-18 DIAGNOSIS — J44.1 COPD EXACERBATION (HCC): Primary | ICD-10-CM

## 2023-12-18 DIAGNOSIS — J10.1 INFLUENZA A: ICD-10-CM

## 2023-12-18 LAB
ALBUMIN SERPL-MCNC: 3.6 G/DL (ref 3.5–5)
ALBUMIN/GLOB SERPL: 1.1 (ref 1.1–2.2)
ALP SERPL-CCNC: 113 U/L (ref 45–117)
ALT SERPL-CCNC: 26 U/L (ref 12–78)
ANION GAP SERPL CALC-SCNC: 8 MMOL/L (ref 5–15)
AST SERPL W P-5'-P-CCNC: 11 U/L (ref 15–37)
BILIRUB SERPL-MCNC: 0.5 MG/DL (ref 0.2–1)
BNP SERPL-MCNC: 134 PG/ML
BUN SERPL-MCNC: 12 MG/DL (ref 6–20)
BUN/CREAT SERPL: 11 (ref 12–20)
CA-I BLD-MCNC: 9.2 MG/DL (ref 8.5–10.1)
CHLORIDE SERPL-SCNC: 102 MMOL/L (ref 97–108)
CO2 SERPL-SCNC: 27 MMOL/L (ref 21–32)
CREAT SERPL-MCNC: 1.05 MG/DL (ref 0.55–1.02)
GLOBULIN SER CALC-MCNC: 3.4 G/DL (ref 2–4)
GLUCOSE SERPL-MCNC: 120 MG/DL (ref 65–100)
POTASSIUM SERPL-SCNC: 4 MMOL/L (ref 3.5–5.1)
PROCALCITONIN SERPL-MCNC: <0.05 NG/ML
PROT SERPL-MCNC: 7 G/DL (ref 6.4–8.2)
SODIUM SERPL-SCNC: 137 MMOL/L (ref 136–145)
TROPONIN I SERPL HS-MCNC: 6 NG/L (ref 0–51)

## 2023-12-18 PROCEDURE — 96375 TX/PRO/DX INJ NEW DRUG ADDON: CPT

## 2023-12-18 PROCEDURE — 6360000002 HC RX W HCPCS: Performed by: STUDENT IN AN ORGANIZED HEALTH CARE EDUCATION/TRAINING PROGRAM

## 2023-12-18 PROCEDURE — 83880 ASSAY OF NATRIURETIC PEPTIDE: CPT

## 2023-12-18 PROCEDURE — 87040 BLOOD CULTURE FOR BACTERIA: CPT

## 2023-12-18 PROCEDURE — 36415 COLL VENOUS BLD VENIPUNCTURE: CPT

## 2023-12-18 PROCEDURE — 2580000003 HC RX 258: Performed by: STUDENT IN AN ORGANIZED HEALTH CARE EDUCATION/TRAINING PROGRAM

## 2023-12-18 PROCEDURE — 80053 COMPREHEN METABOLIC PANEL: CPT

## 2023-12-18 PROCEDURE — 81001 URINALYSIS AUTO W/SCOPE: CPT

## 2023-12-18 PROCEDURE — 83605 ASSAY OF LACTIC ACID: CPT

## 2023-12-18 PROCEDURE — 96365 THER/PROPH/DIAG IV INF INIT: CPT

## 2023-12-18 PROCEDURE — 87635 SARS-COV-2 COVID-19 AMP PRB: CPT

## 2023-12-18 PROCEDURE — 87804 INFLUENZA ASSAY W/OPTIC: CPT

## 2023-12-18 PROCEDURE — 93005 ELECTROCARDIOGRAM TRACING: CPT | Performed by: STUDENT IN AN ORGANIZED HEALTH CARE EDUCATION/TRAINING PROGRAM

## 2023-12-18 PROCEDURE — 99285 EMERGENCY DEPT VISIT HI MDM: CPT

## 2023-12-18 PROCEDURE — 71045 X-RAY EXAM CHEST 1 VIEW: CPT

## 2023-12-18 PROCEDURE — 85025 COMPLETE CBC W/AUTO DIFF WBC: CPT

## 2023-12-18 PROCEDURE — 84145 PROCALCITONIN (PCT): CPT

## 2023-12-18 PROCEDURE — 94640 AIRWAY INHALATION TREATMENT: CPT

## 2023-12-18 PROCEDURE — 84484 ASSAY OF TROPONIN QUANT: CPT

## 2023-12-18 PROCEDURE — 6370000000 HC RX 637 (ALT 250 FOR IP): Performed by: STUDENT IN AN ORGANIZED HEALTH CARE EDUCATION/TRAINING PROGRAM

## 2023-12-18 PROCEDURE — 94761 N-INVAS EAR/PLS OXIMETRY MLT: CPT

## 2023-12-18 RX ORDER — IPRATROPIUM BROMIDE AND ALBUTEROL SULFATE 2.5; .5 MG/3ML; MG/3ML
1 SOLUTION RESPIRATORY (INHALATION)
Status: COMPLETED | OUTPATIENT
Start: 2023-12-18 | End: 2023-12-18

## 2023-12-18 RX ORDER — 0.9 % SODIUM CHLORIDE 0.9 %
1000 INTRAVENOUS SOLUTION INTRAVENOUS ONCE
Status: COMPLETED | OUTPATIENT
Start: 2023-12-18 | End: 2023-12-19

## 2023-12-18 RX ADMIN — IPRATROPIUM BROMIDE AND ALBUTEROL SULFATE 1 DOSE: .5; 3 SOLUTION RESPIRATORY (INHALATION) at 22:41

## 2023-12-18 RX ADMIN — CEFTRIAXONE SODIUM 1000 MG: 1 INJECTION, POWDER, FOR SOLUTION INTRAMUSCULAR; INTRAVENOUS at 22:40

## 2023-12-18 RX ADMIN — IPRATROPIUM BROMIDE AND ALBUTEROL SULFATE 1 DOSE: .5; 3 SOLUTION RESPIRATORY (INHALATION) at 22:40

## 2023-12-18 RX ADMIN — AZITHROMYCIN MONOHYDRATE 500 MG: 500 INJECTION, POWDER, LYOPHILIZED, FOR SOLUTION INTRAVENOUS at 22:41

## 2023-12-18 RX ADMIN — IPRATROPIUM BROMIDE AND ALBUTEROL SULFATE 1 DOSE: .5; 3 SOLUTION RESPIRATORY (INHALATION) at 22:39

## 2023-12-18 RX ADMIN — METHYLPREDNISOLONE SODIUM SUCCINATE 125 MG: 125 INJECTION INTRAMUSCULAR; INTRAVENOUS at 22:40

## 2023-12-18 RX ADMIN — SODIUM CHLORIDE 1000 ML: 9 INJECTION, SOLUTION INTRAVENOUS at 23:47

## 2023-12-19 LAB
APPEARANCE UR: CLEAR
BACTERIA URNS QL MICRO: NEGATIVE /HPF
BASOPHILS # BLD: 0 K/UL (ref 0–0.1)
BASOPHILS NFR BLD: 0 % (ref 0–1)
BILIRUB UR QL: NEGATIVE
COLOR UR: ABNORMAL
DIFFERENTIAL METHOD BLD: ABNORMAL
EOSINOPHIL # BLD: 0 K/UL (ref 0–0.4)
EOSINOPHIL NFR BLD: 0 % (ref 0–7)
EPITH CASTS URNS QL MICRO: ABNORMAL /LPF
ERYTHROCYTE [DISTWIDTH] IN BLOOD BY AUTOMATED COUNT: 12.5 % (ref 11.5–14.5)
FLUAV AG NPH QL IA: POSITIVE
FLUBV AG NOSE QL IA: NEGATIVE
GLUCOSE UR STRIP.AUTO-MCNC: NEGATIVE MG/DL
HCT VFR BLD AUTO: 36.5 % (ref 35–47)
HGB BLD-MCNC: 11.6 G/DL (ref 11.5–16)
HGB UR QL STRIP: ABNORMAL
IMM GRANULOCYTES # BLD AUTO: 0.1 K/UL (ref 0–0.04)
IMM GRANULOCYTES NFR BLD AUTO: 1 % (ref 0–0.5)
KETONES UR QL STRIP.AUTO: NEGATIVE MG/DL
LEUKOCYTE ESTERASE UR QL STRIP.AUTO: NEGATIVE
LYMPHOCYTES # BLD: 0.6 K/UL (ref 0.8–3.5)
LYMPHOCYTES NFR BLD: 4 % (ref 12–49)
MCH RBC QN AUTO: 28.6 PG (ref 26–34)
MCHC RBC AUTO-ENTMCNC: 31.8 G/DL (ref 30–36.5)
MCV RBC AUTO: 90.1 FL (ref 80–99)
MONOCYTES # BLD: 0.6 K/UL (ref 0–1)
MONOCYTES NFR BLD: 5 % (ref 5–13)
MUCOUS THREADS URNS QL MICRO: NEGATIVE /LPF
NEUTS SEG # BLD: 11.4 K/UL (ref 1.8–8)
NEUTS SEG NFR BLD: 90 % (ref 32–75)
NITRITE UR QL STRIP.AUTO: NEGATIVE
NRBC # BLD: 0 K/UL (ref 0–0.01)
NRBC BLD-RTO: 0 PER 100 WBC
PH UR STRIP: 7 (ref 5–8)
PLATELET # BLD AUTO: 261 K/UL (ref 150–400)
PMV BLD AUTO: 10.5 FL (ref 8.9–12.9)
PROT UR STRIP-MCNC: 100 MG/DL
RBC # BLD AUTO: 4.05 M/UL (ref 3.8–5.2)
RBC #/AREA URNS HPF: ABNORMAL /HPF (ref 0–5)
SARS-COV-2 RDRP RESP QL NAA+PROBE: NOT DETECTED
SP GR UR REFRACTOMETRY: 1.01 (ref 1–1.03)
TROPONIN I SERPL HS-MCNC: 5 NG/L (ref 0–51)
URINE CULTURE IF INDICATED: ABNORMAL
UROBILINOGEN UR QL STRIP.AUTO: 0.1 EU/DL (ref 0.1–1)
WBC # BLD AUTO: 12.7 K/UL (ref 3.6–11)
WBC URNS QL MICRO: ABNORMAL /HPF (ref 0–4)

## 2023-12-19 PROCEDURE — 94640 AIRWAY INHALATION TREATMENT: CPT

## 2023-12-19 PROCEDURE — 6360000002 HC RX W HCPCS: Performed by: FAMILY MEDICINE

## 2023-12-19 PROCEDURE — 6370000000 HC RX 637 (ALT 250 FOR IP): Performed by: STUDENT IN AN ORGANIZED HEALTH CARE EDUCATION/TRAINING PROGRAM

## 2023-12-19 PROCEDURE — 94761 N-INVAS EAR/PLS OXIMETRY MLT: CPT

## 2023-12-19 PROCEDURE — 2700000000 HC OXYGEN THERAPY PER DAY

## 2023-12-19 PROCEDURE — 2580000003 HC RX 258: Performed by: FAMILY MEDICINE

## 2023-12-19 PROCEDURE — 6370000000 HC RX 637 (ALT 250 FOR IP): Performed by: FAMILY MEDICINE

## 2023-12-19 PROCEDURE — 1100000000 HC RM PRIVATE

## 2023-12-19 RX ORDER — PANTOPRAZOLE SODIUM 40 MG/1
40 TABLET, DELAYED RELEASE ORAL
Status: DISCONTINUED | OUTPATIENT
Start: 2023-12-19 | End: 2023-12-28 | Stop reason: HOSPADM

## 2023-12-19 RX ORDER — ACETAMINOPHEN 500 MG
1000 TABLET ORAL ONCE
Status: COMPLETED | OUTPATIENT
Start: 2023-12-19 | End: 2023-12-19

## 2023-12-19 RX ORDER — BUSPIRONE HYDROCHLORIDE 5 MG/1
5 TABLET ORAL 2 TIMES DAILY
Status: DISCONTINUED | OUTPATIENT
Start: 2023-12-19 | End: 2023-12-28 | Stop reason: HOSPADM

## 2023-12-19 RX ORDER — AMLODIPINE BESYLATE 5 MG/1
10 TABLET ORAL DAILY
Status: DISCONTINUED | OUTPATIENT
Start: 2023-12-19 | End: 2023-12-28 | Stop reason: HOSPADM

## 2023-12-19 RX ORDER — MAGNESIUM SULFATE IN WATER 40 MG/ML
2000 INJECTION, SOLUTION INTRAVENOUS PRN
Status: DISCONTINUED | OUTPATIENT
Start: 2023-12-19 | End: 2023-12-28 | Stop reason: HOSPADM

## 2023-12-19 RX ORDER — FLUTICASONE PROPIONATE 50 MCG
2 SPRAY, SUSPENSION (ML) NASAL DAILY
Status: DISCONTINUED | OUTPATIENT
Start: 2023-12-19 | End: 2023-12-28 | Stop reason: HOSPADM

## 2023-12-19 RX ORDER — ACETAMINOPHEN 650 MG/1
650 SUPPOSITORY RECTAL EVERY 6 HOURS PRN
Status: DISCONTINUED | OUTPATIENT
Start: 2023-12-19 | End: 2023-12-28 | Stop reason: HOSPADM

## 2023-12-19 RX ORDER — CODEINE PHOSPHATE AND GUAIFENESIN 10; 100 MG/5ML; MG/5ML
5 SOLUTION ORAL ONCE
Status: COMPLETED | OUTPATIENT
Start: 2023-12-19 | End: 2023-12-19

## 2023-12-19 RX ORDER — SODIUM CHLORIDE 0.9 % (FLUSH) 0.9 %
5-40 SYRINGE (ML) INJECTION PRN
Status: DISCONTINUED | OUTPATIENT
Start: 2023-12-19 | End: 2023-12-28 | Stop reason: HOSPADM

## 2023-12-19 RX ORDER — IPRATROPIUM BROMIDE AND ALBUTEROL SULFATE 2.5; .5 MG/3ML; MG/3ML
1 SOLUTION RESPIRATORY (INHALATION)
Status: DISCONTINUED | OUTPATIENT
Start: 2023-12-19 | End: 2023-12-22

## 2023-12-19 RX ORDER — POTASSIUM CHLORIDE 7.45 MG/ML
10 INJECTION INTRAVENOUS PRN
Status: DISCONTINUED | OUTPATIENT
Start: 2023-12-19 | End: 2023-12-28 | Stop reason: HOSPADM

## 2023-12-19 RX ORDER — POLYETHYLENE GLYCOL 3350 17 G/17G
17 POWDER, FOR SOLUTION ORAL DAILY PRN
Status: DISCONTINUED | OUTPATIENT
Start: 2023-12-19 | End: 2023-12-28 | Stop reason: HOSPADM

## 2023-12-19 RX ORDER — ONDANSETRON 2 MG/ML
4 INJECTION INTRAMUSCULAR; INTRAVENOUS EVERY 6 HOURS PRN
Status: DISCONTINUED | OUTPATIENT
Start: 2023-12-19 | End: 2023-12-28 | Stop reason: HOSPADM

## 2023-12-19 RX ORDER — FUROSEMIDE 40 MG/1
40 TABLET ORAL DAILY
Status: DISCONTINUED | OUTPATIENT
Start: 2023-12-19 | End: 2023-12-28 | Stop reason: HOSPADM

## 2023-12-19 RX ORDER — ACETAMINOPHEN 325 MG/1
650 TABLET ORAL EVERY 6 HOURS PRN
Status: DISCONTINUED | OUTPATIENT
Start: 2023-12-19 | End: 2023-12-28 | Stop reason: HOSPADM

## 2023-12-19 RX ORDER — ENOXAPARIN SODIUM 100 MG/ML
40 INJECTION SUBCUTANEOUS DAILY
Status: DISCONTINUED | OUTPATIENT
Start: 2023-12-19 | End: 2023-12-28 | Stop reason: HOSPADM

## 2023-12-19 RX ORDER — SODIUM CHLORIDE 0.9 % (FLUSH) 0.9 %
5-40 SYRINGE (ML) INJECTION EVERY 12 HOURS SCHEDULED
Status: DISCONTINUED | OUTPATIENT
Start: 2023-12-19 | End: 2023-12-28 | Stop reason: HOSPADM

## 2023-12-19 RX ORDER — OSELTAMIVIR PHOSPHATE 75 MG/1
75 CAPSULE ORAL ONCE
Status: COMPLETED | OUTPATIENT
Start: 2023-12-19 | End: 2023-12-19

## 2023-12-19 RX ORDER — ONDANSETRON 4 MG/1
4 TABLET, ORALLY DISINTEGRATING ORAL EVERY 8 HOURS PRN
Status: DISCONTINUED | OUTPATIENT
Start: 2023-12-19 | End: 2023-12-28 | Stop reason: HOSPADM

## 2023-12-19 RX ORDER — CETIRIZINE HYDROCHLORIDE 10 MG/1
10 TABLET ORAL DAILY
Status: DISCONTINUED | OUTPATIENT
Start: 2023-12-19 | End: 2023-12-28 | Stop reason: HOSPADM

## 2023-12-19 RX ORDER — METHYLPREDNISOLONE SODIUM SUCCINATE 40 MG/ML
40 INJECTION, POWDER, LYOPHILIZED, FOR SOLUTION INTRAMUSCULAR; INTRAVENOUS EVERY 6 HOURS
Status: DISCONTINUED | OUTPATIENT
Start: 2023-12-19 | End: 2023-12-22

## 2023-12-19 RX ORDER — BUDESONIDE AND FORMOTEROL FUMARATE DIHYDRATE 80; 4.5 UG/1; UG/1
2 AEROSOL RESPIRATORY (INHALATION)
Status: DISCONTINUED | OUTPATIENT
Start: 2023-12-19 | End: 2023-12-22

## 2023-12-19 RX ORDER — MONTELUKAST SODIUM 10 MG/1
10 TABLET ORAL DAILY
Status: DISCONTINUED | OUTPATIENT
Start: 2023-12-19 | End: 2023-12-28 | Stop reason: HOSPADM

## 2023-12-19 RX ORDER — SODIUM CHLORIDE 9 MG/ML
INJECTION, SOLUTION INTRAVENOUS PRN
Status: DISCONTINUED | OUTPATIENT
Start: 2023-12-19 | End: 2023-12-28 | Stop reason: HOSPADM

## 2023-12-19 RX ORDER — POTASSIUM CHLORIDE 20 MEQ/1
40 TABLET, EXTENDED RELEASE ORAL PRN
Status: DISCONTINUED | OUTPATIENT
Start: 2023-12-19 | End: 2023-12-28 | Stop reason: HOSPADM

## 2023-12-19 RX ORDER — GUAIFENESIN 600 MG/1
600 TABLET, EXTENDED RELEASE ORAL 2 TIMES DAILY
Status: DISCONTINUED | OUTPATIENT
Start: 2023-12-19 | End: 2023-12-28 | Stop reason: HOSPADM

## 2023-12-19 RX ORDER — OSELTAMIVIR PHOSPHATE 75 MG/1
75 CAPSULE ORAL 2 TIMES DAILY
Status: COMPLETED | OUTPATIENT
Start: 2023-12-19 | End: 2023-12-23

## 2023-12-19 RX ORDER — ASPIRIN 81 MG/1
81 TABLET ORAL DAILY
Status: DISCONTINUED | OUTPATIENT
Start: 2023-12-19 | End: 2023-12-28 | Stop reason: HOSPADM

## 2023-12-19 RX ORDER — CARVEDILOL 3.12 MG/1
6.25 TABLET ORAL 2 TIMES DAILY WITH MEALS
Status: DISCONTINUED | OUTPATIENT
Start: 2023-12-19 | End: 2023-12-28 | Stop reason: HOSPADM

## 2023-12-19 RX ORDER — SODIUM CHLORIDE 9 MG/ML
INJECTION, SOLUTION INTRAVENOUS CONTINUOUS
Status: DISCONTINUED | OUTPATIENT
Start: 2023-12-19 | End: 2023-12-22

## 2023-12-19 RX ORDER — ERGOCALCIFEROL 1.25 MG/1
50000 CAPSULE ORAL
Status: DISCONTINUED | OUTPATIENT
Start: 2023-12-19 | End: 2023-12-28 | Stop reason: HOSPADM

## 2023-12-19 RX ORDER — SERTRALINE HYDROCHLORIDE 25 MG/1
25 TABLET, FILM COATED ORAL DAILY
Status: DISCONTINUED | OUTPATIENT
Start: 2023-12-19 | End: 2023-12-28 | Stop reason: HOSPADM

## 2023-12-19 RX ORDER — METHOCARBAMOL 750 MG/1
750 TABLET, FILM COATED ORAL ONCE
Status: COMPLETED | OUTPATIENT
Start: 2023-12-19 | End: 2023-12-19

## 2023-12-19 RX ADMIN — METHYLPREDNISOLONE SODIUM SUCCINATE 40 MG: 40 INJECTION INTRAMUSCULAR; INTRAVENOUS at 10:47

## 2023-12-19 RX ADMIN — IPRATROPIUM BROMIDE AND ALBUTEROL SULFATE 1 DOSE: 2.5; .5 SOLUTION RESPIRATORY (INHALATION) at 11:00

## 2023-12-19 RX ADMIN — GUAIFENESIN AND CODEINE PHOSPHATE 5 ML: 100; 10 SOLUTION ORAL at 00:48

## 2023-12-19 RX ADMIN — Medication 2 PUFF: at 06:46

## 2023-12-19 RX ADMIN — ENOXAPARIN SODIUM 40 MG: 100 INJECTION SUBCUTANEOUS at 09:02

## 2023-12-19 RX ADMIN — METHYLPREDNISOLONE SODIUM SUCCINATE 40 MG: 40 INJECTION INTRAMUSCULAR; INTRAVENOUS at 16:35

## 2023-12-19 RX ADMIN — OSELTAMIVIR PHOSPHATE 75 MG: 75 CAPSULE ORAL at 21:13

## 2023-12-19 RX ADMIN — CARVEDILOL 6.25 MG: 3.12 TABLET, FILM COATED ORAL at 16:35

## 2023-12-19 RX ADMIN — IPRATROPIUM BROMIDE AND ALBUTEROL SULFATE 1 DOSE: 2.5; .5 SOLUTION RESPIRATORY (INHALATION) at 14:55

## 2023-12-19 RX ADMIN — CETIRIZINE HYDROCHLORIDE 10 MG: 10 TABLET, FILM COATED ORAL at 08:59

## 2023-12-19 RX ADMIN — BUSPIRONE HYDROCHLORIDE 5 MG: 5 TABLET ORAL at 21:12

## 2023-12-19 RX ADMIN — SERTRALINE 25 MG: 25 TABLET, FILM COATED ORAL at 08:59

## 2023-12-19 RX ADMIN — SODIUM CHLORIDE: 9 INJECTION, SOLUTION INTRAVENOUS at 04:14

## 2023-12-19 RX ADMIN — AZITHROMYCIN MONOHYDRATE 500 MG: 500 INJECTION, POWDER, LYOPHILIZED, FOR SOLUTION INTRAVENOUS at 11:03

## 2023-12-19 RX ADMIN — AZITHROMYCIN 500 MG: 500 INJECTION, POWDER, LYOPHILIZED, FOR SOLUTION INTRAVENOUS at 04:13

## 2023-12-19 RX ADMIN — FUROSEMIDE 40 MG: 40 TABLET ORAL at 08:58

## 2023-12-19 RX ADMIN — MONTELUKAST 10 MG: 10 TABLET, FILM COATED ORAL at 11:44

## 2023-12-19 RX ADMIN — METHYLPREDNISOLONE SODIUM SUCCINATE 40 MG: 40 INJECTION INTRAMUSCULAR; INTRAVENOUS at 21:18

## 2023-12-19 RX ADMIN — GUAIFENESIN 600 MG: 600 TABLET, EXTENDED RELEASE ORAL at 08:58

## 2023-12-19 RX ADMIN — SODIUM CHLORIDE, PRESERVATIVE FREE 10 ML: 5 INJECTION INTRAVENOUS at 21:24

## 2023-12-19 RX ADMIN — ACETAMINOPHEN 1000 MG: 500 TABLET ORAL at 00:48

## 2023-12-19 RX ADMIN — OSELTAMIVIR PHOSPHATE 75 MG: 75 CAPSULE ORAL at 01:17

## 2023-12-19 RX ADMIN — BUSPIRONE HYDROCHLORIDE 5 MG: 5 TABLET ORAL at 08:59

## 2023-12-19 RX ADMIN — METHYLPREDNISOLONE SODIUM SUCCINATE 40 MG: 40 INJECTION INTRAMUSCULAR; INTRAVENOUS at 04:13

## 2023-12-19 RX ADMIN — IPRATROPIUM BROMIDE AND ALBUTEROL SULFATE 1 DOSE: 2.5; .5 SOLUTION RESPIRATORY (INHALATION) at 20:34

## 2023-12-19 RX ADMIN — CARVEDILOL 6.25 MG: 3.12 TABLET, FILM COATED ORAL at 07:03

## 2023-12-19 RX ADMIN — METHOCARBAMOL 750 MG: 750 TABLET ORAL at 00:48

## 2023-12-19 RX ADMIN — ERGOCALCIFEROL 50000 UNITS: 1.25 CAPSULE ORAL at 11:44

## 2023-12-19 RX ADMIN — PANTOPRAZOLE SODIUM 40 MG: 40 TABLET, DELAYED RELEASE ORAL at 07:04

## 2023-12-19 RX ADMIN — OSELTAMIVIR PHOSPHATE 75 MG: 75 CAPSULE ORAL at 10:47

## 2023-12-19 RX ADMIN — CEFTRIAXONE SODIUM 1000 MG: 1 INJECTION, POWDER, FOR SOLUTION INTRAMUSCULAR; INTRAVENOUS at 04:13

## 2023-12-19 RX ADMIN — AMLODIPINE BESYLATE 10 MG: 5 TABLET ORAL at 08:58

## 2023-12-19 RX ADMIN — GUAIFENESIN 600 MG: 600 TABLET, EXTENDED RELEASE ORAL at 21:21

## 2023-12-19 RX ADMIN — Medication 2 PUFF: at 20:51

## 2023-12-19 RX ADMIN — ASPIRIN 81 MG: 81 TABLET, COATED ORAL at 08:59

## 2023-12-19 RX ADMIN — IPRATROPIUM BROMIDE AND ALBUTEROL SULFATE 1 DOSE: 2.5; .5 SOLUTION RESPIRATORY (INHALATION) at 06:46

## 2023-12-19 NOTE — ED NOTES
Pt ambulated to and from restroom with little assistance from staff, steady gait. Is back in ED5 on hospital stretcher, appears to be resting comfortably. New jesus and brief applied. Verbalizes that she is more comfortable now.

## 2023-12-19 NOTE — ED NOTES
ED TO INPATIENT SBAR HANDOFF    Patient Name: Zeinab Silva   Preferred Name: Russel Stanton  : 1960  66 y.o. Family/Caregiver Present: no   Code Status Order: Full Code  PO Status:diabetic diet  Telemetry Order:   C-SSRS: Risk of Suicide: No Risk  Sitter no   Restraints:     Sepsis Risk Score Sepsis Risk Score: 8.61    Situation  Chief Complaint   Patient presents with    Shortness of Breath     Brief Description of Patient's Condition: shortness of breath with a hx copd, asthma, and bronchitis. Dx of copd exacerbation  Mental Status: A&Ox4  Arrived from:Home  Imaging:   XR CHEST PORTABLE   Final Result   No acute process identified. Abnormal labs:   Abnormal Labs Reviewed   RAPID INFLUENZA A/B ANTIGENS - Abnormal; Notable for the following components:       Result Value    Influenza A Ag Positive (*)     All other components within normal limits   CBC WITH AUTO DIFFERENTIAL - Abnormal; Notable for the following components:    WBC 12.7 (*)     Neutrophils % 90 (*)     Lymphocytes % 4 (*)     Immature Granulocytes 1 (*)     Neutrophils Absolute 11.4 (*)     Lymphocytes Absolute 0.6 (*)     Absolute Immature Granulocyte 0.1 (*)     All other components within normal limits   COMPREHENSIVE METABOLIC PANEL - Abnormal; Notable for the following components:    Glucose 120 (*)     Creatinine 1.05 (*)     Bun/Cre Ratio 11 (*)     AST 11 (*)     All other components within normal limits   PROCALCITONIN - Abnormal; Notable for the following components:    Procalcitonin <0.05 (*)     All other components within normal limits   URINALYSIS WITH REFLEX TO CULTURE - Abnormal; Notable for the following components:    Protein,  (*)     Blood, Urine Small (*)     All other components within normal limits   BRAIN NATRIURETIC PEPTIDE - Abnormal; Notable for the following components:    NT Pro- (*)     All other components within normal limits       Background  Allergies:    Allergies   Allergen Reactions

## 2023-12-19 NOTE — CONSULTS
Pulmonary and Critical Care Consult    Subjective:   Consult Note: 12/19/2023 @no control      Chief Complaint:   Chief Complaint   Patient presents with    Shortness of Breath        This patient has been seen and evaluated at the request of Dr. Benita Larson    81-year-old lady  I am asked to see for acute on chronic respiratory failure with hypoxia    Patient of my partner Dr. Dereck Suh  Ex-smoker  1 pack a day for 40 years  Quit smoking 2022  Longstanding history of COPD  On home oxygen therapy mostly at night    Presents with worsening shortness of breath with cough and congestion  Also complaining of some chest discomfort  Cough is productive with discolored phlegm    Chest x-ray shows mostly clear lungs with minimal right midlung zone infiltrate    On nasal cannula oxygen at 2 L    Influenza A positive      Review of Systems:  Pertinent items are noted in HPI. Past Medical History:   Diagnosis Date    Anxiety and depression     Breast CA ([address])     left    Chronic obstructive pulmonary disease (HCC)     Hypertension     Oxygen dependent     2L    Seasonal allergies     Sleep apnea      Past Surgical History:   Procedure Laterality Date    COLONOSCOPY N/A 7/16/2021    .  performed by Owen Stiles MD at [address] [facility] [address]      COLONOSCOPY N/A 7/21/2023    COLONOSCOPY WITH BIOPSY performed by Bal Morales MD at [address] [place] [address]  ([address])      IR ANGXPTA ILIAC W STENT      MASTECTOMY Left     SKIN LESION EXCISION Left 5/26/2023    EXCISION OF LEFT CHEST WALL MASS performed by Bal Morales MD at [address] [place] [address] N/A 7/21/2023    EGD ESOPHAGOGASTRODUODENOSCOPY performed by Bal Morales MD at [facility] ENDOSCOPY      Family History   Problem Relation Age of Onset    Cancer Father     Hypertension Mother      Social History     Tobacco Use    Smoking status: Former    Smokeless tobacco: Never   Substance Use Topics    Alcohol use:

## 2023-12-19 NOTE — ED NOTES
Assumed care of patient at this time, patient awake in bed, repositioned for comfort and medicated. Patient given PO fluids as requested.       Mary Sánchez RN  12/19/23 0508

## 2023-12-19 NOTE — ED TRIAGE NOTES
EMS reports called for SOB and cough for a free days. Pt hx of COPD, bronchitis, asthma. Pt gave herself neb treatment.

## 2023-12-19 NOTE — H&P
History and Physical    NAME:   Alba Vazquez   :  1960   MRN:  [id number]     Date/Time: 2023 8:48 AM    Patient PCP: Nav Sultana MD  ______________________________________________________________________       Subjective:     CHIEF COMPLAINT:     Shortness of breath        HISTORY OF PRESENT ILLNESS:       Patient is a 66y.o. year old female history of COPD hypertension depression came to the ER complaining of shortness of breath patient having shortness of breath for last 3 days with some chest pain due to cough cough is productive no fever no chills denies any nausea vomiting diarrhea no constipation seen by the ER physician recommended patient to be admitted for exacerbation of COPD    Past Medical History:   Diagnosis Date    Anxiety and depression     Breast CA ([address])     left    Chronic obstructive pulmonary disease ([address])     Hypertension     Oxygen dependent     2L    Seasonal allergies     Sleep apnea         Past Surgical History:   Procedure Laterality Date    COLONOSCOPY N/A 2021    .  performed by Lucy Brown MD at [address] [place] [address]      COLONOSCOPY N/A 2023    COLONOSCOPY WITH BIOPSY performed by Chad Vogel MD at [address] [place] [address] ( [facility])      IR FRANKXMILAD ILIAC W STENT      MASTECTOMY Left     SKIN LESION EXCISION Left 2023    EXCISION OF LEFT CHEST WALL MASS performed by Chad Vogel MD at [address] [place] [address] N/A 2023    EGD ESOPHAGOGASTRODUODENOSCOPY performed by Chad Vogel MD at [facility] ENDOSCOPY       Social History     Tobacco Use    Smoking status: Former    Smokeless tobacco: Never   Substance Use Topics    Alcohol use: Yes     Comment: occasionally        Family History   Problem Relation Age of Onset    Cancer Father     Hypertension Mother        Allergies   Allergen Reactions    Lisinopril Angioedema        Prior to Admission medications    Medication Sig

## 2023-12-19 NOTE — CARE COORDINATION
12/19/23 1639   Service Assessment   Patient Orientation Alert and Oriented   Cognition Alert   History Provided By Patient   Primary Caregiver Self   Accompanied By/Relationship Pt alone during interview. Support Systems Children;Family Members   Patient's Healthcare Decision Maker is: Legal Next of Kin   PCP Verified by CM Yes  (Dale Diaz - seen 1 month ago.)   Last Visit to PCP Within last 3 months   Prior Functional Level Assistance with the following:;Bathing;Dressing; Toileting;Cooking;Housework; Shopping;Mobility  (At times.)   Current Functional Level Assistance with the following:;Bathing;Dressing; Toileting;Cooking;Housework; Shopping;Mobility  (At times.)   Can patient return to prior living arrangement Yes   Ability to make needs known: Good   Family able to assist with home care needs: Yes   Would you like for me to discuss the discharge plan with any other family members/significant others, and if so, who? Yes  (Sister Judit Johnson)   Financial Resources Medicaid   Community Resources None   Social/Functional History   Lives With Alone   Type of [address] [place] [address] Dr One level   Home Access Level entry   Bathroom Shower/Tub Tub/Shower unit   [address]  (2lpm via [address] [place])   [address] Help From Family;Friend(s)   ADL Assistance Needs assistance   Toileting Needs assistance   Homemaking Assistance Needs assistance   Homemaking Responsibilities Yes   Ambulation Assistance Independent   Transfer Assistance Independent   Active  No   Occupation On disability   Discharge Planning   Type of [address] [facility] Prior To Admission Oxygen Therapy   Potential Assistance Needed N/A   DME Ordered?  No   Potential Assistance Purchasing Medications No   Type of Home Care Services None   Patient expects to be discharged to: House   One/Two Story Residence One story   History of falls? 0

## 2023-12-20 LAB
ALBUMIN SERPL-MCNC: 3.1 G/DL (ref 3.5–5)
ALBUMIN/GLOB SERPL: 0.8 (ref 1.1–2.2)
ALP SERPL-CCNC: 90 U/L (ref 45–117)
ALT SERPL-CCNC: 23 U/L (ref 12–78)
ANION GAP SERPL CALC-SCNC: 3 MMOL/L (ref 5–15)
AST SERPL W P-5'-P-CCNC: 15 U/L (ref 15–37)
BASOPHILS # BLD: 0 K/UL (ref 0–0.1)
BASOPHILS NFR BLD: 0 % (ref 0–1)
BILIRUB SERPL-MCNC: 0.2 MG/DL (ref 0.2–1)
BUN SERPL-MCNC: 21 MG/DL (ref 6–20)
BUN/CREAT SERPL: 22 (ref 12–20)
CA-I BLD-MCNC: 9.3 MG/DL (ref 8.5–10.1)
CHLORIDE SERPL-SCNC: 107 MMOL/L (ref 97–108)
CO2 SERPL-SCNC: 26 MMOL/L (ref 21–32)
CREAT SERPL-MCNC: 0.96 MG/DL (ref 0.55–1.02)
DIFFERENTIAL METHOD BLD: ABNORMAL
EKG ATRIAL RATE: 126 BPM
EKG DIAGNOSIS: NORMAL
EKG P AXIS: 63 DEGREES
EKG P-R INTERVAL: 150 MS
EKG Q-T INTERVAL: 306 MS
EKG QRS DURATION: 76 MS
EKG QTC CALCULATION (BAZETT): 443 MS
EKG R AXIS: -31 DEGREES
EKG T AXIS: 41 DEGREES
EKG VENTRICULAR RATE: 126 BPM
EOSINOPHIL # BLD: 0 K/UL (ref 0–0.4)
EOSINOPHIL NFR BLD: 0 % (ref 0–7)
ERYTHROCYTE [DISTWIDTH] IN BLOOD BY AUTOMATED COUNT: 12.4 % (ref 11.5–14.5)
GLOBULIN SER CALC-MCNC: 3.8 G/DL (ref 2–4)
GLUCOSE SERPL-MCNC: 157 MG/DL (ref 65–100)
HCT VFR BLD AUTO: 34.8 % (ref 35–47)
HGB BLD-MCNC: 10.9 G/DL (ref 11.5–16)
IMM GRANULOCYTES # BLD AUTO: 0.1 K/UL (ref 0–0.04)
IMM GRANULOCYTES NFR BLD AUTO: 1 % (ref 0–0.5)
LACTATE BLD-SCNC: 1.28 MMOL/L (ref 0.4–2)
LYMPHOCYTES # BLD: 0.5 K/UL (ref 0.8–3.5)
LYMPHOCYTES NFR BLD: 5 % (ref 12–49)
MCH RBC QN AUTO: 28.6 PG (ref 26–34)
MCHC RBC AUTO-ENTMCNC: 31.3 G/DL (ref 30–36.5)
MCV RBC AUTO: 91.3 FL (ref 80–99)
MONOCYTES # BLD: 0.3 K/UL (ref 0–1)
MONOCYTES NFR BLD: 3 % (ref 5–13)
NEUTS SEG # BLD: 9.3 K/UL (ref 1.8–8)
NEUTS SEG NFR BLD: 91 % (ref 32–75)
NRBC # BLD: 0 K/UL (ref 0–0.01)
NRBC BLD-RTO: 0 PER 100 WBC
PERFORMED BY:: NORMAL
PLATELET # BLD AUTO: 220 K/UL (ref 150–400)
POTASSIUM SERPL-SCNC: 4.5 MMOL/L (ref 3.5–5.1)
PROT SERPL-MCNC: 6.9 G/DL (ref 6.4–8.2)
RBC # BLD AUTO: 3.81 M/UL (ref 3.8–5.2)
SODIUM SERPL-SCNC: 136 MMOL/L (ref 136–145)
WBC # BLD AUTO: 10.1 K/UL (ref 3.6–11)

## 2023-12-20 PROCEDURE — 1100000000 HC RM PRIVATE

## 2023-12-20 PROCEDURE — 85025 COMPLETE CBC W/AUTO DIFF WBC: CPT

## 2023-12-20 PROCEDURE — 6370000000 HC RX 637 (ALT 250 FOR IP): Performed by: FAMILY MEDICINE

## 2023-12-20 PROCEDURE — 2700000000 HC OXYGEN THERAPY PER DAY

## 2023-12-20 PROCEDURE — 36415 COLL VENOUS BLD VENIPUNCTURE: CPT

## 2023-12-20 PROCEDURE — 2580000003 HC RX 258: Performed by: FAMILY MEDICINE

## 2023-12-20 PROCEDURE — 94761 N-INVAS EAR/PLS OXIMETRY MLT: CPT

## 2023-12-20 PROCEDURE — 6360000002 HC RX W HCPCS: Performed by: FAMILY MEDICINE

## 2023-12-20 PROCEDURE — 80053 COMPREHEN METABOLIC PANEL: CPT

## 2023-12-20 PROCEDURE — 94640 AIRWAY INHALATION TREATMENT: CPT

## 2023-12-20 RX ADMIN — BUSPIRONE HYDROCHLORIDE 5 MG: 5 TABLET ORAL at 10:41

## 2023-12-20 RX ADMIN — BUSPIRONE HYDROCHLORIDE 5 MG: 5 TABLET ORAL at 22:08

## 2023-12-20 RX ADMIN — METHYLPREDNISOLONE SODIUM SUCCINATE 40 MG: 40 INJECTION INTRAMUSCULAR; INTRAVENOUS at 05:00

## 2023-12-20 RX ADMIN — CEFTRIAXONE SODIUM 1000 MG: 1 INJECTION, POWDER, FOR SOLUTION INTRAMUSCULAR; INTRAVENOUS at 06:45

## 2023-12-20 RX ADMIN — ENOXAPARIN SODIUM 40 MG: 100 INJECTION SUBCUTANEOUS at 10:41

## 2023-12-20 RX ADMIN — CETIRIZINE HYDROCHLORIDE 10 MG: 10 TABLET, FILM COATED ORAL at 10:45

## 2023-12-20 RX ADMIN — AMLODIPINE BESYLATE 10 MG: 5 TABLET ORAL at 10:41

## 2023-12-20 RX ADMIN — METHYLPREDNISOLONE SODIUM SUCCINATE 40 MG: 40 INJECTION INTRAMUSCULAR; INTRAVENOUS at 10:41

## 2023-12-20 RX ADMIN — SERTRALINE 25 MG: 25 TABLET, FILM COATED ORAL at 10:41

## 2023-12-20 RX ADMIN — Medication 2 PUFF: at 08:56

## 2023-12-20 RX ADMIN — CARVEDILOL 6.25 MG: 3.12 TABLET, FILM COATED ORAL at 10:47

## 2023-12-20 RX ADMIN — OSELTAMIVIR PHOSPHATE 75 MG: 75 CAPSULE ORAL at 22:09

## 2023-12-20 RX ADMIN — FUROSEMIDE 40 MG: 40 TABLET ORAL at 10:41

## 2023-12-20 RX ADMIN — METHYLPREDNISOLONE SODIUM SUCCINATE 40 MG: 40 INJECTION INTRAMUSCULAR; INTRAVENOUS at 16:29

## 2023-12-20 RX ADMIN — OSELTAMIVIR PHOSPHATE 75 MG: 75 CAPSULE ORAL at 10:45

## 2023-12-20 RX ADMIN — ASPIRIN 81 MG: 81 TABLET, COATED ORAL at 10:41

## 2023-12-20 RX ADMIN — IPRATROPIUM BROMIDE AND ALBUTEROL SULFATE 1 DOSE: 2.5; .5 SOLUTION RESPIRATORY (INHALATION) at 08:56

## 2023-12-20 RX ADMIN — METHYLPREDNISOLONE SODIUM SUCCINATE 40 MG: 40 INJECTION INTRAMUSCULAR; INTRAVENOUS at 22:07

## 2023-12-20 RX ADMIN — SODIUM CHLORIDE, PRESERVATIVE FREE 10 ML: 5 INJECTION INTRAVENOUS at 10:47

## 2023-12-20 RX ADMIN — GUAIFENESIN 600 MG: 600 TABLET, EXTENDED RELEASE ORAL at 10:41

## 2023-12-20 RX ADMIN — Medication 2 PUFF: at 21:53

## 2023-12-20 RX ADMIN — SODIUM CHLORIDE, PRESERVATIVE FREE 10 ML: 5 INJECTION INTRAVENOUS at 22:10

## 2023-12-20 RX ADMIN — IPRATROPIUM BROMIDE AND ALBUTEROL SULFATE 1 DOSE: 2.5; .5 SOLUTION RESPIRATORY (INHALATION) at 21:53

## 2023-12-20 RX ADMIN — IPRATROPIUM BROMIDE AND ALBUTEROL SULFATE 1 DOSE: 2.5; .5 SOLUTION RESPIRATORY (INHALATION) at 11:03

## 2023-12-20 RX ADMIN — PANTOPRAZOLE SODIUM 40 MG: 40 TABLET, DELAYED RELEASE ORAL at 22:30

## 2023-12-20 RX ADMIN — IPRATROPIUM BROMIDE AND ALBUTEROL SULFATE 1 DOSE: 2.5; .5 SOLUTION RESPIRATORY (INHALATION) at 15:04

## 2023-12-20 RX ADMIN — CARVEDILOL 6.25 MG: 3.12 TABLET, FILM COATED ORAL at 16:29

## 2023-12-20 RX ADMIN — PANTOPRAZOLE SODIUM 40 MG: 40 TABLET, DELAYED RELEASE ORAL at 07:00

## 2023-12-20 RX ADMIN — SODIUM CHLORIDE: 9 INJECTION, SOLUTION INTRAVENOUS at 22:07

## 2023-12-20 RX ADMIN — AZITHROMYCIN MONOHYDRATE 500 MG: 500 INJECTION, POWDER, LYOPHILIZED, FOR SOLUTION INTRAVENOUS at 10:42

## 2023-12-20 RX ADMIN — MONTELUKAST 10 MG: 10 TABLET, FILM COATED ORAL at 10:41

## 2023-12-20 RX ADMIN — GUAIFENESIN 600 MG: 600 TABLET, EXTENDED RELEASE ORAL at 22:08

## 2023-12-21 LAB
ALBUMIN SERPL-MCNC: 3.1 G/DL (ref 3.5–5)
ALBUMIN/GLOB SERPL: 0.8 (ref 1.1–2.2)
ALP SERPL-CCNC: 84 U/L (ref 45–117)
ALT SERPL-CCNC: 24 U/L (ref 12–78)
ANION GAP SERPL CALC-SCNC: 5 MMOL/L (ref 5–15)
AST SERPL W P-5'-P-CCNC: 16 U/L (ref 15–37)
BILIRUB SERPL-MCNC: 0.2 MG/DL (ref 0.2–1)
BUN SERPL-MCNC: 20 MG/DL (ref 6–20)
BUN/CREAT SERPL: 21 (ref 12–20)
CA-I BLD-MCNC: 8.9 MG/DL (ref 8.5–10.1)
CHLORIDE SERPL-SCNC: 107 MMOL/L (ref 97–108)
CO2 SERPL-SCNC: 28 MMOL/L (ref 21–32)
CREAT SERPL-MCNC: 0.97 MG/DL (ref 0.55–1.02)
ERYTHROCYTE [DISTWIDTH] IN BLOOD BY AUTOMATED COUNT: 12.6 % (ref 11.5–14.5)
GLOBULIN SER CALC-MCNC: 3.7 G/DL (ref 2–4)
GLUCOSE BLD STRIP.AUTO-MCNC: 170 MG/DL (ref 65–100)
GLUCOSE SERPL-MCNC: 127 MG/DL (ref 65–100)
HCT VFR BLD AUTO: 35.1 % (ref 35–47)
HGB BLD-MCNC: 11.1 G/DL (ref 11.5–16)
MCH RBC QN AUTO: 28.5 PG (ref 26–34)
MCHC RBC AUTO-ENTMCNC: 31.6 G/DL (ref 30–36.5)
MCV RBC AUTO: 90 FL (ref 80–99)
NRBC # BLD: 0 K/UL (ref 0–0.01)
NRBC BLD-RTO: 0 PER 100 WBC
PERFORMED BY:: ABNORMAL
PLATELET # BLD AUTO: 311 K/UL (ref 150–400)
PMV BLD AUTO: 10.9 FL (ref 8.9–12.9)
POTASSIUM SERPL-SCNC: 4 MMOL/L (ref 3.5–5.1)
PROT SERPL-MCNC: 6.8 G/DL (ref 6.4–8.2)
RBC # BLD AUTO: 3.9 M/UL (ref 3.8–5.2)
SODIUM SERPL-SCNC: 140 MMOL/L (ref 136–145)
WBC # BLD AUTO: 9.3 K/UL (ref 3.6–11)

## 2023-12-21 PROCEDURE — 6370000000 HC RX 637 (ALT 250 FOR IP): Performed by: FAMILY MEDICINE

## 2023-12-21 PROCEDURE — 82962 GLUCOSE BLOOD TEST: CPT

## 2023-12-21 PROCEDURE — 2700000000 HC OXYGEN THERAPY PER DAY

## 2023-12-21 PROCEDURE — 6360000002 HC RX W HCPCS: Performed by: FAMILY MEDICINE

## 2023-12-21 PROCEDURE — 2580000003 HC RX 258: Performed by: FAMILY MEDICINE

## 2023-12-21 PROCEDURE — 94761 N-INVAS EAR/PLS OXIMETRY MLT: CPT

## 2023-12-21 PROCEDURE — 85027 COMPLETE CBC AUTOMATED: CPT

## 2023-12-21 PROCEDURE — 94640 AIRWAY INHALATION TREATMENT: CPT

## 2023-12-21 PROCEDURE — 36415 COLL VENOUS BLD VENIPUNCTURE: CPT

## 2023-12-21 PROCEDURE — 97161 PT EVAL LOW COMPLEX 20 MIN: CPT

## 2023-12-21 PROCEDURE — 1100000000 HC RM PRIVATE

## 2023-12-21 PROCEDURE — 80053 COMPREHEN METABOLIC PANEL: CPT

## 2023-12-21 RX ORDER — INSULIN LISPRO 100 [IU]/ML
0-8 INJECTION, SOLUTION INTRAVENOUS; SUBCUTANEOUS
Status: DISCONTINUED | OUTPATIENT
Start: 2023-12-21 | End: 2023-12-28 | Stop reason: HOSPADM

## 2023-12-21 RX ORDER — INSULIN LISPRO 100 [IU]/ML
0-4 INJECTION, SOLUTION INTRAVENOUS; SUBCUTANEOUS NIGHTLY
Status: DISCONTINUED | OUTPATIENT
Start: 2023-12-21 | End: 2023-12-28 | Stop reason: HOSPADM

## 2023-12-21 RX ADMIN — GUAIFENESIN 600 MG: 600 TABLET, EXTENDED RELEASE ORAL at 22:05

## 2023-12-21 RX ADMIN — METHYLPREDNISOLONE SODIUM SUCCINATE 40 MG: 40 INJECTION INTRAMUSCULAR; INTRAVENOUS at 08:25

## 2023-12-21 RX ADMIN — OSELTAMIVIR PHOSPHATE 75 MG: 75 CAPSULE ORAL at 22:50

## 2023-12-21 RX ADMIN — GUAIFENESIN 600 MG: 600 TABLET, EXTENDED RELEASE ORAL at 08:25

## 2023-12-21 RX ADMIN — IPRATROPIUM BROMIDE AND ALBUTEROL SULFATE 1 DOSE: 2.5; .5 SOLUTION RESPIRATORY (INHALATION) at 11:09

## 2023-12-21 RX ADMIN — ENOXAPARIN SODIUM 40 MG: 100 INJECTION SUBCUTANEOUS at 08:25

## 2023-12-21 RX ADMIN — OSELTAMIVIR PHOSPHATE 75 MG: 75 CAPSULE ORAL at 08:27

## 2023-12-21 RX ADMIN — METHYLPREDNISOLONE SODIUM SUCCINATE 40 MG: 40 INJECTION INTRAMUSCULAR; INTRAVENOUS at 22:05

## 2023-12-21 RX ADMIN — SODIUM CHLORIDE, PRESERVATIVE FREE 10 ML: 5 INJECTION INTRAVENOUS at 08:28

## 2023-12-21 RX ADMIN — SODIUM CHLORIDE, PRESERVATIVE FREE 10 ML: 5 INJECTION INTRAVENOUS at 22:50

## 2023-12-21 RX ADMIN — Medication 2 PUFF: at 19:50

## 2023-12-21 RX ADMIN — SODIUM CHLORIDE: 9 INJECTION, SOLUTION INTRAVENOUS at 12:57

## 2023-12-21 RX ADMIN — IPRATROPIUM BROMIDE AND ALBUTEROL SULFATE 1 DOSE: 2.5; .5 SOLUTION RESPIRATORY (INHALATION) at 16:01

## 2023-12-21 RX ADMIN — CARVEDILOL 6.25 MG: 3.12 TABLET, FILM COATED ORAL at 17:25

## 2023-12-21 RX ADMIN — ASPIRIN 81 MG: 81 TABLET, COATED ORAL at 08:25

## 2023-12-21 RX ADMIN — SERTRALINE 25 MG: 25 TABLET, FILM COATED ORAL at 08:25

## 2023-12-21 RX ADMIN — BUSPIRONE HYDROCHLORIDE 5 MG: 5 TABLET ORAL at 22:51

## 2023-12-21 RX ADMIN — MONTELUKAST 10 MG: 10 TABLET, FILM COATED ORAL at 08:25

## 2023-12-21 RX ADMIN — METHYLPREDNISOLONE SODIUM SUCCINATE 40 MG: 40 INJECTION INTRAMUSCULAR; INTRAVENOUS at 17:25

## 2023-12-21 RX ADMIN — CEFTRIAXONE SODIUM 1000 MG: 1 INJECTION, POWDER, FOR SOLUTION INTRAMUSCULAR; INTRAVENOUS at 06:59

## 2023-12-21 RX ADMIN — FUROSEMIDE 40 MG: 40 TABLET ORAL at 08:25

## 2023-12-21 RX ADMIN — IPRATROPIUM BROMIDE AND ALBUTEROL SULFATE 1 DOSE: 2.5; .5 SOLUTION RESPIRATORY (INHALATION) at 07:56

## 2023-12-21 RX ADMIN — AMLODIPINE BESYLATE 10 MG: 5 TABLET ORAL at 08:25

## 2023-12-21 RX ADMIN — CETIRIZINE HYDROCHLORIDE 10 MG: 10 TABLET, FILM COATED ORAL at 08:25

## 2023-12-21 RX ADMIN — AZITHROMYCIN MONOHYDRATE 500 MG: 500 INJECTION, POWDER, LYOPHILIZED, FOR SOLUTION INTRAVENOUS at 09:39

## 2023-12-21 RX ADMIN — IPRATROPIUM BROMIDE AND ALBUTEROL SULFATE 1 DOSE: 2.5; .5 SOLUTION RESPIRATORY (INHALATION) at 19:50

## 2023-12-21 RX ADMIN — CARVEDILOL 6.25 MG: 3.12 TABLET, FILM COATED ORAL at 08:25

## 2023-12-21 RX ADMIN — Medication 2 PUFF: at 07:56

## 2023-12-21 RX ADMIN — METHYLPREDNISOLONE SODIUM SUCCINATE 40 MG: 40 INJECTION INTRAMUSCULAR; INTRAVENOUS at 06:59

## 2023-12-21 RX ADMIN — BUSPIRONE HYDROCHLORIDE 5 MG: 5 TABLET ORAL at 08:25

## 2023-12-22 ENCOUNTER — APPOINTMENT (OUTPATIENT)
Facility: HOSPITAL | Age: 63
DRG: 140 | End: 2023-12-22
Payer: MEDICAID

## 2023-12-22 LAB
GLUCOSE BLD STRIP.AUTO-MCNC: 115 MG/DL (ref 65–100)
GLUCOSE BLD STRIP.AUTO-MCNC: 120 MG/DL (ref 65–100)
GLUCOSE BLD STRIP.AUTO-MCNC: 134 MG/DL (ref 65–100)
GLUCOSE BLD STRIP.AUTO-MCNC: 156 MG/DL (ref 65–100)
GLUCOSE BLD STRIP.AUTO-MCNC: 217 MG/DL (ref 65–100)
PERFORMED BY:: ABNORMAL

## 2023-12-22 PROCEDURE — 71045 X-RAY EXAM CHEST 1 VIEW: CPT

## 2023-12-22 PROCEDURE — 2580000003 HC RX 258: Performed by: FAMILY MEDICINE

## 2023-12-22 PROCEDURE — 97530 THERAPEUTIC ACTIVITIES: CPT

## 2023-12-22 PROCEDURE — 94640 AIRWAY INHALATION TREATMENT: CPT

## 2023-12-22 PROCEDURE — 6370000000 HC RX 637 (ALT 250 FOR IP): Performed by: INTERNAL MEDICINE

## 2023-12-22 PROCEDURE — 82962 GLUCOSE BLOOD TEST: CPT

## 2023-12-22 PROCEDURE — 6360000002 HC RX W HCPCS: Performed by: FAMILY MEDICINE

## 2023-12-22 PROCEDURE — 6360000002 HC RX W HCPCS: Performed by: INTERNAL MEDICINE

## 2023-12-22 PROCEDURE — 6370000000 HC RX 637 (ALT 250 FOR IP): Performed by: FAMILY MEDICINE

## 2023-12-22 PROCEDURE — 97165 OT EVAL LOW COMPLEX 30 MIN: CPT

## 2023-12-22 PROCEDURE — 94761 N-INVAS EAR/PLS OXIMETRY MLT: CPT

## 2023-12-22 PROCEDURE — 2700000000 HC OXYGEN THERAPY PER DAY

## 2023-12-22 PROCEDURE — 1100000000 HC RM PRIVATE

## 2023-12-22 RX ORDER — GUAIFENESIN/DEXTROMETHORPHAN 100-10MG/5
10 SYRUP ORAL 3 TIMES DAILY
Status: DISCONTINUED | OUTPATIENT
Start: 2023-12-22 | End: 2023-12-28 | Stop reason: HOSPADM

## 2023-12-22 RX ORDER — METHYLPREDNISOLONE SODIUM SUCCINATE 40 MG/ML
40 INJECTION, POWDER, LYOPHILIZED, FOR SOLUTION INTRAMUSCULAR; INTRAVENOUS EVERY 8 HOURS
Status: DISCONTINUED | OUTPATIENT
Start: 2023-12-22 | End: 2023-12-28 | Stop reason: HOSPADM

## 2023-12-22 RX ORDER — IPRATROPIUM BROMIDE AND ALBUTEROL SULFATE 2.5; .5 MG/3ML; MG/3ML
1 SOLUTION RESPIRATORY (INHALATION)
Status: DISCONTINUED | OUTPATIENT
Start: 2023-12-22 | End: 2023-12-28 | Stop reason: HOSPADM

## 2023-12-22 RX ORDER — BENZONATATE 100 MG/1
200 CAPSULE ORAL 3 TIMES DAILY
Status: DISCONTINUED | OUTPATIENT
Start: 2023-12-22 | End: 2023-12-28 | Stop reason: HOSPADM

## 2023-12-22 RX ORDER — BENZONATATE 100 MG/1
100 CAPSULE ORAL 2 TIMES DAILY
Status: DISCONTINUED | OUTPATIENT
Start: 2023-12-22 | End: 2023-12-22

## 2023-12-22 RX ORDER — CODEINE PHOSPHATE AND GUAIFENESIN 10; 100 MG/5ML; MG/5ML
5 SOLUTION ORAL EVERY 4 HOURS PRN
Status: DISCONTINUED | OUTPATIENT
Start: 2023-12-22 | End: 2023-12-28 | Stop reason: HOSPADM

## 2023-12-22 RX ORDER — BUDESONIDE AND FORMOTEROL FUMARATE DIHYDRATE 160; 4.5 UG/1; UG/1
2 AEROSOL RESPIRATORY (INHALATION)
Status: DISCONTINUED | OUTPATIENT
Start: 2023-12-22 | End: 2023-12-28 | Stop reason: HOSPADM

## 2023-12-22 RX ADMIN — METHYLPREDNISOLONE SODIUM SUCCINATE 40 MG: 40 INJECTION INTRAMUSCULAR; INTRAVENOUS at 22:52

## 2023-12-22 RX ADMIN — ENOXAPARIN SODIUM 40 MG: 100 INJECTION SUBCUTANEOUS at 09:48

## 2023-12-22 RX ADMIN — Medication 2 PUFF: at 08:29

## 2023-12-22 RX ADMIN — GUAIFENESIN 600 MG: 600 TABLET, EXTENDED RELEASE ORAL at 22:31

## 2023-12-22 RX ADMIN — OSELTAMIVIR PHOSPHATE 75 MG: 75 CAPSULE ORAL at 09:47

## 2023-12-22 RX ADMIN — SODIUM CHLORIDE, PRESERVATIVE FREE 10 ML: 5 INJECTION INTRAVENOUS at 22:41

## 2023-12-22 RX ADMIN — CETIRIZINE HYDROCHLORIDE 10 MG: 10 TABLET, FILM COATED ORAL at 09:47

## 2023-12-22 RX ADMIN — MONTELUKAST 10 MG: 10 TABLET, FILM COATED ORAL at 09:47

## 2023-12-22 RX ADMIN — INSULIN LISPRO 2 UNITS: 100 INJECTION, SOLUTION INTRAVENOUS; SUBCUTANEOUS at 17:09

## 2023-12-22 RX ADMIN — CARVEDILOL 6.25 MG: 3.12 TABLET, FILM COATED ORAL at 17:09

## 2023-12-22 RX ADMIN — BENZONATATE 100 MG: 100 CAPSULE ORAL at 09:47

## 2023-12-22 RX ADMIN — PANTOPRAZOLE SODIUM 40 MG: 40 TABLET, DELAYED RELEASE ORAL at 07:30

## 2023-12-22 RX ADMIN — SERTRALINE 25 MG: 25 TABLET, FILM COATED ORAL at 09:48

## 2023-12-22 RX ADMIN — Medication 2 PUFF: at 22:07

## 2023-12-22 RX ADMIN — ASPIRIN 81 MG: 81 TABLET, COATED ORAL at 09:47

## 2023-12-22 RX ADMIN — IPRATROPIUM BROMIDE AND ALBUTEROL SULFATE 1 DOSE: 2.5; .5 SOLUTION RESPIRATORY (INHALATION) at 13:31

## 2023-12-22 RX ADMIN — SODIUM CHLORIDE, PRESERVATIVE FREE 10 ML: 5 INJECTION INTRAVENOUS at 09:48

## 2023-12-22 RX ADMIN — GUAIFENESIN SYRUP AND DEXTROMETHORPHAN 10 ML: 100; 10 SYRUP ORAL at 22:31

## 2023-12-22 RX ADMIN — BENZONATATE 200 MG: 100 CAPSULE ORAL at 22:31

## 2023-12-22 RX ADMIN — IPRATROPIUM BROMIDE AND ALBUTEROL SULFATE 1 DOSE: 2.5; .5 SOLUTION RESPIRATORY (INHALATION) at 22:07

## 2023-12-22 RX ADMIN — GUAIFENESIN 600 MG: 600 TABLET, EXTENDED RELEASE ORAL at 07:30

## 2023-12-22 RX ADMIN — OSELTAMIVIR PHOSPHATE 75 MG: 75 CAPSULE ORAL at 22:31

## 2023-12-22 RX ADMIN — IPRATROPIUM BROMIDE AND ALBUTEROL SULFATE 1 DOSE: 2.5; .5 SOLUTION RESPIRATORY (INHALATION) at 08:29

## 2023-12-22 RX ADMIN — CEFTRIAXONE SODIUM 1000 MG: 1 INJECTION, POWDER, FOR SOLUTION INTRAMUSCULAR; INTRAVENOUS at 07:30

## 2023-12-22 RX ADMIN — GUAIFENESIN SYRUP AND DEXTROMETHORPHAN 10 ML: 100; 10 SYRUP ORAL at 17:09

## 2023-12-22 RX ADMIN — FUROSEMIDE 40 MG: 40 TABLET ORAL at 09:47

## 2023-12-22 RX ADMIN — METHYLPREDNISOLONE SODIUM SUCCINATE 40 MG: 40 INJECTION INTRAMUSCULAR; INTRAVENOUS at 12:32

## 2023-12-22 RX ADMIN — BUSPIRONE HYDROCHLORIDE 5 MG: 5 TABLET ORAL at 09:47

## 2023-12-22 RX ADMIN — AMLODIPINE BESYLATE 10 MG: 5 TABLET ORAL at 09:47

## 2023-12-22 RX ADMIN — CARVEDILOL 6.25 MG: 3.12 TABLET, FILM COATED ORAL at 09:47

## 2023-12-22 RX ADMIN — BUSPIRONE HYDROCHLORIDE 5 MG: 5 TABLET ORAL at 22:31

## 2023-12-22 NOTE — CARE COORDINATION
CM reviewed clinical chart, noted PT/OT recommendation of SNF vs. IRF. CM met with patient to get choices. Patient stated that she would not go to SNF, but gave choice for [facility]. Choice letter completed and placed on chart. Referral sent via [employer]. CM will continue to follow.

## 2023-12-23 LAB
ALBUMIN SERPL-MCNC: 3.2 G/DL (ref 3.5–5)
ANION GAP SERPL CALC-SCNC: 5 MMOL/L (ref 5–15)
ARTERIAL PATENCY WRIST A: YES
BASE EXCESS BLDA CALC-SCNC: 6.1 MMOL/L (ref 0–3)
BDY SITE: ABNORMAL
BNP SERPL-MCNC: 60 PG/ML
BUN SERPL-MCNC: 25 MG/DL (ref 6–20)
BUN/CREAT SERPL: 26 (ref 12–20)
CA-I BLD-MCNC: 1.22 MMOL/L (ref 1.13–1.32)
CA-I BLD-MCNC: 9.7 MG/DL (ref 8.5–10.1)
CHLORIDE SERPL-SCNC: 98 MMOL/L (ref 97–108)
CO2 SERPL-SCNC: 34 MMOL/L (ref 21–32)
COHGB MFR BLD: 0.1 % (ref 1–2)
CREAT SERPL-MCNC: 0.98 MG/DL (ref 0.55–1.02)
ERYTHROCYTE [DISTWIDTH] IN BLOOD BY AUTOMATED COUNT: 12.2 % (ref 11.5–14.5)
GAS FLOW.O2 O2 DELIVERY SYS: 2 L/MIN
GLUCOSE BLD STRIP.AUTO-MCNC: 148 MG/DL (ref 65–100)
GLUCOSE BLD STRIP.AUTO-MCNC: 175 MG/DL (ref 65–100)
GLUCOSE BLD STRIP.AUTO-MCNC: 195 MG/DL (ref 65–100)
GLUCOSE BLD STRIP.AUTO-MCNC: 282 MG/DL (ref 65–100)
GLUCOSE SERPL-MCNC: 145 MG/DL (ref 65–100)
HCO3 BLDA-SCNC: 32 MMOL/L (ref 22–26)
HCT VFR BLD AUTO: 37.4 % (ref 35–47)
HGB BLD-MCNC: 12.1 G/DL (ref 11.5–16)
M PNEUMO IGM SER IA-ACNC: NONREACTIVE
MAGNESIUM SERPL-MCNC: 2.3 MG/DL (ref 1.6–2.4)
MCH RBC QN AUTO: 28.4 PG (ref 26–34)
MCHC RBC AUTO-ENTMCNC: 32.4 G/DL (ref 30–36.5)
MCV RBC AUTO: 87.8 FL (ref 80–99)
METHGB MFR BLD: 0.3 % (ref 0–1.4)
NRBC # BLD: 0 K/UL (ref 0–0.01)
NRBC BLD-RTO: 0 PER 100 WBC
OXYHGB MFR BLD: 95.6 % (ref 95–99)
PCO2 BLDA: 51 MMHG (ref 35–45)
PERFORMED BY:: ABNORMAL
PH BLDA: 7.41 (ref 7.35–7.45)
PHOSPHATE SERPL-MCNC: 3.6 MG/DL (ref 2.6–4.7)
PLATELET # BLD AUTO: 330 K/UL (ref 150–400)
PMV BLD AUTO: 10.6 FL (ref 8.9–12.9)
PO2 BLDA: 88 MMHG (ref 80–100)
POTASSIUM SERPL-SCNC: 4 MMOL/L (ref 3.5–5.1)
RBC # BLD AUTO: 4.26 M/UL (ref 3.8–5.2)
SAO2 % BLD: 96 % (ref 95–99)
SAO2% DEVICE SAO2% SENSOR NAME: ABNORMAL
SODIUM SERPL-SCNC: 137 MMOL/L (ref 136–145)
SPECIMEN SITE: ABNORMAL
WBC # BLD AUTO: 10.3 K/UL (ref 3.6–11)

## 2023-12-23 PROCEDURE — 94761 N-INVAS EAR/PLS OXIMETRY MLT: CPT

## 2023-12-23 PROCEDURE — 80069 RENAL FUNCTION PANEL: CPT

## 2023-12-23 PROCEDURE — 85027 COMPLETE CBC AUTOMATED: CPT

## 2023-12-23 PROCEDURE — 82962 GLUCOSE BLOOD TEST: CPT

## 2023-12-23 PROCEDURE — 6370000000 HC RX 637 (ALT 250 FOR IP): Performed by: FAMILY MEDICINE

## 2023-12-23 PROCEDURE — 82803 BLOOD GASES ANY COMBINATION: CPT

## 2023-12-23 PROCEDURE — 82330 ASSAY OF CALCIUM: CPT

## 2023-12-23 PROCEDURE — 2700000000 HC OXYGEN THERAPY PER DAY

## 2023-12-23 PROCEDURE — 6360000002 HC RX W HCPCS: Performed by: INTERNAL MEDICINE

## 2023-12-23 PROCEDURE — 1100000000 HC RM PRIVATE

## 2023-12-23 PROCEDURE — 6370000000 HC RX 637 (ALT 250 FOR IP): Performed by: INTERNAL MEDICINE

## 2023-12-23 PROCEDURE — 2580000003 HC RX 258: Performed by: FAMILY MEDICINE

## 2023-12-23 PROCEDURE — 94640 AIRWAY INHALATION TREATMENT: CPT

## 2023-12-23 PROCEDURE — 36600 WITHDRAWAL OF ARTERIAL BLOOD: CPT

## 2023-12-23 PROCEDURE — 86738 MYCOPLASMA ANTIBODY: CPT

## 2023-12-23 PROCEDURE — 83735 ASSAY OF MAGNESIUM: CPT

## 2023-12-23 PROCEDURE — 83880 ASSAY OF NATRIURETIC PEPTIDE: CPT

## 2023-12-23 PROCEDURE — 36415 COLL VENOUS BLD VENIPUNCTURE: CPT

## 2023-12-23 PROCEDURE — 6360000002 HC RX W HCPCS: Performed by: FAMILY MEDICINE

## 2023-12-23 PROCEDURE — 97116 GAIT TRAINING THERAPY: CPT

## 2023-12-23 RX ORDER — AZITHROMYCIN 500 MG/1
500 TABLET, FILM COATED ORAL DAILY
Status: COMPLETED | OUTPATIENT
Start: 2023-12-23 | End: 2023-12-27

## 2023-12-23 RX ADMIN — INSULIN LISPRO 4 UNITS: 100 INJECTION, SOLUTION INTRAVENOUS; SUBCUTANEOUS at 17:20

## 2023-12-23 RX ADMIN — GUAIFENESIN SYRUP AND DEXTROMETHORPHAN 10 ML: 100; 10 SYRUP ORAL at 09:32

## 2023-12-23 RX ADMIN — GUAIFENESIN 600 MG: 600 TABLET, EXTENDED RELEASE ORAL at 20:43

## 2023-12-23 RX ADMIN — Medication 2 PUFF: at 08:11

## 2023-12-23 RX ADMIN — MONTELUKAST 10 MG: 10 TABLET, FILM COATED ORAL at 09:32

## 2023-12-23 RX ADMIN — BUSPIRONE HYDROCHLORIDE 5 MG: 5 TABLET ORAL at 20:43

## 2023-12-23 RX ADMIN — PANTOPRAZOLE SODIUM 40 MG: 40 TABLET, DELAYED RELEASE ORAL at 05:12

## 2023-12-23 RX ADMIN — GUAIFENESIN SYRUP AND DEXTROMETHORPHAN 10 ML: 100; 10 SYRUP ORAL at 13:14

## 2023-12-23 RX ADMIN — METHYLPREDNISOLONE SODIUM SUCCINATE 40 MG: 40 INJECTION INTRAMUSCULAR; INTRAVENOUS at 05:09

## 2023-12-23 RX ADMIN — CEFTRIAXONE SODIUM 1000 MG: 1 INJECTION, POWDER, FOR SOLUTION INTRAMUSCULAR; INTRAVENOUS at 05:11

## 2023-12-23 RX ADMIN — CETIRIZINE HYDROCHLORIDE 10 MG: 10 TABLET, FILM COATED ORAL at 09:32

## 2023-12-23 RX ADMIN — SODIUM CHLORIDE, PRESERVATIVE FREE 10 ML: 5 INJECTION INTRAVENOUS at 09:33

## 2023-12-23 RX ADMIN — AMLODIPINE BESYLATE 10 MG: 5 TABLET ORAL at 09:32

## 2023-12-23 RX ADMIN — SERTRALINE 25 MG: 25 TABLET, FILM COATED ORAL at 09:32

## 2023-12-23 RX ADMIN — ENOXAPARIN SODIUM 40 MG: 100 INJECTION SUBCUTANEOUS at 09:33

## 2023-12-23 RX ADMIN — OSELTAMIVIR PHOSPHATE 75 MG: 75 CAPSULE ORAL at 09:32

## 2023-12-23 RX ADMIN — ASPIRIN 81 MG: 81 TABLET, COATED ORAL at 09:32

## 2023-12-23 RX ADMIN — AZITHROMYCIN DIHYDRATE 500 MG: 500 TABLET ORAL at 13:14

## 2023-12-23 RX ADMIN — IPRATROPIUM BROMIDE AND ALBUTEROL SULFATE 1 DOSE: 2.5; .5 SOLUTION RESPIRATORY (INHALATION) at 13:29

## 2023-12-23 RX ADMIN — METHYLPREDNISOLONE SODIUM SUCCINATE 40 MG: 40 INJECTION INTRAMUSCULAR; INTRAVENOUS at 20:45

## 2023-12-23 RX ADMIN — BENZONATATE 200 MG: 100 CAPSULE ORAL at 13:14

## 2023-12-23 RX ADMIN — GUAIFENESIN SYRUP AND DEXTROMETHORPHAN 10 ML: 100; 10 SYRUP ORAL at 20:45

## 2023-12-23 RX ADMIN — BUSPIRONE HYDROCHLORIDE 5 MG: 5 TABLET ORAL at 09:32

## 2023-12-23 RX ADMIN — BENZONATATE 200 MG: 100 CAPSULE ORAL at 09:32

## 2023-12-23 RX ADMIN — SODIUM CHLORIDE, PRESERVATIVE FREE 10 ML: 5 INJECTION INTRAVENOUS at 20:46

## 2023-12-23 RX ADMIN — METHYLPREDNISOLONE SODIUM SUCCINATE 40 MG: 40 INJECTION INTRAMUSCULAR; INTRAVENOUS at 13:14

## 2023-12-23 RX ADMIN — OSELTAMIVIR PHOSPHATE 75 MG: 75 CAPSULE ORAL at 20:43

## 2023-12-23 RX ADMIN — IPRATROPIUM BROMIDE AND ALBUTEROL SULFATE 1 DOSE: 2.5; .5 SOLUTION RESPIRATORY (INHALATION) at 19:44

## 2023-12-23 RX ADMIN — CARVEDILOL 6.25 MG: 3.12 TABLET, FILM COATED ORAL at 09:32

## 2023-12-23 RX ADMIN — BENZONATATE 200 MG: 100 CAPSULE ORAL at 20:43

## 2023-12-23 RX ADMIN — Medication 2 PUFF: at 19:45

## 2023-12-23 RX ADMIN — IPRATROPIUM BROMIDE AND ALBUTEROL SULFATE 1 DOSE: 2.5; .5 SOLUTION RESPIRATORY (INHALATION) at 08:11

## 2023-12-23 RX ADMIN — CARVEDILOL 6.25 MG: 3.12 TABLET, FILM COATED ORAL at 17:23

## 2023-12-23 RX ADMIN — GUAIFENESIN 600 MG: 600 TABLET, EXTENDED RELEASE ORAL at 09:32

## 2023-12-24 LAB
ALBUMIN SERPL-MCNC: 3.3 G/DL (ref 3.5–5)
ANION GAP SERPL CALC-SCNC: 4 MMOL/L (ref 5–15)
BACTERIA SPEC CULT: NORMAL
BACTERIA SPEC CULT: NORMAL
BUN SERPL-MCNC: 28 MG/DL (ref 6–20)
BUN/CREAT SERPL: 26 (ref 12–20)
CA-I BLD-MCNC: 9.6 MG/DL (ref 8.5–10.1)
CHLORIDE SERPL-SCNC: 99 MMOL/L (ref 97–108)
CO2 SERPL-SCNC: 34 MMOL/L (ref 21–32)
CREAT SERPL-MCNC: 1.09 MG/DL (ref 0.55–1.02)
ERYTHROCYTE [DISTWIDTH] IN BLOOD BY AUTOMATED COUNT: 12.1 % (ref 11.5–14.5)
GLUCOSE BLD STRIP.AUTO-MCNC: 147 MG/DL (ref 65–100)
GLUCOSE BLD STRIP.AUTO-MCNC: 179 MG/DL (ref 65–100)
GLUCOSE BLD STRIP.AUTO-MCNC: 179 MG/DL (ref 65–100)
GLUCOSE BLD STRIP.AUTO-MCNC: 213 MG/DL (ref 65–100)
GLUCOSE SERPL-MCNC: 174 MG/DL (ref 65–100)
HCT VFR BLD AUTO: 38.6 % (ref 35–47)
HGB BLD-MCNC: 12.6 G/DL (ref 11.5–16)
Lab: NORMAL
Lab: NORMAL
MAGNESIUM SERPL-MCNC: 2.7 MG/DL (ref 1.6–2.4)
MCH RBC QN AUTO: 28.9 PG (ref 26–34)
MCHC RBC AUTO-ENTMCNC: 32.6 G/DL (ref 30–36.5)
MCV RBC AUTO: 88.5 FL (ref 80–99)
NRBC # BLD: 0 K/UL (ref 0–0.01)
NRBC BLD-RTO: 0 PER 100 WBC
PERFORMED BY:: ABNORMAL
PHOSPHATE SERPL-MCNC: 3.4 MG/DL (ref 2.6–4.7)
PLATELET # BLD AUTO: 336 K/UL (ref 150–400)
PMV BLD AUTO: 10.8 FL (ref 8.9–12.9)
POTASSIUM SERPL-SCNC: 4.1 MMOL/L (ref 3.5–5.1)
RBC # BLD AUTO: 4.36 M/UL (ref 3.8–5.2)
SODIUM SERPL-SCNC: 137 MMOL/L (ref 136–145)
WBC # BLD AUTO: 12.5 K/UL (ref 3.6–11)

## 2023-12-24 PROCEDURE — 6370000000 HC RX 637 (ALT 250 FOR IP): Performed by: FAMILY MEDICINE

## 2023-12-24 PROCEDURE — 36415 COLL VENOUS BLD VENIPUNCTURE: CPT

## 2023-12-24 PROCEDURE — 2580000003 HC RX 258: Performed by: FAMILY MEDICINE

## 2023-12-24 PROCEDURE — 6360000002 HC RX W HCPCS: Performed by: INTERNAL MEDICINE

## 2023-12-24 PROCEDURE — 2700000000 HC OXYGEN THERAPY PER DAY

## 2023-12-24 PROCEDURE — 6370000000 HC RX 637 (ALT 250 FOR IP): Performed by: INTERNAL MEDICINE

## 2023-12-24 PROCEDURE — 1100000000 HC RM PRIVATE

## 2023-12-24 PROCEDURE — 80069 RENAL FUNCTION PANEL: CPT

## 2023-12-24 PROCEDURE — 82962 GLUCOSE BLOOD TEST: CPT

## 2023-12-24 PROCEDURE — 94640 AIRWAY INHALATION TREATMENT: CPT

## 2023-12-24 PROCEDURE — 83735 ASSAY OF MAGNESIUM: CPT

## 2023-12-24 PROCEDURE — 85027 COMPLETE CBC AUTOMATED: CPT

## 2023-12-24 PROCEDURE — 6360000002 HC RX W HCPCS: Performed by: FAMILY MEDICINE

## 2023-12-24 PROCEDURE — 94761 N-INVAS EAR/PLS OXIMETRY MLT: CPT

## 2023-12-24 RX ADMIN — CARVEDILOL 6.25 MG: 3.12 TABLET, FILM COATED ORAL at 15:36

## 2023-12-24 RX ADMIN — AMLODIPINE BESYLATE 10 MG: 5 TABLET ORAL at 09:52

## 2023-12-24 RX ADMIN — GUAIFENESIN SYRUP AND DEXTROMETHORPHAN 10 ML: 100; 10 SYRUP ORAL at 20:56

## 2023-12-24 RX ADMIN — BENZONATATE 200 MG: 100 CAPSULE ORAL at 15:36

## 2023-12-24 RX ADMIN — IPRATROPIUM BROMIDE AND ALBUTEROL SULFATE 1 DOSE: 2.5; .5 SOLUTION RESPIRATORY (INHALATION) at 19:55

## 2023-12-24 RX ADMIN — CARVEDILOL 6.25 MG: 3.12 TABLET, FILM COATED ORAL at 09:52

## 2023-12-24 RX ADMIN — SODIUM CHLORIDE, PRESERVATIVE FREE 10 ML: 5 INJECTION INTRAVENOUS at 09:55

## 2023-12-24 RX ADMIN — ACETAMINOPHEN 650 MG: 325 TABLET ORAL at 05:26

## 2023-12-24 RX ADMIN — METHYLPREDNISOLONE SODIUM SUCCINATE 40 MG: 40 INJECTION INTRAMUSCULAR; INTRAVENOUS at 21:28

## 2023-12-24 RX ADMIN — Medication 2 PUFF: at 08:33

## 2023-12-24 RX ADMIN — GUAIFENESIN AND CODEINE PHOSPHATE 5 ML: 100; 10 SOLUTION ORAL at 09:54

## 2023-12-24 RX ADMIN — BUSPIRONE HYDROCHLORIDE 5 MG: 5 TABLET ORAL at 09:53

## 2023-12-24 RX ADMIN — ASPIRIN 81 MG: 81 TABLET, COATED ORAL at 09:52

## 2023-12-24 RX ADMIN — METHYLPREDNISOLONE SODIUM SUCCINATE 40 MG: 40 INJECTION INTRAMUSCULAR; INTRAVENOUS at 14:58

## 2023-12-24 RX ADMIN — BUSPIRONE HYDROCHLORIDE 5 MG: 5 TABLET ORAL at 20:56

## 2023-12-24 RX ADMIN — SERTRALINE 25 MG: 25 TABLET, FILM COATED ORAL at 09:52

## 2023-12-24 RX ADMIN — INSULIN LISPRO 2 UNITS: 100 INJECTION, SOLUTION INTRAVENOUS; SUBCUTANEOUS at 13:00

## 2023-12-24 RX ADMIN — GUAIFENESIN SYRUP AND DEXTROMETHORPHAN 10 ML: 100; 10 SYRUP ORAL at 15:03

## 2023-12-24 RX ADMIN — Medication 2 PUFF: at 19:55

## 2023-12-24 RX ADMIN — SODIUM CHLORIDE, PRESERVATIVE FREE 10 ML: 5 INJECTION INTRAVENOUS at 20:56

## 2023-12-24 RX ADMIN — AZITHROMYCIN DIHYDRATE 500 MG: 500 TABLET ORAL at 09:52

## 2023-12-24 RX ADMIN — METHYLPREDNISOLONE SODIUM SUCCINATE 40 MG: 40 INJECTION INTRAMUSCULAR; INTRAVENOUS at 05:26

## 2023-12-24 RX ADMIN — IPRATROPIUM BROMIDE AND ALBUTEROL SULFATE 1 DOSE: 2.5; .5 SOLUTION RESPIRATORY (INHALATION) at 08:33

## 2023-12-24 RX ADMIN — MONTELUKAST 10 MG: 10 TABLET, FILM COATED ORAL at 09:52

## 2023-12-24 RX ADMIN — BENZONATATE 200 MG: 100 CAPSULE ORAL at 20:56

## 2023-12-24 RX ADMIN — ENOXAPARIN SODIUM 40 MG: 100 INJECTION SUBCUTANEOUS at 09:53

## 2023-12-24 RX ADMIN — GUAIFENESIN 600 MG: 600 TABLET, EXTENDED RELEASE ORAL at 20:56

## 2023-12-24 RX ADMIN — BENZONATATE 200 MG: 100 CAPSULE ORAL at 09:52

## 2023-12-24 RX ADMIN — IPRATROPIUM BROMIDE AND ALBUTEROL SULFATE 1 DOSE: 2.5; .5 SOLUTION RESPIRATORY (INHALATION) at 13:01

## 2023-12-24 RX ADMIN — ACETAMINOPHEN 650 MG: 325 TABLET ORAL at 16:50

## 2023-12-24 RX ADMIN — GUAIFENESIN 600 MG: 600 TABLET, EXTENDED RELEASE ORAL at 09:52

## 2023-12-24 RX ADMIN — GUAIFENESIN AND CODEINE PHOSPHATE 5 ML: 100; 10 SOLUTION ORAL at 09:52

## 2023-12-24 RX ADMIN — PANTOPRAZOLE SODIUM 40 MG: 40 TABLET, DELAYED RELEASE ORAL at 05:26

## 2023-12-25 LAB
ALBUMIN SERPL-MCNC: 3 G/DL (ref 3.5–5)
ANION GAP SERPL CALC-SCNC: 3 MMOL/L (ref 5–15)
BUN SERPL-MCNC: 31 MG/DL (ref 6–20)
BUN/CREAT SERPL: 29 (ref 12–20)
CA-I BLD-MCNC: 9.2 MG/DL (ref 8.5–10.1)
CHLORIDE SERPL-SCNC: 100 MMOL/L (ref 97–108)
CO2 SERPL-SCNC: 33 MMOL/L (ref 21–32)
CREAT SERPL-MCNC: 1.08 MG/DL (ref 0.55–1.02)
ERYTHROCYTE [DISTWIDTH] IN BLOOD BY AUTOMATED COUNT: 12.2 % (ref 11.5–14.5)
GLUCOSE BLD STRIP.AUTO-MCNC: 160 MG/DL (ref 65–100)
GLUCOSE BLD STRIP.AUTO-MCNC: 198 MG/DL (ref 65–100)
GLUCOSE BLD STRIP.AUTO-MCNC: 241 MG/DL (ref 65–100)
GLUCOSE BLD STRIP.AUTO-MCNC: 286 MG/DL (ref 65–100)
GLUCOSE SERPL-MCNC: 163 MG/DL (ref 65–100)
HCT VFR BLD AUTO: 36.3 % (ref 35–47)
HGB BLD-MCNC: 11.9 G/DL (ref 11.5–16)
MAGNESIUM SERPL-MCNC: 2.8 MG/DL (ref 1.6–2.4)
MCH RBC QN AUTO: 28.8 PG (ref 26–34)
MCHC RBC AUTO-ENTMCNC: 32.8 G/DL (ref 30–36.5)
MCV RBC AUTO: 87.9 FL (ref 80–99)
NRBC # BLD: 0 K/UL (ref 0–0.01)
NRBC BLD-RTO: 0 PER 100 WBC
PERFORMED BY:: ABNORMAL
PHOSPHATE SERPL-MCNC: 3.6 MG/DL (ref 2.6–4.7)
PLATELET # BLD AUTO: 307 K/UL (ref 150–400)
PMV BLD AUTO: 10.3 FL (ref 8.9–12.9)
POTASSIUM SERPL-SCNC: 4 MMOL/L (ref 3.5–5.1)
RBC # BLD AUTO: 4.13 M/UL (ref 3.8–5.2)
SODIUM SERPL-SCNC: 136 MMOL/L (ref 136–145)
WBC # BLD AUTO: 14.8 K/UL (ref 3.6–11)

## 2023-12-25 PROCEDURE — 94761 N-INVAS EAR/PLS OXIMETRY MLT: CPT

## 2023-12-25 PROCEDURE — 1100000000 HC RM PRIVATE

## 2023-12-25 PROCEDURE — 6370000000 HC RX 637 (ALT 250 FOR IP): Performed by: FAMILY MEDICINE

## 2023-12-25 PROCEDURE — 82962 GLUCOSE BLOOD TEST: CPT

## 2023-12-25 PROCEDURE — 6370000000 HC RX 637 (ALT 250 FOR IP): Performed by: INTERNAL MEDICINE

## 2023-12-25 PROCEDURE — 6360000002 HC RX W HCPCS: Performed by: INTERNAL MEDICINE

## 2023-12-25 PROCEDURE — 83735 ASSAY OF MAGNESIUM: CPT

## 2023-12-25 PROCEDURE — 94640 AIRWAY INHALATION TREATMENT: CPT

## 2023-12-25 PROCEDURE — 2580000003 HC RX 258: Performed by: FAMILY MEDICINE

## 2023-12-25 PROCEDURE — 2700000000 HC OXYGEN THERAPY PER DAY

## 2023-12-25 PROCEDURE — 36415 COLL VENOUS BLD VENIPUNCTURE: CPT

## 2023-12-25 PROCEDURE — 6360000002 HC RX W HCPCS: Performed by: FAMILY MEDICINE

## 2023-12-25 PROCEDURE — 85027 COMPLETE CBC AUTOMATED: CPT

## 2023-12-25 PROCEDURE — 80069 RENAL FUNCTION PANEL: CPT

## 2023-12-25 RX ORDER — BENZONATATE 200 MG/1
200 CAPSULE ORAL 3 TIMES DAILY
Qty: 21 CAPSULE | Refills: 0 | Status: SHIPPED | OUTPATIENT
Start: 2023-12-25 | End: 2024-01-01

## 2023-12-25 RX ORDER — BUDESONIDE AND FORMOTEROL FUMARATE DIHYDRATE 160; 4.5 UG/1; UG/1
2 AEROSOL RESPIRATORY (INHALATION)
Qty: 10.2 G | Refills: 3 | Status: SHIPPED | OUTPATIENT
Start: 2023-12-25

## 2023-12-25 RX ORDER — PREDNISONE 20 MG/1
20 TABLET ORAL DAILY
Qty: 10 TABLET | Refills: 0 | Status: SHIPPED | OUTPATIENT
Start: 2023-12-25 | End: 2024-01-04

## 2023-12-25 RX ORDER — AZITHROMYCIN 500 MG/1
500 TABLET, FILM COATED ORAL DAILY
Qty: 2 TABLET | Refills: 0 | Status: SHIPPED | OUTPATIENT
Start: 2023-12-26 | End: 2023-12-28

## 2023-12-25 RX ADMIN — MONTELUKAST 10 MG: 10 TABLET, FILM COATED ORAL at 09:09

## 2023-12-25 RX ADMIN — BUSPIRONE HYDROCHLORIDE 5 MG: 5 TABLET ORAL at 20:09

## 2023-12-25 RX ADMIN — IPRATROPIUM BROMIDE AND ALBUTEROL SULFATE 1 DOSE: 2.5; .5 SOLUTION RESPIRATORY (INHALATION) at 07:53

## 2023-12-25 RX ADMIN — BENZONATATE 200 MG: 100 CAPSULE ORAL at 20:09

## 2023-12-25 RX ADMIN — SODIUM CHLORIDE, PRESERVATIVE FREE 10 ML: 5 INJECTION INTRAVENOUS at 09:14

## 2023-12-25 RX ADMIN — Medication 2 PUFF: at 19:28

## 2023-12-25 RX ADMIN — AMLODIPINE BESYLATE 10 MG: 5 TABLET ORAL at 09:09

## 2023-12-25 RX ADMIN — GUAIFENESIN 600 MG: 600 TABLET, EXTENDED RELEASE ORAL at 20:09

## 2023-12-25 RX ADMIN — Medication 2 PUFF: at 07:53

## 2023-12-25 RX ADMIN — CARVEDILOL 6.25 MG: 3.12 TABLET, FILM COATED ORAL at 15:46

## 2023-12-25 RX ADMIN — GUAIFENESIN SYRUP AND DEXTROMETHORPHAN 10 ML: 100; 10 SYRUP ORAL at 09:07

## 2023-12-25 RX ADMIN — BENZONATATE 200 MG: 100 CAPSULE ORAL at 09:08

## 2023-12-25 RX ADMIN — GUAIFENESIN SYRUP AND DEXTROMETHORPHAN 10 ML: 100; 10 SYRUP ORAL at 20:10

## 2023-12-25 RX ADMIN — ENOXAPARIN SODIUM 40 MG: 100 INJECTION SUBCUTANEOUS at 09:10

## 2023-12-25 RX ADMIN — FUROSEMIDE 40 MG: 40 TABLET ORAL at 09:08

## 2023-12-25 RX ADMIN — AZITHROMYCIN DIHYDRATE 500 MG: 500 TABLET ORAL at 09:08

## 2023-12-25 RX ADMIN — IPRATROPIUM BROMIDE AND ALBUTEROL SULFATE 1 DOSE: 2.5; .5 SOLUTION RESPIRATORY (INHALATION) at 19:28

## 2023-12-25 RX ADMIN — CARVEDILOL 6.25 MG: 3.12 TABLET, FILM COATED ORAL at 09:08

## 2023-12-25 RX ADMIN — BENZONATATE 200 MG: 100 CAPSULE ORAL at 15:45

## 2023-12-25 RX ADMIN — PANTOPRAZOLE SODIUM 40 MG: 40 TABLET, DELAYED RELEASE ORAL at 05:19

## 2023-12-25 RX ADMIN — INSULIN LISPRO 2 UNITS: 100 INJECTION, SOLUTION INTRAVENOUS; SUBCUTANEOUS at 09:09

## 2023-12-25 RX ADMIN — SODIUM CHLORIDE, PRESERVATIVE FREE 10 ML: 5 INJECTION INTRAVENOUS at 20:09

## 2023-12-25 RX ADMIN — BUSPIRONE HYDROCHLORIDE 5 MG: 5 TABLET ORAL at 09:08

## 2023-12-25 RX ADMIN — GUAIFENESIN 600 MG: 600 TABLET, EXTENDED RELEASE ORAL at 09:08

## 2023-12-25 RX ADMIN — CETIRIZINE HYDROCHLORIDE 10 MG: 10 TABLET, FILM COATED ORAL at 13:05

## 2023-12-25 RX ADMIN — METHYLPREDNISOLONE SODIUM SUCCINATE 40 MG: 40 INJECTION INTRAMUSCULAR; INTRAVENOUS at 13:05

## 2023-12-25 RX ADMIN — IPRATROPIUM BROMIDE AND ALBUTEROL SULFATE 1 DOSE: 2.5; .5 SOLUTION RESPIRATORY (INHALATION) at 14:49

## 2023-12-25 RX ADMIN — METHYLPREDNISOLONE SODIUM SUCCINATE 40 MG: 40 INJECTION INTRAMUSCULAR; INTRAVENOUS at 04:37

## 2023-12-25 RX ADMIN — GUAIFENESIN SYRUP AND DEXTROMETHORPHAN 10 ML: 100; 10 SYRUP ORAL at 15:45

## 2023-12-25 RX ADMIN — ASPIRIN 81 MG: 81 TABLET, COATED ORAL at 09:08

## 2023-12-25 RX ADMIN — METHYLPREDNISOLONE SODIUM SUCCINATE 40 MG: 40 INJECTION INTRAMUSCULAR; INTRAVENOUS at 20:06

## 2023-12-25 RX ADMIN — SERTRALINE 25 MG: 25 TABLET, FILM COATED ORAL at 09:08

## 2023-12-25 NOTE — DISCHARGE INSTRUCTIONS
Discharge Instructions       PATIENT ID: Pratibha Mary  MRN: [id number]   YOB: 1960    DATE OF ADMISSION: 12/18/2023  DATE OF DISCHARGE: 12/25/2023    PRIMARY CARE PROVIDER: [unfilled]     ATTENDING PHYSICIAN: Vasyl Ryan MD   DISCHARGING PROVIDER: Vasyl Ryan MD    To contact this individual call [phone] and ask the  to page. If unavailable, ask to be transferred the Adult Hospitalist Department. DISCHARGE DIAGNOSES exacerbation of COPD    CONSULTATIONS: [unfilled]      PROCEDURES/SURGERIES: * No surgery found *    PENDING TEST RESULTS:   At the time of discharge the following test results are still pending: None    FOLLOW UP APPOINTMENTS:   Gwen Adorno [address] [place] [address]  [phone]    Schedule an appointment as soon as possible for a visit in 1 week(s)         ADDITIONAL CARE RECOMMENDATIONS: Discharge to skilled care rehab    DIET: Resume previous diet      ACTIVITY: As tolerated    Wound care: {Discharge Wound Care Instructions:25360}    EQUIPMENT needed: ***      DISCHARGE MEDICATIONS:   See Medication Reconciliation Form    It is important that you take the medication exactly as they are prescribed. Keep your medication in the bottles provided by the pharmacist and keep a list of the medication names, dosages, and times to be taken in your wallet. Do not take other medications without consulting your doctor. NOTIFY YOUR PHYSICIAN FOR ANY OF THE FOLLOWING:   Fever over 101 degrees for 24 hours. Chest pain, shortness of breath, fever, chills, nausea, vomiting, diarrhea, change in mentation, falling, weakness, bleeding. Severe pain or pain not relieved by medications. Or, any other signs or symptoms that you may have questions about.       DISPOSITION:    Home With:   OT  PT  HH  RN       SNF/Inpatient Rehab/LTAC    Independent/assisted living    Hospice    Other:         PROBLEM LIST Updated:  Yes ***       Signed:

## 2023-12-25 NOTE — CARE COORDINATION
DC to SNF order noted. Chart reviewed. Pt referral to IRF for consideration. Previous CM note documents ind with ADLs. Will need to confirm appropriate care level with Facility on tues as liaison signed off at 1pm this date due to Amina.

## 2023-12-26 LAB
GLUCOSE BLD STRIP.AUTO-MCNC: 131 MG/DL (ref 65–100)
GLUCOSE BLD STRIP.AUTO-MCNC: 175 MG/DL (ref 65–100)
GLUCOSE BLD STRIP.AUTO-MCNC: 218 MG/DL (ref 65–100)
GLUCOSE BLD STRIP.AUTO-MCNC: 348 MG/DL (ref 65–100)
PERFORMED BY:: ABNORMAL

## 2023-12-26 PROCEDURE — 6370000000 HC RX 637 (ALT 250 FOR IP): Performed by: INTERNAL MEDICINE

## 2023-12-26 PROCEDURE — 94640 AIRWAY INHALATION TREATMENT: CPT

## 2023-12-26 PROCEDURE — 1100000000 HC RM PRIVATE

## 2023-12-26 PROCEDURE — 6360000002 HC RX W HCPCS: Performed by: FAMILY MEDICINE

## 2023-12-26 PROCEDURE — 6360000002 HC RX W HCPCS: Performed by: INTERNAL MEDICINE

## 2023-12-26 PROCEDURE — 2700000000 HC OXYGEN THERAPY PER DAY

## 2023-12-26 PROCEDURE — 2580000003 HC RX 258: Performed by: FAMILY MEDICINE

## 2023-12-26 PROCEDURE — 6370000000 HC RX 637 (ALT 250 FOR IP): Performed by: FAMILY MEDICINE

## 2023-12-26 PROCEDURE — 82962 GLUCOSE BLOOD TEST: CPT

## 2023-12-26 PROCEDURE — 94761 N-INVAS EAR/PLS OXIMETRY MLT: CPT

## 2023-12-26 RX ADMIN — METHYLPREDNISOLONE SODIUM SUCCINATE 40 MG: 40 INJECTION INTRAMUSCULAR; INTRAVENOUS at 11:59

## 2023-12-26 RX ADMIN — CARVEDILOL 6.25 MG: 3.12 TABLET, FILM COATED ORAL at 09:51

## 2023-12-26 RX ADMIN — ERGOCALCIFEROL 50000 UNITS: 1.25 CAPSULE ORAL at 11:59

## 2023-12-26 RX ADMIN — GUAIFENESIN AND CODEINE PHOSPHATE 5 ML: 100; 10 SOLUTION ORAL at 21:00

## 2023-12-26 RX ADMIN — BUSPIRONE HYDROCHLORIDE 5 MG: 5 TABLET ORAL at 20:51

## 2023-12-26 RX ADMIN — AMLODIPINE BESYLATE 10 MG: 5 TABLET ORAL at 09:51

## 2023-12-26 RX ADMIN — Medication 2 PUFF: at 08:50

## 2023-12-26 RX ADMIN — AZITHROMYCIN DIHYDRATE 500 MG: 500 TABLET ORAL at 09:51

## 2023-12-26 RX ADMIN — MONTELUKAST 10 MG: 10 TABLET, FILM COATED ORAL at 09:51

## 2023-12-26 RX ADMIN — ASPIRIN 81 MG: 81 TABLET, COATED ORAL at 09:51

## 2023-12-26 RX ADMIN — METHYLPREDNISOLONE SODIUM SUCCINATE 40 MG: 40 INJECTION INTRAMUSCULAR; INTRAVENOUS at 04:04

## 2023-12-26 RX ADMIN — Medication 2 PUFF: at 19:35

## 2023-12-26 RX ADMIN — SODIUM CHLORIDE, PRESERVATIVE FREE 10 ML: 5 INJECTION INTRAVENOUS at 09:52

## 2023-12-26 RX ADMIN — ENOXAPARIN SODIUM 40 MG: 100 INJECTION SUBCUTANEOUS at 09:50

## 2023-12-26 RX ADMIN — GUAIFENESIN 600 MG: 600 TABLET, EXTENDED RELEASE ORAL at 20:51

## 2023-12-26 RX ADMIN — CARVEDILOL 6.25 MG: 3.12 TABLET, FILM COATED ORAL at 16:53

## 2023-12-26 RX ADMIN — IPRATROPIUM BROMIDE AND ALBUTEROL SULFATE 1 DOSE: 2.5; .5 SOLUTION RESPIRATORY (INHALATION) at 14:15

## 2023-12-26 RX ADMIN — GUAIFENESIN AND CODEINE PHOSPHATE 5 ML: 100; 10 SOLUTION ORAL at 04:10

## 2023-12-26 RX ADMIN — SERTRALINE 25 MG: 25 TABLET, FILM COATED ORAL at 09:51

## 2023-12-26 RX ADMIN — SODIUM CHLORIDE, PRESERVATIVE FREE 10 ML: 5 INJECTION INTRAVENOUS at 21:03

## 2023-12-26 RX ADMIN — METHYLPREDNISOLONE SODIUM SUCCINATE 40 MG: 40 INJECTION INTRAMUSCULAR; INTRAVENOUS at 21:02

## 2023-12-26 RX ADMIN — BUSPIRONE HYDROCHLORIDE 5 MG: 5 TABLET ORAL at 09:51

## 2023-12-26 RX ADMIN — BENZONATATE 200 MG: 100 CAPSULE ORAL at 16:53

## 2023-12-26 RX ADMIN — PANTOPRAZOLE SODIUM 40 MG: 40 TABLET, DELAYED RELEASE ORAL at 05:26

## 2023-12-26 RX ADMIN — IPRATROPIUM BROMIDE AND ALBUTEROL SULFATE 1 DOSE: 2.5; .5 SOLUTION RESPIRATORY (INHALATION) at 19:35

## 2023-12-26 RX ADMIN — CETIRIZINE HYDROCHLORIDE 10 MG: 10 TABLET, FILM COATED ORAL at 09:52

## 2023-12-26 RX ADMIN — BENZONATATE 200 MG: 100 CAPSULE ORAL at 20:51

## 2023-12-26 RX ADMIN — GUAIFENESIN SYRUP AND DEXTROMETHORPHAN 10 ML: 100; 10 SYRUP ORAL at 16:53

## 2023-12-26 RX ADMIN — INSULIN LISPRO 0 UNITS: 100 INJECTION, SOLUTION INTRAVENOUS; SUBCUTANEOUS at 21:03

## 2023-12-26 RX ADMIN — GUAIFENESIN SYRUP AND DEXTROMETHORPHAN 10 ML: 100; 10 SYRUP ORAL at 20:52

## 2023-12-26 RX ADMIN — INSULIN LISPRO 6 UNITS: 100 INJECTION, SOLUTION INTRAVENOUS; SUBCUTANEOUS at 09:50

## 2023-12-26 RX ADMIN — IPRATROPIUM BROMIDE AND ALBUTEROL SULFATE 1 DOSE: 2.5; .5 SOLUTION RESPIRATORY (INHALATION) at 08:51

## 2023-12-26 RX ADMIN — BENZONATATE 200 MG: 100 CAPSULE ORAL at 09:52

## 2023-12-26 RX ADMIN — GUAIFENESIN SYRUP AND DEXTROMETHORPHAN 10 ML: 100; 10 SYRUP ORAL at 09:50

## 2023-12-26 RX ADMIN — GUAIFENESIN 600 MG: 600 TABLET, EXTENDED RELEASE ORAL at 09:52

## 2023-12-26 RX ADMIN — INSULIN LISPRO 2 UNITS: 100 INJECTION, SOLUTION INTRAVENOUS; SUBCUTANEOUS at 12:30

## 2023-12-26 NOTE — CARE COORDINATION
CM reviewed chart and spoke with primary physician and patient. Patient declined at IRF and only has LTC benefits for SNF. CM offered patient [facility] and she stated that she was not ready to leave today because of her respiratory status. She requested that she speak to the primary physician again. CM made primary aware. Physician saw patient and let CM know he was still planning to discharge. CM then spoke with pulmonologist that stated he felt patient should stay another night. CM made primary physician aware.

## 2023-12-27 VITALS
SYSTOLIC BLOOD PRESSURE: 135 MMHG | RESPIRATION RATE: 20 BRPM | HEIGHT: 64 IN | OXYGEN SATURATION: 95 % | WEIGHT: 186 LBS | HEART RATE: 78 BPM | TEMPERATURE: 98.1 F | DIASTOLIC BLOOD PRESSURE: 85 MMHG | BODY MASS INDEX: 31.76 KG/M2

## 2023-12-27 LAB
GLUCOSE BLD STRIP.AUTO-MCNC: 191 MG/DL (ref 65–100)
GLUCOSE BLD STRIP.AUTO-MCNC: 194 MG/DL (ref 65–100)
GLUCOSE BLD STRIP.AUTO-MCNC: 227 MG/DL (ref 65–100)
GLUCOSE BLD STRIP.AUTO-MCNC: 248 MG/DL (ref 65–100)
PERFORMED BY:: ABNORMAL

## 2023-12-27 PROCEDURE — 94761 N-INVAS EAR/PLS OXIMETRY MLT: CPT

## 2023-12-27 PROCEDURE — 6370000000 HC RX 637 (ALT 250 FOR IP): Performed by: INTERNAL MEDICINE

## 2023-12-27 PROCEDURE — 6360000002 HC RX W HCPCS: Performed by: FAMILY MEDICINE

## 2023-12-27 PROCEDURE — 87077 CULTURE AEROBIC IDENTIFY: CPT

## 2023-12-27 PROCEDURE — 2700000000 HC OXYGEN THERAPY PER DAY

## 2023-12-27 PROCEDURE — 87186 SC STD MICRODIL/AGAR DIL: CPT

## 2023-12-27 PROCEDURE — 6370000000 HC RX 637 (ALT 250 FOR IP): Performed by: FAMILY MEDICINE

## 2023-12-27 PROCEDURE — 87070 CULTURE OTHR SPECIMN AEROBIC: CPT

## 2023-12-27 PROCEDURE — 6360000002 HC RX W HCPCS: Performed by: INTERNAL MEDICINE

## 2023-12-27 PROCEDURE — 2580000003 HC RX 258: Performed by: FAMILY MEDICINE

## 2023-12-27 PROCEDURE — 94640 AIRWAY INHALATION TREATMENT: CPT

## 2023-12-27 PROCEDURE — 82962 GLUCOSE BLOOD TEST: CPT

## 2023-12-27 PROCEDURE — 87205 SMEAR GRAM STAIN: CPT

## 2023-12-27 RX ADMIN — IPRATROPIUM BROMIDE AND ALBUTEROL SULFATE 1 DOSE: 2.5; .5 SOLUTION RESPIRATORY (INHALATION) at 13:54

## 2023-12-27 RX ADMIN — MONTELUKAST 10 MG: 10 TABLET, FILM COATED ORAL at 08:34

## 2023-12-27 RX ADMIN — GUAIFENESIN SYRUP AND DEXTROMETHORPHAN 10 ML: 100; 10 SYRUP ORAL at 08:33

## 2023-12-27 RX ADMIN — SERTRALINE 25 MG: 25 TABLET, FILM COATED ORAL at 08:34

## 2023-12-27 RX ADMIN — Medication 2 PUFF: at 07:55

## 2023-12-27 RX ADMIN — AZITHROMYCIN DIHYDRATE 500 MG: 500 TABLET ORAL at 08:33

## 2023-12-27 RX ADMIN — GUAIFENESIN SYRUP AND DEXTROMETHORPHAN 10 ML: 100; 10 SYRUP ORAL at 16:26

## 2023-12-27 RX ADMIN — BENZONATATE 200 MG: 100 CAPSULE ORAL at 20:04

## 2023-12-27 RX ADMIN — IPRATROPIUM BROMIDE AND ALBUTEROL SULFATE 1 DOSE: 2.5; .5 SOLUTION RESPIRATORY (INHALATION) at 07:54

## 2023-12-27 RX ADMIN — CARVEDILOL 6.25 MG: 3.12 TABLET, FILM COATED ORAL at 16:27

## 2023-12-27 RX ADMIN — GUAIFENESIN SYRUP AND DEXTROMETHORPHAN 10 ML: 100; 10 SYRUP ORAL at 20:03

## 2023-12-27 RX ADMIN — ENOXAPARIN SODIUM 40 MG: 100 INJECTION SUBCUTANEOUS at 08:33

## 2023-12-27 RX ADMIN — SODIUM CHLORIDE, PRESERVATIVE FREE 10 ML: 5 INJECTION INTRAVENOUS at 08:35

## 2023-12-27 RX ADMIN — ASPIRIN 81 MG: 81 TABLET, COATED ORAL at 08:34

## 2023-12-27 RX ADMIN — AMLODIPINE BESYLATE 10 MG: 5 TABLET ORAL at 08:34

## 2023-12-27 RX ADMIN — BENZONATATE 200 MG: 100 CAPSULE ORAL at 08:34

## 2023-12-27 RX ADMIN — BUSPIRONE HYDROCHLORIDE 5 MG: 5 TABLET ORAL at 08:34

## 2023-12-27 RX ADMIN — INSULIN LISPRO 2 UNITS: 100 INJECTION, SOLUTION INTRAVENOUS; SUBCUTANEOUS at 08:33

## 2023-12-27 RX ADMIN — GUAIFENESIN 600 MG: 600 TABLET, EXTENDED RELEASE ORAL at 08:34

## 2023-12-27 RX ADMIN — METHYLPREDNISOLONE SODIUM SUCCINATE 40 MG: 40 INJECTION INTRAMUSCULAR; INTRAVENOUS at 05:29

## 2023-12-27 RX ADMIN — BENZONATATE 200 MG: 100 CAPSULE ORAL at 16:27

## 2023-12-27 RX ADMIN — CETIRIZINE HYDROCHLORIDE 10 MG: 10 TABLET, FILM COATED ORAL at 08:34

## 2023-12-27 RX ADMIN — FUROSEMIDE 40 MG: 40 TABLET ORAL at 08:33

## 2023-12-27 RX ADMIN — CARVEDILOL 6.25 MG: 3.12 TABLET, FILM COATED ORAL at 08:33

## 2023-12-27 RX ADMIN — METHYLPREDNISOLONE SODIUM SUCCINATE 40 MG: 40 INJECTION INTRAMUSCULAR; INTRAVENOUS at 12:42

## 2023-12-27 RX ADMIN — PANTOPRAZOLE SODIUM 40 MG: 40 TABLET, DELAYED RELEASE ORAL at 05:29

## 2023-12-27 RX ADMIN — GUAIFENESIN 600 MG: 600 TABLET, EXTENDED RELEASE ORAL at 20:03

## 2023-12-27 RX ADMIN — BUSPIRONE HYDROCHLORIDE 5 MG: 5 TABLET ORAL at 20:04

## 2023-12-27 NOTE — CARE COORDINATION
1149: CM reviewed chart. Choice letter signed, placed on chart, and sent referrals for home health services. No accepting [facility] services. Current disp: home with self-care. CM will continue to follow. 1426: CM noted discharge order. No CM needs. Nurse aware. Transition of Care Plan:    RUR: 11%  Prior Level of Functioning: Independent  Disposition: Home with self-care  If SNF or IPR: Date FOC offered: N/A  Date FOC received: N/A  Accepting facility: N/A  Date authorization started with reference number: N/A  Date authorization received and expires: N/A  Follow up appointments: Per MD  DME needed:   Transportation at discharge:   IM/IMM Medicare/ letter given:   Is patient a [place] and connected with [facility]? If yes, was [employer] transfer form completed and [facility] notified? Caregiver Contact:   Discharge Caregiver contacted prior to discharge? Care Conference needed?    Barriers to discharge:

## 2023-12-27 NOTE — CARE COORDINATION
CM reviewed chart. Choice letter signed, placed on chart, and sent referrals for home health services. [facility]. Current disp: home with self-care. CM will continue to follow.

## 2023-12-27 NOTE — DISCHARGE SUMMARY
Discharge Summary       PATIENT ID: Nalini Ross  MRN: [id number]   YOB: 1960    DATE OF ADMISSION: 12/18/2023   DATE OF DISCHARGE:   PRIMARY CARE PROVIDER: [unfilled]      ATTENDING PHYSICIAN: Kenya Adorno  DISCHARGING PROVIDER: Kenya Adorno      CONSULTATIONS: IP CONSULT TO PULMONOLOGY  IP CONSULT TO RESPIRATORY CARE    PROCEDURES/SURGERIES: * No surgery found *    ADMITTING DIAGNOSES:    Patient Active Problem List    Diagnosis Date Noted    Mass of left chest wall 05/26/2023    COPD exacerbation ([address]) 08/08/2021    Respiratory failure ([address]) 08/08/2021    Hypoxia 12/13/2020    COPD (chronic obstructive pulmonary disease) ([address]) 12/13/2020       DISCHARGE DIAGNOSES / PLAN:      Acute on chronic hypoxic respiratory failure  Acute exacerbation of COPD  Positive for influenza A  Hypertension  Morbid obesity        DISCHARGE MEDICATIONS:     Medication List        START taking these medications      azithromycin 500 MG tablet  Commonly known as: ZITHROMAX  Take 1 tablet by mouth daily for 2 doses  Start taking on: December 26, 2023     benzonatate 200 MG capsule  Commonly known as: TESSALON  Take 1 capsule by mouth in the morning, at noon, and at bedtime for 7 days     budesonide-formoterol 160-4.5 MCG/ACT Aero  Commonly known as: SYMBICORT  Inhale 2 puffs into the lungs in the morning and 2 puffs in the evening. cetirizine 10 MG tablet  Commonly known as: ZYRTEC     predniSONE 20 MG tablet  Commonly known as: DELTASONE  Take 1 tablet by mouth daily for 10 days            CHANGE how you take these medications      guaiFENesin 1200 MG Tb12  What changed: Another medication with the same name was removed. Continue taking this medication, and follow the directions you see here.             CONTINUE taking these medications      amLODIPine 10 MG tablet  Commonly known as: NORVASC     ASPIRIN 81 PO     carvedilol 6.25 MG tablet  Commonly known as: COREG     ergocalciferol 1.25 MG (19865 UT)
awake still having shortness of breath and coughing and congestion     12/22     Patient coming of current persistent cough unable to sleep  Short of breath on exertion     12/23     Patient still coughing  Seen by pulmonologist     12/24     Patient alert awake still coughing congestion    12/25    Patient seen by the physical therapy recommended discharge to SNF care    Patient denies any chest pain or shortness of breath on exertion still cough congestion    Patient reconciliation done time chart patient 35 minutes 50% time spent counseling and coordination of care    12/26    Patient was denied by the IRF/patient can be discharged home  Patient refusing to go home today   working on it    12/27    Patient alert awake denies any chest pain shortness of breath  Patient cleared for discharge/  Waiting pulmonary clearance      Signed:   Jake Childress MD  12/27/2023  12:54 PM
shortness of breath and coughing and congestion     12/22     Patient coming of current persistent cough unable to sleep  Short of breath on exertion     12/23     Patient still coughing  Seen by pulmonologist     12/24     Patient alert awake still coughing congestion    12/25    Patient seen by the physical therapy recommended discharge to SNF care    Patient denies any chest pain or shortness of breath on exertion still cough congestion    Patient reconciliation done time chart patient 35 minutes 50% time spent counseling and coordination of care    12/26    Patient was denied by the IRF/patient can be discharged home  Patient refusing to go home today   working on it      Signed:   Tete Whitehead MD  12/26/2023  12:44 PM

## 2023-12-30 LAB
BACTERIA SPEC CULT: ABNORMAL
BACTERIA SPEC CULT: ABNORMAL
GRAM STN SPEC: ABNORMAL
Lab: ABNORMAL

## 2024-04-18 ENCOUNTER — HOSPITAL ENCOUNTER (EMERGENCY)
Facility: HOSPITAL | Age: 64
Discharge: HOME | End: 2024-04-18
Attending: EMERGENCY MEDICINE
Payer: MEDICAID

## 2024-04-18 ENCOUNTER — APPOINTMENT (OUTPATIENT)
Facility: HOSPITAL | Age: 64
End: 2024-04-18
Payer: MEDICAID

## 2024-04-18 VITALS
RESPIRATION RATE: 18 BRPM | TEMPERATURE: 98.1 F | SYSTOLIC BLOOD PRESSURE: 144 MMHG | BODY MASS INDEX: 33.19 KG/M2 | HEIGHT: 64 IN | WEIGHT: 194.4 LBS | DIASTOLIC BLOOD PRESSURE: 79 MMHG | OXYGEN SATURATION: 96 % | HEART RATE: 73 BPM

## 2024-04-18 DIAGNOSIS — K64.9 HEMORRHOIDS, UNSPECIFIED HEMORRHOID TYPE: Primary | ICD-10-CM

## 2024-04-18 LAB
ABO + RH BLD: NORMAL
ALBUMIN SERPL-MCNC: 3.6 G/DL (ref 3.5–5)
ALBUMIN/GLOB SERPL: 1 (ref 1.1–2.2)
ALP SERPL-CCNC: 98 U/L (ref 45–117)
ALT SERPL-CCNC: 24 U/L (ref 12–78)
ANION GAP SERPL CALC-SCNC: 3 MMOL/L (ref 5–15)
AST SERPL W P-5'-P-CCNC: 15 U/L (ref 15–37)
BASOPHILS # BLD: 0 K/UL (ref 0–0.1)
BASOPHILS NFR BLD: 0 % (ref 0–1)
BILIRUB SERPL-MCNC: 0.2 MG/DL (ref 0.2–1)
BLOOD GROUP ANTIBODIES SERPL: NEGATIVE
BUN SERPL-MCNC: 20 MG/DL (ref 6–20)
BUN/CREAT SERPL: 21 (ref 12–20)
CA-I BLD-MCNC: 9.8 MG/DL (ref 8.5–10.1)
CHLORIDE SERPL-SCNC: 110 MMOL/L (ref 97–108)
CO2 SERPL-SCNC: 30 MMOL/L (ref 21–32)
CREAT SERPL-MCNC: 0.94 MG/DL (ref 0.55–1.02)
DIFFERENTIAL METHOD BLD: ABNORMAL
EOSINOPHIL # BLD: 0 K/UL (ref 0–0.4)
EOSINOPHIL NFR BLD: 0 % (ref 0–7)
ERYTHROCYTE [DISTWIDTH] IN BLOOD BY AUTOMATED COUNT: 12.7 % (ref 11.5–14.5)
GLOBULIN SER CALC-MCNC: 3.7 G/DL (ref 2–4)
GLUCOSE SERPL-MCNC: 101 MG/DL (ref 65–100)
HCT VFR BLD AUTO: 34.2 % (ref 35–47)
HGB BLD-MCNC: 10.8 G/DL (ref 11.5–16)
IMM GRANULOCYTES # BLD AUTO: 0 K/UL (ref 0–0.04)
IMM GRANULOCYTES NFR BLD AUTO: 0 % (ref 0–0.5)
INR PPP: 0.9 (ref 0.9–1.1)
LYMPHOCYTES # BLD: 1 K/UL (ref 0.8–3.5)
LYMPHOCYTES NFR BLD: 13 % (ref 12–49)
MCH RBC QN AUTO: 28.7 PG (ref 26–34)
MCHC RBC AUTO-ENTMCNC: 31.6 G/DL (ref 30–36.5)
MCV RBC AUTO: 91 FL (ref 80–99)
MONOCYTES # BLD: 0.4 K/UL (ref 0–1)
MONOCYTES NFR BLD: 5 % (ref 5–13)
NEUTS SEG # BLD: 6.6 K/UL (ref 1.8–8)
NEUTS SEG NFR BLD: 82 % (ref 32–75)
NRBC # BLD: 0 K/UL (ref 0–0.01)
NRBC BLD-RTO: 0 PER 100 WBC
PLATELET # BLD AUTO: 247 K/UL (ref 150–400)
PMV BLD AUTO: 11.2 FL (ref 8.9–12.9)
POTASSIUM SERPL-SCNC: 3.7 MMOL/L (ref 3.5–5.1)
PROT SERPL-MCNC: 7.3 G/DL (ref 6.4–8.2)
PROTHROMBIN TIME: 12.5 SEC (ref 11.9–14.6)
RBC # BLD AUTO: 3.76 M/UL (ref 3.8–5.2)
SODIUM SERPL-SCNC: 143 MMOL/L (ref 136–145)
SPECIMEN EXP DATE BLD: NORMAL
WBC # BLD AUTO: 8.1 K/UL (ref 3.6–11)

## 2024-04-18 PROCEDURE — 86900 BLOOD TYPING SEROLOGIC ABO: CPT

## 2024-04-18 PROCEDURE — 86850 RBC ANTIBODY SCREEN: CPT

## 2024-04-18 PROCEDURE — 86901 BLOOD TYPING SEROLOGIC RH(D): CPT

## 2024-04-18 PROCEDURE — 80053 COMPREHEN METABOLIC PANEL: CPT

## 2024-04-18 PROCEDURE — 36415 COLL VENOUS BLD VENIPUNCTURE: CPT

## 2024-04-18 PROCEDURE — 6360000004 HC RX CONTRAST MEDICATION: Performed by: EMERGENCY MEDICINE

## 2024-04-18 PROCEDURE — 85610 PROTHROMBIN TIME: CPT

## 2024-04-18 PROCEDURE — 85025 COMPLETE CBC W/AUTO DIFF WBC: CPT

## 2024-04-18 PROCEDURE — 74174 CTA ABD&PLVS W/CONTRAST: CPT

## 2024-04-18 PROCEDURE — 99285 EMERGENCY DEPT VISIT HI MDM: CPT

## 2024-04-18 RX ADMIN — IOPAMIDOL 100 ML: 755 INJECTION, SOLUTION INTRAVENOUS at 16:19

## 2024-04-18 ASSESSMENT — PAIN - FUNCTIONAL ASSESSMENT
PAIN_FUNCTIONAL_ASSESSMENT: 0-10
PAIN_FUNCTIONAL_ASSESSMENT: ACTIVITIES ARE NOT PREVENTED

## 2024-04-18 ASSESSMENT — PAIN DESCRIPTION - PAIN TYPE: TYPE: ACUTE PAIN

## 2024-04-18 ASSESSMENT — PAIN DESCRIPTION - DESCRIPTORS: DESCRIPTORS: SHARP;THROBBING

## 2024-04-18 ASSESSMENT — PAIN DESCRIPTION - LOCATION: LOCATION: RECTUM

## 2024-04-18 ASSESSMENT — PAIN DESCRIPTION - FREQUENCY: FREQUENCY: CONTINUOUS

## 2024-04-18 ASSESSMENT — LIFESTYLE VARIABLES
HOW OFTEN DO YOU HAVE A DRINK CONTAINING ALCOHOL: NEVER
HOW MANY STANDARD DRINKS CONTAINING ALCOHOL DO YOU HAVE ON A TYPICAL DAY: PATIENT DOES NOT DRINK

## 2024-04-18 ASSESSMENT — PAIN SCALES - GENERAL: PAINLEVEL_OUTOF10: 9

## 2024-04-18 NOTE — ED PROVIDER NOTES
[facility] EMERGENCY DEPT  EMERGENCY DEPARTMENT HISTORY AND PHYSICAL EXAM      Date: 4/18/2024  Patient Name: Omayra Cartagena  MRN: [id number]  Birthdate 1960  Date of evaluation: 4/18/2024  Provider: Olivia Barrientos MD   Note Started: 12:56 PM EDT 4/18/24    HISTORY OF PRESENT ILLNESS     Chief Complaint   Patient presents with    Hemorrhoids       History Provided By: Patient    HPI: Omayra Cartagena is a 63 y.o. female brbpr and fatigue, thinks she has cancer    PAST MEDICAL HISTORY   Past Medical History:  Past Medical History:   Diagnosis Date    Anxiety and depression     Breast CA (HCC)     left    Chronic obstructive pulmonary disease (HCC)     Hypertension     Oxygen dependent     2L    Seasonal allergies     Sleep apnea        Past Surgical History:  Past Surgical History:   Procedure Laterality Date    COLONOSCOPY N/A 7/16/2021    . performed by Monica Lopez MD at [facility] ENDOSCOPY    COLONOSCOPY      COLONOSCOPY N/A 7/21/2023    COLONOSCOPY WITH BIOPSY performed by Fawn Montejo MD at [facility] ENDOSCOPY    HYSTERECTOMY (CERVIX STATUS UNKNOWN)      IR ANGXPTA ILIAC W STENT      MASTECTOMY Left     SKIN LESION EXCISION Left 5/26/2023    EXCISION OF LEFT CHEST WALL MASS performed by Fawn Montejo MD at [facility] MAIN OR    UPPER GASTROINTESTINAL ENDOSCOPY N/A 7/21/2023    EGD ESOPHAGOGASTRODUODENOSCOPY performed by Fawn Montejo MD at [facility] ENDOSCOPY       Family History:  Family History   Problem Relation Age of Onset    Cancer Father     Hypertension Mother        Social History:  Social History     Tobacco Use    Smoking status: Former    Smokeless tobacco: Never   Vaping Use    Vaping Use: Never used   Substance Use Topics    Alcohol use: Yes     Comment: occasionally    Drug use: Never       Allergies:  Allergies   Allergen Reactions    Lisinopril Angioedema       PCP: Charlette Adorno MD    Current Meds:   No current facility-administered medications for this encounter.     Current Outpatient

## 2024-04-18 NOTE — DISCHARGE INSTRUCTIONS
Thank you!  Thank you for allowing me to care for you in the emergency department. It is my goal to provide you with excellent care.  Please fill out the survey that will come to you by mail or email since we listen to your feedback!     Below you will find a list of your tests from today's visit.  Should you have any questions, please do not hesitate to call the emergency department.    Labs  Recent Results (from the past 12 hour(s))   CBC with Auto Differential    Collection Time: 04/18/24 12:47 PM   Result Value Ref Range    WBC 8.1 3.6 - 11.0 K/uL    RBC 3.76 (L) 3.80 - 5.20 M/uL    Hemoglobin 10.8 (L) 11.5 - 16.0 g/dL    Hematocrit 34.2 (L) 35.0 - 47.0 %    MCV 91.0 80.0 - 99.0 FL    MCH 28.7 26.0 - 34.0 PG    MCHC 31.6 30.0 - 36.5 g/dL    RDW 12.7 11.5 - 14.5 %    Platelets 247 150 - 400 K/uL    MPV 11.2 8.9 - 12.9 FL    Nucleated RBCs 0.0 0.0  WBC    nRBC 0.00 0.00 - 0.01 K/uL    Neutrophils % 82 (H) 32 - 75 %    Lymphocytes % 13 12 - 49 %    Monocytes % 5 5 - 13 %    Eosinophils % 0 0 - 7 %    Basophils % 0 0 - 1 %    Immature Granulocytes % 0 0 - 0.5 %    Neutrophils Absolute 6.6 1.8 - 8.0 K/UL    Lymphocytes Absolute 1.0 0.8 - 3.5 K/UL    Monocytes Absolute 0.4 0.0 - 1.0 K/UL    Eosinophils Absolute 0.0 0.0 - 0.4 K/UL    Basophils Absolute 0.0 0.0 - 0.1 K/UL    Immature Granulocytes Absolute 0.0 0.00 - 0.04 K/UL    Differential Type AUTOMATED     TYPE AND SCREEN    Collection Time: 04/18/24  1:33 PM   Result Value Ref Range    Crossmatch expiration date 04/21/2024,1050     ABO/Rh B Positive     Antibody Screen Negative    Protime-INR    Collection Time: 04/18/24  1:33 PM   Result Value Ref Range    Protime 12.5 11.9 - 14.6 sec    INR 0.9 0.9 - 1.1     Comprehensive Metabolic Panel    Collection Time: 04/18/24  1:33 PM   Result Value Ref Range    Sodium 143 136 - 145 mmol/L    Potassium 3.7 3.5 - 5.1 mmol/L    Chloride 110 (H) 97 - 108 mmol/L    CO2 30 21 - 32 mmol/L    Anion Gap 3 (L) 5 - 15

## 2024-04-18 NOTE — ED TRIAGE NOTES
Patient here c/o Hemorrhoids \" I think I have colon cancer\". Having issues with bright red blood.

## 2024-05-29 NOTE — PROGRESS NOTES
Discharge plan of care/case management plan validated with provider discharge order. Pt left home in private vehicle.  IV removed same name as above

## 2024-07-05 NOTE — PERIOP NOTE
Attempted to reach pt for PAT for upcoming procedure on 7/11/2024 at [phone]. No answer- detailed VM left in regards to arrival time of 1000, prep instructions, and need for a ride home post procedure.

## 2024-07-11 ENCOUNTER — HOSPITAL ENCOUNTER (OUTPATIENT)
Facility: HOSPITAL | Age: 64
Setting detail: OUTPATIENT SURGERY
Discharge: HOME | End: 2024-07-11
Attending: INTERNAL MEDICINE | Admitting: INTERNAL MEDICINE
Payer: MEDICAID

## 2024-07-11 ENCOUNTER — ANESTHESIA EVENT (OUTPATIENT)
Facility: HOSPITAL | Age: 64
End: 2024-07-11
Payer: MEDICAID

## 2024-07-11 ENCOUNTER — ANESTHESIA (OUTPATIENT)
Facility: HOSPITAL | Age: 64
End: 2024-07-11
Payer: MEDICAID

## 2024-07-11 VITALS
RESPIRATION RATE: 18 BRPM | HEART RATE: 85 BPM | TEMPERATURE: 98 F | OXYGEN SATURATION: 96 % | WEIGHT: 193 LBS | SYSTOLIC BLOOD PRESSURE: 143 MMHG | HEIGHT: 64 IN | DIASTOLIC BLOOD PRESSURE: 68 MMHG | BODY MASS INDEX: 32.95 KG/M2

## 2024-07-11 DIAGNOSIS — K62.89 RECTAL PAIN: ICD-10-CM

## 2024-07-11 DIAGNOSIS — K62.5 RECTAL BLEEDING: ICD-10-CM

## 2024-07-11 DIAGNOSIS — K62.5 HEMORRHAGE OF RECTUM AND ANUS: ICD-10-CM

## 2024-07-11 DIAGNOSIS — R10.9 ABDOMINAL PAIN, UNSPECIFIED ABDOMINAL LOCATION: ICD-10-CM

## 2024-07-11 PROCEDURE — 7100000010 HC PHASE II RECOVERY - FIRST 15 MIN: Performed by: INTERNAL MEDICINE

## 2024-07-11 PROCEDURE — 2720000010 HC SURG SUPPLY STERILE: Performed by: INTERNAL MEDICINE

## 2024-07-11 PROCEDURE — 2580000003 HC RX 258: Performed by: INTERNAL MEDICINE

## 2024-07-11 PROCEDURE — 7100000011 HC PHASE II RECOVERY - ADDTL 15 MIN: Performed by: INTERNAL MEDICINE

## 2024-07-11 PROCEDURE — 88305 TISSUE EXAM BY PATHOLOGIST: CPT

## 2024-07-11 PROCEDURE — 3700000000 HC ANESTHESIA ATTENDED CARE: Performed by: INTERNAL MEDICINE

## 2024-07-11 PROCEDURE — 2500000003 HC RX 250 WO HCPCS: Performed by: NURSE PRACTITIONER

## 2024-07-11 PROCEDURE — 3600007513: Performed by: INTERNAL MEDICINE

## 2024-07-11 PROCEDURE — 3700000001 HC ADD 15 MINUTES (ANESTHESIA): Performed by: INTERNAL MEDICINE

## 2024-07-11 PROCEDURE — 6360000002 HC RX W HCPCS: Performed by: NURSE PRACTITIONER

## 2024-07-11 PROCEDURE — 2709999900 HC NON-CHARGEABLE SUPPLY: Performed by: INTERNAL MEDICINE

## 2024-07-11 PROCEDURE — 3600007503: Performed by: INTERNAL MEDICINE

## 2024-07-11 RX ORDER — PROPOFOL 10 MG/ML
INJECTION, EMULSION INTRAVENOUS PRN
Status: DISCONTINUED | OUTPATIENT
Start: 2024-07-11 | End: 2024-07-11 | Stop reason: SDUPTHER

## 2024-07-11 RX ORDER — SODIUM CHLORIDE 9 MG/ML
INJECTION, SOLUTION INTRAVENOUS CONTINUOUS
Status: DISCONTINUED | OUTPATIENT
Start: 2024-07-11 | End: 2024-07-11 | Stop reason: HOSPADM

## 2024-07-11 RX ORDER — SODIUM CHLORIDE, SODIUM LACTATE, POTASSIUM CHLORIDE, CALCIUM CHLORIDE 600; 310; 30; 20 MG/100ML; MG/100ML; MG/100ML; MG/100ML
INJECTION, SOLUTION INTRAVENOUS CONTINUOUS
Status: DISCONTINUED | OUTPATIENT
Start: 2024-07-11 | End: 2024-07-11 | Stop reason: HOSPADM

## 2024-07-11 RX ORDER — LIDOCAINE HYDROCHLORIDE 20 MG/ML
INJECTION, SOLUTION EPIDURAL; INFILTRATION; INTRACAUDAL; PERINEURAL PRN
Status: DISCONTINUED | OUTPATIENT
Start: 2024-07-11 | End: 2024-07-11 | Stop reason: SDUPTHER

## 2024-07-11 RX ORDER — FENTANYL CITRATE 50 UG/ML
INJECTION, SOLUTION INTRAMUSCULAR; INTRAVENOUS PRN
Status: DISCONTINUED | OUTPATIENT
Start: 2024-07-11 | End: 2024-07-11 | Stop reason: SDUPTHER

## 2024-07-11 RX ADMIN — PROPOFOL 50 MG: 10 INJECTION, EMULSION INTRAVENOUS at 12:23

## 2024-07-11 RX ADMIN — PROPOFOL 50 MG: 10 INJECTION, EMULSION INTRAVENOUS at 12:28

## 2024-07-11 RX ADMIN — FENTANYL CITRATE 25 MCG: 50 INJECTION, SOLUTION INTRAMUSCULAR; INTRAVENOUS at 12:20

## 2024-07-11 RX ADMIN — SODIUM CHLORIDE, POTASSIUM CHLORIDE, SODIUM LACTATE AND CALCIUM CHLORIDE: 600; 310; 30; 20 INJECTION, SOLUTION INTRAVENOUS at 12:09

## 2024-07-11 RX ADMIN — PROPOFOL 50 MG: 10 INJECTION, EMULSION INTRAVENOUS at 12:38

## 2024-07-11 RX ADMIN — FENTANYL CITRATE 25 MCG: 50 INJECTION, SOLUTION INTRAMUSCULAR; INTRAVENOUS at 12:29

## 2024-07-11 RX ADMIN — FENTANYL CITRATE 25 MCG: 50 INJECTION, SOLUTION INTRAMUSCULAR; INTRAVENOUS at 12:38

## 2024-07-11 RX ADMIN — FENTANYL CITRATE 25 MCG: 50 INJECTION, SOLUTION INTRAMUSCULAR; INTRAVENOUS at 12:18

## 2024-07-11 RX ADMIN — PROPOFOL 30 MG: 10 INJECTION, EMULSION INTRAVENOUS at 12:20

## 2024-07-11 RX ADMIN — PROPOFOL 50 MG: 10 INJECTION, EMULSION INTRAVENOUS at 12:42

## 2024-07-11 RX ADMIN — LIDOCAINE HYDROCHLORIDE 100 MG: 20 INJECTION, SOLUTION EPIDURAL; INFILTRATION; INTRACAUDAL; PERINEURAL at 12:16

## 2024-07-11 RX ADMIN — PROPOFOL 70 MG: 10 INJECTION, EMULSION INTRAVENOUS at 12:18

## 2024-07-11 RX ADMIN — PROPOFOL 50 MG: 10 INJECTION, EMULSION INTRAVENOUS at 12:35

## 2024-07-11 ASSESSMENT — PAIN - FUNCTIONAL ASSESSMENT
PAIN_FUNCTIONAL_ASSESSMENT: 0-10

## 2024-07-11 NOTE — ANESTHESIA POSTPROCEDURE EVALUATION
Department of Anesthesiology  Postprocedure Note    Patient: Omayra Cartagena  MRN: [id number]  YOB: 1960  Date of evaluation: 7/11/2024    Procedure Summary       Date: 07/11/24 Room / Location: [facility] ENDO 03 / [facility] ENDOSCOPY    Anesthesia Start: 1208 Anesthesia Stop: 1246    Procedures:       COLONOSCOPY DIAGNOSTIC      HEMORRHOID BANDING Diagnosis:       Abdominal pain, unspecified abdominal location      Hemorrhage of rectum and anus      Rectal bleeding      Rectal pain      (Abdominal pain, unspecified abdominal location [R10.9])      (Hemorrhage of rectum and anus [K62.5])      (Rectal bleeding [K62.5])      (Rectal pain [K62.89])    Surgeons: Bhavana Donahue MD Responsible Provider: Ja Mora Jr., MD    Anesthesia Type: MAC ASA Status: 3            Anesthesia Type: MAC    Royal Phase I: Royal Score: 10    Royal Phase II:      Anesthesia Post Evaluation    Patient location during evaluation: bedside (Endoscopy Unit)  Patient participation: complete - patient participated  Level of consciousness: sleepy but conscious  Pain score: 0  Airway patency: patent  Nausea & Vomiting: no nausea and no vomiting  Cardiovascular status: hemodynamically stable  Respiratory status: acceptable  Hydration status: stable  Comments: This patient remained on the stretcher.  The patient was handed off to the endoscopy nursing team.  All questions regarding pre-, intra-, and postoperative care were answered.  Multimodal analgesia pain management approach    No notable events documented.

## 2024-07-11 NOTE — ANESTHESIA PRE PROCEDURE
Department of Anesthesiology  Preprocedure Note       Name:  Omayra Cartagena   Age:  64 y.o.  :  1960                                          MRN:  [id number]         Date:  2024      Surgeon: Surgeon(s):  Bhavana Donahue MD    Procedure: Procedure(s):  COLONOSCOPY DIAGNOSTIC  HEMORRHOID BANDING    Medications prior to admission:   Prior to Admission medications    Medication Sig Start Date End Date Taking? Authorizing Provider   budesonide-formoterol (SYMBICORT) 160-4.5 MCG/ACT AERO Inhale 2 puffs into the lungs in the morning and 2 puffs in the evening. 23   Charlette Adorno MD   ASPIRIN 81 PO Take 81 mg by mouth daily    Provider, MD Ahsan   amLODIPine (NORVASC) 10 MG tablet Take 1 tablet by mouth daily    Automatic Reconciliation, Ar   carvedilol (COREG) 6.25 MG tablet Take 1 tablet by mouth 2 times daily (with meals) 21   Automatic Reconciliation, Ar   cetirizine (ZYRTEC) 10 MG tablet Take 1 tablet by mouth daily 12/3/22   Automatic Reconciliation, Ar   ergocalciferol (ERGOCALCIFEROL) 1.25 MG (83498 UT) capsule Take 1 capsule by mouth every 7 days    Automatic Reconciliation, Ar   fluticasone (FLONASE) 50 MCG/ACT nasal spray 2 sprays by Nasal route daily    Automatic Reconciliation, Ar   furosemide (LASIX) 40 MG tablet Lasix 40 mg daily 21   Automatic Reconciliation, Ar   guaiFENesin 1200 MG TB12 Take 1,200 mg by mouth in the morning and 1,200 mg in the evening. 22   Automatic Reconciliation, Ar   ipratropium-albuterol (DUONEB) 0.5-2.5 (3) MG/3ML SOLN nebulizer solution Inhale 3 mLs into the lungs every 6 hours as needed 20   Automatic Reconciliation, Ar   montelukast (SINGULAIR) 10 MG tablet Take 1 tablet by mouth daily    Automatic Reconciliation, Ar   pantoprazole (PROTONIX) 40 MG tablet Take 1 tablet by mouth every morning (before breakfast) 21   Automatic Reconciliation, Ar   sertraline (ZOLOFT) 25 MG tablet Take 1 tablet by mouth daily 22   Automatic

## 2024-07-16 NOTE — PERIOP NOTE
Dr. Nikita Norman called verified he has reviewed patient's x-ray results. Made aware her pain is now a 5 out of 10 so improving. Dr. Nikita Norman stated to observe the patient for another hour then will reassess prior to discharge. Will continue to monitor. done

## 2024-08-30 ENCOUNTER — TRANSCRIBE ORDERS (OUTPATIENT)
Facility: HOSPITAL | Age: 64
End: 2024-08-30

## 2024-08-30 DIAGNOSIS — R22.2 MASS IN CHEST: Primary | ICD-10-CM

## 2024-09-25 ENCOUNTER — HOSPITAL ENCOUNTER (OUTPATIENT)
Facility: HOSPITAL | Age: 64
Discharge: HOME | End: 2024-09-28
Attending: FAMILY MEDICINE
Payer: MEDICAID

## 2024-09-25 DIAGNOSIS — Z85.3 PERSONAL HISTORY OF MALIGNANT NEOPLASM OF BREAST: ICD-10-CM

## 2024-09-25 DIAGNOSIS — Z90.12 STATUS POST LEFT MASTECTOMY: ICD-10-CM

## 2024-09-25 PROCEDURE — G0279 TOMOSYNTHESIS, MAMMO: HCPCS

## 2024-09-25 PROCEDURE — 76642 ULTRASOUND BREAST LIMITED: CPT

## 2024-10-07 ENCOUNTER — HOSPITAL ENCOUNTER (OUTPATIENT)
Facility: HOSPITAL | Age: 64
Discharge: HOME | End: 2024-10-10
Attending: FAMILY MEDICINE
Payer: MEDICAID

## 2024-10-07 ENCOUNTER — TRANSCRIBE ORDERS (OUTPATIENT)
Facility: HOSPITAL | Age: 64
End: 2024-10-07

## 2024-10-07 DIAGNOSIS — N18.30 OTHER SPECIFIED DIABETES MELLITUS WITH STAGE 3 CHRONIC KIDNEY DISEASE, UNSPECIFIED WHETHER LONG TERM INSULIN USE, UNSPECIFIED WHETHER STAGE 3A OR 3B CKD (HCC): ICD-10-CM

## 2024-10-07 DIAGNOSIS — R06.02 SHORTNESS OF BREATH: ICD-10-CM

## 2024-10-07 DIAGNOSIS — E13.22 OTHER SPECIFIED DIABETES MELLITUS WITH STAGE 3 CHRONIC KIDNEY DISEASE, UNSPECIFIED WHETHER LONG TERM INSULIN USE, UNSPECIFIED WHETHER STAGE 3A OR 3B CKD (HCC): ICD-10-CM

## 2024-10-07 DIAGNOSIS — N18.30 OTHER SPECIFIED DIABETES MELLITUS WITH STAGE 3 CHRONIC KIDNEY DISEASE, UNSPECIFIED WHETHER LONG TERM INSULIN USE, UNSPECIFIED WHETHER STAGE 3A OR 3B CKD (HCC): Primary | ICD-10-CM

## 2024-10-07 DIAGNOSIS — R06.02 SHORTNESS OF BREATH: Primary | ICD-10-CM

## 2024-10-07 DIAGNOSIS — R22.2 MASS IN CHEST: ICD-10-CM

## 2024-10-07 DIAGNOSIS — E13.22 OTHER SPECIFIED DIABETES MELLITUS WITH STAGE 3 CHRONIC KIDNEY DISEASE, UNSPECIFIED WHETHER LONG TERM INSULIN USE, UNSPECIFIED WHETHER STAGE 3A OR 3B CKD (HCC): Primary | ICD-10-CM

## 2024-10-07 LAB — CREAT BLD-MCNC: 1.1 MG/DL (ref 0.6–1.3)

## 2024-10-07 PROCEDURE — 82565 ASSAY OF CREATININE: CPT

## 2024-10-07 PROCEDURE — 6360000004 HC RX CONTRAST MEDICATION: Performed by: FAMILY MEDICINE

## 2024-10-07 PROCEDURE — 71046 X-RAY EXAM CHEST 2 VIEWS: CPT

## 2024-10-07 PROCEDURE — 71260 CT THORAX DX C+: CPT

## 2024-10-07 RX ORDER — IOPAMIDOL 755 MG/ML
100 INJECTION, SOLUTION INTRAVASCULAR
Status: COMPLETED | OUTPATIENT
Start: 2024-10-07 | End: 2024-10-07

## 2024-10-07 RX ADMIN — IOPAMIDOL 100 ML: 755 INJECTION, SOLUTION INTRAVENOUS at 16:12

## 2024-10-29 ENCOUNTER — TRANSCRIBE ORDERS (OUTPATIENT)
Facility: HOSPITAL | Age: 64
End: 2024-10-29

## 2024-10-29 DIAGNOSIS — R22.2 MASS IN CHEST: Primary | ICD-10-CM

## (undated) DEVICE — CANNULA NASAL ADULT 10FT ETCO2/CO2 VENTFLO

## (undated) DEVICE — GARMENT,MEDLINE,DVT,INT,CALF,MED, GEN2: Brand: MEDLINE

## (undated) DEVICE — MASK ANES INF SZ 2 PREM TAIL VLV INFL PRT UNSCENTED SGL PT

## (undated) DEVICE — BLADE,CARBON-STEEL,15,STRL,DISPOSABLE,TB: Brand: MEDLINE

## (undated) DEVICE — SYRINGE MED 10ML LUERLOCK TIP W/O SFTY DISP

## (undated) DEVICE — MASK 14X6.5MM O2 PVC ADLT ADPT -- 2 ENTRY PORT ELAS STRP NSE CL

## (undated) DEVICE — ELECTRODE PT RET AD L9FT HI MOIST COND ADH HYDRGEL CORDED

## (undated) DEVICE — MOUTHPIECE ENDOSCP L CTRL OPN AND SIDE PORTS DISP

## (undated) DEVICE — LIGATOR ENDOSCP L2.8MM DIA8.6-11.5MM MULT BND SPEEDBAND

## (undated) DEVICE — SUTURE VCRL + SZ 3-0 L36IN ABSRB UD L36MM CT-1 1/2 CIR VCP944H

## (undated) DEVICE — ENDOSCOPIC KIT 1.1+ DE BOWL

## (undated) DEVICE — HYPODERMIC SAFETY NEEDLE: Brand: MONOJECT

## (undated) DEVICE — MASK O2 MED AD 7 FT 3 IN 1 W/ STD CONN LTX

## (undated) DEVICE — FCPS RAD JAW 4LC 240CM W/NDL -- BX/20 RADIAL JAW 4

## (undated) DEVICE — GLOVE SURG SZ 7 L12IN FNGR THK79MIL GRN LTX FREE

## (undated) DEVICE — CANNULA NSL O2 AD 7 FT END-TIDAL CARBON DIOX VENTFLO

## (undated) DEVICE — FORCEPS BX L240CM JAW DIA2.4MM ORNG L CAP W/ NDL DISP RAD

## (undated) DEVICE — MINOR GENERAL PACK: Brand: MEDLINE INDUSTRIES, INC.

## (undated) DEVICE — SOLUTION IRRIG 500ML 0.9% SOD CHLO USP POUR PLAS BTL

## (undated) DEVICE — FORCEPS BX L240CM JAW DIA2.8MM L CAP W/ NDL MIC MESH TOOTH

## (undated) DEVICE — SOUTHSIDE TURNOVER: Brand: MEDLINE INDUSTRIES, INC.

## (undated) DEVICE — LINE SAMP LNG AD ORAL NSL PT O2 TBNG SMRT CAPNOLN H +

## (undated) DEVICE — SUTURE ETHLN SZ 10-0 L8IN NONABSORBABLE BLK CSM-6 L4.3MM 9006G

## (undated) DEVICE — SYRINGE IRRIG 60ML SFT PLIABLE BLB EZ TO GRP 1 HND USE W/

## (undated) DEVICE — KIT PRECLEANING BS ENDOSCP

## (undated) DEVICE — GLOVE ORANGE PI 7   MSG9070

## (undated) DEVICE — APPLICATOR MEDICATED 26 CC SOLUTION HI LT ORNG CHLORAPREP

## (undated) DEVICE — THE ENDO CARRY-ON PROCEDURE KIT CONTAINS ALL OF THE SUPPLIES AND INFECTION PREVENTION PRODUCTS NEEDED FOR ENDOSCOPIC PROCEDURES: Brand: ENDO CARRY-ON PROCEDURE KIT